# Patient Record
Sex: FEMALE | Race: WHITE | Employment: OTHER | URBAN - METROPOLITAN AREA
[De-identification: names, ages, dates, MRNs, and addresses within clinical notes are randomized per-mention and may not be internally consistent; named-entity substitution may affect disease eponyms.]

---

## 2017-05-03 ENCOUNTER — HOSPITAL ENCOUNTER (OUTPATIENT)
Dept: GENERAL RADIOLOGY | Age: 72
Discharge: HOME | End: 2017-05-03
Attending: NEUROLOGICAL SURGERY | Admitting: NEUROLOGICAL SURGERY

## 2017-05-03 DIAGNOSIS — M54.2 CERVICALGIA: ICD-10-CM

## 2017-05-03 DIAGNOSIS — M54.2 NECK PAIN: ICD-10-CM

## 2017-05-17 ENCOUNTER — HOSPITAL ENCOUNTER (OUTPATIENT)
Dept: MAMMOGRAPHY | Age: 72
Discharge: HOME | End: 2017-05-17
Attending: INTERNAL MEDICINE | Admitting: INTERNAL MEDICINE

## 2017-05-17 DIAGNOSIS — R92.8 ABNORMAL MAMMOGRAM, UNSPECIFIED: ICD-10-CM

## 2017-05-17 DIAGNOSIS — R92.8 OTHER ABNORMAL AND INCONCLUSIVE FINDINGS ON DIAGNOSTIC IMAGING OF BREAST: ICD-10-CM

## 2017-05-22 RX ORDER — NITROGLYCERIN 0.4 MG/1
0.4 TABLET SUBLINGUAL EVERY 5 MIN PRN
Qty: 25 TABLET | Refills: 3 | Status: SHIPPED | OUTPATIENT
Start: 2017-05-22 | End: 2018-03-07 | Stop reason: SDUPTHER

## 2017-06-20 LAB
ALBUMIN SERPL-MCNC: 3.8 G/DL
ALP BLD-CCNC: 64 U/L
ALT SERPL-CCNC: 29 U/L
AST SERPL-CCNC: 20 U/L
BILIRUB SERPL-MCNC: 0.5 MG/DL (ref 0.1–1.4)
BUN BLDV-MCNC: 18 MG/DL
CALCIUM SERPL-MCNC: 8.9 MG/DL
CHLORIDE BLD-SCNC: 103 MMOL/L
CO2: 23 MMOL/L
CREAT SERPL-MCNC: 1.14 MG/DL
GFR CALCULATED: 47
GLUCOSE BLD-MCNC: 101 MG/DL
POTASSIUM SERPL-SCNC: 4.5 MMOL/L
SODIUM BLD-SCNC: 138 MMOL/L
TOTAL PROTEIN: 6.2

## 2017-07-06 ENCOUNTER — OFFICE VISIT (OUTPATIENT)
Dept: CARDIOLOGY CLINIC | Age: 72
End: 2017-07-06

## 2017-07-06 VITALS
HEIGHT: 63 IN | SYSTOLIC BLOOD PRESSURE: 132 MMHG | WEIGHT: 220 LBS | DIASTOLIC BLOOD PRESSURE: 80 MMHG | BODY MASS INDEX: 38.98 KG/M2 | HEART RATE: 102 BPM | OXYGEN SATURATION: 95 %

## 2017-07-06 DIAGNOSIS — I10 ESSENTIAL HYPERTENSION: ICD-10-CM

## 2017-07-06 DIAGNOSIS — I25.10 CORONARY ARTERY DISEASE INVOLVING NATIVE CORONARY ARTERY OF NATIVE HEART WITHOUT ANGINA PECTORIS: ICD-10-CM

## 2017-07-06 DIAGNOSIS — E78.2 MIXED HYPERLIPIDEMIA: Primary | ICD-10-CM

## 2017-07-06 DIAGNOSIS — I20.9 ISCHEMIC CHEST PAIN (HCC): ICD-10-CM

## 2017-07-06 PROCEDURE — 1123F ACP DISCUSS/DSCN MKR DOCD: CPT | Performed by: INTERNAL MEDICINE

## 2017-07-06 PROCEDURE — 1090F PRES/ABSN URINE INCON ASSESS: CPT | Performed by: INTERNAL MEDICINE

## 2017-07-06 PROCEDURE — G8598 ASA/ANTIPLAT THER USED: HCPCS | Performed by: INTERNAL MEDICINE

## 2017-07-06 PROCEDURE — 3014F SCREEN MAMMO DOC REV: CPT | Performed by: INTERNAL MEDICINE

## 2017-07-06 PROCEDURE — G8399 PT W/DXA RESULTS DOCUMENT: HCPCS | Performed by: INTERNAL MEDICINE

## 2017-07-06 PROCEDURE — 1036F TOBACCO NON-USER: CPT | Performed by: INTERNAL MEDICINE

## 2017-07-06 PROCEDURE — 4040F PNEUMOC VAC/ADMIN/RCVD: CPT | Performed by: INTERNAL MEDICINE

## 2017-07-06 PROCEDURE — 3017F COLORECTAL CA SCREEN DOC REV: CPT | Performed by: INTERNAL MEDICINE

## 2017-07-06 PROCEDURE — 99214 OFFICE O/P EST MOD 30 MIN: CPT | Performed by: INTERNAL MEDICINE

## 2017-07-06 PROCEDURE — G8417 CALC BMI ABV UP PARAM F/U: HCPCS | Performed by: INTERNAL MEDICINE

## 2017-07-06 PROCEDURE — G8427 DOCREV CUR MEDS BY ELIG CLIN: HCPCS | Performed by: INTERNAL MEDICINE

## 2017-07-18 ENCOUNTER — HOSPITAL ENCOUNTER (OUTPATIENT)
Dept: NUCLEAR MEDICINE | Age: 72
Discharge: HOME | End: 2017-07-18
Attending: INTERNAL MEDICINE | Admitting: INTERNAL MEDICINE

## 2017-07-18 ENCOUNTER — TELEPHONE (OUTPATIENT)
Dept: CARDIOLOGY CLINIC | Age: 72
End: 2017-07-18

## 2017-07-18 VITALS
TEMPERATURE: 98 F | BODY MASS INDEX: 40.39 KG/M2 | RESPIRATION RATE: 16 BRPM | DIASTOLIC BLOOD PRESSURE: 85 MMHG | SYSTOLIC BLOOD PRESSURE: 175 MMHG | WEIGHT: 228 LBS | HEART RATE: 93 BPM

## 2017-07-18 DIAGNOSIS — I20.9 ANGINA PECTORIS (HCC): ICD-10-CM

## 2017-07-18 LAB
LV EF: 65 %
LVEF MODALITY: NORMAL

## 2017-07-18 RX ORDER — AMINOPHYLLINE DIHYDRATE 25 MG/ML
100 INJECTION, SOLUTION INTRAVENOUS ONCE
Status: COMPLETED | OUTPATIENT
Start: 2017-07-18 | End: 2017-07-18

## 2017-07-18 RX ADMIN — AMINOPHYLLINE DIHYDRATE 100 MG: 25 INJECTION, SOLUTION INTRAVENOUS at 09:15

## 2017-08-01 ENCOUNTER — HOSPITAL ENCOUNTER (OUTPATIENT)
Dept: OTHER | Age: 72
Discharge: HOME | End: 2017-08-01
Attending: PODIATRIST | Admitting: PODIATRIST

## 2017-08-01 DIAGNOSIS — M79.671 RIGHT FOOT PAIN: ICD-10-CM

## 2017-08-01 DIAGNOSIS — M14.679: ICD-10-CM

## 2017-08-01 DIAGNOSIS — M79.672 LEFT FOOT PAIN: ICD-10-CM

## 2017-08-16 ENCOUNTER — TELEPHONE (OUTPATIENT)
Dept: CARDIOLOGY CLINIC | Age: 72
End: 2017-08-16

## 2017-10-17 ENCOUNTER — OFFICE VISIT (OUTPATIENT)
Dept: CARDIOLOGY CLINIC | Age: 72
End: 2017-10-17

## 2017-10-17 VITALS
HEIGHT: 63 IN | WEIGHT: 233 LBS | HEART RATE: 89 BPM | OXYGEN SATURATION: 95 % | DIASTOLIC BLOOD PRESSURE: 72 MMHG | SYSTOLIC BLOOD PRESSURE: 126 MMHG | BODY MASS INDEX: 41.29 KG/M2

## 2017-10-17 DIAGNOSIS — I20.8 STABLE ANGINA PECTORIS (HCC): Primary | ICD-10-CM

## 2017-10-17 DIAGNOSIS — I25.10 CORONARY ARTERY DISEASE INVOLVING NATIVE CORONARY ARTERY OF NATIVE HEART WITHOUT ANGINA PECTORIS: ICD-10-CM

## 2017-10-17 DIAGNOSIS — R00.2 PALPITATIONS: ICD-10-CM

## 2017-10-17 DIAGNOSIS — E78.2 MIXED HYPERLIPIDEMIA: ICD-10-CM

## 2017-10-17 DIAGNOSIS — I10 ESSENTIAL HYPERTENSION: ICD-10-CM

## 2017-10-17 PROCEDURE — 1090F PRES/ABSN URINE INCON ASSESS: CPT | Performed by: INTERNAL MEDICINE

## 2017-10-17 PROCEDURE — 99215 OFFICE O/P EST HI 40 MIN: CPT | Performed by: INTERNAL MEDICINE

## 2017-10-17 PROCEDURE — G8427 DOCREV CUR MEDS BY ELIG CLIN: HCPCS | Performed by: INTERNAL MEDICINE

## 2017-10-17 PROCEDURE — G8417 CALC BMI ABV UP PARAM F/U: HCPCS | Performed by: INTERNAL MEDICINE

## 2017-10-17 PROCEDURE — G8598 ASA/ANTIPLAT THER USED: HCPCS | Performed by: INTERNAL MEDICINE

## 2017-10-17 PROCEDURE — 3014F SCREEN MAMMO DOC REV: CPT | Performed by: INTERNAL MEDICINE

## 2017-10-17 PROCEDURE — 4040F PNEUMOC VAC/ADMIN/RCVD: CPT | Performed by: INTERNAL MEDICINE

## 2017-10-17 PROCEDURE — 1036F TOBACCO NON-USER: CPT | Performed by: INTERNAL MEDICINE

## 2017-10-17 PROCEDURE — 1123F ACP DISCUSS/DSCN MKR DOCD: CPT | Performed by: INTERNAL MEDICINE

## 2017-10-17 PROCEDURE — 3017F COLORECTAL CA SCREEN DOC REV: CPT | Performed by: INTERNAL MEDICINE

## 2017-10-17 PROCEDURE — G8484 FLU IMMUNIZE NO ADMIN: HCPCS | Performed by: INTERNAL MEDICINE

## 2017-10-17 PROCEDURE — G8399 PT W/DXA RESULTS DOCUMENT: HCPCS | Performed by: INTERNAL MEDICINE

## 2017-10-17 RX ORDER — ISOSORBIDE MONONITRATE 30 MG/1
30 TABLET, EXTENDED RELEASE ORAL DAILY
Qty: 30 TABLET | Refills: 11 | Status: SHIPPED | OUTPATIENT
Start: 2017-10-17 | End: 2018-01-12 | Stop reason: SDUPTHER

## 2017-10-17 NOTE — COMMUNICATION BODY
[address]   Cardiac Followup    Referring Provider:  Martin Vasquez MD     Chief Complaint   Patient presents with    3 Month Follow-Up    Hyperlipidemia    Hypertension    Results     arlene 07/18/2017    Discuss Labs     cmp 06/201/2017 & lipids 2015    Palpitations     w/ activity    Shortness of Breath     w/ palps    Edema     little in feet    Fatigue     energy not good    Other     has gallbladder surgery 2 wks ago      Subjective: Mrs Mcrae is being seen today for follow up of CAD s/p 3V CABG in 2005, HTN, and HLD      History of Present Illness:    Mrs. Mcrae is a 71yo female with hx of CAD s/p 3V CABG in 2005, hyperlipidemia, HTN, sleep apnea and uses CPAP, Charcot-foot with neuropathy, s/p fab 10/2/17, and intolerance to statins and niacin due to myalgias. Note ECHO in May 2010 showing normal EF=55% with LAE and diastolic noncompliance. She had right foot surgery for her Charcot in April 2012 and can ambulate without pain but balance is issue with neuropathy. She states she has had 2 cataract surgeries in 2012. She underwent most recent lexiscan myoview stress testing on 04/13/2015 which was found to be abnormal with small amount of ischemia anteroseptal wall at apex and lateral wall at base. Thus, she underwent most recent cardiac cath on 4/16/15 which showed patent LIMA-LAD and vein grafts to RCA and OM1 with EF=55%. Diffuse disease in small vessels distal to grafts and medical management felt best without PCI at this time. EKG 7/2/15 showed sinus tachycardia with frequent PVC's, nonspecific ST changes. Note most recent EKG from 07/22/16 showed NSR; PVC's; nonspecific ST changes. Due to CP complaints at last OV I ordered most recent [place] 7/18/17 no evidence of  myocardial ischemia or scar. Hyperdynamic LV systolic function with PC>79%  with uniform wall motion. Note most recent EKG from 07/22/16 showed NSR; PVC's; nonspecific ST changes.      Today she reports episodes of exertional racing heart, SOB. She continues to have left anterior chest pain. No radiation of the chest pain. She will take SL nitro which will resolve her pain and symptoms. She reports occasional fatigue and BLE edema which is unchanged. She reports she is no longer taking aspirin due to nose bleeds. She is ambulating today with 4 prong cane. She reports undergoing gall bladder surgery by Dr. Snehal Lee 2 weeks ago at [facility] and has been doing well since surgery. Past Medical History:   has a past medical history of Allergic rhinitis; Arthritis; CAD (coronary artery disease); Cholelithiasis; Diabetes mellitus ([facility] [address] Hepatic steatosis; Hyperlipidemia; Hypertension; Peripheral neuropathy ([facility] [address] Sleep apnea; Thyroid disease; and Vitamin B12 deficiency. Surgical History:   has a past surgical history that includes Coronary artery bypass graft (2005); Foot surgery; colectomy (1998); Colonoscopy; Upper gastrointestinal endoscopy; Tonsillectomy and adenoidectomy; Tubal ligation; other surgical history (4/17/2012); other surgical history (Right, 3/20/13); Coronary angioplasty; and Cholecystectomy. Social History:  She is  and lives alone in [facility]. She reports that she quit smoking about 13 years ago. She has never used smokeless tobacco. She reports that she does not drink alcohol or use illicit drugs. Family History:  family history is not on file. Home Medications:  Prior to Admission medications    Medication Sig Start Date End Date Taking? Authorizing Provider   loratadine (ALAVERT) 10 MG tablet Take 10 mg by mouth 2 times daily as needed. Yes Historical Provider, MD   GLIPIZIDE Take 5 mg by mouth 2 times daily (with meals). Yes Historical Provider, MD   gabapentin (NEURONTIN) 600 MG tablet Take 1,200 mg by mouth 3 times daily.  May take up to 4x per day if needed for neuropathy pain   Yes Historical Provider, MD   tizanidine (ZANAFLEX) 4 MG tablet Take 8 mg Not at goal but overall not terrible considering she cannot take statin. She does NOT want any consideration for new PCSK9 inhibitors. 2. Hypertension : Stable and will continue present medical regimen. 3. CAD (coronary artery disease): Note she does not have history of MI and LV function is normal.  There is no need for beta blocker at this time. Most recent [facility] 7/18/17 no evidence of  myocardial ischemia or scar. Hyperdynamic LV systolic function with CN>49%  with uniform wall motion     4. Chest pain:   Taking SL NTG relieves the pain. She has documented CAD s/p CABG  Most recent Lexiscan 7/18/17 normal and may be due to small branch disease distally. Will start imdur 30mg daily for anti-anginal and see if helps alleviate symptoms. 5. Palpitations:  ? SVT or ectopy or sinus tachycardia. Will order 2 week event monitor. Plan:  1. I will order a 2 week event monitor Further recs after results. 2. Follow up with me in 9 month  3. Restart Aspirin 81 mg 1 tab twice per week or as tolerated  4. Will recheck fasting lipids  5. Chest pain may be due to small branch disease distally. Will start imdur 30mg daily for anti-anginal and see if helps alleviate symptoms. Cost of prescription medications and patient compliance have been reviewed with patient. All questions answered. Thank you for allowing me to participate in the care of this individual.     Duane Connor.  Julio Miner M.D., [facility] [place]

## 2017-10-17 NOTE — PROGRESS NOTES
production. No hematemesis. · Gastrointestinal: No abdominal pain, appetite loss, blood in stools. No change in bowel or bladder habits. · Genitourinary: No dysuria, trouble voiding, or hematuria. · Musculoskeletal:  No gait disturbance, weakness or joint complaints. · Integumentary: No rash or pruritis. · Neurological: No headache, diplopia, change in muscle strength, numbness or tingling. No change in gait, balance, coordination, mood, affect, memory, mentation, behavior. · Psychiatric: No anxiety, no depression. · Endocrine: No malaise, fatigue or temperature intolerance. No excessive thirst, fluid intake, or urination. No tremor. · Hematologic/Lymphatic: No abnormal bruising or bleeding, blood clots or swollen lymph nodes. · Allergic/Immunologic: No nasal congestion or hives. Physical Examination:    Vitals:    10/17/17 1240   BP: 126/72   Pulse: 89   SpO2: 95%        Constitutional and General Appearance: NAD   Respiratory:  · Normal excursion and expansion without use of accessory muscles  · Resp Auscultation: Normal breath sounds without dullness  Cardiovascular:  · The apical impulses not displaced  · Heart tones are crisp and normal  · Cervical veins are not engorged  · The carotid upstroke is normal in amplitude and contour without delay or bruit  · Normal S1S2, No S3, Soft systolic ejection murmur  · Peripheral pulses are symmetrical and full  · There is no clubbing, cyanosis of the extremities.               1+ BLE edema  · Femoral Arteries: 2+ and equal  · Pedal Pulses: 2+ and equal   Abdomen:  · Obese,No masses or tenderness  · Liver/Spleen: No Abnormalities Noted  Neurological/Psychiatric:  · Alert and oriented in all spheres  · Moves all extremities well  · Exhibits normal gait balance and coordination  · No abnormalities of mood, affect, memory, mentation, or behavior are noted    NM Stress Test: 04/13/2015  Small areas of stress-induced myocardial ischemia within the   anteroseptal wall at the apex and the lateral wall at the base. Cardiac Cath: 04/16/2015  LM: calcified, entire vessel 30% with 40% at distal bifurcation  LAD: heavily diseased, calcified, prox/mid 50%, tapers to small vessel and does not appear to wrap the apex. No competitive flow seen, Diag 1 with mild diffuse disease  LCX: prox with mod-severe disease. prox 70% then 70% lesion. Bifurcation with OM is 80% Competitive flow seen in OM1. RCA: 100% occluded in proximal portion. Dominant    LIMA-LAD: patent, unusual takeoff-possible posterior, touches down to mid LAD with only tiny/insignificant flow into LAD flowing in retrograde and anterograde direction  SVG-OM1: widely patent; Touches to OM1 with good anterograde flow, some but smaller retrograde flow up to Lcx and into OM2. This section is moderately diseased  SVG-RCA: patent, somewhat downward takeoff, Touches to distal RCA with retro/antegograde flow. LVEFP 20  LVEF: 55%    PLAN  1. Severe native CAD as above- medical management  2. Patent LIMA and SVG x 2. LIMA provides flow to a small insignificant LAD distal vessel    Lab Results   Component Value Date     06/20/2017    K 4.5 06/20/2017     06/20/2017    CO2 23 06/20/2017    BUN 18 06/20/2017    CREATININE 1.14 06/20/2017    GLUCOSE 101 06/20/2017    CALCIUM 8.9 06/20/2017    PROT 6.6 07/22/2016    LABALBU 3.8 06/20/2017    BILITOT 0.5 06/20/2017    ALKPHOS 64 06/20/2017    AST 20 06/20/2017    ALT 29 06/20/2017    LABGLOM 47 06/20/2017    GFRAA 60 (A) 11/18/2016    AGRATIO 1.8 07/22/2016    GLOB 2.4 07/22/2016       Assessment:     1. Hyperlipidemia : Last lipids 4/16/15 Cholesterol 227 Triglycerides 179   HDL 39   LDL Calculated 152   She is unable to take statins or niacin due to severe myagias. She states she is taking 2 tablets of fish oil and red yeast extract. With her intolerance to statins and niacin I do not order lipid panels. She has labs in April as part of routine set prior to cath. Not at goal but overall not terrible considering she cannot take statin. She does NOT want any consideration for new PCSK9 inhibitors. 2. Hypertension : Stable and will continue present medical regimen. 3. CAD (coronary artery disease): Note she does not have history of MI and LV function is normal.  There is no need for beta blocker at this time. Most recent [facility] 7/18/17 no evidence of  myocardial ischemia or scar. Hyperdynamic LV systolic function with TF>95%  with uniform wall motion     4. Chest pain:   Taking SL NTG relieves the pain. She has documented CAD s/p CABG  Most recent Lexiscan 7/18/17 normal and may be due to small branch disease distally. Will start imdur 30mg daily for anti-anginal and see if helps alleviate symptoms. 5. Palpitations:  ? SVT or ectopy or sinus tachycardia. Will order 2 week event monitor. Plan:  1. I will order a 2 week event monitor Further recs after results. 2. Follow up with me in 9 month  3. Restart Aspirin 81 mg 1 tab twice per week or as tolerated  4. Will recheck fasting lipids  5. Chest pain may be due to small branch disease distally. Will start imdur 30mg daily for anti-anginal and see if helps alleviate symptoms. Cost of prescription medications and patient compliance have been reviewed with patient. All questions answered. Thank you for allowing me to participate in the care of this individual.     Deloris Richard.  Xiomara Killian M.D., [facility] [place]

## 2017-10-18 ENCOUNTER — HOSPITAL ENCOUNTER (OUTPATIENT)
Dept: OTHER | Age: 72
Discharge: HOME | End: 2017-10-18
Attending: INTERNAL MEDICINE | Admitting: INTERNAL MEDICINE

## 2017-10-18 LAB
CHOLESTEROL, FASTING: 199 MG/DL (ref 0–199)
HDLC SERPL-MCNC: 34 MG/DL (ref 40–60)
LDL CHOLESTEROL CALCULATED: 108 MG/DL
TRIGLYCERIDE, FASTING: 284 MG/DL (ref 0–150)
VLDLC SERPL CALC-MCNC: 57 MG/DL

## 2017-10-19 ENCOUNTER — TELEPHONE (OUTPATIENT)
Dept: CARDIOLOGY CLINIC | Age: 72
End: 2017-10-19

## 2017-10-19 NOTE — TELEPHONE ENCOUNTER
----- Message from Carrie Roberts MD sent at 10/18/2017  4:19 PM EDT -----  Note TC and LDL definitely improved compared to 2015 labs. TG are too high. Continue to be as aggressive as possible with diet to watch cholesterol. Would not qualify for PCSK9 inhibitors and cannot tolerate statins.

## 2017-12-27 PROBLEM — R79.89 ELEVATED TROPONIN: Status: ACTIVE | Noted: 2017-12-27

## 2017-12-27 PROBLEM — I21.4 NSTEMI (NON-ST ELEVATED MYOCARDIAL INFARCTION) (HCC): Status: ACTIVE | Noted: 2017-12-27

## 2017-12-27 PROBLEM — E66.9 OBESITY: Status: ACTIVE | Noted: 2017-12-27

## 2017-12-27 PROBLEM — R11.10 VOMITING: Status: ACTIVE | Noted: 2017-12-27

## 2017-12-27 PROBLEM — R77.8 ELEVATED TROPONIN: Status: ACTIVE | Noted: 2017-12-27

## 2017-12-27 PROBLEM — I20.0 UNSTABLE ANGINA (HCC): Status: ACTIVE | Noted: 2017-12-27

## 2017-12-28 ENCOUNTER — TELEPHONE (OUTPATIENT)
Dept: CARDIOLOGY | Age: 72
End: 2017-12-28

## 2017-12-28 DIAGNOSIS — I50.22 CHRONIC SYSTOLIC HEART FAILURE (HCC): ICD-10-CM

## 2017-12-28 DIAGNOSIS — I25.708 CORONARY ARTERY DISEASE OF BYPASS GRAFT OF NATIVE HEART WITH STABLE ANGINA PECTORIS (HCC): Primary | ICD-10-CM

## 2017-12-28 LAB
LEFT VENTRICULAR EJECTION FRACTION HIGH VALUE: 55 %
LEFT VENTRICULAR EJECTION FRACTION MODE: NORMAL

## 2017-12-28 NOTE — TELEPHONE ENCOUNTER
Please schedule hospital follow up appt with me at 1/12/18 at [place] at 930. appt listed in her d/c instructions.

## 2018-01-05 ENCOUNTER — HOSPITAL ENCOUNTER (OUTPATIENT)
Dept: OTHER | Age: 73
Discharge: HOME | End: 2018-01-05
Attending: NURSE PRACTITIONER | Admitting: NURSE PRACTITIONER

## 2018-01-05 LAB
ANION GAP SERPL CALCULATED.3IONS-SCNC: 11 MMOL/L (ref 3–16)
BUN BLDV-MCNC: 18 MG/DL (ref 7–20)
CALCIUM SERPL-MCNC: 9.4 MG/DL (ref 8.3–10.6)
CHLORIDE BLD-SCNC: 104 MMOL/L (ref 99–110)
CO2: 28 MMOL/L (ref 21–32)
CREAT SERPL-MCNC: 1.2 MG/DL (ref 0.6–1.2)
GFR AFRICAN AMERICAN: 54
GFR NON-AFRICAN AMERICAN: 44
GLUCOSE BLD-MCNC: 151 MG/DL (ref 70–99)
POTASSIUM SERPL-SCNC: 4.8 MMOL/L (ref 3.5–5.1)
SODIUM BLD-SCNC: 143 MMOL/L (ref 136–145)

## 2018-01-08 ENCOUNTER — TELEPHONE (OUTPATIENT)
Dept: CARDIOLOGY CLINIC | Age: 73
End: 2018-01-08

## 2018-01-12 ENCOUNTER — OFFICE VISIT (OUTPATIENT)
Dept: CARDIOLOGY CLINIC | Age: 73
End: 2018-01-12

## 2018-01-12 VITALS
BODY MASS INDEX: 41.29 KG/M2 | DIASTOLIC BLOOD PRESSURE: 74 MMHG | HEART RATE: 74 BPM | OXYGEN SATURATION: 97 % | HEIGHT: 63 IN | SYSTOLIC BLOOD PRESSURE: 122 MMHG | WEIGHT: 233 LBS

## 2018-01-12 DIAGNOSIS — R93.1 LVEF <40%: ICD-10-CM

## 2018-01-12 DIAGNOSIS — E78.2 MIXED HYPERLIPIDEMIA: ICD-10-CM

## 2018-01-12 DIAGNOSIS — I25.708 CORONARY ARTERY DISEASE OF BYPASS GRAFT OF NATIVE HEART WITH STABLE ANGINA PECTORIS (HCC): Primary | ICD-10-CM

## 2018-01-12 DIAGNOSIS — I10 ESSENTIAL HYPERTENSION: ICD-10-CM

## 2018-01-12 PROCEDURE — 3017F COLORECTAL CA SCREEN DOC REV: CPT | Performed by: NURSE PRACTITIONER

## 2018-01-12 PROCEDURE — 1123F ACP DISCUSS/DSCN MKR DOCD: CPT | Performed by: NURSE PRACTITIONER

## 2018-01-12 PROCEDURE — 99214 OFFICE O/P EST MOD 30 MIN: CPT | Performed by: NURSE PRACTITIONER

## 2018-01-12 PROCEDURE — G8598 ASA/ANTIPLAT THER USED: HCPCS | Performed by: NURSE PRACTITIONER

## 2018-01-12 PROCEDURE — G8399 PT W/DXA RESULTS DOCUMENT: HCPCS | Performed by: NURSE PRACTITIONER

## 2018-01-12 PROCEDURE — 1111F DSCHRG MED/CURRENT MED MERGE: CPT | Performed by: NURSE PRACTITIONER

## 2018-01-12 PROCEDURE — 4040F PNEUMOC VAC/ADMIN/RCVD: CPT | Performed by: NURSE PRACTITIONER

## 2018-01-12 PROCEDURE — 1090F PRES/ABSN URINE INCON ASSESS: CPT | Performed by: NURSE PRACTITIONER

## 2018-01-12 PROCEDURE — G8427 DOCREV CUR MEDS BY ELIG CLIN: HCPCS | Performed by: NURSE PRACTITIONER

## 2018-01-12 PROCEDURE — G8417 CALC BMI ABV UP PARAM F/U: HCPCS | Performed by: NURSE PRACTITIONER

## 2018-01-12 PROCEDURE — G8484 FLU IMMUNIZE NO ADMIN: HCPCS | Performed by: NURSE PRACTITIONER

## 2018-01-12 PROCEDURE — 3014F SCREEN MAMMO DOC REV: CPT | Performed by: NURSE PRACTITIONER

## 2018-01-12 PROCEDURE — 1036F TOBACCO NON-USER: CPT | Performed by: NURSE PRACTITIONER

## 2018-01-12 RX ORDER — ISOSORBIDE MONONITRATE 60 MG/1
60 TABLET, EXTENDED RELEASE ORAL DAILY
Qty: 90 TABLET | Refills: 1 | Status: ON HOLD | OUTPATIENT
Start: 2018-01-12 | End: 2018-06-19 | Stop reason: HOSPADM

## 2018-01-12 NOTE — PROGRESS NOTES
(FISH OIL) 1000 MG CAPS Take 2 capsules by mouth daily. 4/3/15  Yes Shahriar Streeter MD   levothyroxine (SYNTHROID) 50 MCG tablet Take 50 mcg by mouth Daily. Yes Historical Provider, MD   GLIPIZIDE Take 5 mg by mouth 2 times daily (with meals) Taking 10 mg AM & 5 mg in PM   Yes Historical Provider, MD   gabapentin (NEURONTIN) 600 MG tablet Take 600 mg by mouth 3 times daily May take up to 4x per day if needed for neuropathy pain   Yes Historical Provider, MD   tizanidine (ZANAFLEX) 4 MG tablet Take 8 mg by mouth nightly. Yes Historical Provider, MD   diphenhydrAMINE (BENADRYL) 25 MG tablet Take 25 mg by mouth nightly as needed. Yes Historical Provider, MD   ranitidine (ZANTAC 75) 75 MG tablet Take 75 mg by mouth daily. Yes Historical Provider, MD   Cholecalciferol (VITAMIN D3) 1000 UNIT CAPS Take 1 capsule by mouth daily. Yes Historical Provider, MD   furosemide (LASIX) 40 MG tablet Take 40 mg by mouth as needed. Yes Historical Provider, MD        Allergies:  Niacin and related; Adhesive tape; Other; Statins depletion therapy; and Vancomycin     Review of Systems:   · Constitutional: there has been no unanticipated weight loss. · Eyes: No vision changes  · ENT: No Headaches, no nasal congestion. No mouth sores or sore throat. · Cardiovascular: Reviewed in HPI  · Respiratory: dry occasional cough , no wheezing, no sputum production. · Gastrointestinal: No abdominal pain, no constipation or diarrhea, + occ nausea  · Genitourinary: No dysuria, trouble voiding, or hematuria. · Musculoskeletal:  + weakness or joint complaints. · Integumentary: No rash or pruritis. · Neurological: No numbness or tingling. No weakness. No tremor. · Psychiatric: No anxiety, no depression. · Endocrine:  No excessive thirst or urination. · Hematologic/Lymphatic: No abnormal bruising or bleeding, blood clots or swollen lymph nodes.     Physical Examination:    Vitals:    01/12/18 0934   BP: 122/74   Pulse: 74

## 2018-01-12 NOTE — PATIENT INSTRUCTIONS
the camera. Small pads or patches (electrodes) will be attached to your chest.  The injection site on your arm will be cleaned. A needle will be put into a vein and a blood sample will be taken. Then the tracer will be added to the blood, which is put back into your vein. The camera will be positioned close to your body. It may be positioned in different places across your chest to get different views of your heart. The camera will take pictures as the tracer moves through your bloodstream and into your heart. It is important not to move during the scan. You may be asked to:  Change position for each different view. Do some exercise between scans to see how well your heart functions after the stress of exercise. What else should you know about the test?  A MUGA scan can be done both before and after your heart is stressed with exercise or medicine. You may feel a quick sting or pinch when the needle is put into your arm. The camera doesn't produce any radiation, so you aren't exposed to any more radiation while the scan is being done. How long does the test take? The test may take about 1 to 2 hours. What happens after the test?  After your scan, you will probably be able to leave the testing room right away. You may have to wait at the test center until all of your scan pictures have been reviewed. If you moved during the scan and the pictures turned out blurry, the scan may have to be done again. Drink lots of water and urinate often after your scan. This helps your body flush out the tracer. If you have kidney, heart, or liver disease and have to limit fluids, talk with your doctor before you increase the amount of fluids you drink. Follow-up care is a key part of your treatment and safety. Be sure to make and go to all appointments, and call your doctor if you are having problems. It's also a good idea to keep a list of the medicines you take.  Ask your doctor when you can expect to have your test results. Where can you learn more? Go to [email or web address] and sign in to your [employer] account. Enter Z031 in the GoSave box to learn more about \"MUGA Scan: About This Test.\"     If you do not have an account, please click on the \"Sign Up Now\" link. Current as of: September 21, 2016  Content Version: 11.5  © 5330-1490 Healthwise, Incorporated. Care instructions adapted under license by [facility] ([facility]). If you have questions about a medical condition or this instruction, always ask your healthcare professional. [address] any warranty or liability for your use of this information.

## 2018-01-12 NOTE — Clinical Note
Hi, saw your patient, hospital follow up. Continues with angina. I increased her imdur and ordered MUGA to actually verify her LVEF given the cath said 20% and the echo said 55%. I have her coming back in 4 weeks to see me to go over those results and decide a plan if I need to titrate any heart failure therapy. Otherwise, I she will be following back up with you. Thanks and let me know if you would like for me do to anything different in her management.  Jhonatan Thornton

## 2018-01-15 NOTE — COMMUNICATION BODY
History:   has a past surgical history that includes Coronary artery bypass graft (2005); Foot surgery; colectomy (1998); Colonoscopy; Upper gastrointestinal endoscopy; Tonsillectomy and adenoidectomy; Tubal ligation; other surgical history (4/17/2012); other surgical history (Right, 3/20/13); Coronary angioplasty; and Cholecystectomy. Social History:   reports that she quit smoking about 19 years ago. She has a 30.00 pack-year smoking history. She has never used smokeless tobacco. She reports that she does not drink alcohol or use drugs. Family History:   No family history on file. Home Medications:  Prior to Admission medications    Medication Sig Start Date End Date Taking? Authorizing Provider   aspirin 81 MG EC tablet Take 1 tablet by mouth daily 12/29/17  Yes Maikel Cantrell MD   metFORMIN (GLUCOPHAGE) 1000 MG tablet Take 1 tablet by mouth 2 times daily (with meals) 12/29/17  Yes Maikel Cantrell MD   lisinopril (PRINIVIL;ZESTRIL) 5 MG tablet Take 1 tablet by mouth 2 times daily 12/28/17  Yes Maikel Cantrell MD   carvedilol (COREG) 6.25 MG tablet Take 1 tablet by mouth 2 times daily (with meals) 12/28/17  Yes Maikel Cantrell MD   isosorbide mononitrate (IMDUR) 30 MG extended release tablet Take 1 tablet by mouth daily 10/17/17  Yes Elijah Holcomb MD   nitroGLYCERIN (NITROSTAT) 0.4 MG SL tablet Place 1 tablet under the tongue every 5 minutes as needed for Chest pain 5/22/17  Yes Elijah Holcomb MD   Cyanocobalamin (B-12 SL) Place under the tongue   Yes Historical Provider, MD   tolterodine (DETROL LA) 2 MG ER capsule Take 4 mg by mouth daily    Yes Historical Provider, MD   magnesium oxide (MAG-OX) 400 MG tablet Take 200 mg by mouth daily    Yes Historical Provider, MD   fluticasone (FLONASE) 50 MCG/ACT nasal spray 1 spray by Nasal route daily. Yes Historical Provider, MD   Red Yeast Rice 600 MG TABS Take 2 tablets by mouth daily.  4/3/15  Yes Elijah Holcomb MD   Omega-3 Fatty Acids (FISH OIL) 1000 MG CAPS Take 2 capsules by mouth daily. 4/3/15  Yes Kendall Bradley MD   levothyroxine (SYNTHROID) 50 MCG tablet Take 50 mcg by mouth Daily. Yes Historical Provider, MD   GLIPIZIDE Take 5 mg by mouth 2 times daily (with meals) Taking 10 mg AM & 5 mg in PM   Yes Historical Provider, MD   gabapentin (NEURONTIN) 600 MG tablet Take 600 mg by mouth 3 times daily May take up to 4x per day if needed for neuropathy pain   Yes Historical Provider, MD   tizanidine (ZANAFLEX) 4 MG tablet Take 8 mg by mouth nightly. Yes Historical Provider, MD   diphenhydrAMINE (BENADRYL) 25 MG tablet Take 25 mg by mouth nightly as needed. Yes Historical Provider, MD   ranitidine (ZANTAC 75) 75 MG tablet Take 75 mg by mouth daily. Yes Historical Provider, MD   Cholecalciferol (VITAMIN D3) 1000 UNIT CAPS Take 1 capsule by mouth daily. Yes Historical Provider, MD   furosemide (LASIX) 40 MG tablet Take 40 mg by mouth as needed. Yes Historical Provider, MD        Allergies:  Niacin and related; Adhesive tape; Other; Statins depletion therapy; and Vancomycin     Review of Systems:   · Constitutional: there has been no unanticipated weight loss. · Eyes: No vision changes  · ENT: No Headaches, no nasal congestion. No mouth sores or sore throat. · Cardiovascular: Reviewed in HPI  · Respiratory: dry occasional cough , no wheezing, no sputum production. · Gastrointestinal: No abdominal pain, no constipation or diarrhea, + occ nausea  · Genitourinary: No dysuria, trouble voiding, or hematuria. · Musculoskeletal:  + weakness or joint complaints. · Integumentary: No rash or pruritis. · Neurological: No numbness or tingling. No weakness. No tremor. · Psychiatric: No anxiety, no depression. · Endocrine:  No excessive thirst or urination. · Hematologic/Lymphatic: No abnormal bruising or bleeding, blood clots or swollen lymph nodes.     Physical Examination:    Vitals:    01/12/18 0934   BP: 122/74   Pulse: 74 SpO2: 97%   Weight: 233 lb (105.7 kg)   Height: 5' 3\" (1.6 m)        Constitutional and General Appearance: no apparent distress  HEENT: non-icteric sclera, oropharynx without exudate, oral mucosa moist  Neck: JVP less than 8 cm H20  Respiratory:  · No use of accessory muscles  · Clear breath sounds throughout, no wheezing, no crackles, no rhonchi  Cardiovascular:  · The apical impulses not displaced  · + soft murmur, no rub/gallop  · Regular rate and rhythm, S1,S2 normal  · Radial pulses 2+ and equal bilaterally  · + BLE edema  · Pedal Pulses: 1+ and equal   · Right groin without hematoma or bruising 2+ right femoral pulse  Abdomen:  · No masses or tenderness  · Liver: No Abnormalities Noted  Musculoskeletal/Skin:  · Walks with cane  · There is no clubbing, cyanosis of the extremities  · Skin is warm and dry  · Moves all extremities well  Neurological/Psychiatric:  · Alert and oriented in all spheres  · No abnormalities of mood, affect, memory, mentation, or behavior are noted    Lab Data:  CBC:   Lab Results   Component Value Date    WBC 6.4 12/28/2017    WBC 8.9 12/27/2017    WBC 8.2 07/23/2016    RBC 4.48 12/28/2017    RBC 5.31 12/27/2017    RBC 4.86 07/23/2016    HGB 13.3 12/28/2017    HGB 16.0 12/27/2017    HGB 14.6 07/23/2016    HCT 40.6 12/28/2017    HCT 47.7 12/27/2017    HCT 44.5 07/23/2016    MCV 90.6 12/28/2017    MCV 89.8 12/27/2017    MCV 91.7 07/23/2016    RDW 13.9 12/28/2017    RDW 13.3 12/27/2017    RDW 13.9 07/23/2016     12/28/2017     12/27/2017     07/23/2016     BMP:   Lab Results   Component Value Date     01/05/2018     12/28/2017     12/27/2017    K 4.8 01/05/2018    K 4.1 12/28/2017    K 4.6 12/27/2017     01/05/2018    CL 98 12/28/2017    CL 96 12/27/2017    CO2 28 01/05/2018    CO2 28 12/28/2017    CO2 25 12/27/2017    BUN 18 01/05/2018    BUN 22 12/28/2017    BUN 24 12/27/2017    CREATININE 1.2 01/05/2018    CREATININE 1.0 12/28/2017 patient on anti-platelet: Yes  5. CAD patient on STATIN therapy:  No, allergy  6. Patient with CHF and aFib on anticoagulation:  NA     I appreciate the opportunity for caring for this patient.      Jamie Davis CNP, 1/12/2018, 9:32 AM

## 2018-01-23 ENCOUNTER — HOSPITAL ENCOUNTER (OUTPATIENT)
Dept: NUCLEAR MEDICINE | Age: 73
Discharge: HOME | End: 2018-01-23
Attending: NURSE PRACTITIONER | Admitting: NURSE PRACTITIONER

## 2018-01-23 DIAGNOSIS — I25.708 ATHEROSCLEROSIS OF CORONARY ARTERY BYPASS GRAFT(S), UNSPECIFIED, WITH OTHER FORMS OF ANGINA PECTORIS (HCC): ICD-10-CM

## 2018-01-23 LAB
LV EF: 57 %
LVEF MODALITY: NORMAL

## 2018-01-23 RX ADMIN — Medication 20.7 MILLICURIE: at 10:42

## 2018-02-23 ENCOUNTER — OFFICE VISIT (OUTPATIENT)
Dept: CARDIOLOGY CLINIC | Age: 73
End: 2018-02-23

## 2018-02-23 VITALS
HEART RATE: 68 BPM | SYSTOLIC BLOOD PRESSURE: 122 MMHG | OXYGEN SATURATION: 93 % | HEIGHT: 63 IN | WEIGHT: 236 LBS | DIASTOLIC BLOOD PRESSURE: 76 MMHG | BODY MASS INDEX: 41.82 KG/M2

## 2018-02-23 DIAGNOSIS — I25.708 CORONARY ARTERY DISEASE OF BYPASS GRAFT OF NATIVE HEART WITH STABLE ANGINA PECTORIS (HCC): Primary | ICD-10-CM

## 2018-02-23 DIAGNOSIS — Z88.8 ALLERGY TO STATIN MEDICATION: ICD-10-CM

## 2018-02-23 DIAGNOSIS — I10 ESSENTIAL HYPERTENSION: ICD-10-CM

## 2018-02-23 DIAGNOSIS — E78.2 MIXED HYPERLIPIDEMIA: ICD-10-CM

## 2018-02-23 PROCEDURE — G8399 PT W/DXA RESULTS DOCUMENT: HCPCS | Performed by: NURSE PRACTITIONER

## 2018-02-23 PROCEDURE — 1090F PRES/ABSN URINE INCON ASSESS: CPT | Performed by: NURSE PRACTITIONER

## 2018-02-23 PROCEDURE — 1123F ACP DISCUSS/DSCN MKR DOCD: CPT | Performed by: NURSE PRACTITIONER

## 2018-02-23 PROCEDURE — 4040F PNEUMOC VAC/ADMIN/RCVD: CPT | Performed by: NURSE PRACTITIONER

## 2018-02-23 PROCEDURE — 3017F COLORECTAL CA SCREEN DOC REV: CPT | Performed by: NURSE PRACTITIONER

## 2018-02-23 PROCEDURE — 3014F SCREEN MAMMO DOC REV: CPT | Performed by: NURSE PRACTITIONER

## 2018-02-23 PROCEDURE — G8427 DOCREV CUR MEDS BY ELIG CLIN: HCPCS | Performed by: NURSE PRACTITIONER

## 2018-02-23 PROCEDURE — G8417 CALC BMI ABV UP PARAM F/U: HCPCS | Performed by: NURSE PRACTITIONER

## 2018-02-23 PROCEDURE — 99214 OFFICE O/P EST MOD 30 MIN: CPT | Performed by: NURSE PRACTITIONER

## 2018-02-23 PROCEDURE — G8484 FLU IMMUNIZE NO ADMIN: HCPCS | Performed by: NURSE PRACTITIONER

## 2018-02-23 PROCEDURE — G8598 ASA/ANTIPLAT THER USED: HCPCS | Performed by: NURSE PRACTITIONER

## 2018-02-23 PROCEDURE — 1036F TOBACCO NON-USER: CPT | Performed by: NURSE PRACTITIONER

## 2018-02-23 RX ORDER — CARVEDILOL 6.25 MG/1
3.12 TABLET ORAL 2 TIMES DAILY WITH MEALS
Qty: 60 TABLET | Refills: 3 | Status: ON HOLD
Start: 2018-02-23 | End: 2018-06-19 | Stop reason: HOSPADM

## 2018-02-23 RX ORDER — PANTOPRAZOLE SODIUM 20 MG/1
20 TABLET, DELAYED RELEASE ORAL DAILY
Status: ON HOLD | COMMUNITY
End: 2018-06-19 | Stop reason: HOSPADM

## 2018-02-23 NOTE — PROGRESS NOTES
complaints. · Integumentary: No rash or pruritis. · Neurological: No numbness or tingling. No weakness. No tremor. · Psychiatric: No anxiety, no depression. · Endocrine:  No excessive thirst or urination. · Hematologic/Lymphatic: No abnormal bruising or bleeding, blood clots or swollen lymph nodes.     Physical Examination:    Vitals:    02/23/18 1044   BP: 122/76   Pulse: 68   SpO2: 93%   Weight: 236 lb (107 kg)   Height: 5' 3\" (1.6 m)        Constitutional and General Appearance: no apparent distress, obese  HEENT: non-icteric sclera, oropharynx without exudate, oral mucosa moist  Neck: JVP less than 8 cm H20  Respiratory:  · No use of accessory muscles  · Clear breath sounds throughout, no wheezing, no crackles, no rhonchi  Cardiovascular:  · The apical impulses not displaced  · + soft murmur, no rub/gallop  · Regular rate and rhythm, S1,S2 normal  · Radial pulses 2+ and equal bilaterally  · Trace BLE edema  · Pedal Pulses: 1+ and equal   Abdomen:  · No masses or tenderness  · Liver: No Abnormalities Noted  Musculoskeletal/Skin:  · Walks with cane  · There is no clubbing, cyanosis of the extremities  · Skin is warm and dry  · Moves all extremities well  Neurological/Psychiatric:  · Alert and oriented in all spheres  · No abnormalities of mood, affect, memory, mentation, or behavior are noted    Lab Data:  CBC:   Lab Results   Component Value Date    WBC 6.4 12/28/2017    WBC 8.9 12/27/2017    WBC 8.2 07/23/2016    RBC 4.48 12/28/2017    RBC 5.31 12/27/2017    RBC 4.86 07/23/2016    HGB 13.3 12/28/2017    HGB 16.0 12/27/2017    HGB 14.6 07/23/2016    HCT 40.6 12/28/2017    HCT 47.7 12/27/2017    HCT 44.5 07/23/2016    MCV 90.6 12/28/2017    MCV 89.8 12/27/2017    MCV 91.7 07/23/2016    RDW 13.9 12/28/2017    RDW 13.3 12/27/2017    RDW 13.9 07/23/2016     12/28/2017     12/27/2017     07/23/2016     BMP:   Lab Results   Component Value Date     01/05/2018     12/28/2017    NA 137 12/27/2017    K 4.8 01/05/2018    K 4.1 12/28/2017    K 4.6 12/27/2017     01/05/2018    CL 98 12/28/2017    CL 96 12/27/2017    CO2 28 01/05/2018    CO2 28 12/28/2017    CO2 25 12/27/2017    BUN 18 01/05/2018    BUN 22 12/28/2017    BUN 24 12/27/2017    CREATININE 1.2 01/05/2018    CREATININE 1.0 12/28/2017    CREATININE 1.1 12/27/2017     BNP:   Lab Results   Component Value Date    PROBNP 208 12/27/2017    PROBNP 404 07/02/2015     Lab Results   Component Value Date    CHOL 227 (H) 04/16/2015     Lab Results   Component Value Date    TRIG 179 (H) 04/16/2015     Lab Results   Component Value Date    HDL 34 (L) 10/18/2017    HDL 39 (L) 04/16/2015     Lab Results   Component Value Date    LDLCALC 108 (H) 10/18/2017    LDLCALC 152 (H) 04/16/2015     Lab Results   Component Value Date    LABVLDL 57 10/18/2017    LABVLDL 36 04/16/2015     No results found for: CHOLHDLRATIO      Recent Testing:  NM Stress Test: 04/13/2015  Small areas of stress-induced myocardial ischemia within the   anteroseptal wall at the apex and the lateral wall at the base.      Cardiac Cath: 04/16/2015  LM: calcified, entire vessel 30% with 40% at distal bifurcation  LAD: heavily diseased, calcified, prox/mid 50%, tapers to small vessel and does not appear to wrap the apex. No competitive flow seen, Diag 1 with mild diffuse disease  LCX: prox with mod-severe disease. prox 70% then 70% lesion. Bifurcation with OM is 80% Competitive flow seen in OM1. RCA: 100% occluded in proximal portion. Dominant  LIMA-LAD: patent, unusual takeoff-possible posterior, touches down to mid LAD with only tiny/insignificant flow into LAD flowing in retrograde and anterograde direction  SVG-OM1: widely patent; Touches to OM1 with good anterograde flow, some but smaller retrograde flow up to Lcx and into OM2.  This section is moderately diseased  SVG-RCA: patent, somewhat downward takeoff, Touches to distal RCA with retro/antegograde flow.      LVEFP 20  LVEF: 55%     PLAN  1. Severe native CAD as above- medical management  2. Patent LIMA and SVG x 2. LIMA provides flow to a small insignificant LAD distal vessel    C 12/27/17  Procedures: LHC, CA, LVG, LIMA, SVG x 2, sedation  LM 50%  LAD 50% prox, 100% mid  Cx 80% mid              OM2 90%               Cx/Om system is small and diffusely diseased     % ostium (from prior cath)  LIMA to LAD  SVG to RCA 50% distal  SVG to OM2 100% ostium ( new since prior cath 2015  LVEF 20%  Severe small artery disease  Progressive decline LVEF  Med mgmt  Consider ICD if LVEF remains low post OMT    Echo 12/28/17  Summary   Technically difficult examination secondary to habitus.   Left ventricular systolic function is normal with the ejection fraction estimated at 55%.   Abnormal (paradoxical) septal motion is present likely due to post operative status.   Grade I diastolic dysfunction with normal filling pressure.   Left ventricular systolic function has improved from cardiac cath done 12/27/2017 when it was 20%. MuGA 1/23/18  MUGA Conclusions    Normal resting left ventricular function with ejection fraction of 56.9 %.             Pertinent Problems:  · CAD s/p 3 vessel CABG 2005  · CAD of SVG to OM2 graft 100%  · Angina- likely due to severe small artery disease  · HLD unable to take statins or niacin due to severe myalgias   · HTN  · LVEF 20% on cardiac cath, however echo shows normal LVEF at 55%; LVEF 56% on MUGA    Visit Diagnosis:    1. Coronary artery disease of bypass graft of native heart with stable angina pectoris ([address]    2. Mixed hyperlipidemia    3. Essential hypertension    4. Allergy to statin medication        Plan:     1. Continue Imdur to 60mg daily for angina  2. Decrease coreg to 3.125mg Twice a day, take with food to see if helps with fatigue, but would continue BB given her CAD  3. Continue aspirin 81mg daily  4. Consider EECP if angina continues; discussed with patient  5.  Continue Lisinopril 5mg twice a day for now, can consider increasing lisinopril to 5mg in the morning and 10 mg in the evening if angina continues to help with optimizing med management for microvascular angina  6. Did discuss considering PSCK9 meds again, as her LDL was 150 last check  7. Follow up with Primary care regarding reflux as possible cause of chest pain as well  8. Follow up with Dr. [place] Bouquet  1. Tobacco Cessation Counseling: NA  2. Retake of BP if >140/90:   NA  3. Documentation to PCP/referring for new patient:  Sent to PCP at close of office visit  4. CAD patient on anti-platelet: Yes  5. CAD patient on STATIN therapy:  No, allergy  6. Patient with CHF and aFib on anticoagulation:  NA     I appreciate the opportunity for caring for this patient.      Meenu Pérez CNP, 2/23/2018, 11:01 AM

## 2018-02-23 NOTE — COMMUNICATION BODY
medical history of Allergic rhinitis; Arthritis; CAD (coronary artery disease); Cholelithiasis; Diabetes mellitus ([facility] [address] Hepatic steatosis; Hyperlipidemia; Hypertension; Peripheral neuropathy ([facility] [address] Sleep apnea; Thyroid disease; and Vitamin B12 deficiency. Surgical History:   has a past surgical history that includes Coronary artery bypass graft (2005); Foot surgery; colectomy (1998); Colonoscopy; Upper gastrointestinal endoscopy; Tonsillectomy and adenoidectomy; Tubal ligation; other surgical history (4/17/2012); other surgical history (Right, 3/20/13); Coronary angioplasty; Cholecystectomy; and Cardiac catheterization (12/27/2017). Social History:   reports that she quit smoking about 19 years ago. She has a 30.00 pack-year smoking history. She has never used smokeless tobacco. She reports that she does not drink alcohol or use drugs. Family History:   History reviewed. No pertinent family history. Home Medications:  Prior to Admission medications    Medication Sig Start Date End Date Taking?  Authorizing Provider   pantoprazole (PROTONIX) 20 MG tablet Take 20 mg by mouth daily   Yes Historical Provider, MD   isosorbide mononitrate (IMDUR) 60 MG extended release tablet Take 1 tablet by mouth daily 1/12/18  Yes Jan Dickinson CNP   aspirin 81 MG EC tablet Take 1 tablet by mouth daily 12/29/17  Yes Марина Velasco MD   metFORMIN (GLUCOPHAGE) 1000 MG tablet Take 1 tablet by mouth 2 times daily (with meals) 12/29/17  Yes Марина Velasco MD   lisinopril (PRINIVIL;ZESTRIL) 5 MG tablet Take 1 tablet by mouth 2 times daily 12/28/17  Yes Марина Velasco MD   carvedilol (COREG) 6.25 MG tablet Take 1 tablet by mouth 2 times daily (with meals) 12/28/17  Yes Марина Velasco MD   nitroGLYCERIN (NITROSTAT) 0.4 MG SL tablet Place 1 tablet under the tongue every 5 minutes as needed for Chest pain 5/22/17  Yes Arianne Christie MD   Cyanocobalamin (B-12 SL) Place under the tongue   Yes Historical Provider, MD tolterodine (DETROL LA) 2 MG ER capsule Take 4 mg by mouth daily    Yes Historical Provider, MD   magnesium oxide (MAG-OX) 400 MG tablet Take 200 mg by mouth daily    Yes Historical Provider, MD   fluticasone (FLONASE) 50 MCG/ACT nasal spray 1 spray by Nasal route daily. Yes Historical Provider, MD   Red Yeast Rice 600 MG TABS Take 2 tablets by mouth daily. 4/3/15  Yes Arianne Christie MD   Omega-3 Fatty Acids (FISH OIL) 1000 MG CAPS Take 2 capsules by mouth daily. 4/3/15  Yes Arianne Christie MD   levothyroxine (SYNTHROID) 50 MCG tablet Take 50 mcg by mouth Daily. Yes Historical Provider, MD   GLIPIZIDE Take 5 mg by mouth 2 times daily (with meals) Taking 10 mg AM & 5 mg in PM   Yes Historical Provider, MD   gabapentin (NEURONTIN) 600 MG tablet Take 600 mg by mouth 3 times daily May take up to 4x per day if needed for neuropathy pain   Yes Historical Provider, MD   tizanidine (ZANAFLEX) 4 MG tablet Take 8 mg by mouth nightly. Yes Historical Provider, MD   diphenhydrAMINE (BENADRYL) 25 MG tablet Take 25 mg by mouth nightly as needed. Yes Historical Provider, MD   ranitidine (ZANTAC 75) 75 MG tablet Take 150 mg by mouth daily    Yes Historical Provider, MD   Cholecalciferol (VITAMIN D3) 1000 UNIT CAPS Take 1 capsule by mouth daily. Yes Historical Provider, MD   furosemide (LASIX) 40 MG tablet Take 40 mg by mouth as needed. Yes Historical Provider, MD        Allergies:  Niacin and related; Adhesive tape; Other; Statins depletion therapy; and Vancomycin     Review of Systems:   · Constitutional: there has been no unanticipated weight loss. · Eyes: No vision changes  · ENT: No Headaches, no nasal congestion. No mouth sores or sore throat. · Cardiovascular: Reviewed in HPI  · Respiratory:  no wheezing, no sputum production. · Gastrointestinal: No abdominal pain, no constipation or diarrhea,   · Genitourinary: No dysuria, trouble voiding, or hematuria.   · Musculoskeletal:  + weakness or joint 137 12/27/2017    K 4.8 01/05/2018    K 4.1 12/28/2017    K 4.6 12/27/2017     01/05/2018    CL 98 12/28/2017    CL 96 12/27/2017    CO2 28 01/05/2018    CO2 28 12/28/2017    CO2 25 12/27/2017    BUN 18 01/05/2018    BUN 22 12/28/2017    BUN 24 12/27/2017    CREATININE 1.2 01/05/2018    CREATININE 1.0 12/28/2017    CREATININE 1.1 12/27/2017     BNP:   Lab Results   Component Value Date    PROBNP 208 12/27/2017    PROBNP 404 07/02/2015     Lab Results   Component Value Date    CHOL 227 (H) 04/16/2015     Lab Results   Component Value Date    TRIG 179 (H) 04/16/2015     Lab Results   Component Value Date    HDL 34 (L) 10/18/2017    HDL 39 (L) 04/16/2015     Lab Results   Component Value Date    LDLCALC 108 (H) 10/18/2017    LDLCALC 152 (H) 04/16/2015     Lab Results   Component Value Date    LABVLDL 57 10/18/2017    LABVLDL 36 04/16/2015     No results found for: CHOLHDLRATIO      Recent Testing:  NM Stress Test: 04/13/2015  Small areas of stress-induced myocardial ischemia within the   anteroseptal wall at the apex and the lateral wall at the base.      Cardiac Cath: 04/16/2015  LM: calcified, entire vessel 30% with 40% at distal bifurcation  LAD: heavily diseased, calcified, prox/mid 50%, tapers to small vessel and does not appear to wrap the apex. No competitive flow seen, Diag 1 with mild diffuse disease  LCX: prox with mod-severe disease. prox 70% then 70% lesion. Bifurcation with OM is 80% Competitive flow seen in OM1. RCA: 100% occluded in proximal portion. Dominant  LIMA-LAD: patent, unusual takeoff-possible posterior, touches down to mid LAD with only tiny/insignificant flow into LAD flowing in retrograde and anterograde direction  SVG-OM1: widely patent; Touches to OM1 with good anterograde flow, some but smaller retrograde flow up to Lcx and into OM2.  This section is moderately diseased  SVG-RCA: patent, somewhat downward takeoff, Touches to distal RCA with retro/antegograde flow.      LVEFP

## 2018-03-07 RX ORDER — NITROGLYCERIN 0.4 MG/1
0.4 TABLET SUBLINGUAL EVERY 5 MIN PRN
Qty: 25 TABLET | Refills: 3 | Status: SHIPPED | OUTPATIENT
Start: 2018-03-07 | End: 2018-10-01 | Stop reason: SDUPTHER

## 2018-03-27 LAB
ALBUMIN SERPL-MCNC: 4 G/DL
ALP BLD-CCNC: 77 U/L
ALT SERPL-CCNC: 29 U/L
ANION GAP SERPL CALCULATED.3IONS-SCNC: 12 MMOL/L
AST SERPL-CCNC: 23 U/L
BILIRUB SERPL-MCNC: 0.7 MG/DL (ref 0.1–1.4)
BUN BLDV-MCNC: 16 MG/DL
CALCIUM SERPL-MCNC: 9.4 MG/DL
CHLORIDE BLD-SCNC: 102 MMOL/L
CO2: 26 MMOL/L
CREAT SERPL-MCNC: 1.02 MG/DL
GFR CALCULATED: 54
GLUCOSE BLD-MCNC: 124 MG/DL
POTASSIUM SERPL-SCNC: 5 MMOL/L
SODIUM BLD-SCNC: 140 MMOL/L
TOTAL PROTEIN: 6.2

## 2018-04-11 PROBLEM — R79.89 ELEVATED TROPONIN: Status: RESOLVED | Noted: 2017-12-27 | Resolved: 2018-04-11

## 2018-04-11 PROBLEM — R77.8 ELEVATED TROPONIN: Status: RESOLVED | Noted: 2017-12-27 | Resolved: 2018-04-11

## 2018-04-24 ENCOUNTER — OFFICE VISIT (OUTPATIENT)
Dept: CARDIOLOGY CLINIC | Age: 73
End: 2018-04-24

## 2018-04-24 VITALS
OXYGEN SATURATION: 96 % | WEIGHT: 238 LBS | DIASTOLIC BLOOD PRESSURE: 66 MMHG | HEIGHT: 63 IN | SYSTOLIC BLOOD PRESSURE: 118 MMHG | HEART RATE: 80 BPM | BODY MASS INDEX: 42.17 KG/M2

## 2018-04-24 DIAGNOSIS — E78.2 MIXED HYPERLIPIDEMIA: ICD-10-CM

## 2018-04-24 DIAGNOSIS — I25.810 CORONARY ARTERY DISEASE INVOLVING CORONARY BYPASS GRAFT OF NATIVE HEART WITHOUT ANGINA PECTORIS: ICD-10-CM

## 2018-04-24 DIAGNOSIS — I21.4 NSTEMI (NON-ST ELEVATED MYOCARDIAL INFARCTION) (HCC): Primary | ICD-10-CM

## 2018-04-24 DIAGNOSIS — I10 ESSENTIAL HYPERTENSION: ICD-10-CM

## 2018-04-24 PROCEDURE — 4040F PNEUMOC VAC/ADMIN/RCVD: CPT | Performed by: INTERNAL MEDICINE

## 2018-04-24 PROCEDURE — 1090F PRES/ABSN URINE INCON ASSESS: CPT | Performed by: INTERNAL MEDICINE

## 2018-04-24 PROCEDURE — 1036F TOBACCO NON-USER: CPT | Performed by: INTERNAL MEDICINE

## 2018-04-24 PROCEDURE — 1123F ACP DISCUSS/DSCN MKR DOCD: CPT | Performed by: INTERNAL MEDICINE

## 2018-04-24 PROCEDURE — G8427 DOCREV CUR MEDS BY ELIG CLIN: HCPCS | Performed by: INTERNAL MEDICINE

## 2018-04-24 PROCEDURE — G8399 PT W/DXA RESULTS DOCUMENT: HCPCS | Performed by: INTERNAL MEDICINE

## 2018-04-24 PROCEDURE — 99214 OFFICE O/P EST MOD 30 MIN: CPT | Performed by: INTERNAL MEDICINE

## 2018-04-24 PROCEDURE — G8598 ASA/ANTIPLAT THER USED: HCPCS | Performed by: INTERNAL MEDICINE

## 2018-04-24 PROCEDURE — G8417 CALC BMI ABV UP PARAM F/U: HCPCS | Performed by: INTERNAL MEDICINE

## 2018-04-24 PROCEDURE — 3017F COLORECTAL CA SCREEN DOC REV: CPT | Performed by: INTERNAL MEDICINE

## 2018-05-02 ENCOUNTER — TELEPHONE (OUTPATIENT)
Dept: CARDIOLOGY CLINIC | Age: 73
End: 2018-05-02

## 2018-05-02 PROCEDURE — 93228 REMOTE 30 DAY ECG REV/REPORT: CPT | Performed by: INTERNAL MEDICINE

## 2018-05-03 DIAGNOSIS — R00.2 PALPITATIONS: ICD-10-CM

## 2018-06-15 PROBLEM — R07.9 CHEST PAIN: Status: ACTIVE | Noted: 2018-06-15

## 2018-06-27 ENCOUNTER — OFFICE VISIT (OUTPATIENT)
Dept: CARDIOLOGY CLINIC | Age: 73
End: 2018-06-27

## 2018-06-27 VITALS — SYSTOLIC BLOOD PRESSURE: 126 MMHG | DIASTOLIC BLOOD PRESSURE: 62 MMHG | HEART RATE: 72 BPM | OXYGEN SATURATION: 98 %

## 2018-06-27 DIAGNOSIS — I50.22 CHRONIC SYSTOLIC HEART FAILURE (HCC): Primary | ICD-10-CM

## 2018-06-27 DIAGNOSIS — R06.02 SHORTNESS OF BREATH: ICD-10-CM

## 2018-06-27 DIAGNOSIS — I20.9 ANGINA PECTORIS (HCC): ICD-10-CM

## 2018-06-27 DIAGNOSIS — I10 ESSENTIAL HYPERTENSION: ICD-10-CM

## 2018-06-27 PROCEDURE — 1090F PRES/ABSN URINE INCON ASSESS: CPT | Performed by: NURSE PRACTITIONER

## 2018-06-27 PROCEDURE — G8399 PT W/DXA RESULTS DOCUMENT: HCPCS | Performed by: NURSE PRACTITIONER

## 2018-06-27 PROCEDURE — G8598 ASA/ANTIPLAT THER USED: HCPCS | Performed by: NURSE PRACTITIONER

## 2018-06-27 PROCEDURE — 1123F ACP DISCUSS/DSCN MKR DOCD: CPT | Performed by: NURSE PRACTITIONER

## 2018-06-27 PROCEDURE — G8427 DOCREV CUR MEDS BY ELIG CLIN: HCPCS | Performed by: NURSE PRACTITIONER

## 2018-06-27 PROCEDURE — 1036F TOBACCO NON-USER: CPT | Performed by: NURSE PRACTITIONER

## 2018-06-27 PROCEDURE — 3017F COLORECTAL CA SCREEN DOC REV: CPT | Performed by: NURSE PRACTITIONER

## 2018-06-27 PROCEDURE — G8417 CALC BMI ABV UP PARAM F/U: HCPCS | Performed by: NURSE PRACTITIONER

## 2018-06-27 PROCEDURE — 99214 OFFICE O/P EST MOD 30 MIN: CPT | Performed by: NURSE PRACTITIONER

## 2018-06-27 PROCEDURE — 1111F DSCHRG MED/CURRENT MED MERGE: CPT | Performed by: NURSE PRACTITIONER

## 2018-06-27 PROCEDURE — 4040F PNEUMOC VAC/ADMIN/RCVD: CPT | Performed by: NURSE PRACTITIONER

## 2018-07-10 ENCOUNTER — TELEPHONE (OUTPATIENT)
Dept: CARDIOLOGY CLINIC | Age: 73
End: 2018-07-10

## 2018-07-11 ENCOUNTER — HOSPITAL ENCOUNTER (OUTPATIENT)
Dept: OTHER | Age: 73
Discharge: HOME | End: 2018-07-11
Attending: PODIATRIST | Admitting: PODIATRIST

## 2018-07-11 DIAGNOSIS — M14.679 CHARCOT'S JOINT OF ANKLE, UNSPECIFIED LATERALITY: ICD-10-CM

## 2018-07-11 DIAGNOSIS — M21.969 DEFORMITY OF FOOT, UNSPECIFIED LATERALITY: ICD-10-CM

## 2018-07-17 RX ORDER — RAMIPRIL 2.5 MG/1
2.5 CAPSULE ORAL DAILY
Qty: 90 CAPSULE | Refills: 2 | Status: SHIPPED | OUTPATIENT
Start: 2018-07-17 | End: 2018-08-09 | Stop reason: SDUPTHER

## 2018-07-17 NOTE — TELEPHONE ENCOUNTER
Refill request ramipril (ALTACE) 2.5 MG capsule     :  [facility] [address], [address] [facility] - F [phone]

## 2018-08-02 ENCOUNTER — TELEPHONE (OUTPATIENT)
Dept: CARDIOLOGY CLINIC | Age: 73
End: 2018-08-02

## 2018-08-02 NOTE — TELEPHONE ENCOUNTER
Pt's Isosorbide Mononitrate was denied. Pt is asking to please refill medication. If there is a discrepancy with refilling the  Medication please have medical staff contact pt with reason. If pt is to get it filled please send to [facility]. Cr. She said she has been waiting for it to be filled since 7-23-18.

## 2018-08-03 RX ORDER — ISOSORBIDE MONONITRATE 60 MG/1
TABLET, EXTENDED RELEASE ORAL
Qty: 90 TABLET | Refills: 0 | Status: SHIPPED | OUTPATIENT
Start: 2018-08-03 | End: 2018-10-04 | Stop reason: SDUPTHER

## 2018-08-08 NOTE — PROGRESS NOTES
[address]   Cardiac Follow-up    Primary Care Doctor:  Janice Lal, APRN - CNP    Chief Complaint   Patient presents with    1 Month Follow-Up        History of Present Illness:   I had the pleasure of seeing Ginette Traore in follow up for chest pain, CAD, heart failure. She has a history of CAD s/p CABG 2005, HLD (intolerant to statins and niacin), HTN, BERTRAND on CPAP. Admitted on 12/27/17-12/28/17 with unstable angina, s/p LHC on 12/27/17 which showed severe small artery disease and SVG to OM2 100% ostium; recommend medical management. LVEF on cath was 20% and LVEF on echo was 55% prior to discharge. Admitted from 6/15/18-6/19/18 with chest pain, s/p angiogram 6/15/18 with recommend aggressive risk management, not amendible to PCI. Required lasix infusion. LVEF on cath 20% (6/2018). Lisinopril was changed to ramipril 2.5mg daily and lasix 40mg PO daily. Since last visit, she had some issues with getting her isosorbide renewed, not sure what the issues were. She has occasinal lightheadedness/dizziness at times, improves with drinking water and sitting. Seen by GI recently and to have gastric emptying study. Continues with chest pains with/after eating. + early satiety. Still getting short of breath with exertion, not walking around as much as she used to. She feels like she isn't recovering as well as she has in the past from the last hospitalizations. Haven't taken nitro for some time, but does occasionally. She has to sit up after eating due to the chest pain. Not able to eat much, having to eat very small meal.  Continues on lasix 40mg daily but will take an extra dose of 20mg if she is feeling the chest pain, shortness of breath in the afternoon. Ginette Traore describes symptoms including chest pain, dyspnea, palpitations, fatigue, early saiety, edema but denies PND, syncope.    Home Weights:  223-230lbs  HOme blood pressures: 100-130/60-80's      NYHA:   III  ACC/ AHA Stage:    C    Past Medical History:   has a past medical history of Allergic rhinitis; Arthritis; CAD (coronary artery disease); Cholelithiasis; Diabetes mellitus ([address] Hepatic steatosis; Hyperlipidemia; Hypertension; Peripheral neuropathy; Sleep apnea; Thyroid disease; and Vitamin B12 deficiency. Surgical History:   has a past surgical history that includes Coronary artery bypass graft (2005); Foot surgery; colectomy (1998); Colonoscopy; Upper gastrointestinal endoscopy; Tonsillectomy and adenoidectomy; Tubal ligation; other surgical history (4/17/2012); other surgical history (Right, 3/20/13); Coronary angioplasty; Cholecystectomy; and Cardiac catheterization (12/27/2017). Social History:   reports that she quit smoking about 20 years ago. She has a 30.00 pack-year smoking history. She has never used smokeless tobacco. She reports that she does not drink alcohol or use drugs. Family History:   History reviewed. No pertinent family history. Home Medications:  Prior to Admission medications    Medication Sig Start Date End Date Taking?  Authorizing Provider   isosorbide mononitrate (IMDUR) 60 MG extended release tablet TAKE ONE TABLET BY MOUTH DAILY 8/3/18  Yes DARIA Darden CNP   ramipril (ALTACE) 2.5 MG capsule Take 1 capsule by mouth daily 7/17/18  Yes DARIA Darden CNP   carvedilol (COREG) 3.125 MG tablet Take 1 tablet by mouth 2 times daily (with meals) 6/19/18  Yes Jenny Xiong MD   docusate sodium (COLACE, DULCOLAX) 100 MG CAPS Take 200 mg by mouth daily 6/20/18  Yes Jenny Xiong MD   furosemide (LASIX) 40 MG tablet Take 1 tablet by mouth daily 6/19/18  Yes Jenny Xiong MD   aspirin 81 MG EC tablet Take 1 tablet by mouth daily 12/29/17  Yes Jenny Xiong MD   metFORMIN (GLUCOPHAGE) 1000 MG tablet Take 1 tablet by mouth 2 times daily (with meals) 12/29/17  Yes Jenny Xiong MD   Cyanocobalamin (B-12 SL) Place under the tongue As needed   Yes Component Value Date     07/10/2018     06/19/2018     06/18/2018    K 4.7 07/10/2018    K 4.1 06/19/2018    K 4.1 06/18/2018    K 4.3 06/17/2018    K 4.6 06/16/2018    CL 97 07/10/2018    CL 94 06/19/2018    CL 95 06/18/2018    CO2 26 07/10/2018    CO2 27 06/19/2018    CO2 31 06/18/2018    PHOS 5.0 06/17/2018    BUN 20 07/10/2018    BUN 35 06/19/2018    BUN 34 06/18/2018    CREATININE 1.0 07/10/2018    CREATININE 1.2 06/19/2018    CREATININE 1.5 06/18/2018     BNP:   Lab Results   Component Value Date    PROBNP 327 07/10/2018    PROBNP 259 06/15/2018    PROBNP 208 12/27/2017     Lab Results   Component Value Date    CHOL 227 (H) 04/16/2015     Lab Results   Component Value Date    TRIG 179 (H) 04/16/2015     Lab Results   Component Value Date    HDL 34 (L) 10/18/2017    HDL 39 (L) 04/16/2015     Lab Results   Component Value Date    LDLCALC 108 (H) 10/18/2017    LDLCALC 152 (H) 04/16/2015     Lab Results   Component Value Date    LABVLDL 57 10/18/2017    LABVLDL 36 04/16/2015     No results found for: CHOLHDLRATIO      Recent Testing:  NM Stress Test: 04/13/2015  Small areas of stress-induced myocardial ischemia within the   anteroseptal wall at the apex and the lateral wall at the base.      Cardiac Cath: 04/16/2015  LM: calcified, entire vessel 30% with 40% at distal bifurcation  LAD: heavily diseased, calcified, prox/mid 50%, tapers to small vessel and does not appear to wrap the apex. No competitive flow seen, Diag 1 with mild diffuse disease  LCX: prox with mod-severe disease. prox 70% then 70% lesion. Bifurcation with OM is 80% Competitive flow seen in OM1. RCA: 100% occluded in proximal portion. Dominant  LIMA-LAD: patent, unusual takeoff-possible posterior, touches down to mid LAD with only tiny/insignificant flow into LAD flowing in retrograde and anterograde direction  SVG-OM1: widely patent;  Touches to OM1 with good anterograde flow, some but smaller retrograde flow up to Lcx and into OM2. This section is moderately diseased  SVG-RCA: patent, somewhat downward takeoff, Touches to distal RCA with retro/antegograde flow.      LVEFP 20  LVEF: 55%     PLAN  1. Severe native CAD as above- medical management  2. Patent LIMA and SVG x 2. LIMA provides flow to a small insignificant LAD distal vessel    [facility] 12/27/17  Procedures: LHC, CA, LVG, LIMA, SVG x 2, sedation  LM 50%  LAD 50% prox, 100% mid  Cx 80% mid              OM2 90%               Cx/Om system is small and diffusely diseased     % ostium (from prior cath)  LIMA to LAD  SVG to RCA 50% distal  SVG to OM2 100% ostium ( new since prior cath 2015  LVEF 20%  Severe small artery disease  Progressive decline LVEF  Med mgmt  Consider ICD if LVEF remains low post OMT    Echo 12/28/17  Summary   Technically difficult examination secondary to habitus.   Left ventricular systolic function is normal with the ejection fraction estimated at 55%.   Abnormal (paradoxical) septal motion is present likely due to post operative status.   Grade I diastolic dysfunction with normal filling pressure.   Left ventricular systolic function has improved from cardiac cath done 12/27/2017 when it was 20%. MuGA 1/23/18  MUGA Conclusions    Normal resting left ventricular function with ejection fraction of 56.9 %.         Cardiac cath 6/2018 [facility])  CONCLUSION:  Multivessel critical coronary artery disease with severely  reduced left ventricular function in the setting of decompensated  Hemodynamics. RECOMMENDATIONS:  Aggressive risk factor modification. The patient's clinical symptoms are severe and her ischemic disease is quite progressive, but unchanged from 12/2017. At this point, I  am concerned that her symptoms are mainly related to congestive heart failure; however, there is a component of aggressive ischemic heart disease. This has been attempted at revascularization.   We can consider high-risk PCI of the left main, but further diuresis and management will be  important initially. Pertinent Problems:  · CAD s/p 3 vessel CABG 2005  · CAD of SVG to OM2 graft 100%  · Angina- likely due to severe small artery disease  · HLD unable to take statins or niacin due to severe myalgias   · HTN  · LVEF 20% on cardiac cath, however echo shows normal LVEF at 55%; LVEF 56% on MUGA    Visit Diagnosis:    1. Coronary artery disease involving coronary bypass graft of native heart without angina pectoris    2. Essential hypertension    3. Chronic stable angina ([address]    4. Mixed hyperlipidemia    5. Other fatigue    6. Shortness of breath    7. Chronic systolic heart failure ([address]    8. Neuropathy    9. Intestinal malabsorption, unspecified type         Plan:     1. Continue Imdur to 60mg daily for angina  2. Continue coreg to 3.125mg Twice a day  3. Increase ramipril 5mg nightly  4. Check MUGA scan for LVEF  5. Check BMP/BNP; check vitamin D and vitamin B12  6. Continue lasix 40mg daily for now. Continue daily weights. Okay to take and extra 20 mg of the lasix if weight goes up and having shortness of breath  7. Keep your fluids consistently from day to day. 8. Continue aspirin 81mg daily  9. EECP- can consider after GI follow up   10. Follow up 8 weeks      QUALITY MEASURES  1. Tobacco Cessation Counseling: NA  2. Retake of BP if >140/90:   NA  3. Documentation to PCP/referring for new patient:  Sent to PCP at close of office visit  4. CAD patient on anti-platelet: Yes  5. CAD patient on STATIN therapy:  No, allergy  6. Patient with CHF and aFib on anticoagulation:  NA     I appreciate the opportunity for caring for this patient.      Baltazar Hernandez CNP, 8/9/2018, 1:55 PM

## 2018-08-09 ENCOUNTER — OFFICE VISIT (OUTPATIENT)
Dept: CARDIOLOGY CLINIC | Age: 73
End: 2018-08-09

## 2018-08-09 VITALS
WEIGHT: 235 LBS | BODY MASS INDEX: 41.64 KG/M2 | HEIGHT: 63 IN | DIASTOLIC BLOOD PRESSURE: 62 MMHG | OXYGEN SATURATION: 96 % | SYSTOLIC BLOOD PRESSURE: 104 MMHG | HEART RATE: 94 BPM

## 2018-08-09 DIAGNOSIS — R06.02 SHORTNESS OF BREATH: ICD-10-CM

## 2018-08-09 DIAGNOSIS — G62.9 NEUROPATHY: ICD-10-CM

## 2018-08-09 DIAGNOSIS — E78.2 MIXED HYPERLIPIDEMIA: ICD-10-CM

## 2018-08-09 DIAGNOSIS — I25.810 CORONARY ARTERY DISEASE INVOLVING CORONARY BYPASS GRAFT OF NATIVE HEART WITHOUT ANGINA PECTORIS: Primary | ICD-10-CM

## 2018-08-09 DIAGNOSIS — I20.8 CHRONIC STABLE ANGINA (HCC): ICD-10-CM

## 2018-08-09 DIAGNOSIS — R53.83 OTHER FATIGUE: ICD-10-CM

## 2018-08-09 DIAGNOSIS — I10 ESSENTIAL HYPERTENSION: ICD-10-CM

## 2018-08-09 DIAGNOSIS — K90.9 INTESTINAL MALABSORPTION, UNSPECIFIED TYPE: ICD-10-CM

## 2018-08-09 DIAGNOSIS — I50.22 CHRONIC SYSTOLIC HEART FAILURE (HCC): ICD-10-CM

## 2018-08-09 PROCEDURE — 99214 OFFICE O/P EST MOD 30 MIN: CPT | Performed by: NURSE PRACTITIONER

## 2018-08-09 PROCEDURE — 4040F PNEUMOC VAC/ADMIN/RCVD: CPT | Performed by: NURSE PRACTITIONER

## 2018-08-09 PROCEDURE — 1036F TOBACCO NON-USER: CPT | Performed by: NURSE PRACTITIONER

## 2018-08-09 PROCEDURE — G8417 CALC BMI ABV UP PARAM F/U: HCPCS | Performed by: NURSE PRACTITIONER

## 2018-08-09 PROCEDURE — 1101F PT FALLS ASSESS-DOCD LE1/YR: CPT | Performed by: NURSE PRACTITIONER

## 2018-08-09 PROCEDURE — 1090F PRES/ABSN URINE INCON ASSESS: CPT | Performed by: NURSE PRACTITIONER

## 2018-08-09 PROCEDURE — G8427 DOCREV CUR MEDS BY ELIG CLIN: HCPCS | Performed by: NURSE PRACTITIONER

## 2018-08-09 PROCEDURE — G8598 ASA/ANTIPLAT THER USED: HCPCS | Performed by: NURSE PRACTITIONER

## 2018-08-09 PROCEDURE — G8399 PT W/DXA RESULTS DOCUMENT: HCPCS | Performed by: NURSE PRACTITIONER

## 2018-08-09 PROCEDURE — 1123F ACP DISCUSS/DSCN MKR DOCD: CPT | Performed by: NURSE PRACTITIONER

## 2018-08-09 PROCEDURE — 3017F COLORECTAL CA SCREEN DOC REV: CPT | Performed by: NURSE PRACTITIONER

## 2018-08-09 RX ORDER — RAMIPRIL 5 MG/1
5 CAPSULE ORAL NIGHTLY
Qty: 90 CAPSULE | Refills: 1 | Status: SHIPPED | OUTPATIENT
Start: 2018-08-09 | End: 2019-04-30 | Stop reason: SDUPTHER

## 2018-08-09 RX ORDER — FUROSEMIDE 40 MG/1
40 TABLET ORAL SEE ADMIN INSTRUCTIONS
Qty: 120 TABLET | Refills: 0 | Status: SHIPPED | OUTPATIENT
Start: 2018-08-09 | End: 2019-01-29 | Stop reason: SDUPTHER

## 2018-08-09 NOTE — PATIENT INSTRUCTIONS
Plan:     1. Continue Imdur to 60mg daily for angina  2. Continue coreg to 3.125mg Twice a day  3. Increase ramipril 5mg nightly  4. Check MUGA scan for LVEF  5. Check BMP/BNP; check vitamin D and vitamin B12  6. Continue lasix 40mg daily for now. Continue daily weights. Okay to take and extra 20 mg of the lasix if weight goes up and having shortness of breath  7. Keep your fluids consistently from day to day. 8. Continue aspirin 81mg daily  9. EECP- can consider after GI follow up   10. Follow up 8 weeks    Your provider has ordered testing for further evaluation. An order/prescription has been included in your paper work.  Central Schedule should contact you within three business days to schedule the test or you can contact Central Scheduling sooner by calling [employer].  If Central scheduling does not contact within three business days, please call to schedule the test by calling [employer]. ([phone])

## 2018-08-14 ENCOUNTER — TELEPHONE (OUTPATIENT)
Dept: CARDIOLOGY CLINIC | Age: 73
End: 2018-08-14

## 2018-08-14 ENCOUNTER — HOSPITAL ENCOUNTER (OUTPATIENT)
Dept: NUCLEAR MEDICINE | Age: 73
Discharge: HOME | End: 2018-08-14
Payer: MEDICARE

## 2018-08-14 DIAGNOSIS — I10 ESSENTIAL HYPERTENSION: ICD-10-CM

## 2018-08-14 DIAGNOSIS — I50.22 CHRONIC SYSTOLIC HEART FAILURE (HCC): ICD-10-CM

## 2018-08-14 DIAGNOSIS — R06.02 SHORTNESS OF BREATH: ICD-10-CM

## 2018-08-14 DIAGNOSIS — I25.810 CORONARY ARTERY DISEASE INVOLVING CORONARY BYPASS GRAFT OF NATIVE HEART WITHOUT ANGINA PECTORIS: ICD-10-CM

## 2018-08-14 DIAGNOSIS — E78.2 MIXED HYPERLIPIDEMIA: ICD-10-CM

## 2018-08-14 DIAGNOSIS — I20.8 CHRONIC STABLE ANGINA (HCC): ICD-10-CM

## 2018-08-14 DIAGNOSIS — R53.83 OTHER FATIGUE: ICD-10-CM

## 2018-08-14 LAB
LV EF: 61 %
LVEF MODALITY: NORMAL

## 2018-08-14 PROCEDURE — 3430000000 HC RX DIAGNOSTIC RADIOPHARMACEUTICAL: Performed by: NURSE PRACTITIONER

## 2018-08-14 PROCEDURE — 78472 GATED HEART PLANAR SINGLE: CPT

## 2018-08-14 PROCEDURE — A9560 TC99M LABELED RBC: HCPCS | Performed by: NURSE PRACTITIONER

## 2018-08-14 RX ADMIN — Medication 29.4 MILLICURIE: at 10:06

## 2018-08-14 NOTE — TELEPHONE ENCOUNTER
NM CARDIAC MUGA SCAN   Order: [id number]   Status:  Final result   Visible to patient:  Yes (MyChart)   Notes recorded by DARIA Taveras CNP on 8/14/2018 at 4:38 PM EDT  EF is normal at 60%! Attempted to reach.

## 2018-08-16 ENCOUNTER — HOSPITAL ENCOUNTER (OUTPATIENT)
Age: 73
Setting detail: SPECIMEN
Discharge: HOME | End: 2018-08-16
Payer: MEDICARE

## 2018-08-16 LAB
ANION GAP SERPL CALCULATED.3IONS-SCNC: 16 MMOL/L (ref 3–16)
BUN BLDV-MCNC: 27 MG/DL (ref 7–20)
CALCIUM SERPL-MCNC: 9.7 MG/DL (ref 8.3–10.6)
CHLORIDE BLD-SCNC: 96 MMOL/L (ref 99–110)
CO2: 26 MMOL/L (ref 21–32)
CREAT SERPL-MCNC: 1.2 MG/DL (ref 0.6–1.2)
GFR AFRICAN AMERICAN: 54
GFR NON-AFRICAN AMERICAN: 44
GLUCOSE BLD-MCNC: 146 MG/DL (ref 70–99)
POTASSIUM SERPL-SCNC: 4.8 MMOL/L (ref 3.5–5.1)
PRO-BNP: 197 PG/ML (ref 0–124)
SODIUM BLD-SCNC: 138 MMOL/L (ref 136–145)

## 2018-08-16 PROCEDURE — 82607 VITAMIN B-12: CPT

## 2018-08-16 PROCEDURE — 36415 COLL VENOUS BLD VENIPUNCTURE: CPT

## 2018-08-16 PROCEDURE — 83880 ASSAY OF NATRIURETIC PEPTIDE: CPT

## 2018-08-16 PROCEDURE — 82746 ASSAY OF FOLIC ACID SERUM: CPT

## 2018-08-16 PROCEDURE — 80048 BASIC METABOLIC PNL TOTAL CA: CPT

## 2018-08-16 PROCEDURE — 82306 VITAMIN D 25 HYDROXY: CPT

## 2018-08-17 LAB
FOLATE: 8.61 NG/ML (ref 4.78–24.2)
VITAMIN B-12: 288 PG/ML (ref 211–911)
VITAMIN D 25-HYDROXY: 42.2 NG/ML

## 2018-10-01 RX ORDER — NITROGLYCERIN 0.4 MG/1
0.4 TABLET SUBLINGUAL EVERY 5 MIN PRN
Qty: 25 TABLET | Refills: 3 | Status: SHIPPED | OUTPATIENT
Start: 2018-10-01 | End: 2018-10-22 | Stop reason: SDUPTHER

## 2018-10-04 ENCOUNTER — TELEPHONE (OUTPATIENT)
Dept: CARDIOLOGY CLINIC | Age: 73
End: 2018-10-04

## 2018-10-04 ENCOUNTER — OFFICE VISIT (OUTPATIENT)
Dept: CARDIOLOGY CLINIC | Age: 73
End: 2018-10-04
Payer: MEDICARE

## 2018-10-04 VITALS
DIASTOLIC BLOOD PRESSURE: 60 MMHG | WEIGHT: 233 LBS | HEART RATE: 78 BPM | BODY MASS INDEX: 41.29 KG/M2 | OXYGEN SATURATION: 97 % | HEIGHT: 63 IN | SYSTOLIC BLOOD PRESSURE: 110 MMHG

## 2018-10-04 DIAGNOSIS — I25.708 CORONARY ARTERY DISEASE OF BYPASS GRAFT OF NATIVE HEART WITH STABLE ANGINA PECTORIS (HCC): Primary | ICD-10-CM

## 2018-10-04 DIAGNOSIS — E78.2 MIXED HYPERLIPIDEMIA: ICD-10-CM

## 2018-10-04 DIAGNOSIS — I10 ESSENTIAL HYPERTENSION: ICD-10-CM

## 2018-10-04 PROCEDURE — 4040F PNEUMOC VAC/ADMIN/RCVD: CPT | Performed by: NURSE PRACTITIONER

## 2018-10-04 PROCEDURE — G8484 FLU IMMUNIZE NO ADMIN: HCPCS | Performed by: NURSE PRACTITIONER

## 2018-10-04 PROCEDURE — G8399 PT W/DXA RESULTS DOCUMENT: HCPCS | Performed by: NURSE PRACTITIONER

## 2018-10-04 PROCEDURE — 3017F COLORECTAL CA SCREEN DOC REV: CPT | Performed by: NURSE PRACTITIONER

## 2018-10-04 PROCEDURE — 99214 OFFICE O/P EST MOD 30 MIN: CPT | Performed by: NURSE PRACTITIONER

## 2018-10-04 PROCEDURE — 1101F PT FALLS ASSESS-DOCD LE1/YR: CPT | Performed by: NURSE PRACTITIONER

## 2018-10-04 PROCEDURE — G8427 DOCREV CUR MEDS BY ELIG CLIN: HCPCS | Performed by: NURSE PRACTITIONER

## 2018-10-04 PROCEDURE — 1123F ACP DISCUSS/DSCN MKR DOCD: CPT | Performed by: NURSE PRACTITIONER

## 2018-10-04 PROCEDURE — 1036F TOBACCO NON-USER: CPT | Performed by: NURSE PRACTITIONER

## 2018-10-04 PROCEDURE — 1090F PRES/ABSN URINE INCON ASSESS: CPT | Performed by: NURSE PRACTITIONER

## 2018-10-04 PROCEDURE — G8598 ASA/ANTIPLAT THER USED: HCPCS | Performed by: NURSE PRACTITIONER

## 2018-10-04 PROCEDURE — G8417 CALC BMI ABV UP PARAM F/U: HCPCS | Performed by: NURSE PRACTITIONER

## 2018-10-04 RX ORDER — METOCLOPRAMIDE 10 MG/1
10 TABLET ORAL 2 TIMES DAILY
COMMUNITY
End: 2018-12-04

## 2018-10-04 RX ORDER — ISOSORBIDE MONONITRATE 120 MG/1
120 TABLET, EXTENDED RELEASE ORAL DAILY
Qty: 90 TABLET | Refills: 1 | Status: SHIPPED | OUTPATIENT
Start: 2018-10-04 | End: 2018-11-19 | Stop reason: SDUPTHER

## 2018-10-04 NOTE — PROGRESS NOTES
tolterodine (DETROL LA) 2 MG ER capsule Take 4 mg by mouth daily    Yes Historical Provider, MD   magnesium oxide (MAG-OX) 400 MG tablet Take 200 mg by mouth daily    Yes Historical Provider, MD   fluticasone (FLONASE) 50 MCG/ACT nasal spray 1 spray by Nasal route daily. Yes Historical Provider, MD   Red Yeast Rice 600 MG TABS Take 2 tablets by mouth daily. 4/3/15  Yes Wil Courser, MD   Omega-3 Fatty Acids (FISH OIL) 1000 MG CAPS Take 2 capsules by mouth daily. 4/3/15  Yes Wil Powellr, MD   levothyroxine (SYNTHROID) 50 MCG tablet Take 50 mcg by mouth Daily. Yes Historical Provider, MD   GLIPIZIDE Take 5 mg by mouth 2 times daily (with meals) Taking 10 mg AM & 5 mg in PM   Yes Historical Provider, MD   gabapentin (NEURONTIN) 600 MG tablet Take 600 mg by mouth. May take up to 4x per day if needed for neuropathy pain. Yes Historical Provider, MD   tizanidine (ZANAFLEX) 4 MG tablet Take 8 mg by mouth nightly. Yes Historical Provider, MD   diphenhydrAMINE (BENADRYL) 25 MG tablet Take 25 mg by mouth nightly as needed. Yes Historical Provider, MD   ranitidine (ZANTAC 75) 75 MG tablet Take 150 mg by mouth daily    Yes Historical Provider, MD   Cholecalciferol (VITAMIN D3) 1000 UNIT CAPS Take 1 capsule by mouth daily. Yes Historical Provider, MD   docusate sodium (COLACE, DULCOLAX) 100 MG CAPS Take 200 mg by mouth daily 6/20/18   Jelani Kennedy MD        Allergies:  Niacin and related; Adhesive tape; Other; Statins depletion therapy; and Vancomycin     Review of Systems:   · Constitutional: there has been no unanticipated weight loss. · Eyes: No vision changes  · ENT: No Headaches, no nasal congestion. No mouth sores or sore throat. · Cardiovascular: Reviewed in HPI  · Respiratory:  no wheezing, no sputum production. · Gastrointestinal: No abdominal pain, no constipation or diarrhea,   · Genitourinary: No dysuria, trouble voiding, or hematuria.   · Musculoskeletal:  + weakness or joint complaints. · Integumentary: No rash or pruritis. · Neurological: No numbness or tingling. + weakness. No tremor. · Psychiatric: No anxiety, no depression. · Endocrine:  No excessive thirst or urination. · Hematologic/Lymphatic: No abnormal bruising or bleeding, blood clots or swollen lymph nodes.     Physical Examination:    Vitals:    10/04/18 1323   BP: 110/60   Pulse: 78   SpO2: 97%   Weight: 233 lb (105.7 kg)   Height: 5' 3\" (1.6 m)        Constitutional and General Appearance: no apparent distress, obese  HEENT: non-icteric sclera, oropharynx without exudate, oral mucosa moist  Neck: JVP less than 8 cm H20  Respiratory:  · No use of accessory muscles  · Clear breath sounds throughout, no wheezing, no crackles, no rhonchi  Cardiovascular:  · The apical impulses not displaced  · + soft murmur, no rub/gallop  · Regular rate and rhythm, S1,S2 normal  · Radial pulses 2+ and equal bilaterally  · Trace BLE edema  · Pedal Pulses: 1+ and equal   Abdomen:  · No masses or tenderness  · Liver: No Abnormalities Noted  Musculoskeletal/Skin:  · Walks with cane  · There is no clubbing, cyanosis of the extremities  · Skin is warm and dry  · Moves all extremities well  Neurological/Psychiatric:  · Alert and oriented in all spheres  · No abnormalities of mood, affect, memory, mentation, or behavior are noted    Lab Data:  CBC:   Lab Results   Component Value Date    WBC 8.8 06/16/2018    WBC 9.7 06/15/2018    WBC 6.4 12/28/2017    RBC 4.26 06/16/2018    RBC 5.66 06/15/2018    RBC 4.48 12/28/2017    HGB 13.0 06/16/2018    HGB 17.4 06/15/2018    HGB 13.3 12/28/2017    HCT 38.6 06/16/2018    HCT 51.3 06/15/2018    HCT 40.6 12/28/2017    MCV 90.5 06/16/2018    MCV 90.6 06/15/2018    MCV 90.6 12/28/2017    RDW 14.7 06/16/2018    RDW 14.5 06/15/2018    RDW 13.9 12/28/2017     06/19/2018     06/17/2018     06/16/2018     BMP:   Lab Results   Component Value Date     08/16/2018     07/10/2018    NA

## 2018-10-05 NOTE — COMMUNICATION BODY
[address]   Cardiac Follow-up    Primary Care Doctor:  Desmond Delcid, DARIA - CNP    Chief Complaint   Patient presents with    Coronary Artery Disease        History of Present Illness:   I had the pleasure of seeing Azael Gilmore in follow up for chest pain, CAD, heart failure. She has a history of CAD s/p CABG 2005, HLD (intolerant to statins and niacin), HTN, BERTRAND on CPAP. Admitted on 12/27/17-12/28/17 with unstable angina, s/p LHC on 12/27/17 which showed severe small artery disease and SVG to OM2 100% ostium; recommend medical management. LVEF on cath was 20% and LVEF on echo was 55% prior to discharge. Admitted from 6/15/18-6/19/18 with chest pain, s/p angiogram 6/15/18 with recommend aggressive risk management, not amendible to PCI. Required lasix infusion. LVEF on cath 20% (6/2018). Lisinopril was changed to ramipril 2.5mg daily and lasix 40mg PO daily. Since last visit, she had a MUGA scan showed LVEF 60%. Ramipril was increased to 5mg daily. She has been having chest pain, that would be releived with nitro but would come back 15-20 minutes later. Took aspirin, extra water pill, Required 3-4 nitro and pain was not completely releived. Recently diagnosed with gastroparesis and started on reglan. Continues with angina and shortness of breath with minimal exertion. Only taking 40mg of the lasix and has had to take 2-3 times extra 20mg of the lasix. Using cpap at night. Home weights running 226-229lbs  -142 at home    Azael Gilmore describes symptoms including chest pain, dyspnea, fatigue but denies palpitations, orthopnea, edema, syncope. NYHA:   III  ACC/ AHA Stage:    C    Past Medical History:   has a past medical history of Allergic rhinitis; Arthritis; CAD (coronary artery disease); Cholelithiasis; Diabetes mellitus ([address] Hepatic steatosis; Hyperlipidemia; Hypertension; Peripheral neuropathy; Sleep apnea;  Thyroid disease; and Vitamin B12 complaints. · Integumentary: No rash or pruritis. · Neurological: No numbness or tingling. + weakness. No tremor. · Psychiatric: No anxiety, no depression. · Endocrine:  No excessive thirst or urination. · Hematologic/Lymphatic: No abnormal bruising or bleeding, blood clots or swollen lymph nodes.     Physical Examination:    Vitals:    10/04/18 1323   BP: 110/60   Pulse: 78   SpO2: 97%   Weight: 233 lb (105.7 kg)   Height: 5' 3\" (1.6 m)        Constitutional and General Appearance: no apparent distress, obese  HEENT: non-icteric sclera, oropharynx without exudate, oral mucosa moist  Neck: JVP less than 8 cm H20  Respiratory:  · No use of accessory muscles  · Clear breath sounds throughout, no wheezing, no crackles, no rhonchi  Cardiovascular:  · The apical impulses not displaced  · + soft murmur, no rub/gallop  · Regular rate and rhythm, S1,S2 normal  · Radial pulses 2+ and equal bilaterally  · Trace BLE edema  · Pedal Pulses: 1+ and equal   Abdomen:  · No masses or tenderness  · Liver: No Abnormalities Noted  Musculoskeletal/Skin:  · Walks with cane  · There is no clubbing, cyanosis of the extremities  · Skin is warm and dry  · Moves all extremities well  Neurological/Psychiatric:  · Alert and oriented in all spheres  · No abnormalities of mood, affect, memory, mentation, or behavior are noted    Lab Data:  CBC:   Lab Results   Component Value Date    WBC 8.8 06/16/2018    WBC 9.7 06/15/2018    WBC 6.4 12/28/2017    RBC 4.26 06/16/2018    RBC 5.66 06/15/2018    RBC 4.48 12/28/2017    HGB 13.0 06/16/2018    HGB 17.4 06/15/2018    HGB 13.3 12/28/2017    HCT 38.6 06/16/2018    HCT 51.3 06/15/2018    HCT 40.6 12/28/2017    MCV 90.5 06/16/2018    MCV 90.6 06/15/2018    MCV 90.6 12/28/2017    RDW 14.7 06/16/2018    RDW 14.5 06/15/2018    RDW 13.9 12/28/2017     06/19/2018     06/17/2018     06/16/2018     BMP:   Lab Results   Component Value Date     08/16/2018     07/10/2018    NA diseased  SVG-RCA: patent, somewhat downward takeoff, Touches to distal RCA with retro/antegograde flow.      LVEFP 20  LVEF: 55%     PLAN  1. Severe native CAD as above- medical management  2. Patent LIMA and SVG x 2. LIMA provides flow to a small insignificant LAD distal vessel    [facility] 12/27/17  Procedures: LHC, CA, LVG, LIMA, SVG x 2, sedation  LM 50%  LAD 50% prox, 100% mid  Cx 80% mid              OM2 90%               Cx/Om system is small and diffusely diseased     % ostium (from prior cath)  LIMA to LAD  SVG to RCA 50% distal  SVG to OM2 100% ostium ( new since prior cath 2015  LVEF 20%  Severe small artery disease  Progressive decline LVEF  Med mgmt  Consider ICD if LVEF remains low post OMT    Echo 12/28/17  Summary   Technically difficult examination secondary to habitus.   Left ventricular systolic function is normal with the ejection fraction estimated at 55%.   Abnormal (paradoxical) septal motion is present likely due to post operative status.   Grade I diastolic dysfunction with normal filling pressure.   Left ventricular systolic function has improved from cardiac cath done 12/27/2017 when it was 20%. MuGA 1/23/18  MUGA Conclusions    Normal resting left ventricular function with ejection fraction of 56.9 %.         Cardiac cath 6/2018 [facility])  ANGIOGRAPHY FINDINGS:  1. Native three-vessel critical coronary artery disease. 2.  Severely reduced left ventricular function with EF of 20% with anterior  and apical wall hypokinesis. 3.  Decompensated hemodynamics with elevated left ventricular end-diastolic  pressure of 26.  4.  Patent saphenous vein graft to the PDA. 5.  Occluded saphenous vein graft to the OM-1.  6.  Patent left internal mammary artery to the LAD. CONCLUSION:  Multivessel critical coronary artery disease with severely  reduced left ventricular function in the setting of decompensated  Hemodynamics. RECOMMENDATIONS:  Aggressive risk factor modification.  The patient's clinical symptoms are severe and her ischemic disease is quite progressive, but unchanged from 12/2017. At this point, I am concerned that her symptoms are mainly related to congestive heart failure; however, there is a component of aggressive ischemic heart disease. This has been attempted at revascularization. We can consider high-risk PCI of the left main, but further diuresis and management will be important initially. MUGA 8/14/18  MUGA Conclusions    Normal resting left ventricular function with ejection fraction of 60.4 %.    Normal wall motion. Pertinent Problems:  · CAD s/p 3 vessel CABG 2005  · CAD of SVG to OM2 graft 100%  · Angina- likely due to severe small artery disease  · HLD unable to take statins or niacin due to severe myalgias   · HTN  · LVEF 20% on cardiac cath, however echo shows normal LVEF at 55%; LVEF 60% on MUGA    Visit Diagnosis:    1. Coronary artery disease of bypass graft of native heart with stable angina pectoris ([address]    2. Essential hypertension    3. Mixed hyperlipidemia        Plan:     1. Increase Imdur to 120mg daily for angina; can consider adding Ranexa for chest pain and also EECP  2. Continue coreg to 3.125mg Twice a day  3. ramipril 5mg nightly  4. Continue lasix 40mg daily for now. Continue daily weights. Okay to take and extra 20 mg of the lasix if weight goes up and having shortness of breath  5. Continue aspirin 81mg daily  6. Follow up Dr. Pride Bussing next month      QUALITY MEASURES  1. Tobacco Cessation Counseling: NA  2. Retake of BP if >140/90:   NA  3. Documentation to PCP/referring for new patient:  Sent to PCP at close of office visit  4. CAD patient on anti-platelet: Yes  5. CAD patient on STATIN therapy:  No, allergy  6. Patient with CHF and aFib on anticoagulation:  NA     I appreciate the opportunity for caring for this patient.      Shant Grissom CNP, 10/4/2018, 1:39 PM

## 2018-10-22 RX ORDER — CARVEDILOL 3.12 MG/1
3.12 TABLET ORAL 2 TIMES DAILY WITH MEALS
Qty: 60 TABLET | Refills: 5 | Status: SHIPPED | OUTPATIENT
Start: 2018-10-22 | End: 2018-11-19 | Stop reason: SDUPTHER

## 2018-10-22 RX ORDER — NITROGLYCERIN 0.4 MG/1
0.4 TABLET SUBLINGUAL EVERY 5 MIN PRN
Qty: 25 TABLET | Refills: 3 | Status: SHIPPED | OUTPATIENT
Start: 2018-10-22 | End: 2018-11-19 | Stop reason: SDUPTHER

## 2018-12-03 NOTE — PROGRESS NOTES
Authorizing Provider   loratadine (ALAVERT) 10 MG tablet Take 10 mg by mouth 2 times daily as needed. Yes Historical Provider, MD   GLIPIZIDE Take 5 mg by mouth 2 times daily (with meals). Yes Historical Provider, MD   gabapentin (NEURONTIN) 600 MG tablet Take 1,200 mg by mouth 3 times daily. May take up to 4x per day if needed for neuropathy pain   Yes Historical Provider, MD   tizanidine (ZANAFLEX) 4 MG tablet Take 8 mg by mouth nightly. Yes Historical Provider, MD   tramadol (ULTRAM) 50 MG tablet Take 50 mg by mouth every 4 hours as needed. States uses very rare and only when neurontin ineffective. Yes Historical Provider, MD   metformin (GLUCOPHAGE) 1000 MG tablet Take 1,000 mg by mouth daily (with breakfast). Yes Historical Provider, MD   levothyroxine (SYNTHROID) 50 MCG tablet Take 50 mcg by mouth every morning. Yes Historical Provider, MD   topiramate (TOPAMAX) 25 MG tablet Take 25 mg by mouth daily. Yes Historical Provider, MD   tolterodine (DETROL LA) 4 MG ER capsule Take 4 mg by mouth 2 times daily. Yes Historical Provider, MD   diphenhydrAMINE (BENADRYL) 25 MG tablet Take 25 mg by mouth nightly as needed. Yes Historical Provider, MD   ranitidine (ZANTAC 75) 75 MG tablet Take 75 mg by mouth daily. Yes Historical Provider, MD   Cholecalciferol (VITAMIN D3) 1000 UNIT CAPS Take 1 capsule by mouth daily. Yes Historical Provider, MD   Omega-3 Fatty Acids (FISH OIL) 1000 MG CAPS Take 1,000 mg by mouth daily. Yes Historical Provider, MD   Red Yeast Rice 600 MG TABS Take 1 tablet by mouth daily. Yes Historical Provider, MD   lisinopril (PRINIVIL;ZESTRIL) 10 MG tablet Take 5 mg by mouth every morning. Yes Historical Provider, MD   furosemide (LASIX) 40 MG tablet Take 40 mg by mouth daily as needed. Yes Historical Provider, MD   aspirin 81 MG EC tablet Take 81 mg by mouth daily. Historical Provider, MD        Allergies:  Niacin and related; Adhesive tape;  Other; Abdomen:  · Obese,No masses or tenderness  · Liver/Spleen: No Abnormalities Noted  Neurological/Psychiatric:  · Alert and oriented in all spheres  · Moves all extremities well  · Exhibits normal gait balance and coordination  · No abnormalities of mood, affect, memory, mentation, or behavior are noted      Lab Results   Component Value Date     08/16/2018    K 4.8 08/16/2018    CL 96 (L) 08/16/2018    CO2 26 08/16/2018    BUN 27 (H) 08/16/2018    CREATININE 1.2 08/16/2018    GLUCOSE 146 (H) 08/16/2018    CALCIUM 9.7 08/16/2018    PROT 6.9 12/27/2017    LABALBU 3.3 (L) 06/17/2018    BILITOT 0.7 03/27/2018    ALKPHOS 77 03/27/2018    AST 23 03/27/2018    ALT 29 03/27/2018    LABGLOM 44 (A) 08/16/2018    GFRAA 54 (A) 08/16/2018    AGRATIO 1.4 12/27/2017    GLOB 2.9 12/27/2017     Lab Results   Component Value Date    CHOL 227 (H) 04/16/2015     Lab Results   Component Value Date    TRIG 179 (H) 04/16/2015     Lab Results   Component Value Date    HDL 34 (L) 10/18/2017    HDL 39 (L) 04/16/2015     Lab Results   Component Value Date    LDLCALC 108 (H) 10/18/2017    LDLCALC 152 (H) 04/16/2015     Lab Results   Component Value Date    LABVLDL 57 10/18/2017    LABVLDL 36 04/16/2015     No results found for: CHOLHDLRATIO    Assessment:     1. Hyperlipidemia : Last lipids 10/18/17 personally reviewed by me (see above)  She is unable to take statins or niacin due to severe myagias. She states she is taking 2 tablets of fish oil and red yeast extract along with dieting. She still does NOT want any consideration for new PCSK9 inhibitors. No need to recheck labs. 2. Hypertension : Stable and will continue present medical regimen. 3. CAD (coronary artery disease): Diagnosed with NSTEMI June 2018 peak troponin 0.24 and new LBBB. Most recent [address] [place] [address] 6/15/18  Native 3VCAD with occluded SVG to the OM Patent SVG to the RCA Patent LIMA to the LAD; risky disease NOT amenable to PCI. LVEF on cath 20% (6/2018).   Most recent MUGA scan 8/14/18 EF of 60.4 %. Normal wall motion  1/23/18 56.9% EF (no significant change)     4. Chest pain:   Taking SL NTG relieves the pain but also with pain after eating possibly gastroparesis. Definitely could have angina given extensive CAD. Will add ranexa to regimen and if not helping consider EECP. She is hesitant given transportation difficulties. Plan:  1. Did discuss considering PSCK9 meds again and still wants to think about it. 2. No need for cardiac testing at this time. 3. Follow up with me in 3 month  4. Medications reviewed and refilled as warranted  5. Will add Ranexa 500 mg twice a day will give samples to see if this helps chest pain she will call for more samples in 2 weeks may increase to 2 tabs twice a day if helpful but still having chest pain. Call if not helping and will consider EECP. Cost of prescription medications and patient compliance have been reviewed with patient. All questions answered. Thank you for allowing me to participate in the care of this individual.     Narayan Altamirano.  Gaye Ruiz M.D., [facility] - [place]

## 2018-12-04 ENCOUNTER — OFFICE VISIT (OUTPATIENT)
Dept: CARDIOLOGY CLINIC | Age: 73
End: 2018-12-04
Payer: MEDICARE

## 2018-12-04 VITALS
OXYGEN SATURATION: 98 % | HEART RATE: 78 BPM | DIASTOLIC BLOOD PRESSURE: 70 MMHG | WEIGHT: 236 LBS | SYSTOLIC BLOOD PRESSURE: 110 MMHG | HEIGHT: 63 IN | BODY MASS INDEX: 41.82 KG/M2

## 2018-12-04 DIAGNOSIS — E78.2 MIXED HYPERLIPIDEMIA: ICD-10-CM

## 2018-12-04 DIAGNOSIS — I10 ESSENTIAL HYPERTENSION: ICD-10-CM

## 2018-12-04 DIAGNOSIS — I25.708 CORONARY ARTERY DISEASE OF BYPASS GRAFT OF NATIVE HEART WITH STABLE ANGINA PECTORIS (HCC): Primary | ICD-10-CM

## 2018-12-04 PROCEDURE — 1101F PT FALLS ASSESS-DOCD LE1/YR: CPT | Performed by: INTERNAL MEDICINE

## 2018-12-04 PROCEDURE — G8417 CALC BMI ABV UP PARAM F/U: HCPCS | Performed by: INTERNAL MEDICINE

## 2018-12-04 PROCEDURE — G8427 DOCREV CUR MEDS BY ELIG CLIN: HCPCS | Performed by: INTERNAL MEDICINE

## 2018-12-04 PROCEDURE — 99214 OFFICE O/P EST MOD 30 MIN: CPT | Performed by: INTERNAL MEDICINE

## 2018-12-04 PROCEDURE — 1036F TOBACCO NON-USER: CPT | Performed by: INTERNAL MEDICINE

## 2018-12-04 PROCEDURE — 1123F ACP DISCUSS/DSCN MKR DOCD: CPT | Performed by: INTERNAL MEDICINE

## 2018-12-04 PROCEDURE — G8484 FLU IMMUNIZE NO ADMIN: HCPCS | Performed by: INTERNAL MEDICINE

## 2018-12-04 PROCEDURE — 4040F PNEUMOC VAC/ADMIN/RCVD: CPT | Performed by: INTERNAL MEDICINE

## 2018-12-04 PROCEDURE — G8399 PT W/DXA RESULTS DOCUMENT: HCPCS | Performed by: INTERNAL MEDICINE

## 2018-12-04 PROCEDURE — G8598 ASA/ANTIPLAT THER USED: HCPCS | Performed by: INTERNAL MEDICINE

## 2018-12-04 PROCEDURE — 1090F PRES/ABSN URINE INCON ASSESS: CPT | Performed by: INTERNAL MEDICINE

## 2018-12-04 PROCEDURE — 3017F COLORECTAL CA SCREEN DOC REV: CPT | Performed by: INTERNAL MEDICINE

## 2018-12-04 NOTE — COMMUNICATION BODY
MUGA scan 8/14/18 EF of 60.4 %. Normal wall motion  1/23/18 56.9% EF (no significant change)     4. Chest pain:   Taking SL NTG relieves the pain but also with pain after eating possibly gastroparesis. Definitely could have angina given extensive CAD. Will add ranexa to regimen and if not helping consider EECP. She is hesitant given transportation difficulties. Plan:  1. Did discuss considering PSCK9 meds again and still wants to think about it. 2. No need for cardiac testing at this time. 3. Follow up with me in 3 month  4. Medications reviewed and refilled as warranted  5. Will add Ranexa 500 mg twice a day will give samples to see if this helps chest pain she will call for more samples in 2 weeks may increase to 2 tabs twice a day if helpful but still having chest pain. Call if not helping and will consider EECP. Cost of prescription medications and patient compliance have been reviewed with patient. All questions answered. Thank you for allowing me to participate in the care of this individual.     Maurice Mora.  Anmol Shah M.D., [facility] - [place]

## 2018-12-27 RX ORDER — CARVEDILOL 3.12 MG/1
3.12 TABLET ORAL 2 TIMES DAILY WITH MEALS
Qty: 60 TABLET | Refills: 5 | Status: SHIPPED | OUTPATIENT
Start: 2018-12-27 | End: 2019-04-30 | Stop reason: SDUPTHER

## 2019-01-08 RX ORDER — ISOSORBIDE MONONITRATE 120 MG/1
120 TABLET, EXTENDED RELEASE ORAL DAILY
Qty: 90 TABLET | Refills: 1 | Status: SHIPPED | OUTPATIENT
Start: 2019-01-08 | End: 2019-01-23 | Stop reason: SDUPTHER

## 2019-01-29 RX ORDER — NITROGLYCERIN 0.4 MG/1
0.4 TABLET SUBLINGUAL EVERY 5 MIN PRN
Qty: 25 TABLET | Refills: 3 | Status: SHIPPED | OUTPATIENT
Start: 2019-01-29 | End: 2020-03-24

## 2019-01-29 RX ORDER — ISOSORBIDE MONONITRATE 120 MG/1
120 TABLET, EXTENDED RELEASE ORAL DAILY
Qty: 90 TABLET | Refills: 1 | Status: SHIPPED | OUTPATIENT
Start: 2019-01-29 | End: 2019-04-30 | Stop reason: SDUPTHER

## 2019-01-29 RX ORDER — FUROSEMIDE 40 MG/1
40 TABLET ORAL SEE ADMIN INSTRUCTIONS
Qty: 120 TABLET | Refills: 0 | Status: SHIPPED | OUTPATIENT
Start: 2019-01-29 | End: 2019-04-30 | Stop reason: SDUPTHER

## 2019-04-29 NOTE — PROGRESS NOTES
the LAD; disease risky and NOT amenable to PCI; LVEF on cath 20%. Note EKG 6/15/18 NSR; Cannot rule out Anterior infarct; nonspecific ST change. Most recent MUGA scan 8/14/18 EF of 60% (no change from 1/23/18 showing EF=57%). History of Present Illness: Today she reports she has been aggravated from psoriasis, she has one area  in her ear. She is ambulating today with 4 prong cane. She reports issues with GERD, but no true chest pains. She has used SL nitro a few times in past month feels she is stable. Denies chest pain, shortness of breath, edema, dizziness, palpitations and syncope. Past Medical History:   has a past medical history of Allergic rhinitis, Arthritis, CAD (coronary artery disease), Cholelithiasis, Diabetes mellitus ([address] Hepatic steatosis, Hyperlipidemia, Hypertension, Peripheral neuropathy, Sleep apnea, Thyroid disease, and Vitamin B12 deficiency. Surgical History:   has a past surgical history that includes Coronary artery bypass graft (2005); Foot surgery; colectomy (1998); Colonoscopy; Upper gastrointestinal endoscopy; Tonsillectomy and adenoidectomy; Tubal ligation; other surgical history (4/17/2012); other surgical history (Right, 3/20/13); Coronary angioplasty; Cholecystectomy; Cardiac catheterization (12/27/2017); and Diagnostic Cardiac Cath Lab Procedure. Social History:  She is  and lives alone in [facility]. She reports that she quit smoking about 13 years ago. She has never used smokeless tobacco. She reports that she does not drink alcohol or use illicit drugs. Family History:  family history is not on file. Home Medications:  Prior to Admission medications    Medication Sig Start Date End Date Taking? Authorizing Provider   loratadine (ALAVERT) 10 MG tablet Take 10 mg by mouth 2 times daily as needed. Yes Historical Provider, MD   GLIPIZIDE Take 5 mg by mouth 2 times daily (with meals).    Yes Historical Provider, MD   gabapentin (NEURONTIN) 600 MG tablet Take 1,200 mg by mouth 3 times daily. May take up to 4x per day if needed for neuropathy pain   Yes Historical Provider, MD   tizanidine (ZANAFLEX) 4 MG tablet Take 8 mg by mouth nightly. Yes Historical Provider, MD   tramadol (ULTRAM) 50 MG tablet Take 50 mg by mouth every 4 hours as needed. States uses very rare and only when neurontin ineffective. Yes Historical Provider, MD   metformin (GLUCOPHAGE) 1000 MG tablet Take 1,000 mg by mouth daily (with breakfast). Yes Historical Provider, MD   levothyroxine (SYNTHROID) 50 MCG tablet Take 50 mcg by mouth every morning. Yes Historical Provider, MD   topiramate (TOPAMAX) 25 MG tablet Take 25 mg by mouth daily. Yes Historical Provider, MD   tolterodine (DETROL LA) 4 MG ER capsule Take 4 mg by mouth 2 times daily. Yes Historical Provider, MD   diphenhydrAMINE (BENADRYL) 25 MG tablet Take 25 mg by mouth nightly as needed. Yes Historical Provider, MD   ranitidine (ZANTAC 75) 75 MG tablet Take 75 mg by mouth daily. Yes Historical Provider, MD   Cholecalciferol (VITAMIN D3) 1000 UNIT CAPS Take 1 capsule by mouth daily. Yes Historical Provider, MD   Omega-3 Fatty Acids (FISH OIL) 1000 MG CAPS Take 1,000 mg by mouth daily. Yes Historical Provider, MD   Red Yeast Rice 600 MG TABS Take 1 tablet by mouth daily. Yes Historical Provider, MD   lisinopril (PRINIVIL;ZESTRIL) 10 MG tablet Take 5 mg by mouth every morning. Yes Historical Provider, MD   furosemide (LASIX) 40 MG tablet Take 40 mg by mouth daily as needed. Yes Historical Provider, MD   aspirin 81 MG EC tablet Take 81 mg by mouth daily. Historical Provider, MD        Allergies:  Niacin and related; Adhesive tape; Other; Statins depletion therapy; and Vancomycin     Review of Systems:   · Constitutional: there has been no unanticipated weight loss. There's been no change in energy level, sleep pattern, or activity level.      · Eyes: No visual changes or diplopia. No scleral icterus. · ENT: No Headaches, hearing loss or vertigo. No mouth sores or sore throat. · Cardiovascular: Reviewed in HPI  · Respiratory: No cough or wheezing, no sputum production. No hematemesis. · Gastrointestinal: No abdominal pain, appetite loss, blood in stools. No change in bowel or bladder habits. · Genitourinary: No dysuria, trouble voiding, or hematuria. · Musculoskeletal:  No gait disturbance, weakness or joint complaints. · Integumentary: No rash or pruritis. · Neurological: No headache, diplopia, change in muscle strength, numbness or tingling. No change in gait, balance, coordination, mood, affect, memory, mentation, behavior. · Psychiatric: No anxiety, no depression. · Endocrine: No malaise, fatigue or temperature intolerance. No excessive thirst, fluid intake, or urination. No tremor. · Hematologic/Lymphatic: No abnormal bruising or bleeding, blood clots or swollen lymph nodes. · Allergic/Immunologic: No nasal congestion or hives. Physical Examination:    Vitals:    04/30/19 1302   BP: 118/72   Pulse: 75   SpO2: 97%        Constitutional and General Appearance: NAD   Respiratory:  · Normal excursion and expansion without use of accessory muscles  · Resp Auscultation: Normal breath sounds without dullness  Cardiovascular:  · The apical impulses not displaced  · Heart tones are crisp and normal RRR  · Cervical veins are not engorged  · The carotid upstroke is normal in amplitude and contour without delay or bruit  · Normal S1S2, No S3, Soft JACQUI  · Peripheral pulses are symmetrical and full  · There is no clubbing, cyanosis of the extremities.               Trace BLE edema  · Femoral Arteries: 2+ and equal  · Pedal Pulses: 2+ and equal   Abdomen:  · Obese,No masses or tenderness  · Liver/Spleen: No Abnormalities Noted  Neurological/Psychiatric:  · Alert and oriented in all spheres  · Moves all extremities well  · Exhibits normal gait balance and coordination  · No abnormalities of mood, affect, memory, mentation, or behavior are noted      Lab Results   Component Value Date     08/16/2018    K 4.8 08/16/2018    CL 96 (L) 08/16/2018    CO2 26 08/16/2018    BUN 27 (H) 08/16/2018    CREATININE 1.2 08/16/2018    GLUCOSE 146 (H) 08/16/2018    CALCIUM 9.7 08/16/2018    PROT 6.9 12/27/2017    LABALBU 3.3 (L) 06/17/2018    BILITOT 0.7 03/27/2018    ALKPHOS 77 03/27/2018    AST 23 03/27/2018    ALT 29 03/27/2018    LABGLOM 44 (A) 08/16/2018    GFRAA 54 (A) 08/16/2018    AGRATIO 1.4 12/27/2017    GLOB 2.9 12/27/2017     Lab Results   Component Value Date    CHOL 227 (H) 04/16/2015     Lab Results   Component Value Date    TRIG 179 (H) 04/16/2015     Lab Results   Component Value Date    HDL 34 (L) 10/18/2017    HDL 39 (L) 04/16/2015     Lab Results   Component Value Date    LDLCALC 108 (H) 10/18/2017    LDLCALC 152 (H) 04/16/2015     Lab Results   Component Value Date    LABVLDL 57 10/18/2017    LABVLDL 36 04/16/2015     No results found for: CHOLHDLRATIO    Assessment:     1. Hyperlipidemia : Last lipids 10/18/17 personally reviewed by me (see above)  She is unable to take statins or niacin due to severe myagias. She states she is taking 2 tablets of fish oil and red yeast extract along with dieting. She still does NOT want any consideration for new PCSK9 inhibitors. No need to recheck labs. 2. Hypertension : Stable and will continue present medical regimen. 3. CAD (coronary artery disease): Diagnosed with NSTEMI June 2018 peak troponin 0.24 and new LBBB. Most recent [facility] 6/15/18  Native 3VCAD with occluded SVG to the OM Patent SVG to the RCA Patent LIMA to the LAD; risky disease NOT amenable to PCI. LVEF on cath 20% (6/2018). Most recent MUGA scan 8/14/18 EF of 60.4 %. Normal wall motion  1/23/18 56.9% EF (no significant change)     4. Chest pain:   Taking SL NTG 2-3 x per week which relieves the pain but also with pain after eating possibly gastroparesis. Definitely could have angina given extensive CAD. No increased frequency or severity of CP and actually feels like it's improved. Continue current medical mgt. Plan:  1. No changes warranted    2. No need for cardiac testing at this time. 3. Follow up with me in 6  month  4. Medications reviewed and refilled as warranted   . Cost of prescription medications and patient compliance have been reviewed with patient. All questions answered. Thank you for allowing me to participate in the care of this individual.     This note was scribed in the presence of Aneudy Wooten MD by Marlon Alvarado RN    I, Dr. Evan Izaguirre, personally performed the services described in this documentation, as scribed by the above signed scribe in my presence. It is both accurate and complete to my knowledge. I agree with the details independently gathered by the clinical support staff, while the remaining scribed note accurately describes my personal service to the patient. Hernan Young.  Gill Morris M.D., [address]

## 2019-04-30 ENCOUNTER — OFFICE VISIT (OUTPATIENT)
Dept: CARDIOLOGY CLINIC | Age: 74
End: 2019-04-30
Payer: MEDICARE

## 2019-04-30 VITALS
WEIGHT: 226 LBS | SYSTOLIC BLOOD PRESSURE: 118 MMHG | HEART RATE: 75 BPM | HEIGHT: 63 IN | BODY MASS INDEX: 40.04 KG/M2 | DIASTOLIC BLOOD PRESSURE: 72 MMHG | OXYGEN SATURATION: 97 %

## 2019-04-30 DIAGNOSIS — I25.810 CORONARY ARTERY DISEASE INVOLVING CORONARY BYPASS GRAFT OF NATIVE HEART WITHOUT ANGINA PECTORIS: Primary | ICD-10-CM

## 2019-04-30 DIAGNOSIS — E78.2 MIXED HYPERLIPIDEMIA: ICD-10-CM

## 2019-04-30 DIAGNOSIS — I10 ESSENTIAL HYPERTENSION: ICD-10-CM

## 2019-04-30 PROCEDURE — G8399 PT W/DXA RESULTS DOCUMENT: HCPCS | Performed by: INTERNAL MEDICINE

## 2019-04-30 PROCEDURE — G8417 CALC BMI ABV UP PARAM F/U: HCPCS | Performed by: INTERNAL MEDICINE

## 2019-04-30 PROCEDURE — 99214 OFFICE O/P EST MOD 30 MIN: CPT | Performed by: INTERNAL MEDICINE

## 2019-04-30 PROCEDURE — 4040F PNEUMOC VAC/ADMIN/RCVD: CPT | Performed by: INTERNAL MEDICINE

## 2019-04-30 PROCEDURE — 1090F PRES/ABSN URINE INCON ASSESS: CPT | Performed by: INTERNAL MEDICINE

## 2019-04-30 PROCEDURE — 1123F ACP DISCUSS/DSCN MKR DOCD: CPT | Performed by: INTERNAL MEDICINE

## 2019-04-30 PROCEDURE — G8427 DOCREV CUR MEDS BY ELIG CLIN: HCPCS | Performed by: INTERNAL MEDICINE

## 2019-04-30 PROCEDURE — 3017F COLORECTAL CA SCREEN DOC REV: CPT | Performed by: INTERNAL MEDICINE

## 2019-04-30 PROCEDURE — 1036F TOBACCO NON-USER: CPT | Performed by: INTERNAL MEDICINE

## 2019-04-30 PROCEDURE — G8599 NO ASA/ANTIPLAT THER USE RNG: HCPCS | Performed by: INTERNAL MEDICINE

## 2019-04-30 RX ORDER — RAMIPRIL 5 MG/1
5 CAPSULE ORAL NIGHTLY
Qty: 90 CAPSULE | Refills: 3 | Status: SHIPPED | OUTPATIENT
Start: 2019-04-30 | End: 2020-10-23

## 2019-04-30 RX ORDER — FUROSEMIDE 40 MG/1
40 TABLET ORAL SEE ADMIN INSTRUCTIONS
Qty: 120 TABLET | Refills: 3 | Status: SHIPPED | OUTPATIENT
Start: 2019-04-30 | End: 2020-06-01

## 2019-04-30 RX ORDER — CARVEDILOL 3.12 MG/1
3.12 TABLET ORAL 2 TIMES DAILY WITH MEALS
Qty: 60 TABLET | Refills: 11 | Status: SHIPPED | OUTPATIENT
Start: 2019-04-30 | End: 2019-11-12 | Stop reason: SDUPTHER

## 2019-04-30 RX ORDER — ISOSORBIDE MONONITRATE 120 MG/1
120 TABLET, EXTENDED RELEASE ORAL DAILY
Qty: 90 TABLET | Refills: 3 | Status: SHIPPED | OUTPATIENT
Start: 2019-04-30 | End: 2020-10-05

## 2019-04-30 NOTE — PATIENT INSTRUCTIONS
[address]   Cardiac Followup    Referring Provider:  DARIA Gama - CNP     Chief Complaint   Patient presents with    3 Month Follow-Up    Coronary Artery Disease    Hyperlipidemia    Hypertension    Discuss Labs     CE 11/01/2018    Chest Pain     every once in awhile - same as before      Subjective: Mrs Adriano Hauser is being seen today for follow up of CAD s/p 3V CABG in 2005, HTN, and HLD; c/o baseline mild CP    Past Medical History:    Mrs. Adriano Hauser is a 81yo female with hx of CAD s/p 3V CABG in 2005, HLD, HTN, sleep apnea and uses CPAP, Charcot-foot with neuropathy, s/p fab 10/2/17, and intolerance to statins and niacin due to myalgias. She had right foot surgery for her Charcot in April 2012. She states she has had 2 cataract surgeries in 2012. Note lexiscan myoview stress test on 04/13/2015 was abnormal with small amount of ischemia anteroseptal wall at apex and lateral wall at base. Note cardiac cath on 4/16/15 which showed patent LIMA-LAD and vein grafts to RCA and OM1 with EF=55%. Diffuse disease in small vessels distal to grafts and medical management felt best without PCI at this time. Due to CP complaints at last OV I ordered most recent [facility] 7/18/17 no evidence of myocardial ischemia or scar;  EF>65%  with uniform wall motion. Admitted to hospital 12/27/17 with chest pain and diagnosed with NSTEMI (peak troponin 0.29). Note LHC 12/27/17 showed LM 50% LAD 50% prox, 100% mid Cx 80% mid OM2 90%  Cx/Om system is small and diffusely diseased % ostium (from prior cath); LIMA to LAD patent; SVG to RCA 50% distal, and SVG to OM2 100% ostially occluded (new since prior cath 2015, LVEF 20%) with severe small artery disease. Most recent ECHO 12/28/17 EF 55% Grade I DD. Readmitted and diagnosed with NSTEMI mid-June 2018 (peak troponin 0.24) and new LBBB. Most recent [facility] 6/15/18 showed severe 3V CAD with occluded SVG to the OM; Patent SVG to the RCA;  Patent LIMA to the LAD; disease risky and NOT amenable to PCI; LVEF on cath 20%. Note EKG 6/15/18 NSR; Cannot rule out Anterior infarct; nonspecific ST change. Most recent MUGA scan 8/14/18 EF of 60% (no change from 1/23/18 showing EF=57%). History of Present Illness: Today she reports she has been aggravated from psoriasis, she has one area  in her ear. She is ambulating today with 4 prong cane. She reports issues with GERD, but no true chest pains. She has used SL nitro a few times in past month feels she is stable. Denies chest pain, shortness of breath, edema, dizziness, palpitations and syncope. Past Medical History:   has a past medical history of Allergic rhinitis, Arthritis, CAD (coronary artery disease), Cholelithiasis, Diabetes mellitus ([address] Hepatic steatosis, Hyperlipidemia, Hypertension, Peripheral neuropathy, Sleep apnea, Thyroid disease, and Vitamin B12 deficiency. Surgical History:   has a past surgical history that includes Coronary artery bypass graft (2005); Foot surgery; colectomy (1998); Colonoscopy; Upper gastrointestinal endoscopy; Tonsillectomy and adenoidectomy; Tubal ligation; other surgical history (4/17/2012); other surgical history (Right, 3/20/13); Coronary angioplasty; Cholecystectomy; Cardiac catheterization (12/27/2017); and Diagnostic Cardiac Cath Lab Procedure. Social History:  She is  and lives alone in [facility]. She reports that she quit smoking about 13 years ago. She has never used smokeless tobacco. She reports that she does not drink alcohol or use illicit drugs. Family History:  family history is not on file. Home Medications:  Prior to Admission medications    Medication Sig Start Date End Date Taking? Authorizing Provider   loratadine (ALAVERT) 10 MG tablet Take 10 mg by mouth 2 times daily as needed. Yes Historical Provider, MD   GLIPIZIDE Take 5 mg by mouth 2 times daily (with meals).    Yes Historical Provider, MD   gabapentin (NEURONTIN) 600 MG tablet Take 1,200 mg by mouth 3 times daily. May take up to 4x per day if needed for neuropathy pain   Yes Historical Provider, MD   tizanidine (ZANAFLEX) 4 MG tablet Take 8 mg by mouth nightly. Yes Historical Provider, MD   tramadol (ULTRAM) 50 MG tablet Take 50 mg by mouth every 4 hours as needed. States uses very rare and only when neurontin ineffective. Yes Historical Provider, MD   metformin (GLUCOPHAGE) 1000 MG tablet Take 1,000 mg by mouth daily (with breakfast). Yes Historical Provider, MD   levothyroxine (SYNTHROID) 50 MCG tablet Take 50 mcg by mouth every morning. Yes Historical Provider, MD   topiramate (TOPAMAX) 25 MG tablet Take 25 mg by mouth daily. Yes Historical Provider, MD   tolterodine (DETROL LA) 4 MG ER capsule Take 4 mg by mouth 2 times daily. Yes Historical Provider, MD   diphenhydrAMINE (BENADRYL) 25 MG tablet Take 25 mg by mouth nightly as needed. Yes Historical Provider, MD   ranitidine (ZANTAC 75) 75 MG tablet Take 75 mg by mouth daily. Yes Historical Provider, MD   Cholecalciferol (VITAMIN D3) 1000 UNIT CAPS Take 1 capsule by mouth daily. Yes Historical Provider, MD   Omega-3 Fatty Acids (FISH OIL) 1000 MG CAPS Take 1,000 mg by mouth daily. Yes Historical Provider, MD   Red Yeast Rice 600 MG TABS Take 1 tablet by mouth daily. Yes Historical Provider, MD   lisinopril (PRINIVIL;ZESTRIL) 10 MG tablet Take 5 mg by mouth every morning. Yes Historical Provider, MD   furosemide (LASIX) 40 MG tablet Take 40 mg by mouth daily as needed. Yes Historical Provider, MD   aspirin 81 MG EC tablet Take 81 mg by mouth daily. Historical Provider, MD        Allergies:  Niacin and related; Adhesive tape; Other; Statins depletion therapy; and Vancomycin     Review of Systems:   · Constitutional: there has been no unanticipated weight loss. There's been no change in energy level, sleep pattern, or activity level.      · Eyes: No visual changes or diplopia. No scleral icterus. · ENT: No Headaches, hearing loss or vertigo. No mouth sores or sore throat. · Cardiovascular: Reviewed in HPI  · Respiratory: No cough or wheezing, no sputum production. No hematemesis. · Gastrointestinal: No abdominal pain, appetite loss, blood in stools. No change in bowel or bladder habits. · Genitourinary: No dysuria, trouble voiding, or hematuria. · Musculoskeletal:  No gait disturbance, weakness or joint complaints. · Integumentary: No rash or pruritis. · Neurological: No headache, diplopia, change in muscle strength, numbness or tingling. No change in gait, balance, coordination, mood, affect, memory, mentation, behavior. · Psychiatric: No anxiety, no depression. · Endocrine: No malaise, fatigue or temperature intolerance. No excessive thirst, fluid intake, or urination. No tremor. · Hematologic/Lymphatic: No abnormal bruising or bleeding, blood clots or swollen lymph nodes. · Allergic/Immunologic: No nasal congestion or hives. Physical Examination:    Vitals:    04/30/19 1302   BP: 118/72   Pulse: 75   SpO2: 97%        Constitutional and General Appearance: NAD   Respiratory:  · Normal excursion and expansion without use of accessory muscles  · Resp Auscultation: Normal breath sounds without dullness  Cardiovascular:  · The apical impulses not displaced  · Heart tones are crisp and normal RRR  · Cervical veins are not engorged  · The carotid upstroke is normal in amplitude and contour without delay or bruit  · Normal S1S2, No S3, Soft JACQUI  · Peripheral pulses are symmetrical and full  · There is no clubbing, cyanosis of the extremities.               Trace BLE edema  · Femoral Arteries: 2+ and equal  · Pedal Pulses: 2+ and equal   Abdomen:  · Obese,No masses or tenderness  · Liver/Spleen: No Abnormalities Noted  Neurological/Psychiatric:  · Alert and oriented in all spheres  · Moves all extremities well  · Exhibits normal gait balance and coordination  · No Definitely could have angina given extensive CAD. No increased frequency or severity of CP and actually feels like it's improved. Continue current medical mgt. Plan:  1. No changes warranted    2. No need for cardiac testing at this time. 3. Follow up with me in 6  month  4. Medications reviewed and refilled as warranted   . Cost of prescription medications and patient compliance have been reviewed with patient. All questions answered. Thank you for allowing me to participate in the care of this individual.     This note was scribed in the presence of Zelda Javier MD by Ayala Miranda RN    I, Dr. Quan Jimenez, personally performed the services described in this documentation, as scribed by the above signed scribe in my presence. It is both accurate and complete to my knowledge. I agree with the details independently gathered by the clinical support staff, while the remaining scribed note accurately describes my personal service to the patient. Jamir Gandhi.  Monica Mccray M.D., [facility]

## 2019-09-16 ENCOUNTER — HOSPITAL ENCOUNTER (OUTPATIENT)
Age: 74
Discharge: HOME | End: 2019-09-16
Payer: MEDICARE

## 2019-09-16 ENCOUNTER — HOSPITAL ENCOUNTER (OUTPATIENT)
Dept: ULTRASOUND IMAGING | Age: 74
Discharge: HOME | End: 2019-09-16
Payer: MEDICARE

## 2019-09-16 DIAGNOSIS — N18.30 CHRONIC KIDNEY DISEASE, STAGE III (MODERATE) (HCC): ICD-10-CM

## 2019-09-16 LAB
ALBUMIN SERPL-MCNC: 4 G/DL (ref 3.4–5)
ANION GAP SERPL CALCULATED.3IONS-SCNC: 15 MMOL/L (ref 3–16)
BILIRUBIN URINE: NEGATIVE
BLOOD, URINE: NEGATIVE
BUN BLDV-MCNC: 22 MG/DL (ref 7–20)
CALCIUM SERPL-MCNC: 9.5 MG/DL (ref 8.3–10.6)
CHLORIDE BLD-SCNC: 100 MMOL/L (ref 99–110)
CLARITY: CLEAR
CO2: 23 MMOL/L (ref 21–32)
COLOR: NORMAL
CREAT SERPL-MCNC: 1.1 MG/DL (ref 0.6–1.2)
CREATININE URINE: 15.6 MG/DL (ref 28–259)
GFR AFRICAN AMERICAN: 59
GFR NON-AFRICAN AMERICAN: 49
GLUCOSE BLD-MCNC: 205 MG/DL (ref 70–99)
GLUCOSE URINE: NEGATIVE MG/DL
HCT VFR BLD CALC: 45.3 % (ref 36–48)
HEMOGLOBIN: 15 G/DL (ref 12–16)
KETONES, URINE: NEGATIVE MG/DL
LEUKOCYTE ESTERASE, URINE: NEGATIVE
MCH RBC QN AUTO: 30.6 PG (ref 26–34)
MCHC RBC AUTO-ENTMCNC: 33.2 G/DL (ref 31–36)
MCV RBC AUTO: 92.3 FL (ref 80–100)
MICROSCOPIC EXAMINATION: NORMAL
NITRITE, URINE: NEGATIVE
PARATHYROID HORMONE INTACT: 42.5 PG/ML (ref 14–72)
PDW BLD-RTO: 13.9 % (ref 12.4–15.4)
PH UA: 6.5 (ref 5–8)
PHOSPHORUS: 3 MG/DL (ref 2.5–4.9)
PLATELET # BLD: 166 K/UL (ref 135–450)
PMV BLD AUTO: 9.6 FL (ref 5–10.5)
POTASSIUM SERPL-SCNC: 4.9 MMOL/L (ref 3.5–5.1)
PROTEIN PROTEIN: <0.004 G/DL
PROTEIN PROTEIN: <4 MG/DL
PROTEIN UA: NEGATIVE MG/DL
PROTEIN/CREAT RATIO: ABNORMAL MG/DL
RBC # BLD: 4.91 M/UL (ref 4–5.2)
SODIUM BLD-SCNC: 138 MMOL/L (ref 136–145)
SPECIFIC GRAVITY UA: <=1.005 (ref 1–1.03)
URINE TYPE: NORMAL
UROBILINOGEN, URINE: 0.2 E.U./DL
VITAMIN D 25-HYDROXY: 41.3 NG/ML
WBC # BLD: 10.2 K/UL (ref 4–11)

## 2019-09-16 PROCEDURE — 83883 ASSAY NEPHELOMETRY NOT SPEC: CPT

## 2019-09-16 PROCEDURE — 82306 VITAMIN D 25 HYDROXY: CPT

## 2019-09-16 PROCEDURE — 84155 ASSAY OF PROTEIN SERUM: CPT

## 2019-09-16 PROCEDURE — 85027 COMPLETE CBC AUTOMATED: CPT

## 2019-09-16 PROCEDURE — 83970 ASSAY OF PARATHORMONE: CPT

## 2019-09-16 PROCEDURE — 84156 ASSAY OF PROTEIN URINE: CPT

## 2019-09-16 PROCEDURE — 84165 PROTEIN E-PHORESIS SERUM: CPT

## 2019-09-16 PROCEDURE — 82570 ASSAY OF URINE CREATININE: CPT

## 2019-09-16 PROCEDURE — 80069 RENAL FUNCTION PANEL: CPT

## 2019-09-16 PROCEDURE — 36415 COLL VENOUS BLD VENIPUNCTURE: CPT

## 2019-09-16 PROCEDURE — 84166 PROTEIN E-PHORESIS/URINE/CSF: CPT

## 2019-09-16 PROCEDURE — 81003 URINALYSIS AUTO W/O SCOPE: CPT

## 2019-09-16 PROCEDURE — 76770 US EXAM ABDO BACK WALL COMP: CPT

## 2019-09-17 LAB
KAPPA, FREE LIGHT CHAINS, SERUM: 10.94 MG/L (ref 3.3–19.4)
KAPPA/LAMBDA RATIO: 1.2 (ref 0.26–1.65)
KAPPA/LAMBDA TEST COMMENT: NORMAL
LAMBDA, FREE LIGHT CHAINS, SERUM: 9.08 MG/L (ref 5.71–26.3)

## 2019-09-18 LAB
ALBUMIN SERPL-MCNC: 3.4 G/DL (ref 3.1–4.9)
ALPHA-1-GLOBULIN: 0.2 G/DL (ref 0.2–0.4)
ALPHA-2-GLOBULIN: 1.2 G/DL (ref 0.4–1.1)
BETA GLOBULIN: 1.1 G/DL (ref 0.9–1.6)
GAMMA GLOBULIN: 0.8 G/DL (ref 0.6–1.8)
TOTAL PROTEIN: 6.7 G/DL (ref 6.4–8.2)

## 2019-09-19 LAB
SPE/IFE INTERPRETATION: NORMAL
URINE ELECTROPHORESIS INTERP: NORMAL

## 2019-10-29 ENCOUNTER — OFFICE VISIT (OUTPATIENT)
Dept: CARDIOLOGY CLINIC | Age: 74
End: 2019-10-29
Payer: MEDICARE

## 2019-10-29 VITALS
BODY MASS INDEX: 40.36 KG/M2 | DIASTOLIC BLOOD PRESSURE: 64 MMHG | SYSTOLIC BLOOD PRESSURE: 98 MMHG | WEIGHT: 227.8 LBS | OXYGEN SATURATION: 97 % | HEIGHT: 63 IN | HEART RATE: 72 BPM

## 2019-10-29 DIAGNOSIS — E78.2 MIXED HYPERLIPIDEMIA: ICD-10-CM

## 2019-10-29 DIAGNOSIS — I25.810 CORONARY ARTERY DISEASE INVOLVING CORONARY BYPASS GRAFT OF NATIVE HEART WITHOUT ANGINA PECTORIS: Primary | ICD-10-CM

## 2019-10-29 DIAGNOSIS — E78.2 MIXED HYPERLIPIDEMIA: Primary | ICD-10-CM

## 2019-10-29 DIAGNOSIS — I44.7 LBBB (LEFT BUNDLE BRANCH BLOCK): ICD-10-CM

## 2019-10-29 DIAGNOSIS — I10 ESSENTIAL HYPERTENSION: ICD-10-CM

## 2019-10-29 PROCEDURE — 1036F TOBACCO NON-USER: CPT | Performed by: INTERNAL MEDICINE

## 2019-10-29 PROCEDURE — G8427 DOCREV CUR MEDS BY ELIG CLIN: HCPCS | Performed by: INTERNAL MEDICINE

## 2019-10-29 PROCEDURE — 1123F ACP DISCUSS/DSCN MKR DOCD: CPT | Performed by: INTERNAL MEDICINE

## 2019-10-29 PROCEDURE — G8599 NO ASA/ANTIPLAT THER USE RNG: HCPCS | Performed by: INTERNAL MEDICINE

## 2019-10-29 PROCEDURE — G8399 PT W/DXA RESULTS DOCUMENT: HCPCS | Performed by: INTERNAL MEDICINE

## 2019-10-29 PROCEDURE — 1090F PRES/ABSN URINE INCON ASSESS: CPT | Performed by: INTERNAL MEDICINE

## 2019-10-29 PROCEDURE — 3017F COLORECTAL CA SCREEN DOC REV: CPT | Performed by: INTERNAL MEDICINE

## 2019-10-29 PROCEDURE — G8484 FLU IMMUNIZE NO ADMIN: HCPCS | Performed by: INTERNAL MEDICINE

## 2019-10-29 PROCEDURE — 99214 OFFICE O/P EST MOD 30 MIN: CPT | Performed by: INTERNAL MEDICINE

## 2019-10-29 PROCEDURE — G8417 CALC BMI ABV UP PARAM F/U: HCPCS | Performed by: INTERNAL MEDICINE

## 2019-10-29 PROCEDURE — 4040F PNEUMOC VAC/ADMIN/RCVD: CPT | Performed by: INTERNAL MEDICINE

## 2019-10-29 RX ORDER — RANOLAZINE 500 MG/1
500 TABLET, EXTENDED RELEASE ORAL 2 TIMES DAILY
Qty: 60 TABLET | Refills: 11 | Status: SHIPPED | OUTPATIENT
Start: 2019-10-29 | End: 2020-11-05

## 2019-11-12 RX ORDER — CARVEDILOL 3.12 MG/1
TABLET ORAL
Qty: 60 TABLET | Refills: 5 | Status: SHIPPED | OUTPATIENT
Start: 2019-11-12 | End: 2020-05-18

## 2019-11-18 RX ORDER — NITROGLYCERIN 0.4 MG/1
TABLET SUBLINGUAL
Qty: 25 TABLET | Refills: 2 | Status: ON HOLD
Start: 2019-11-18 | End: 2020-02-25 | Stop reason: HOSPADM

## 2020-02-22 ENCOUNTER — HOSPITAL ENCOUNTER (EMERGENCY)
Age: 75
Discharge: ANOTHER HOSPITAL | End: 2020-02-23
Attending: EMERGENCY MEDICINE
Payer: MEDICARE

## 2020-02-22 ENCOUNTER — APPOINTMENT (OUTPATIENT)
Dept: GENERAL RADIOLOGY | Age: 75
End: 2020-02-22
Payer: MEDICARE

## 2020-02-22 PROCEDURE — 83880 ASSAY OF NATRIURETIC PEPTIDE: CPT

## 2020-02-22 PROCEDURE — 6370000000 HC RX 637 (ALT 250 FOR IP)

## 2020-02-22 PROCEDURE — 83605 ASSAY OF LACTIC ACID: CPT

## 2020-02-22 PROCEDURE — 80053 COMPREHEN METABOLIC PANEL: CPT

## 2020-02-22 PROCEDURE — 93005 ELECTROCARDIOGRAM TRACING: CPT | Performed by: EMERGENCY MEDICINE

## 2020-02-22 PROCEDURE — 71045 X-RAY EXAM CHEST 1 VIEW: CPT

## 2020-02-22 PROCEDURE — 84484 ASSAY OF TROPONIN QUANT: CPT

## 2020-02-22 PROCEDURE — 85025 COMPLETE CBC W/AUTO DIFF WBC: CPT

## 2020-02-22 PROCEDURE — 99285 EMERGENCY DEPT VISIT HI MDM: CPT

## 2020-02-22 PROCEDURE — 36415 COLL VENOUS BLD VENIPUNCTURE: CPT

## 2020-02-22 PROCEDURE — 85730 THROMBOPLASTIN TIME PARTIAL: CPT

## 2020-02-22 RX ORDER — PANTOPRAZOLE SODIUM 40 MG/1
TABLET, DELAYED RELEASE ORAL
COMMUNITY
Start: 2019-09-03

## 2020-02-22 RX ORDER — IPRATROPIUM BROMIDE AND ALBUTEROL SULFATE 2.5; .5 MG/3ML; MG/3ML
SOLUTION RESPIRATORY (INHALATION)
Status: COMPLETED
Start: 2020-02-22 | End: 2020-02-22

## 2020-02-22 RX ADMIN — IPRATROPIUM BROMIDE AND ALBUTEROL SULFATE 3 ML: .5; 3 SOLUTION RESPIRATORY (INHALATION) at 23:51

## 2020-02-23 ENCOUNTER — HOSPITAL ENCOUNTER (INPATIENT)
Age: 75
LOS: 2 days | Discharge: HOME | DRG: 308 | End: 2020-02-25
Attending: INTERNAL MEDICINE | Admitting: INTERNAL MEDICINE
Payer: MEDICARE

## 2020-02-23 VITALS
OXYGEN SATURATION: 100 % | WEIGHT: 227 LBS | RESPIRATION RATE: 12 BRPM | DIASTOLIC BLOOD PRESSURE: 81 MMHG | HEART RATE: 98 BPM | SYSTOLIC BLOOD PRESSURE: 127 MMHG | BODY MASS INDEX: 40.22 KG/M2 | HEIGHT: 63 IN

## 2020-02-23 PROBLEM — I48.91 ATRIAL FIBRILLATION WITH RAPID VENTRICULAR RESPONSE (HCC): Status: ACTIVE | Noted: 2020-02-23

## 2020-02-23 PROBLEM — J96.01 ACUTE RESPIRATORY FAILURE WITH HYPOXIA (HCC): Status: ACTIVE | Noted: 2020-02-23

## 2020-02-23 PROBLEM — Z95.1 AORTOCORONARY BYPASS STATUS: Chronic | Status: ACTIVE | Noted: 2020-02-23

## 2020-02-23 LAB
A/G RATIO: 1.4 (ref 1.1–2.2)
ALBUMIN SERPL-MCNC: 4.5 G/DL (ref 3.4–5)
ALP BLD-CCNC: 111 U/L (ref 40–129)
ALT SERPL-CCNC: 30 U/L (ref 10–40)
ANION GAP SERPL CALCULATED.3IONS-SCNC: 13 MMOL/L (ref 3–16)
ANION GAP SERPL CALCULATED.3IONS-SCNC: 14 MMOL/L (ref 3–16)
APTT: 25.9 SEC (ref 24.2–36.2)
APTT: 38 SEC (ref 24.2–36.2)
APTT: 55.5 SEC (ref 24.2–36.2)
APTT: 63.4 SEC (ref 24.2–36.2)
AST SERPL-CCNC: 25 U/L (ref 15–37)
BASOPHILS ABSOLUTE: 0.1 K/UL (ref 0–0.2)
BASOPHILS RELATIVE PERCENT: 0.8 %
BILIRUB SERPL-MCNC: 0.3 MG/DL (ref 0–1)
BUN BLDV-MCNC: 21 MG/DL (ref 7–20)
BUN BLDV-MCNC: 22 MG/DL (ref 7–20)
CALCIUM SERPL-MCNC: 9.1 MG/DL (ref 8.3–10.6)
CALCIUM SERPL-MCNC: 9.6 MG/DL (ref 8.3–10.6)
CHLORIDE BLD-SCNC: 102 MMOL/L (ref 99–110)
CHLORIDE BLD-SCNC: 103 MMOL/L (ref 99–110)
CO2: 22 MMOL/L (ref 21–32)
CO2: 24 MMOL/L (ref 21–32)
CREAT SERPL-MCNC: 1.4 MG/DL (ref 0.6–1.2)
CREAT SERPL-MCNC: 1.5 MG/DL (ref 0.6–1.2)
EKG ATRIAL RATE: 101 BPM
EKG ATRIAL RATE: 133 BPM
EKG ATRIAL RATE: 92 BPM
EKG DIAGNOSIS: NORMAL
EKG P AXIS: 38 DEGREES
EKG P-R INTERVAL: 156 MS
EKG Q-T INTERVAL: 344 MS
EKG Q-T INTERVAL: 346 MS
EKG Q-T INTERVAL: 426 MS
EKG QRS DURATION: 106 MS
EKG QRS DURATION: 136 MS
EKG QRS DURATION: 136 MS
EKG QTC CALCULATION (BAZETT): 446 MS
EKG QTC CALCULATION (BAZETT): 501 MS
EKG QTC CALCULATION (BAZETT): 526 MS
EKG R AXIS: -14 DEGREES
EKG R AXIS: 26 DEGREES
EKG R AXIS: 8 DEGREES
EKG T AXIS: 206 DEGREES
EKG T AXIS: 214 DEGREES
EKG T AXIS: 219 DEGREES
EKG VENTRICULAR RATE: 101 BPM
EKG VENTRICULAR RATE: 126 BPM
EKG VENTRICULAR RATE: 92 BPM
EOSINOPHILS ABSOLUTE: 0.2 K/UL (ref 0–0.6)
EOSINOPHILS RELATIVE PERCENT: 1.4 %
ESTIMATED AVERAGE GLUCOSE: 157.1 MG/DL
GFR AFRICAN AMERICAN: 41
GFR AFRICAN AMERICAN: 45
GFR NON-AFRICAN AMERICAN: 34
GFR NON-AFRICAN AMERICAN: 37
GLOBULIN: 3.3 G/DL
GLUCOSE BLD-MCNC: 168 MG/DL (ref 70–99)
GLUCOSE BLD-MCNC: 170 MG/DL (ref 70–99)
GLUCOSE BLD-MCNC: 181 MG/DL (ref 70–99)
GLUCOSE BLD-MCNC: 189 MG/DL (ref 70–99)
GLUCOSE BLD-MCNC: 342 MG/DL (ref 70–99)
HBA1C MFR BLD: 7.1 %
HCT VFR BLD CALC: 42.7 % (ref 36–48)
HCT VFR BLD CALC: 51.5 % (ref 36–48)
HEMOGLOBIN: 14.4 G/DL (ref 12–16)
HEMOGLOBIN: 16.6 G/DL (ref 12–16)
LACTIC ACID: 3.5 MMOL/L (ref 0.4–2)
LYMPHOCYTES ABSOLUTE: 3.7 K/UL (ref 1–5.1)
LYMPHOCYTES RELATIVE PERCENT: 27.2 %
MCH RBC QN AUTO: 31.2 PG (ref 26–34)
MCH RBC QN AUTO: 32 PG (ref 26–34)
MCHC RBC AUTO-ENTMCNC: 32.3 G/DL (ref 31–36)
MCHC RBC AUTO-ENTMCNC: 33.8 G/DL (ref 31–36)
MCV RBC AUTO: 94.6 FL (ref 80–100)
MCV RBC AUTO: 96.8 FL (ref 80–100)
MONOCYTES ABSOLUTE: 0.7 K/UL (ref 0–1.3)
MONOCYTES RELATIVE PERCENT: 4.9 %
NEUTROPHILS ABSOLUTE: 8.9 K/UL (ref 1.7–7.7)
NEUTROPHILS RELATIVE PERCENT: 65.7 %
PDW BLD-RTO: 13.4 % (ref 12.4–15.4)
PDW BLD-RTO: 13.5 % (ref 12.4–15.4)
PERFORMED ON: ABNORMAL
PLATELET # BLD: 163 K/UL (ref 135–450)
PLATELET # BLD: 248 K/UL (ref 135–450)
PMV BLD AUTO: 8.4 FL (ref 5–10.5)
PMV BLD AUTO: 9.8 FL (ref 5–10.5)
POTASSIUM REFLEX MAGNESIUM: 5.4 MMOL/L (ref 3.5–5.1)
POTASSIUM SERPL-SCNC: 5.1 MMOL/L (ref 3.5–5.1)
PRO-BNP: 3954 PG/ML (ref 0–124)
RBC # BLD: 4.52 M/UL (ref 4–5.2)
RBC # BLD: 5.32 M/UL (ref 4–5.2)
SODIUM BLD-SCNC: 139 MMOL/L (ref 136–145)
SODIUM BLD-SCNC: 139 MMOL/L (ref 136–145)
TOTAL PROTEIN: 7.8 G/DL (ref 6.4–8.2)
TROPONIN: 0.03 NG/ML
TROPONIN: 0.1 NG/ML
TROPONIN: 0.3 NG/ML
WBC # BLD: 13.6 K/UL (ref 4–11)
WBC # BLD: 8 K/UL (ref 4–11)

## 2020-02-23 PROCEDURE — 93005 ELECTROCARDIOGRAM TRACING: CPT | Performed by: INTERNAL MEDICINE

## 2020-02-23 PROCEDURE — 2060000000 HC ICU INTERMEDIATE R&B

## 2020-02-23 PROCEDURE — 6360000002 HC RX W HCPCS: Performed by: EMERGENCY MEDICINE

## 2020-02-23 PROCEDURE — 36415 COLL VENOUS BLD VENIPUNCTURE: CPT

## 2020-02-23 PROCEDURE — 84484 ASSAY OF TROPONIN QUANT: CPT

## 2020-02-23 PROCEDURE — 2500000003 HC RX 250 WO HCPCS: Performed by: EMERGENCY MEDICINE

## 2020-02-23 PROCEDURE — 85027 COMPLETE CBC AUTOMATED: CPT

## 2020-02-23 PROCEDURE — 6360000002 HC RX W HCPCS: Performed by: INTERNAL MEDICINE

## 2020-02-23 PROCEDURE — 85730 THROMBOPLASTIN TIME PARTIAL: CPT

## 2020-02-23 PROCEDURE — 80048 BASIC METABOLIC PNL TOTAL CA: CPT

## 2020-02-23 PROCEDURE — 96375 TX/PRO/DX INJ NEW DRUG ADDON: CPT

## 2020-02-23 PROCEDURE — 93010 ELECTROCARDIOGRAM REPORT: CPT | Performed by: INTERNAL MEDICINE

## 2020-02-23 PROCEDURE — 96374 THER/PROPH/DIAG INJ IV PUSH: CPT

## 2020-02-23 PROCEDURE — 6370000000 HC RX 637 (ALT 250 FOR IP): Performed by: INTERNAL MEDICINE

## 2020-02-23 PROCEDURE — 94761 N-INVAS EAR/PLS OXIMETRY MLT: CPT

## 2020-02-23 PROCEDURE — 2700000000 HC OXYGEN THERAPY PER DAY

## 2020-02-23 PROCEDURE — 99223 1ST HOSP IP/OBS HIGH 75: CPT | Performed by: INTERNAL MEDICINE

## 2020-02-23 PROCEDURE — 6370000000 HC RX 637 (ALT 250 FOR IP): Performed by: EMERGENCY MEDICINE

## 2020-02-23 PROCEDURE — 83036 HEMOGLOBIN GLYCOSYLATED A1C: CPT

## 2020-02-23 RX ORDER — HEPARIN SODIUM 1000 [USP'U]/ML
4000 INJECTION, SOLUTION INTRAVENOUS; SUBCUTANEOUS ONCE
Status: DISCONTINUED | OUTPATIENT
Start: 2020-02-23 | End: 2020-02-23 | Stop reason: DRUGHIGH

## 2020-02-23 RX ORDER — RANOLAZINE 500 MG/1
500 TABLET, EXTENDED RELEASE ORAL 2 TIMES DAILY
Status: DISCONTINUED | OUTPATIENT
Start: 2020-02-23 | End: 2020-02-25 | Stop reason: HOSPADM

## 2020-02-23 RX ORDER — IPRATROPIUM BROMIDE AND ALBUTEROL SULFATE 2.5; .5 MG/3ML; MG/3ML
1 SOLUTION RESPIRATORY (INHALATION) 4 TIMES DAILY
Status: DISCONTINUED | OUTPATIENT
Start: 2020-02-23 | End: 2020-02-23

## 2020-02-23 RX ORDER — ALBUTEROL SULFATE 2.5 MG/3ML
2.5 SOLUTION RESPIRATORY (INHALATION)
Status: DISCONTINUED | OUTPATIENT
Start: 2020-02-23 | End: 2020-02-25 | Stop reason: HOSPADM

## 2020-02-23 RX ORDER — MORPHINE SULFATE 4 MG/ML
INJECTION, SOLUTION INTRAMUSCULAR; INTRAVENOUS
Status: DISCONTINUED
Start: 2020-02-23 | End: 2020-02-23 | Stop reason: WASHOUT

## 2020-02-23 RX ORDER — DEXTROSE MONOHYDRATE 25 G/50ML
12.5 INJECTION, SOLUTION INTRAVENOUS PRN
Status: DISCONTINUED | OUTPATIENT
Start: 2020-02-23 | End: 2020-02-25 | Stop reason: HOSPADM

## 2020-02-23 RX ORDER — MAGNESIUM SULFATE 1 G/100ML
1 INJECTION INTRAVENOUS PRN
Status: DISCONTINUED | OUTPATIENT
Start: 2020-02-23 | End: 2020-02-25 | Stop reason: HOSPADM

## 2020-02-23 RX ORDER — POTASSIUM CHLORIDE 7.45 MG/ML
10 INJECTION INTRAVENOUS PRN
Status: DISCONTINUED | OUTPATIENT
Start: 2020-02-23 | End: 2020-02-25 | Stop reason: HOSPADM

## 2020-02-23 RX ORDER — ASPIRIN 81 MG/1
81 TABLET ORAL DAILY
Status: DISCONTINUED | OUTPATIENT
Start: 2020-02-23 | End: 2020-02-25 | Stop reason: HOSPADM

## 2020-02-23 RX ORDER — IPRATROPIUM BROMIDE AND ALBUTEROL SULFATE 2.5; .5 MG/3ML; MG/3ML
1 SOLUTION RESPIRATORY (INHALATION) EVERY 4 HOURS PRN
Status: DISCONTINUED | OUTPATIENT
Start: 2020-02-23 | End: 2020-02-25 | Stop reason: HOSPADM

## 2020-02-23 RX ORDER — HEPARIN SODIUM 1000 [USP'U]/ML
2000 INJECTION, SOLUTION INTRAVENOUS; SUBCUTANEOUS PRN
Status: DISCONTINUED | OUTPATIENT
Start: 2020-02-23 | End: 2020-02-24

## 2020-02-23 RX ORDER — LEVOTHYROXINE SODIUM 0.05 MG/1
50 TABLET ORAL DAILY
Status: DISCONTINUED | OUTPATIENT
Start: 2020-02-23 | End: 2020-02-25 | Stop reason: HOSPADM

## 2020-02-23 RX ORDER — ONDANSETRON 2 MG/ML
4 INJECTION INTRAMUSCULAR; INTRAVENOUS ONCE
Status: COMPLETED | OUTPATIENT
Start: 2020-02-23 | End: 2020-02-23

## 2020-02-23 RX ORDER — HEPARIN SODIUM 10000 [USP'U]/100ML
8.7 INJECTION, SOLUTION INTRAVENOUS CONTINUOUS
Status: DISCONTINUED | OUTPATIENT
Start: 2020-02-23 | End: 2020-02-23 | Stop reason: HOSPADM

## 2020-02-23 RX ORDER — ASPIRIN 325 MG
325 TABLET ORAL ONCE
Status: DISCONTINUED | OUTPATIENT
Start: 2020-02-23 | End: 2020-02-23

## 2020-02-23 RX ORDER — ONDANSETRON 4 MG/1
4 TABLET, ORALLY DISINTEGRATING ORAL EVERY 8 HOURS PRN
Status: DISCONTINUED | OUTPATIENT
Start: 2020-02-23 | End: 2020-02-25 | Stop reason: HOSPADM

## 2020-02-23 RX ORDER — SODIUM CHLORIDE 0.9 % (FLUSH) 0.9 %
10 SYRINGE (ML) INJECTION PRN
Status: DISCONTINUED | OUTPATIENT
Start: 2020-02-23 | End: 2020-02-25 | Stop reason: HOSPADM

## 2020-02-23 RX ORDER — METOPROLOL TARTRATE 5 MG/5ML
2.5 INJECTION INTRAVENOUS ONCE
Status: COMPLETED | OUTPATIENT
Start: 2020-02-23 | End: 2020-02-23

## 2020-02-23 RX ORDER — POTASSIUM CHLORIDE 20 MEQ/1
40 TABLET, EXTENDED RELEASE ORAL PRN
Status: DISCONTINUED | OUTPATIENT
Start: 2020-02-23 | End: 2020-02-25 | Stop reason: HOSPADM

## 2020-02-23 RX ORDER — SODIUM CHLORIDE 9 MG/ML
INJECTION, SOLUTION INTRAVENOUS CONTINUOUS
Status: DISCONTINUED | OUTPATIENT
Start: 2020-02-24 | End: 2020-02-24

## 2020-02-23 RX ORDER — ACETAMINOPHEN 650 MG/1
650 SUPPOSITORY RECTAL EVERY 6 HOURS PRN
Status: DISCONTINUED | OUTPATIENT
Start: 2020-02-23 | End: 2020-02-25 | Stop reason: HOSPADM

## 2020-02-23 RX ORDER — CARVEDILOL 3.12 MG/1
3.12 TABLET ORAL 2 TIMES DAILY WITH MEALS
Status: DISCONTINUED | OUTPATIENT
Start: 2020-02-23 | End: 2020-02-25 | Stop reason: HOSPADM

## 2020-02-23 RX ORDER — PANTOPRAZOLE SODIUM 40 MG/1
40 TABLET, DELAYED RELEASE ORAL DAILY
Status: DISCONTINUED | OUTPATIENT
Start: 2020-02-23 | End: 2020-02-25 | Stop reason: HOSPADM

## 2020-02-23 RX ORDER — ONDANSETRON 2 MG/ML
4 INJECTION INTRAMUSCULAR; INTRAVENOUS EVERY 6 HOURS PRN
Status: DISCONTINUED | OUTPATIENT
Start: 2020-02-23 | End: 2020-02-25 | Stop reason: HOSPADM

## 2020-02-23 RX ORDER — DEXTROSE MONOHYDRATE 50 MG/ML
100 INJECTION, SOLUTION INTRAVENOUS PRN
Status: DISCONTINUED | OUTPATIENT
Start: 2020-02-23 | End: 2020-02-25 | Stop reason: HOSPADM

## 2020-02-23 RX ORDER — FUROSEMIDE 10 MG/ML
40 INJECTION INTRAMUSCULAR; INTRAVENOUS ONCE
Status: COMPLETED | OUTPATIENT
Start: 2020-02-23 | End: 2020-02-23

## 2020-02-23 RX ORDER — NICOTINE POLACRILEX 4 MG
15 LOZENGE BUCCAL PRN
Status: DISCONTINUED | OUTPATIENT
Start: 2020-02-23 | End: 2020-02-25 | Stop reason: HOSPADM

## 2020-02-23 RX ORDER — MORPHINE SULFATE 2 MG/ML
2 INJECTION, SOLUTION INTRAMUSCULAR; INTRAVENOUS ONCE
Status: DISCONTINUED | OUTPATIENT
Start: 2020-02-23 | End: 2020-02-23 | Stop reason: HOSPADM

## 2020-02-23 RX ORDER — HEPARIN SODIUM 1000 [USP'U]/ML
4000 INJECTION, SOLUTION INTRAVENOUS; SUBCUTANEOUS ONCE
Status: COMPLETED | OUTPATIENT
Start: 2020-02-23 | End: 2020-02-23

## 2020-02-23 RX ORDER — ACETAMINOPHEN 325 MG/1
650 TABLET ORAL EVERY 6 HOURS PRN
Status: DISCONTINUED | OUTPATIENT
Start: 2020-02-23 | End: 2020-02-25 | Stop reason: HOSPADM

## 2020-02-23 RX ORDER — HEPARIN SODIUM 10000 [USP'U]/100ML
1000 INJECTION, SOLUTION INTRAVENOUS CONTINUOUS
Status: DISCONTINUED | OUTPATIENT
Start: 2020-02-23 | End: 2020-02-24

## 2020-02-23 RX ORDER — HEPARIN SODIUM 1000 [USP'U]/ML
4000 INJECTION, SOLUTION INTRAVENOUS; SUBCUTANEOUS PRN
Status: DISCONTINUED | OUTPATIENT
Start: 2020-02-23 | End: 2020-02-24

## 2020-02-23 RX ORDER — GABAPENTIN 300 MG/1
600 CAPSULE ORAL 3 TIMES DAILY
Status: DISCONTINUED | OUTPATIENT
Start: 2020-02-23 | End: 2020-02-25 | Stop reason: HOSPADM

## 2020-02-23 RX ORDER — ISOSORBIDE MONONITRATE 60 MG/1
120 TABLET, EXTENDED RELEASE ORAL DAILY
Status: DISCONTINUED | OUTPATIENT
Start: 2020-02-23 | End: 2020-02-25 | Stop reason: HOSPADM

## 2020-02-23 RX ADMIN — RANOLAZINE 500 MG: 500 TABLET, FILM COATED, EXTENDED RELEASE ORAL at 09:25

## 2020-02-23 RX ADMIN — INSULIN LISPRO 1 UNITS: 100 INJECTION, SOLUTION INTRAVENOUS; SUBCUTANEOUS at 18:25

## 2020-02-23 RX ADMIN — CARVEDILOL 3.12 MG: 3.12 TABLET, FILM COATED ORAL at 09:25

## 2020-02-23 RX ADMIN — LEVOTHYROXINE SODIUM 50 MCG: 0.05 TABLET ORAL at 09:25

## 2020-02-23 RX ADMIN — RANOLAZINE 500 MG: 500 TABLET, FILM COATED, EXTENDED RELEASE ORAL at 20:06

## 2020-02-23 RX ADMIN — ISOSORBIDE MONONITRATE 120 MG: 60 TABLET, EXTENDED RELEASE ORAL at 09:25

## 2020-02-23 RX ADMIN — METOPROLOL TARTRATE 2.5 MG: 5 INJECTION, SOLUTION INTRAVENOUS at 00:40

## 2020-02-23 RX ADMIN — CARVEDILOL 3.12 MG: 3.12 TABLET, FILM COATED ORAL at 18:25

## 2020-02-23 RX ADMIN — METOPROLOL TARTRATE 2.5 MG: 5 INJECTION, SOLUTION INTRAVENOUS at 00:30

## 2020-02-23 RX ADMIN — ONDANSETRON HYDROCHLORIDE 4 MG: 2 INJECTION, SOLUTION INTRAMUSCULAR; INTRAVENOUS at 00:29

## 2020-02-23 RX ADMIN — HEPARIN SODIUM 8.7 ML/HR: 10000 INJECTION, SOLUTION INTRAVENOUS at 01:27

## 2020-02-23 RX ADMIN — GABAPENTIN 600 MG: 300 CAPSULE ORAL at 14:34

## 2020-02-23 RX ADMIN — ASPIRIN 81 MG: 81 TABLET, COATED ORAL at 09:25

## 2020-02-23 RX ADMIN — FUROSEMIDE 40 MG: 10 INJECTION, SOLUTION INTRAMUSCULAR; INTRAVENOUS at 00:38

## 2020-02-23 RX ADMIN — HEPARIN SODIUM 2000 UNITS: 1000 INJECTION, SOLUTION INTRAVENOUS; SUBCUTANEOUS at 09:25

## 2020-02-23 RX ADMIN — GABAPENTIN 600 MG: 300 CAPSULE ORAL at 20:06

## 2020-02-23 RX ADMIN — PANTOPRAZOLE SODIUM 40 MG: 40 TABLET, DELAYED RELEASE ORAL at 09:25

## 2020-02-23 RX ADMIN — NITROGLYCERIN 1 INCH: 20 OINTMENT TOPICAL at 00:07

## 2020-02-23 RX ADMIN — INSULIN LISPRO 1 UNITS: 100 INJECTION, SOLUTION INTRAVENOUS; SUBCUTANEOUS at 12:34

## 2020-02-23 RX ADMIN — GABAPENTIN 600 MG: 300 CAPSULE ORAL at 09:25

## 2020-02-23 RX ADMIN — INSULIN LISPRO 1 UNITS: 100 INJECTION, SOLUTION INTRAVENOUS; SUBCUTANEOUS at 22:09

## 2020-02-23 RX ADMIN — HEPARIN SODIUM 4000 UNITS: 1000 INJECTION, SOLUTION INTRAVENOUS; SUBCUTANEOUS at 01:26

## 2020-02-23 RX ADMIN — HEPARIN SODIUM 1000 UNITS/HR: 10000 INJECTION, SOLUTION INTRAVENOUS at 09:26

## 2020-02-23 ASSESSMENT — PAIN SCALES - GENERAL
PAINLEVEL_OUTOF10: 0
PAINLEVEL_OUTOF10: 3
PAINLEVEL_OUTOF10: 0
PAINLEVEL_OUTOF10: 8
PAINLEVEL_OUTOF10: 0

## 2020-02-23 ASSESSMENT — PAIN DESCRIPTION - PAIN TYPE
TYPE: ACUTE PAIN
TYPE: ACUTE PAIN

## 2020-02-23 ASSESSMENT — PAIN DESCRIPTION - ORIENTATION: ORIENTATION: LEFT

## 2020-02-23 ASSESSMENT — PAIN DESCRIPTION - DESCRIPTORS: DESCRIPTORS: PRESSURE

## 2020-02-23 ASSESSMENT — PAIN DESCRIPTION - FREQUENCY: FREQUENCY: CONTINUOUS

## 2020-02-23 ASSESSMENT — PAIN DESCRIPTION - LOCATION
LOCATION: CHEST
LOCATION: CHEST

## 2020-02-23 NOTE — ED NOTES
Attempted to call report to [facility] [phone] without success.       Lon Vela RN  02/23/20 2043

## 2020-02-23 NOTE — ED PROVIDER NOTES
Emergency Department Attending Note    Ibis Asif MD    Date of ED VIsit: 2/22/2020    CHIEF COMPLAINT  Chest Pain (pt arrived in resp. distress. having cp today and rapid onset sob tonight. Has had a cough. )      HISTORY OF PRESENT ILLNESS  Almaz Camacho is a 69 y.o. female  With Vital signs of BP (!) 158/104   Pulse 107   Resp 24   Ht 5' 3\" (1.6 m)   Wt 227 lb (103 kg)   SpO2 99%   BMI 40.21 kg/m²  who presents to the ED with a complaint of shortness of breath and chest pain. Patient seen and evaluated in room 1. She states she had chest pain earlier today prompting her to take 4 nitroglycerin. During this time she also developed shortness of breath no shortness of breath or developed rather rapidly. Apparently according to EMS her heart rate was in the 160s. And when she first came here her blood heart rate was in the 150s. Rhythm strip revealed her to be in atrial fibrillation with for which she has no history of. Twelve-lead EKG reveals a left bundle branch block which which she does have a history of patient denies any fevers or chills she does have a cough. No abdominal pain she has basic longstanding lower extremity edema. .  No other complaints, modifying factors or associated symptoms. Patients Past medical history reviewed and listed below  Past Medical History:   Diagnosis Date    Allergic rhinitis     Arthritis     CAD (coronary artery disease)     Cholelithiasis 7/3/2015    Diabetes mellitus ([address]     Accuchecks daily once a day at home in [place]    [facility] Hepatic steatosis 7/3/2015    Hyperlipidemia     Hypertension     Peripheral neuropathy     Sleep apnea     Has used the CPAP x 25 yrs now per pt.     Thyroid disease     Vitamin B12 deficiency      Past Surgical History:   Procedure Laterality Date    CARDIAC CATHETERIZATION  12/27/2017    SVG to OM2 100% ostium    CHOLECYSTECTOMY      COLECTOMY  1998    diverticulitis     COLONOSCOPY      CORONARY ANGIOPLASTY      CORONARY ARTERY BYPASS GRAFT  2005    x 3    DIAGNOSTIC CARDIAC CATH LAB PROCEDURE      FOOT SURGERY      Multiple foot surgeries bilateral    OTHER SURGICAL HISTORY  2012    achilles tendon lengthening,arthroplasty,matatarsalhead resection    OTHER SURGICAL HISTORY Right 3/20/13    FUSION OF RIGHT GREAT TOE JOINT WITH WIRE FIXATION, DEBRIEDMENT OF WOUND RIGHT GREAT TOE    TONSILLECTOMY AND ADENOIDECTOMY      TUBAL LIGATION      Laparoscopic    UPPER GASTROINTESTINAL ENDOSCOPY         I have reviewed the following from the nursing documentation. History reviewed. No pertinent family history. Social History     Socioeconomic History    Marital status:       Spouse name: Not on file    Number of children: Not on file    Years of education: Not on file    Highest education level: Not on file   Occupational History    Not on file   Social Needs    Financial resource strain: Not on file    Food insecurity:     Worry: Not on file     Inability: Not on file    Transportation needs:     Medical: Not on file     Non-medical: Not on file   Tobacco Use    Smoking status: Former Smoker     Packs/day: 1.00     Years: 30.00     Pack years: 30.00     Last attempt to quit: 1998     Years since quittin.7    Smokeless tobacco: Never Used   Substance and Sexual Activity    Alcohol use: No    Drug use: No    Sexual activity: Not Currently   Lifestyle    Physical activity:     Days per week: Not on file     Minutes per session: Not on file    Stress: Not on file   Relationships    Social connections:     Talks on phone: Not on file     Gets together: Not on file     Attends Sabianist service: Not on file     Active member of club or organization: Not on file     Attends meetings of clubs or organizations: Not on file     Relationship status: Not on file    Intimate partner violence:     Fear of current or ex partner: Not on file     Emotionally abused: Not on file     Physically abused: Not on file     Forced sexual activity: Not on file   Other Topics Concern    Not on file   Social History Narrative    Not on file     Current Facility-Administered Medications   Medication Dose Route Frequency Provider Last Rate Last Dose    [START ON 2/23/2020] nitroglycerin (NITRO-BID) 2 % ointment 1 inch  1 inch Topical Once Ibis Asif MD         Current Outpatient Medications   Medication Sig Dispense Refill    pantoprazole (PROTONIX) 40 MG tablet TAKE ONE TABLET BY MOUTH DAILY      carvedilol (COREG) 3.125 MG tablet TAKE ONE TABLET BY MOUTH TWICE A DAY WITH MEALS 60 tablet 5    ranolazine (RANEXA) 500 MG extended release tablet Take 1 tablet by mouth 2 times daily 60 tablet 11    furosemide (LASIX) 40 MG tablet Take 1 tablet by mouth See Admin Instructions Take 1 tab daily, okay to take an extra dose 20mg as needed for weight gain of 3lbs in a day 120 tablet 3    isosorbide mononitrate (IMDUR) 120 MG extended release tablet Take 1 tablet by mouth daily 90 tablet 3    ramipril (ALTACE) 5 MG capsule Take 1 capsule by mouth nightly 90 capsule 3    nitroGLYCERIN (NITROSTAT) 0.4 MG SL tablet Place 1 tablet under the tongue every 5 minutes as needed for Chest pain 25 tablet 3    aspirin 81 MG EC tablet Take 1 tablet by mouth daily 30 tablet 3    tolterodine (DETROL LA) 2 MG ER capsule Take 4 mg by mouth daily       fluticasone (FLONASE) 50 MCG/ACT nasal spray 1 spray by Nasal route daily.  Omega-3 Fatty Acids (FISH OIL) 1000 MG CAPS Take 2 capsules by mouth daily. 180 capsule 0    levothyroxine (SYNTHROID) 50 MCG tablet Take 50 mcg by mouth Daily.  GLIPIZIDE Take 5 mg by mouth 2 times daily (with meals) Taking 10 mg AM & 5 mg in PM      gabapentin (NEURONTIN) 600 MG tablet Take 600 mg by mouth. May take up to 4x per day if needed for neuropathy pain.  tizanidine (ZANAFLEX) 4 MG tablet Take 8 mg by mouth nightly.       diphenhydrAMINE (BENADRYL) 25 MG tablet Take 25 mg by mouth Her initial heart rate of 160 which was in atrial fib with a left bundle branch block was acquired. She does have an old bundle branch block which was similar in wave formation. But she never had A. fib before and she specifically states that she never had an irregular heartbeat. She is significant vasculopath with CABG in 2005 and multiple caths in the with the most recent in 2018 showing critical stenosis in multiple places. She was also complaining of chest pain which seem to be rate related. It was worse when she had a heart rate of 160 and it was significantly better down to about a 2 or 3 of 10 when she her heart rate was in the 70s and 80s. She did pop positive with a troponin of 0.03. She also has a chest radiograph consistent with congestive heart failure as well as a BNP that was elevated. Patient responded well to all therapies including morphine Lasix nitroglycerin and heparin. She was stabilized on BiPAP and then she arrangements were made for her to be transferred to [facility]. I spoke with Dr. Selvin Ambrose who accepted her in transfer. ED Course as of Mar 06 1919   Sat Feb 22, 2020   2342 Patient seen on arrival Case discussed with EMS    [DL]   [phone] EKG: Indication: Shortness of breath and chest pain, it shows a rhythm of atrial fibrillation at a rate of 101, with no acute ST-Twave changes except for a left bundle branch which I cannot find an EKG for but is noted in her history as previously having a left bundle to indicate ischemia. Intervals are irregular and the axis is leftward. This  interpreted by me without a cardiologist.   We did finally get an EKG from June 2018 which revealed her to be in normal sinus rhythm with this left bundle branch block not as wide as it is now. The ST depression seen on today's EKG is much deeper in the inferior and lateral leads. [DL]   Sun Feb 23, 2020   0010 IMPRESSION:  Pulmonary edema.    XR CHEST PORTABLE [DL]   0010 Note patient feels much Neutrophils Absolute 8.9(!)   Lymphocytes Absolute 3.7   Monocytes Absolute 0.7   Eosinophils Absolute 0.2   Basophils Absolute 0.1 [DL]   0048 Patient was found to have an EF of 55% on 12/17    [DL]   0126 Patient's APTT was 25.9. Heparin dosing was adjusted by pharmacy   aPTT: 25.9 [DL]   0235 Patient's repeat troponin was 0.10   Troponin(!):    Troponin 0.10(!) [DL]      ED Course User Index  [DL] Juan Ngo MD       The ED course and plan were reviewed and results discussed with the patient    The patient understood and agreed with the Discharge/transfer planning. CLINICAL IMPRESSION and DISPOSITION    Kinsey Lopez was stable and diagnosed with A. fib with RVR and unstable angina    Patient was treated with nitro, Lasix, aspirin, heparin    Patient was admitted/transferred to [facility]. I spoke to Dr. Ernst Britt and reviewed the patients labs and radiographic images, who accepted the continuity of this patients care. CRITICAL CARE TIME:   CRITICAL CARE NOTE:    I spent 90 minutes on the critical care of the patient since this illness of A. fib with RVR and possible unstable angina acutely impaired one or more vital organ systems such that there was a high probability of imminent or life threatening deterioration of the patient's condition. This time includes discussion regarding the patients condition with paramedics, nurses, consultants and the family. It also includes  a review of computer record data, interpretation of all test results and time spent charting. It does not include time spent on separately reported billable procedures.                        Juan Ngo MD  02/23/20 0028       Juan Ngo MD  03/06/20 [facility]

## 2020-02-23 NOTE — ED NOTES
Pt removed from bipap and placed on NRB @ 12L. Pt O2 sat 100%.       Anurag Hawkins RN  02/23/20 9078

## 2020-02-23 NOTE — CONSULTS
Pharmacy to manage low dose heparin protocol    Baseline aPTT = 38 sec  Bolus: 2000 units (2 mL)  Initial rate: 10 mL/hr  Next aPTT due 2/23 @ 1600    Tatum Wilson [facility]       Aptt = 55.5 at 1611. Continue heparin drip at 10ml/hr. Next aptt at 2230 2/23  [place] Locus 4:41 PM 2/23/2020 2/23 2253  aptt = 63.4sec  Continue current rate. Next aptt daily  Julianne Cavanaugh Pharm D.2/24/2020 1:15 AM    2/24  aPTT - 70. 3sec @ 0390  Continue heparin at 10ml/hr  Next aPTT 2/25 with AM labs  RAFFAELE ROJAS PharmD 2/24/2020 7:19 AM

## 2020-02-23 NOTE — ED NOTES
Patient pressed call light. Patient advises she is having a bad cramp in right lower leg. Patient reports this normal for her and only relief is with an ice pack. Provided patient with an ice pack to affected area.      Joleen Priest  02/23/20 0205

## 2020-02-23 NOTE — H&P
Hospital Medicine History & Physical      PCP: DARIA Gamble - CNP    Date of Admission: 2/23/2020    Date of Service: Pt seen/examined on 02/23/20 and Admitted to Inpatient with expected LOS greater than two midnights due to medical therapy. Chief Complaint:  Chest pain      History Of Present Illness:    69 y.o. female who presented to [facility] with chest pain. Patient developed sudden chest pain last night. Normally when she develops chest pain, she takes nitro and it resolves. However, last night the pain did not improve after 4 doses of nitro. The chest pain was associated with worsening SOB. When she presented to the ED, she was found to have Afib with rates in the 150s. She is not aware of a prior history of afib. She denies any fevers, chills, nausea or diarrhea. Past Medical History:          Diagnosis Date    Allergic rhinitis     Arthritis     CAD (coronary artery disease)     Cholelithiasis 7/3/2015    Diabetes mellitus ([address]     Accuchecks daily once a day at home in [facility] Hepatic steatosis 7/3/2015    Hyperlipidemia     Hypertension     Peripheral neuropathy     Sleep apnea     Has used the CPAP x 25 yrs now per pt.     Thyroid disease     Vitamin B12 deficiency        Past Surgical History:          Procedure Laterality Date    CARDIAC CATHETERIZATION  12/27/2017    SVG to OM2 100% ostium    CHOLECYSTECTOMY      COLECTOMY  1998    diverticulitis     COLONOSCOPY      CORONARY ANGIOPLASTY      CORONARY ARTERY BYPASS GRAFT  2005    x 3    DIAGNOSTIC CARDIAC CATH LAB PROCEDURE      FOOT SURGERY      Multiple foot surgeries bilateral    OTHER SURGICAL HISTORY  4/17/2012    achilles tendon lengthening,arthroplasty,matatarsalhead resection    OTHER SURGICAL HISTORY Right 3/20/13    FUSION OF RIGHT GREAT TOE JOINT WITH WIRE FIXATION, DEBRIEDMENT OF WOUND RIGHT GREAT TOE    TONSILLECTOMY AND ADENOIDECTOMY      TUBAL LIGATION      Laparoscopic    UPPER GASTROINTESTINAL ENDOSCOPY         Medications Prior to Admission:      Prior to Admission medications    Medication Sig Start Date End Date Taking? Authorizing Provider   pantoprazole (PROTONIX) 40 MG tablet TAKE ONE TABLET BY MOUTH DAILY 9/3/19   Historical Provider, MD   nitroGLYCERIN (NITROSTAT) 0.4 MG SL tablet DISSOLVE 1 TAB UNDER TONGUE FOR CHEST PAIN - IF PAIN REMAINS AFTER 5 MIN, CALL 911 AND REPEAT DOSE. MAX 3 TABS IN 15 MINUTES 11/18/19   Jeannie Olea MD   carvedilol (COREG) 3.125 MG tablet TAKE ONE TABLET BY MOUTH TWICE A DAY WITH MEALS 11/12/19   Dom Potter MD   ranolazine (RANEXA) 500 MG extended release tablet Take 1 tablet by mouth 2 times daily 10/29/19   Jeannie Olea MD   furosemide (LASIX) 40 MG tablet Take 1 tablet by mouth See Admin Instructions Take 1 tab daily, okay to take an extra dose 20mg as needed for weight gain of 3lbs in a day 4/30/19   Jeannie Olea MD   isosorbide mononitrate (IMDUR) 120 MG extended release tablet Take 1 tablet by mouth daily 4/30/19   Jeannie Olea MD   ramipril (ALTACE) 5 MG capsule Take 1 capsule by mouth nightly 4/30/19   Jeannie Olea MD   nitroGLYCERIN (NITROSTAT) 0.4 MG SL tablet Place 1 tablet under the tongue every 5 minutes as needed for Chest pain 1/29/19   [facility], APRN - CNP   aspirin 81 MG EC tablet Take 1 tablet by mouth daily 12/29/17   Suha Chin MD   tolterodine (DETROL LA) 2 MG ER capsule Take 4 mg by mouth daily     Historical Provider, MD   fluticasone (FLONASE) 50 MCG/ACT nasal spray 1 spray by Nasal route daily. Historical Provider, MD   Red Yeast Rice 600 MG TABS Take 2 tablets by mouth daily. 4/3/15   Jeannie Olea MD   Omega-3 Fatty Acids (FISH OIL) 1000 MG CAPS Take 2 capsules by mouth daily. 4/3/15   Jeannie Olea MD   levothyroxine (SYNTHROID) 50 MCG tablet Take 50 mcg by mouth Daily.     Historical Provider, MD   GLIPIZIDE Take 5 mg by mouth 2 times daily (with meals) Taking 10 mg AM & 5 mg in PM    Historical Provider, MD   gabapentin (NEURONTIN) 600 MG tablet Take 600 mg by mouth. May take up to 4x per day if needed for neuropathy pain. Historical Provider, MD   tizanidine (ZANAFLEX) 4 MG tablet Take 8 mg by mouth nightly. Historical Provider, MD   diphenhydrAMINE (BENADRYL) 25 MG tablet Take 25 mg by mouth nightly as needed. Historical Provider, MD   Cholecalciferol (VITAMIN D3) 1000 UNIT CAPS Take 1 capsule by mouth daily. Historical Provider, MD       Allergies:  Niacin and related; Adhesive tape; Other; Statins depletion therapy; and Vancomycin    Social History:      The patient currently lives at home    TOBACCO:   reports that she quit smoking about 21 years ago. She has a 30.00 pack-year smoking history. She has never used smokeless tobacco.  ETOH:   reports no history of alcohol use. E-Cigarettes Vaping or Juuling     Questions Responses    E-Cigarette Use Never User    Start Date     Does device contain nicotine? Never    Quit Date     E-Cigarette Type             Family History:      Reviewed in detail and negative for DM, Cancer. Positive as follows:        Problem Relation Age of Onset    Stroke Mother     Coronary Art Dis Father     Coronary Art Dis Sister     Coronary Art Dis Brother        REVIEW OF SYSTEMS:   Pertinent positives as noted in the HPI. All other systems reviewed and negative. PHYSICAL EXAM PERFORMED:    /68   Pulse 91   Temp 98.3 °F (36.8 °C) (Oral)   Resp 16   Wt 236 lb (107 kg)   SpO2 98%   BMI 41.81 kg/m²     General appearance:  No apparent distress, appears stated age and cooperative. HEENT:  Normal cephalic, atraumatic without obvious deformity. Pupils equal, round, and reactive to light. Extra ocular muscles intact. Conjunctivae/corneas clear. Neck: Supple, with full range of motion. No jugular venous distention. Trachea midline. Respiratory:  Normal respiratory effort.  Clear to auscultation, bilaterally without Rales/Wheezes/Rhonchi. Cardiovascular:  Regular rate and rhythm with normal S1/S2 without murmurs, rubs or gallops. Abdomen: Soft, non-tender, non-distended with normal bowel sounds. Musculoskeletal:  No clubbing, cyanosis. LE edema bilaterally. Full range of motion without deformity. Skin: Skin color, texture, turgor normal.  No rashes or lesions. Neurologic:  Neurovascularly intact without any focal sensory/motor deficits. Cranial nerves: II-XII intact, grossly non-focal.  Psychiatric:  Alert and oriented, thought content appropriate, normal insight  Capillary Refill: Brisk,< 3 seconds   Peripheral Pulses: +2 palpable, equal bilaterally       Labs:     Recent Labs     02/22/20  2343 02/23/20  0830   WBC 13.6* 8.0   HGB 16.6* 14.4   HCT 51.5* 42.7    163     Recent Labs     02/22/20  2343      K 5.4*      CO2 22   BUN 21*   CREATININE 1.5*   CALCIUM 9.6     Recent Labs     02/22/20  2343   AST 25   ALT 30   BILITOT 0.3   ALKPHOS 111     No results for input(s): INR in the last 72 hours.   Recent Labs     02/22/20  2343 02/23/20  0130 02/23/20  0830   TROPONINI 0.03* 0.10* 0.30*       Urinalysis:      Lab Results   Component Value Date    NITRU Negative 09/16/2019    WBCUA 10-20 07/03/2015    BACTERIA 4+ 07/03/2015    RBCUA 3-5 07/03/2015    BLOODU Negative 09/16/2019    SPECGRAV <=1.005 09/16/2019    GLUCOSEU Negative 09/16/2019       Radiology:     CXR: I have reviewed the CXR with the following interpretation: pulmonary edema  EKG:  I have reviewed the EKG with the following interpretation: NSR, LBBB    No orders to display       ASSESSMENT:    Active Hospital Problems    Diagnosis Date Noted    Atrial fibrillation with rapid ventricular response ([address] [I48.91] 02/23/2020    Aortocoronary bypass status [Z95.1] 02/23/2020    Acute respiratory failure with hypoxia ([address] [J96.01] 02/23/2020    LBBB (left bundle branch block) [I44.7]     Diabetes mellitus ([address] [E11.9]     CAD (coronary

## 2020-02-23 NOTE — ED NOTES
After attempting to reduce FIO2 to 80% pt heart rate increased and pain had came back. Reported to Dr. Ita Myles who ordered FIO2 to be increased back to 100% which is now done.       Melody Ha RN  02/23/20 9055

## 2020-02-23 NOTE — CONSULTS
[address]  Cardiac Consult     Referring Provider:  Kaila Wang, APRN - CNP         History of Present Illness:  70 y/o female followed by Dr. Christina Bashir with severe CAD admitted with atrial fib, chest pain and increased troponin. She is followed by Dr. Christina Bashir with severe CAD. Prior CABG. Last cath 6/2018. \"Severe 3 vessel CAD with occluded SVG to the OM, Patent to the RCA patent LIMA to the LAD risky disease not amenable to PCI\". She has been having chest pain a couple times a week since then. Seems to occur after eating. She is not very active but limited by back pain and dyspnea not chest pain. Chest pain relieved after a few minutes with slNTG that she takes about 2 times per week. She believes this has been worse over the last 2-3 weeks. Coming more often, more severe and lasts longer    Last pm she was sitting watching TV. Sudden onset of severe chest pain. Radiated into neck and arms. Associated \"heart pounding\" and shortness of breath. Took 4 slNTG without help. Called EMS. Brought to ER in rapid afib and respiratory distress. Placed on bipap and carizem drip. Symptoms seemed to improve as heart rate improved. Pain free overnight and converted to sinus. Troponin increased to 0.3. Past Medical History:   has a past medical history of Allergic rhinitis, Arthritis, CAD (coronary artery disease), Cholelithiasis, Diabetes mellitus ([address] Hepatic steatosis, Hyperlipidemia, Hypertension, Peripheral neuropathy, Sleep apnea, Thyroid disease, and Vitamin B12 deficiency. Surgical History:   has a past surgical history that includes Coronary artery bypass graft (2005); Foot surgery; colectomy (1998); Colonoscopy; Upper gastrointestinal endoscopy; Tonsillectomy and adenoidectomy; Tubal ligation; other surgical history (4/17/2012); other surgical history (Right, 3/20/13); Coronary angioplasty; Cholecystectomy; Cardiac catheterization (12/27/2017); and Diagnostic Cardiac Cath Lab Procedure. 20% with anterior  and apical wall hypokinesis. 3.  Decompensated hemodynamics with elevated left ventricular end-diastolic  pressure of 26.  4.  Patent saphenous vein graft to the PDA. 5.  Occluded saphenous vein graft to the OM-1.  6.  Patent left internal mammary artery to the LAD. ECHO 12/2017   Technically difficult examination secondary to habitus. Left ventricular systolic function is normal with the ejection fraction   estimated at 55%. Abnormal (paradoxical) septal motion is present likely due to post operative   status. Grade I diastolic dysfunction with normal filling pressure. Left ventricular systolic function has improved from cardiac cath done   12/27/2017 when it was 20%. LABS  WBC 8.0 K/uL RBC 4.52 M/uL Hemoglobin 14.4 g/dL Hematocrit 42.7 % MCV 94.6 fL MCH 32.0 pg MCHC 33.8 g/dL RDW 13.4 % Platelets 453 K/uL   Results for Malik Askew (MRN [id number]) as of 2/23/2020 10:44   Ref. Range 2/22/2020 23:43 2/23/2020 01:30   Troponin Latest Ref Range: <0.01 ng/mL 0.03 (H) 0.10 (H)     EKG: Not available to be afib with LBBB    TELE: NSR    Assessment:     Chest Pain/NQWMI:  It is unclear if this was a primary event or brought on by rapids afib. Follow troponin. Agree with heparin. She does say she feels she was having more issues over the last 2-3 weeks  Last cath with little options by report. I will have interventional review films in am and decide if they feel repeat cath worthwhile,  Will make NPO after midnight    Afib;  CHADS2-Vasc=4  Back in sinus  On heparin, will need anticoagulation  Follow    Renal:  Creat 1.5 on admit  Mild edema but no dyspnea now,  Recheck and follow  Gentle hydration in am in event cath needed    Plan:  As above.  Discussed with pt, family and medicine service      Repeat EKG performed=>sinus, ILBBB, anterior ischemic changes-Will need cath unless there are no good revascularization options    Thank you for allowing me to participate in the care of

## 2020-02-23 NOTE — ED NOTES
Patient BiPAP adjusted. FIO2 decreased to 90%. IPAP 14, EPAP 5.       [facility] Distance  02/23/20 6455

## 2020-02-23 NOTE — PROGRESS NOTES
Per Low-dose heparin drip protocol:    Wt = 103 kg  IBW = 51.9 kg    Heparin to be dosed on adjusted body wt = 72.3 kg    Baseline aPTT = 25.9 sec  Bolus dose = 4000 units  (60 units/kg) - max initial dose    Initiate drip at 12 units/kg/h = 868 units/h (8.7 ml/h)     First aPTT ordered for 2/23 @ 1000    Adjust drip as needed per the following protocol:    aPTT < 36.3    Heparin 60 units/kg bolus  Increase infusion by 4 units/kg/hr  aPTT 36.3 - 48.9 Heparin 30 units/kg bolus Increase infusion by 2 units/kg/hr  aPTT 49 - 76    No bolus No change   aPTT 76.1 - 85 No bolus Decrease infusion by 2 units/kg/hr  aPTT 85.1 - 94 Hold heparin for 1 hour Decrease infusion by 3 units/kg/hr  aPTT 94.1 - 103    Hold heparin for 1 hour Decrease infusion by 4 units/kg/hr  aPTT > 103 Hold heparin for 1 hour Decrease infusion by 6 units/kg/hr      Obtain aPTT 6-8 hours after bolus and 6 hours after any dose change until two consecutive therapeutic aPTT are achieved- then daily.

## 2020-02-23 NOTE — ED NOTES
Pt requested nurse to call family, nurse left voicemail for Justin Maddox. Then called Janelle Schroeder her niece as requested by pt and gave her an update of pt current status and that she would be transferred to [facility]. She will continue to get a hold of Justin Maddox.       Milad Lozano RN  02/23/20 1420

## 2020-02-24 PROBLEM — J96.01 ACUTE HYPOXEMIC RESPIRATORY FAILURE (HCC): Status: ACTIVE | Noted: 2020-02-24

## 2020-02-24 LAB
ANION GAP SERPL CALCULATED.3IONS-SCNC: 11 MMOL/L (ref 3–16)
APTT: 70.3 SEC (ref 24.2–36.2)
BASOPHILS ABSOLUTE: 0 K/UL (ref 0–0.2)
BASOPHILS RELATIVE PERCENT: 0.5 %
BUN BLDV-MCNC: 20 MG/DL (ref 7–20)
CALCIUM SERPL-MCNC: 8.7 MG/DL (ref 8.3–10.6)
CHLORIDE BLD-SCNC: 101 MMOL/L (ref 99–110)
CO2: 25 MMOL/L (ref 21–32)
CREAT SERPL-MCNC: 1.2 MG/DL (ref 0.6–1.2)
EOSINOPHILS ABSOLUTE: 0.2 K/UL (ref 0–0.6)
EOSINOPHILS RELATIVE PERCENT: 3.2 %
GFR AFRICAN AMERICAN: 53
GFR NON-AFRICAN AMERICAN: 44
GLUCOSE BLD-MCNC: 171 MG/DL (ref 70–99)
GLUCOSE BLD-MCNC: 173 MG/DL (ref 70–99)
GLUCOSE BLD-MCNC: 192 MG/DL (ref 70–99)
GLUCOSE BLD-MCNC: 194 MG/DL (ref 70–99)
GLUCOSE BLD-MCNC: 216 MG/DL (ref 70–99)
HCT VFR BLD CALC: 38.9 % (ref 36–48)
HEMOGLOBIN: 13.2 G/DL (ref 12–16)
INR BLD: 1.09 (ref 0.86–1.14)
LYMPHOCYTES ABSOLUTE: 2.1 K/UL (ref 1–5.1)
LYMPHOCYTES RELATIVE PERCENT: 34.4 %
MAGNESIUM: 1.6 MG/DL (ref 1.8–2.4)
MCH RBC QN AUTO: 31.9 PG (ref 26–34)
MCHC RBC AUTO-ENTMCNC: 33.8 G/DL (ref 31–36)
MCV RBC AUTO: 94.3 FL (ref 80–100)
MONOCYTES ABSOLUTE: 0.5 K/UL (ref 0–1.3)
MONOCYTES RELATIVE PERCENT: 7.9 %
NEUTROPHILS ABSOLUTE: 3.3 K/UL (ref 1.7–7.7)
NEUTROPHILS RELATIVE PERCENT: 54 %
PDW BLD-RTO: 13.3 % (ref 12.4–15.4)
PERFORMED ON: ABNORMAL
PLATELET # BLD: 156 K/UL (ref 135–450)
PMV BLD AUTO: 8.9 FL (ref 5–10.5)
POTASSIUM SERPL-SCNC: 4.3 MMOL/L (ref 3.5–5.1)
PROTHROMBIN TIME: 12.6 SEC (ref 10–13.2)
RBC # BLD: 4.13 M/UL (ref 4–5.2)
SODIUM BLD-SCNC: 137 MMOL/L (ref 136–145)
TROPONIN: 0.19 NG/ML
WBC # BLD: 6.1 K/UL (ref 4–11)

## 2020-02-24 PROCEDURE — 85730 THROMBOPLASTIN TIME PARTIAL: CPT

## 2020-02-24 PROCEDURE — 94660 CPAP INITIATION&MGMT: CPT

## 2020-02-24 PROCEDURE — 85610 PROTHROMBIN TIME: CPT

## 2020-02-24 PROCEDURE — 80048 BASIC METABOLIC PNL TOTAL CA: CPT

## 2020-02-24 PROCEDURE — 6370000000 HC RX 637 (ALT 250 FOR IP): Performed by: INTERNAL MEDICINE

## 2020-02-24 PROCEDURE — 84484 ASSAY OF TROPONIN QUANT: CPT

## 2020-02-24 PROCEDURE — 99233 SBSQ HOSP IP/OBS HIGH 50: CPT | Performed by: INTERNAL MEDICINE

## 2020-02-24 PROCEDURE — 85025 COMPLETE CBC W/AUTO DIFF WBC: CPT

## 2020-02-24 PROCEDURE — 83735 ASSAY OF MAGNESIUM: CPT

## 2020-02-24 PROCEDURE — 2060000000 HC ICU INTERMEDIATE R&B

## 2020-02-24 PROCEDURE — 36415 COLL VENOUS BLD VENIPUNCTURE: CPT

## 2020-02-24 PROCEDURE — 2580000003 HC RX 258: Performed by: INTERNAL MEDICINE

## 2020-02-24 PROCEDURE — 6360000002 HC RX W HCPCS: Performed by: INTERNAL MEDICINE

## 2020-02-24 RX ORDER — FUROSEMIDE 10 MG/ML
40 INJECTION INTRAMUSCULAR; INTRAVENOUS ONCE
Status: COMPLETED | OUTPATIENT
Start: 2020-02-24 | End: 2020-02-24

## 2020-02-24 RX ORDER — AMIODARONE HYDROCHLORIDE 200 MG/1
200 TABLET ORAL 2 TIMES DAILY
Status: DISCONTINUED | OUTPATIENT
Start: 2020-02-24 | End: 2020-02-25 | Stop reason: HOSPADM

## 2020-02-24 RX ADMIN — Medication 10 ML: at 20:07

## 2020-02-24 RX ADMIN — INSULIN LISPRO 1 UNITS: 100 INJECTION, SOLUTION INTRAVENOUS; SUBCUTANEOUS at 12:17

## 2020-02-24 RX ADMIN — INSULIN LISPRO 2 UNITS: 100 INJECTION, SOLUTION INTRAVENOUS; SUBCUTANEOUS at 18:30

## 2020-02-24 RX ADMIN — RANOLAZINE 500 MG: 500 TABLET, FILM COATED, EXTENDED RELEASE ORAL at 08:54

## 2020-02-24 RX ADMIN — MAGNESIUM SULFATE HEPTAHYDRATE 1 G: 1 INJECTION, SOLUTION INTRAVENOUS at 14:47

## 2020-02-24 RX ADMIN — ASPIRIN 81 MG: 81 TABLET, COATED ORAL at 08:54

## 2020-02-24 RX ADMIN — INSULIN LISPRO 1 UNITS: 100 INJECTION, SOLUTION INTRAVENOUS; SUBCUTANEOUS at 23:13

## 2020-02-24 RX ADMIN — MAGNESIUM SULFATE HEPTAHYDRATE 1 G: 1 INJECTION, SOLUTION INTRAVENOUS at 12:27

## 2020-02-24 RX ADMIN — APIXABAN 5 MG: 5 TABLET, FILM COATED ORAL at 12:26

## 2020-02-24 RX ADMIN — GABAPENTIN 600 MG: 300 CAPSULE ORAL at 14:46

## 2020-02-24 RX ADMIN — Medication 10 ML: at 20:06

## 2020-02-24 RX ADMIN — FUROSEMIDE 40 MG: 10 INJECTION, SOLUTION INTRAMUSCULAR; INTRAVENOUS at 12:27

## 2020-02-24 RX ADMIN — HEPARIN SODIUM 1000 UNITS/HR: 10000 INJECTION, SOLUTION INTRAVENOUS at 05:57

## 2020-02-24 RX ADMIN — Medication 10 ML: at 12:27

## 2020-02-24 RX ADMIN — PANTOPRAZOLE SODIUM 40 MG: 40 TABLET, DELAYED RELEASE ORAL at 08:54

## 2020-02-24 RX ADMIN — AMIODARONE HYDROCHLORIDE 200 MG: 200 TABLET ORAL at 12:26

## 2020-02-24 RX ADMIN — CARVEDILOL 3.12 MG: 3.12 TABLET, FILM COATED ORAL at 08:53

## 2020-02-24 RX ADMIN — SODIUM CHLORIDE: 9 INJECTION, SOLUTION INTRAVENOUS at 00:00

## 2020-02-24 RX ADMIN — CARVEDILOL 3.12 MG: 3.12 TABLET, FILM COATED ORAL at 18:30

## 2020-02-24 RX ADMIN — GABAPENTIN 600 MG: 300 CAPSULE ORAL at 08:55

## 2020-02-24 RX ADMIN — LEVOTHYROXINE SODIUM 50 MCG: 0.05 TABLET ORAL at 08:55

## 2020-02-24 RX ADMIN — APIXABAN 5 MG: 5 TABLET, FILM COATED ORAL at 20:07

## 2020-02-24 RX ADMIN — GABAPENTIN 600 MG: 300 CAPSULE ORAL at 20:07

## 2020-02-24 RX ADMIN — RANOLAZINE 500 MG: 500 TABLET, FILM COATED, EXTENDED RELEASE ORAL at 20:07

## 2020-02-24 RX ADMIN — ISOSORBIDE MONONITRATE 120 MG: 60 TABLET, EXTENDED RELEASE ORAL at 08:54

## 2020-02-24 RX ADMIN — AMIODARONE HYDROCHLORIDE 200 MG: 200 TABLET ORAL at 20:07

## 2020-02-24 ASSESSMENT — PAIN SCALES - GENERAL
PAINLEVEL_OUTOF10: 0

## 2020-02-24 NOTE — PLAN OF CARE
Problem: Falls - Risk of:  Goal: Will remain free from falls  Description  Will remain free from falls  2/24/2020 1006 by Prasanna Cho RN  Outcome: Ongoing  2/24/2020 0854 by Prasanna Cho RN  Outcome: Ongoing  Goal: Absence of physical injury  Description  Absence of physical injury  2/24/2020 1006 by Prasanna Cho RN  Outcome: Ongoing  2/24/2020 0854 by Prasanna Cho RN  Outcome: Ongoing    Remains free from falls. Bed alarm in place. Patient verbalizes understanding to call for assistance.

## 2020-02-25 VITALS
RESPIRATION RATE: 16 BRPM | DIASTOLIC BLOOD PRESSURE: 89 MMHG | WEIGHT: 226 LBS | HEART RATE: 84 BPM | BODY MASS INDEX: 40.04 KG/M2 | SYSTOLIC BLOOD PRESSURE: 138 MMHG | HEIGHT: 63 IN | OXYGEN SATURATION: 96 % | TEMPERATURE: 97.9 F

## 2020-02-25 LAB
ANION GAP SERPL CALCULATED.3IONS-SCNC: 12 MMOL/L (ref 3–16)
BASOPHILS ABSOLUTE: 0.1 K/UL (ref 0–0.2)
BASOPHILS RELATIVE PERCENT: 1.3 %
BUN BLDV-MCNC: 19 MG/DL (ref 7–20)
CALCIUM SERPL-MCNC: 8.7 MG/DL (ref 8.3–10.6)
CHLORIDE BLD-SCNC: 99 MMOL/L (ref 99–110)
CO2: 24 MMOL/L (ref 21–32)
CREAT SERPL-MCNC: 1.2 MG/DL (ref 0.6–1.2)
EOSINOPHILS ABSOLUTE: 0.2 K/UL (ref 0–0.6)
EOSINOPHILS RELATIVE PERCENT: 3.4 %
GFR AFRICAN AMERICAN: 53
GFR NON-AFRICAN AMERICAN: 44
GLUCOSE BLD-MCNC: 161 MG/DL (ref 70–99)
GLUCOSE BLD-MCNC: 181 MG/DL (ref 70–99)
GLUCOSE BLD-MCNC: 206 MG/DL (ref 70–99)
HCT VFR BLD CALC: 40.5 % (ref 36–48)
HEMOGLOBIN: 13.6 G/DL (ref 12–16)
LV EF: 58 %
LVEF MODALITY: NORMAL
LYMPHOCYTES ABSOLUTE: 2 K/UL (ref 1–5.1)
LYMPHOCYTES RELATIVE PERCENT: 30.7 %
MAGNESIUM: 2 MG/DL (ref 1.8–2.4)
MCH RBC QN AUTO: 31.8 PG (ref 26–34)
MCHC RBC AUTO-ENTMCNC: 33.7 G/DL (ref 31–36)
MCV RBC AUTO: 94.5 FL (ref 80–100)
MONOCYTES ABSOLUTE: 0.6 K/UL (ref 0–1.3)
MONOCYTES RELATIVE PERCENT: 9.9 %
NEUTROPHILS ABSOLUTE: 3.5 K/UL (ref 1.7–7.7)
NEUTROPHILS RELATIVE PERCENT: 54.7 %
PDW BLD-RTO: 13.4 % (ref 12.4–15.4)
PERFORMED ON: ABNORMAL
PERFORMED ON: ABNORMAL
PLATELET # BLD: 159 K/UL (ref 135–450)
PMV BLD AUTO: 9 FL (ref 5–10.5)
POTASSIUM SERPL-SCNC: 4.3 MMOL/L (ref 3.5–5.1)
RBC # BLD: 4.28 M/UL (ref 4–5.2)
SODIUM BLD-SCNC: 135 MMOL/L (ref 136–145)
WBC # BLD: 6.4 K/UL (ref 4–11)

## 2020-02-25 PROCEDURE — 94660 CPAP INITIATION&MGMT: CPT

## 2020-02-25 PROCEDURE — 6370000000 HC RX 637 (ALT 250 FOR IP): Performed by: INTERNAL MEDICINE

## 2020-02-25 PROCEDURE — C8929 TTE W OR WO FOL WCON,DOPPLER: HCPCS

## 2020-02-25 PROCEDURE — 83735 ASSAY OF MAGNESIUM: CPT

## 2020-02-25 PROCEDURE — 80048 BASIC METABOLIC PNL TOTAL CA: CPT

## 2020-02-25 PROCEDURE — 2580000003 HC RX 258: Performed by: INTERNAL MEDICINE

## 2020-02-25 PROCEDURE — 99232 SBSQ HOSP IP/OBS MODERATE 35: CPT | Performed by: INTERNAL MEDICINE

## 2020-02-25 PROCEDURE — 6360000004 HC RX CONTRAST MEDICATION: Performed by: INTERNAL MEDICINE

## 2020-02-25 PROCEDURE — 85025 COMPLETE CBC W/AUTO DIFF WBC: CPT

## 2020-02-25 PROCEDURE — 36415 COLL VENOUS BLD VENIPUNCTURE: CPT

## 2020-02-25 RX ORDER — AMIODARONE HYDROCHLORIDE 200 MG/1
200 TABLET ORAL DAILY
Qty: 30 TABLET | Refills: 3 | Status: SHIPPED | OUTPATIENT
Start: 2020-02-25 | End: 2020-06-29

## 2020-02-25 RX ADMIN — CARVEDILOL 3.12 MG: 3.12 TABLET, FILM COATED ORAL at 10:18

## 2020-02-25 RX ADMIN — GABAPENTIN 600 MG: 300 CAPSULE ORAL at 10:19

## 2020-02-25 RX ADMIN — ASPIRIN 81 MG: 81 TABLET, COATED ORAL at 10:20

## 2020-02-25 RX ADMIN — LEVOTHYROXINE SODIUM 50 MCG: 0.05 TABLET ORAL at 07:31

## 2020-02-25 RX ADMIN — PANTOPRAZOLE SODIUM 40 MG: 40 TABLET, DELAYED RELEASE ORAL at 10:20

## 2020-02-25 RX ADMIN — ISOSORBIDE MONONITRATE 120 MG: 60 TABLET, EXTENDED RELEASE ORAL at 10:20

## 2020-02-25 RX ADMIN — INSULIN LISPRO 2 UNITS: 100 INJECTION, SOLUTION INTRAVENOUS; SUBCUTANEOUS at 12:30

## 2020-02-25 RX ADMIN — INSULIN LISPRO 1 UNITS: 100 INJECTION, SOLUTION INTRAVENOUS; SUBCUTANEOUS at 08:18

## 2020-02-25 RX ADMIN — Medication 10 ML: at 08:10

## 2020-02-25 RX ADMIN — GABAPENTIN 600 MG: 300 CAPSULE ORAL at 14:55

## 2020-02-25 RX ADMIN — AMIODARONE HYDROCHLORIDE 200 MG: 200 TABLET ORAL at 10:19

## 2020-02-25 RX ADMIN — PERFLUTREN 1.65 MG: 6.52 INJECTION, SUSPENSION INTRAVENOUS at 08:10

## 2020-02-25 RX ADMIN — APIXABAN 5 MG: 5 TABLET, FILM COATED ORAL at 10:19

## 2020-02-25 RX ADMIN — RANOLAZINE 500 MG: 500 TABLET, FILM COATED, EXTENDED RELEASE ORAL at 10:19

## 2020-02-25 ASSESSMENT — PAIN SCALES - GENERAL
PAINLEVEL_OUTOF10: 0

## 2020-02-25 NOTE — DISCHARGE SUMMARY
RVR  - appears to be new onset  - currently converted back to NSR. Continue coreg  - cardiology consulted  - Start Eliquis    Acute hypoxic respiratory failure  - likely pulmonary edema from above problems  - briefly on BIPAP  -Diuresis given  -Improved    OMKAR with CKD stage III  - baseline Cr around 1.2  - admitted with Cr 1.5  - likely related to above event  -Monitor with additional diuresis    DMII  - well controlled    HTN  - well controlled  - continue coreg and ramipril    Hypothyroidism  - continue home synthroid    BERTRAND  - CPAP        Exam:   /89   Pulse 84   Temp 97.9 °F (36.6 °C) (Oral)   Resp 16   Ht 5' 3\" (1.6 m)   Wt 226 lb (102.5 kg)   SpO2 96%   BMI 40.03 kg/m²     General appearance:  No apparent distress, appears stated age and cooperative. HEENT:  Normal cephalic, atraumatic without obvious deformity. Pupils equal, round, and reactive to light. Extra ocular muscles intact. Conjunctivae/corneas clear. Neck: Supple, with full range of motion. No jugular venous distention. Trachea midline. Respiratory:  Normal respiratory effort. Clear to auscultation, bilaterally without Rales/Wheezes/Rhonchi. Cardiovascular:  Regular rate and rhythm with normal S1/S2 without murmurs, rubs or gallops. Abdomen: Soft, non-tender, non-distended with normal bowel sounds. Musculoskeletal:  No clubbing, cyanosis. LE edema bilaterally. Full range of motion without deformity. Skin: Skin color, texture, turgor normal.  No rashes or lesions. Neurologic:  Neurovascularly intact without any focal sensory/motor deficits. Cranial nerves: II-XII intact, grossly non-focal.  Psychiatric:  Alert and oriented, thought content appropriate, normal insight  Capillary Refill: Brisk,< 3 seconds   Peripheral Pulses: +2 palpable, equal bilaterally       Patient Discharge Instructions: Follow-up appointment on March 3rd at 10AM with Rico Wallace CNP.     Discharge Medications:   Discharge Medication List as of 2/25/2020 daily progress note from day of discharge. Time spent on discharge is more than 30 minutes in the examination, evaluation, counseling and review of medications and discharge plan. Signed:    Danie Coulter MD   3/19/2020    Thank you DARIA Peters CNP for the opportunity to be involved in this patient's care. If you have any questions or concerns please feel free to contact my office [phone].

## 2020-02-25 NOTE — FLOWSHEET NOTE
02/24/20 2004   Assessment   Charting Type Shift assessment   Neurological   Neuro (WDL) WDL   Level of Consciousness 0   Orientation Level Oriented X4;Oriented to place;Oriented to time;Oriented to situation;Oriented to person   Cognition Follows commands   Language Clear   Size R Pupil (mm) 3   R Pupil Shape Round   R Pupil Reaction Brisk   Size L Pupil (mm) 3   L Pupil Shape Round   L Pupil Reaction Brisk   R Hand  Strong   L Hand  Strong   R Foot Dorsiflexion Strong   L Foot Dorsiflexion Strong   R Foot Plantar Flexion Strong   L Foot Plantar Flexion Strong   RUE Motor Response Responds to command;Normal extension;Normal flexion; No tremor   LUE Motor Response Responds to command;Normal extension;Normal flexion; No tremor   RLE Motor Response Responds to command;Normal extension;Normal flexion; No tremor   Sensation RLE Decreased;Numbness   LLE Motor Response Responds to command;Normal extension;Normal flexion; No tremor   Sensation LLE Decreased;Numbness   NIH/MNIS Stroke Scale Assessed No   Jesse Coma Scale   Eye Opening 4   Best Verbal Response 5   Best Motor Response 6   [place] Coma Scale Score 15   HEENT   HEENT (WDL) X   Teeth Dentures upper   Respiratory   Respiratory (WDL) X   Respiratory Pattern Regular   Respiratory Depth Normal   Respiratory Quality/Effort Unlabored   Chest Assessment Trachea midline; Chest expansion symmetrical   L Breath Sounds Clear;Diminished   R Breath Sounds Clear;Diminished   Breath Sounds   Right Upper Lobe Clear   Right Middle Lobe Diminished   Right Lower Lobe Diminished   Left Upper Lobe Clear   Left Lower Lobe Diminished   Cardiac   Cardiac (WDL) WDL   Cardiac Regularity Regular   Heart Sounds S1, S2   Cardiac Rhythm NSR   Rhythm Interpretation   Pulse 81   Cardiac Monitor   Telemetry Monitor On Yes   Telemetry Audible Yes   Telemetry Alarms Set Yes   Telemetry Box Number 20   Gastrointestinal   Abdominal (WDL) WDL   Abdomen Inspection Soft   Last BM (including

## 2020-02-25 NOTE — PLAN OF CARE
Problem:  Activity:  Goal: Ability to tolerate increased activity will improve  Description  Ability to tolerate increased activity will improve  Outcome: Ongoing     Problem: Cardiac:  Goal: Ability to maintain an adequate cardiac output will improve  Description  Ability to maintain an adequate cardiac output will improve  Outcome: Ongoing     Problem: Cardiac:  Goal: Complications related to the disease process, condition or treatment will be avoided or minimized  Description  Complications related to the disease process, condition or treatment will be avoided or minimized  Outcome: Ongoing

## 2020-02-25 NOTE — PROGRESS NOTES
02/25/20 0007   NIV Type   $NIV $Daily Charge   Equipment Type v60   Mode CPAP   Mask Type Nasal mask   Settings/Measurements   IPAP 7 cmH20   Resp 11   FiO2  21 %   Vt Exhaled 312 mL   Minute Volume 3.8 Liters   Mask Leak (lpm) 0 lpm   Comfort Level Good   Using Accessory Muscles No   SpO2 94   Breath Sounds   Right Upper Lobe Diminished   Right Middle Lobe Diminished   Right Lower Lobe Diminished   Left Upper Lobe Diminished   Left Lower Lobe Diminished   Alarm Settings   Alarms On Y   Press Low Alarm 4 cmH2O   High Pressure Alarm 30 cmH2O   Delay Alarm 20 sec(s)   Resp Rate Low Alarm 6   High Respiratory Rate 40 br/min
02/25/20 0419   NIV Type   Equipment Type v60   Mode CPAP   Mask Type Nasal mask   Settings/Measurements   IPAP 9 cmH20   Resp 18   FiO2  21 %   Vt Exhaled 350 mL   Minute Volume 5.5 Liters   Mask Leak (lpm) 5 lpm   Comfort Level Good   Using Accessory Muscles No   SpO2 96   Breath Sounds   Right Upper Lobe Diminished   Right Middle Lobe Diminished   Right Lower Lobe Diminished   Left Upper Lobe Diminished   Left Lower Lobe Diminished
4 Eyes Skin Assessment     The patient is being assess for  Admission    I agree that 2 RN's have performed a thorough Head to Toe Skin Assessment on the patient. ALL assessment sites listed below have been assessed. Areas assessed by both nurses: lolly/Lia  [x]   Head, Face, and Ears   [x]   Shoulders, Back, and Chest  [x]   Arms, Elbows, and Hands   [x]   Coccyx, Sacrum, and Ischum  [x]   Legs, Feet, and Heels        Does the Patient have Skin Breakdown?   No         Jesus Prevention initiated:  No   Wound Care Orders initiated:  No      [facility] nurse consulted for Pressure Injury (Stage 3,4, Unstageable, DTI, NWPT, and Complex wounds):  No      Nurse 1 eSignature: Electronically signed by Priya Funes RN on 2/23/2020 at 6:35 AM    **SHARE this note so that the co-signing nurse is able to place an eSignature**    Nurse 2 eSignature: Electronically signed by Oz Kovacs RN on 2/23/20 at 6:37 AM
Patient admitted to room 441 from [facility] ED. Patient oriented to room, call light, bed rails, phone, lights and bathroom. Patient instructed about the schedule of the day including: vital sign frequency, lab draws, possible tests, frequency of MD and staff rounds, including RN/MD rounding together at bedside, daily weights, and I &O's. Patient instructed about prescribed diet, how to use 8MENU, and television. bed alarm in place, patient aware of placement and reason. Telemetry box in place, patient aware of placement and reason. Bed locked, in lowest position, side rails up 2/4, call light within reach. Will continue to monitor.       Vitals:    02/23/20 0625   BP: 115/77   Pulse: 82   Resp: 14   Temp: 98 °F (36.7 °C)   SpO2: 100%
Patient arrived from [place]. Following msg sent to Dr Bill Diaz cross cover to inform and get orders. Via [place] [id number] or Facility: [facility] From: Diane Larkin RE: Barak Corrales 1945 RM: 0 Hi Dr Bill Diaz, the above patient has just arrived from [place]. Can you please place orders Thanks    A/W Orders.
Pt discharged home per order. IV removed, scripts given, meds reviewed. Pt fully understand d/c instructions. Pt has left via wheelchair with friend and all personal belongings.
Spoke with pharmacy. Instructed to give patient 2,000 unit heparin bolus and drip @ 10 ml/hr.
CATHETERIZATION  12/27/2017    SVG to OM2 100% ostium    CHOLECYSTECTOMY      COLECTOMY  1998    diverticulitis     COLONOSCOPY      CORONARY ANGIOPLASTY      CORONARY ARTERY BYPASS GRAFT  2005    x 3    DIAGNOSTIC CARDIAC CATH LAB PROCEDURE      FOOT SURGERY      Multiple foot surgeries bilateral    OTHER SURGICAL HISTORY  4/17/2012    achilles tendon lengthening,arthroplasty,matatarsalhead resection    OTHER SURGICAL HISTORY Right 3/20/13    FUSION OF RIGHT GREAT TOE JOINT WITH WIRE FIXATION, DEBRIEDMENT OF WOUND RIGHT GREAT TOE    TONSILLECTOMY AND ADENOIDECTOMY      TUBAL LIGATION      Laparoscopic    UPPER GASTROINTESTINAL ENDOSCOPY         Level of Consciousness: Alert, Oriented, and Cooperative = 0    Level of Activity: Walking unassisted = 0    Respiratory Pattern: Regular Pattern; RR 8-20 = 0    Breath Sounds: Clear = 0    Sputum   ,  ,    Cough: Strong, spontaneous, non-productive = 0    Vital Signs   /77   Pulse 82   Temp 98 °F (36.7 °C) (Oral)   Resp 14   Wt 236 lb (107 kg)   SpO2 100%   BMI 41.81 kg/m²   SPO2 (COPD values may differ): 88-89% on room air or greater than 92% on FiO2 28- 35% = 2    Peak Flow (asthma only): not applicable = 0    RSI: 0-4 = See once and convert to home regimen or discontinue        Plan       Goals: medication delivery, mobilize retained secretions, volume expansion and improve oxygenation    Patient/caregiver was educated on the proper method of use for Respiratory Care Devices:  Yes      Level of patient/caregiver understanding able to:   ? Verbalize understanding   ? Demonstrate understanding       ? Teach back        ? Needs reinforcement       ? No available caregiver               ? Other:     Response to education:  Excellent     Is patient being placed on Home Treatment Regimen? NA     Does the patient have everything they need prior to discharge?   NA     Comments: pt only episode of SOB was with her heart attack/  Changing meds to
ACE    Hypothyroidism  - continue home synthroid    BERTRAND  - CPAP    DVT Prophylaxis: Eliquis  Diet: Diet NPO Time Specified Exceptions are: Sips with Meds, Ice Chips  Code Status: Full Code  PT/OT Eval Status: NA    Dispo - 1-2 days     Alisson Cat MD

## 2020-03-02 NOTE — PROGRESS NOTES
MG extended release tablet Take 1 tablet by mouth 2 times daily 10/29/19  Yes Nico Khan MD   furosemide (LASIX) 40 MG tablet Take 1 tablet by mouth See Admin Instructions Take 1 tab daily, okay to take an extra dose 20mg as needed for weight gain of 3lbs in a day 4/30/19  Yes Nico Khan MD   isosorbide mononitrate (IMDUR) 120 MG extended release tablet Take 1 tablet by mouth daily 4/30/19  Yes Nico Khan MD   ramipril (ALTACE) 5 MG capsule Take 1 capsule by mouth nightly 4/30/19  Yes Nico Khan MD   nitroGLYCERIN (NITROSTAT) 0.4 MG SL tablet Place 1 tablet under the tongue every 5 minutes as needed for Chest pain 1/29/19  Yes DARIA Barragan CNP   aspirin 81 MG EC tablet Take 1 tablet by mouth daily 12/29/17  Yes Selma Morris MD   tolterodine (DETROL LA) 2 MG ER capsule Take 4 mg by mouth daily    Yes Historical Provider, MD   fluticasone (FLONASE) 50 MCG/ACT nasal spray 1 spray by Nasal route daily. Yes Historical Provider, MD   Red Yeast Rice 600 MG TABS Take 2 tablets by mouth daily. 4/3/15  Yes Nico Khan MD   Omega-3 Fatty Acids (FISH OIL) 1000 MG CAPS Take 2 capsules by mouth daily. 4/3/15  Yes Nico Khan MD   levothyroxine (SYNTHROID) 50 MCG tablet Take 50 mcg by mouth Daily. Yes Historical Provider, MD   GLIPIZIDE Take 5 mg by mouth 2 times daily (with meals) Taking 10 mg AM & 5 mg in PM   Yes Historical Provider, MD   gabapentin (NEURONTIN) 600 MG tablet Take 600 mg by mouth. May take up to 4x per day if needed for neuropathy pain. Yes Historical Provider, MD   tizanidine (ZANAFLEX) 4 MG tablet Take 8 mg by mouth nightly. Yes Historical Provider, MD   diphenhydrAMINE (BENADRYL) 25 MG tablet Take 25 mg by mouth nightly as needed. Yes Historical Provider, MD   Cholecalciferol (VITAMIN D3) 1000 UNIT CAPS Take 1 capsule by mouth daily. Yes Historical Provider, MD       Allergies:  Niacin and related; Adhesive tape;  Other; Statins depletion therapy; and Vancomycin    Social History:   reports that she quit smoking about 21 years ago. She has a 30.00 pack-year smoking history. She has never used smokeless tobacco. She reports that she does not drink alcohol or use drugs. Family History: family history includes Coronary Art Dis in her brother, father, and sister; Stroke in her mother. Review of Systems   Review of Systems   Constitutional: Positive for fatigue. Respiratory: Positive for shortness of breath (with exertion ). Negative for cough and chest tightness. Cardiovascular: Negative for chest pain, palpitations and leg swelling. Gastrointestinal: Negative for blood in stool. Genitourinary: Negative for hematuria. Musculoskeletal: Positive for arthralgias, back pain and gait problem. Neurological: Positive for weakness. Negative for dizziness. Hematological: Bruises/bleeds easily. Psychiatric/Behavioral: Positive for sleep disturbance.        OBJECTIVE:    Vital signs:    /68   Pulse 80   Ht 5' 3\" (1.6 m)   Wt 222 lb (100.7 kg)   BMI 39.33 kg/m²      Physical Exam:  Constitutional:  Comfortable and alert, NAD, obese, appears stated age  Eyes: PERRL, sclera nonicteric  Neck:  Supple, no masses, no thyroidmegaly, no JVD  Skin:  Warm and dry; no rash or lesions, scattered ecchymotic areas from multiple blood sticks and IV at hospital.  Heart:  Regular, normal apex, S1 and S2 normal, no M/G/R  Lungs:  Normal respiratory effort; clear; no wheezing/rhonchi/rales  Abdomen: obese, soft, non tender, + bowel sounds  Extremities:  No edema or cyanosis; no clubbing  Neuro: alert and oriented, moves legs and arms equally, normal mood and affect      Data Reviewed:    EKG 2/23/2020:  Normal sinus rhythmIncomplete left bundle branch blockST & T wave abnormality, consider inferior ischemiaST & T wave abnormality, consider anterolateral ischemiaProlonged QTAbnormal ECGWhen compared with ECG of 23-FEB-2020 00:15,Sinus rhythm has replaced Atrial fibrillationIncomplete left bundle branch block has replaced Left bundle branch blockConfirmed by Maggie Will MD ([id number]) on 2/23/2020 1:24:55 PM    EKG 2/23/2020:  Atrial fibrillation with rapid ventricular responseLeft bundle branch blockAbnormal ECGWhen compared with ECG of 22-FEB-2020 23:42, (unconfirmed)No significant change was foundConfirmed by Maggie Will MD ([id number]) on 2/23/2020 1:04:00 PM    Echo 2/25/2020:  Normal left ventricular systolic function with ejection fraction of 55-60%. No regional wall motion abnormalites are seen. Moderate concentric left ventricular hypertrophy. Grade I diastolic dysfunction with normal filling pressure. Mild mitral regurgitation. ECHO 12/2017  Technically difficult examination secondary to habitus. Left ventricular systolic function is normal with the ejection fraction  estimated at 55%. Abnormal (paradoxical) septal motion is present likely due to post operative  status. Grade I diastolic dysfunction with normal filling pressure. Left ventricular systolic function has improved from cardiac cath done  12/27/2017 when it was 20%. CARDIAC CATH 6/2018  1. Selective coronary angiography. 2.  Left heart catheterization. 3.  Left ventriculography. 4.  Saphenous vein graft angiography x2.  5.  Left internal mammary artery angiography. ANGIOGRAPHY FINDINGS:  1. Native three-vessel critical coronary artery disease. 2.  Severely reduced left ventricular function with EF of 20% with anterior  and apical wall hypokinesis. 3.  Decompensated hemodynamics with elevated left ventricular end-diastolic  pressure of 26.  4.  Patent saphenous vein graft to the PDA. 5.  Occluded saphenous vein graft to the OM-1.  6.  Patent left internal mammary artery to the LAD.        Coronary angiogram 12/27/2017:  Procedures: LHC, CA, LVG, LIMA, SVG x 2, sedation     LM 50%  LAD 50% prox, 100% mid  Cx 80% mid              OM2 90%               Cx/Om system is 6/2018   -CABG x3 2005  2. Elevated troponin while in hospital- due to ischemia by new AFIB with RVR as well as CHF    3. Atrial fibrillation with RVR -new onset while in hospital :EKG shows NSR today- no significant changes from previous EKG 2/23 while in hospital    -[id number] Score for Atrial Fibrillation Stroke Risk 6 (CHF, HTN, DM, vascular disease, age, female)  3. Chronic diastolic CHF: appears compensated  5. LBBB  6. BERTRAND: uses CPAP      Plan:   1. No change in medications at this time  2. Continue to monitor blood pressure, if top number consistently in the 90's or you become symptomatic please call the office we can make some medication adjustments. 3. Daily weights, low sodium diet, 2000 ml fluid restriction, if you gain 3 lbs in a day 5 lbs in a week, increased swelling, or increased shortness of breath please. Ok to take extra dose of 20 mg as needed for weight gain, swelling, or increased shortness of breath. 4. Follow up with Dr. Perez Mckenzie as scheduled in April, follow up with Dr. Crow Madera as scheduled he ordered blood work prior to appointment. 5. Diet and exercise as discussed    DARIA Figueroa-CNP  [facility]  [phone]      QUALITY MEASURES  1. Tobacco Cessation Counseling: NA  2. Retake of BP if >140/90:   NA  3. Documentation to PCP/referring for new patient:  Sent to PCP at close of office visit  4. CAD patient on anti-platelet: Yes  5. CAD patient on STATIN therapy:  Intolerant   6.  Patient with CHF and aFib on anticoagulation:  Yes

## 2020-03-03 ENCOUNTER — OFFICE VISIT (OUTPATIENT)
Dept: CARDIOLOGY CLINIC | Age: 75
End: 2020-03-03
Payer: MEDICARE

## 2020-03-03 VITALS
SYSTOLIC BLOOD PRESSURE: 100 MMHG | WEIGHT: 222 LBS | HEART RATE: 80 BPM | DIASTOLIC BLOOD PRESSURE: 68 MMHG | HEIGHT: 63 IN | BODY MASS INDEX: 39.34 KG/M2

## 2020-03-03 PROCEDURE — 93000 ELECTROCARDIOGRAM COMPLETE: CPT | Performed by: NURSE PRACTITIONER

## 2020-03-03 PROCEDURE — 3017F COLORECTAL CA SCREEN DOC REV: CPT | Performed by: NURSE PRACTITIONER

## 2020-03-03 PROCEDURE — G8399 PT W/DXA RESULTS DOCUMENT: HCPCS | Performed by: NURSE PRACTITIONER

## 2020-03-03 PROCEDURE — 1036F TOBACCO NON-USER: CPT | Performed by: NURSE PRACTITIONER

## 2020-03-03 PROCEDURE — G8427 DOCREV CUR MEDS BY ELIG CLIN: HCPCS | Performed by: NURSE PRACTITIONER

## 2020-03-03 PROCEDURE — G8484 FLU IMMUNIZE NO ADMIN: HCPCS | Performed by: NURSE PRACTITIONER

## 2020-03-03 PROCEDURE — 1123F ACP DISCUSS/DSCN MKR DOCD: CPT | Performed by: NURSE PRACTITIONER

## 2020-03-03 PROCEDURE — G8417 CALC BMI ABV UP PARAM F/U: HCPCS | Performed by: NURSE PRACTITIONER

## 2020-03-03 PROCEDURE — 1111F DSCHRG MED/CURRENT MED MERGE: CPT | Performed by: NURSE PRACTITIONER

## 2020-03-03 PROCEDURE — 1090F PRES/ABSN URINE INCON ASSESS: CPT | Performed by: NURSE PRACTITIONER

## 2020-03-03 PROCEDURE — 99214 OFFICE O/P EST MOD 30 MIN: CPT | Performed by: NURSE PRACTITIONER

## 2020-03-03 PROCEDURE — 4040F PNEUMOC VAC/ADMIN/RCVD: CPT | Performed by: NURSE PRACTITIONER

## 2020-03-03 ASSESSMENT — ENCOUNTER SYMPTOMS
CHEST TIGHTNESS: 0
SHORTNESS OF BREATH: 1
BACK PAIN: 1
COUGH: 0
BLOOD IN STOOL: 0

## 2020-03-03 NOTE — PATIENT INSTRUCTIONS
Plan:   1. No change in medications at this time  2. Continue to monitor blood pressure, if top number consistently in the 90's or u become symptomatic please call the office we can make some medication adjustments. 3. Daily weights, low sodium diet, 2000 ml fluid restriction, if you gain 3 lbs in a day 5 lbs in a week, increased swelling, or increased shortness of breath please. Ok to take extra dose of 20 mg as needed for weight gain, swelling, or increased shortness of breath. 4. Follow up with Dr. Mandi Olmedo as scheduled in April- Dr. Angelina Westfall and lab work prior to his scheduled appoitnment  5.  Diet and exercise as discussed

## 2020-03-03 NOTE — LETTER
[id number]  [address] [address]  Phone: [phone]  Fax: [phone]     Calton Scheuermann, APRN - CNP     3/4/2020     DARIA Anne CNP   No address on file     Patient: Daniel Whitlock   MR Number: [id number]   YOB: 1945   Date of Visit: 3/3/2020     Dear Dr. Roxann Vargas     Today I saw our mutual patient named above. Below are the relevant portions of my assessment and plan of care. If you have questions, please do not hesitate to call me. I look forward to following Yumiko Mckeon along with you. [facility]   Cardiology Note              Date:  March 3, 2020  Patientname: Daniel Whitlock  YOB: 1945    Chief Complaint   Patient presents with    Follow-Up from [facility] Atrial Fibrillation      Primary Care physician: DARIA Anne CNP    HISTORY OF PRESENT ILLNESS: Daniel Whitlock is a 69 y.o. female who presented to the hospital with complaints of chest pain for a couple times a week. Usually occurring after eating. She is not very active but limited by back pain and dyspnea not chest pain. Chest pain was relieved after a few minutes with SL nitro which she takes a couple times a week. She had sudden onset of severe chest pain that radiated to neck and arms associated with heart pounding and shortness of breath. She was brought to the ER by EMS and noted to be in atrial fibrillation with RVR. Placed on bipap and cardizem drip. She converted to NSR while in the hospital. Troponin noted to peak at  0.30 which was noted to be related to new onset AFIB as well as CHF. She was also treated for acute diastolic heart failure which mostly likely was caused by AFIB with RVR. She was started on amiodarone and Eliquis while in the hospital.  Options regarding cath vs medical management while in hospital, decided on medical management. Today she presents for follow up as mentioned above.  This is the first time seeing this patient. Overall she is doing well other than fatigue. She stated that she is tired today, she usually doesn't get up this early. Her fatigue has been going on over the 3-4 months. She has been trying to stay active. She,s limited on movement due to her back issues and her neuropathy. Weights at home have been running around 225-230. She has not had to take as needed Lasix for weight gain, or shortness of breath. She has been taking 40 mg daily and extra 20 only when needed. Denies chest pain, shortness of breath, palpitations and dizziness. No s/sx of bleeding since starting Eliquis. She has not been monitoring blood pressures at home, she is planning on getting a new machine and start monitoring again. She does get some dizziness if she gets up too fast, which has been going on for awhile, No change. Taking all medications as prescribed, no refills needed at this time. EKG today NSR with incomplete LBBB QTC has improved and no significant changes from previous EKG om 2/23/2020. Sharmaine Yi describes symptoms including dyspnea, fatigue but denies chest pain, palpitations, orthopnea, PND, exertional chest pressure/discomfort, early saiety, edema, syncope. Past Medical History:   has a past medical history of Allergic rhinitis, Arthritis, CAD (coronary artery disease), Cholelithiasis, Diabetes mellitus ([address] Hepatic steatosis, Hyperlipidemia, Hypertension, Peripheral neuropathy, Sleep apnea, Thyroid disease, and Vitamin B12 deficiency. Past Surgical History:   has a past surgical history that includes Coronary artery bypass graft (2005); Foot surgery; colectomy (1998); Colonoscopy; Upper gastrointestinal endoscopy; Tonsillectomy and adenoidectomy; Tubal ligation; other surgical history (4/17/2012); other surgical history (Right, 3/20/13); Coronary angioplasty; Cholecystectomy; Cardiac catheterization (12/27/2017); and Diagnostic Cardiac Cath Lab Procedure. GLIPIZIDE Take 5 mg by mouth 2 times daily (with meals) Taking 10 mg AM & 5 mg in PM   Yes Historical Provider, MD   gabapentin (NEURONTIN) 600 MG tablet Take 600 mg by mouth. May take up to 4x per day if needed for neuropathy pain. Yes Historical Provider, MD   tizanidine (ZANAFLEX) 4 MG tablet Take 8 mg by mouth nightly. Yes Historical Provider, MD   diphenhydrAMINE (BENADRYL) 25 MG tablet Take 25 mg by mouth nightly as needed. Yes Historical Provider, MD   Cholecalciferol (VITAMIN D3) 1000 UNIT CAPS Take 1 capsule by mouth daily. Yes Historical Provider, MD       Allergies:  Niacin and related; Adhesive tape; Other; Statins depletion therapy; and Vancomycin    Social History:   reports that she quit smoking about 21 years ago. She has a 30.00 pack-year smoking history. She has never used smokeless tobacco. She reports that she does not drink alcohol or use drugs. Family History: family history includes Coronary Art Dis in her brother, father, and sister; Stroke in her mother. Review of Systems   Review of Systems   Constitutional: Positive for fatigue. Respiratory: Positive for shortness of breath (with exertion ). Negative for cough and chest tightness. Cardiovascular: Negative for chest pain, palpitations and leg swelling. Gastrointestinal: Negative for blood in stool. Genitourinary: Negative for hematuria. Musculoskeletal: Positive for arthralgias, back pain and gait problem. Neurological: Positive for weakness. Negative for dizziness. Hematological: Bruises/bleeds easily. Psychiatric/Behavioral: Positive for sleep disturbance.        OBJECTIVE:    Vital signs:    /68   Pulse 80   Ht 5' 3\" (1.6 m)   Wt 222 lb (100.7 kg)   BMI 39.33 kg/m²       Physical Exam:  Constitutional:  Comfortable and alert, NAD, obese, appears stated age  Eyes: PERRL, sclera nonicteric  Neck:  Supple, no masses, no thyroidmegaly, no JVD Skin:  Warm and dry; no rash or lesions, scattered ecchymotic areas from multiple blood sticks and IV at hospital.  Heart:  Regular, normal apex, S1 and S2 normal, no M/G/R  Lungs:  Normal respiratory effort; clear; no wheezing/rhonchi/rales  Abdomen: obese, soft, non tender, + bowel sounds  Extremities:  No edema or cyanosis; no clubbing  Neuro: alert and oriented, moves legs and arms equally, normal mood and affect      Data Reviewed:    EKG 2/23/2020:  Normal sinus rhythmIncomplete left bundle branch blockST & T wave abnormality, consider inferior ischemiaST & T wave abnormality, consider anterolateral ischemiaProlonged QTAbnormal ECGWhen compared with ECG of 23-FEB-2020 00:15,Sinus rhythm has replaced Atrial fibrillationIncomplete left bundle branch block has replaced Left bundle branch blockConfirmed by Reyes James MD, Aranza Serum ([id number]) on 2/23/2020 1:24:55 PM    EKG 2/23/2020:  Atrial fibrillation with rapid ventricular responseLeft bundle branch blockAbnormal ECGWhen compared with ECG of 22-FEB-2020 23:42, (unconfirmed)No significant change was foundConfirmed by Reyes James MD, Annitta Serum ([id number]) on 2/23/2020 1:04:00 PM    Echo 2/25/2020:  Normal left ventricular systolic function with ejection fraction of 55-60%. No regional wall motion abnormalites are seen. Moderate concentric left ventricular hypertrophy. Grade I diastolic dysfunction with normal filling pressure. Mild mitral regurgitation. ECHO 12/2017  Technically difficult examination secondary to habitus. Left ventricular systolic function is normal with the ejection fraction  estimated at 55%. Abnormal (paradoxical) septal motion is present likely due to post operative  status. Grade I diastolic dysfunction with normal filling pressure. Left ventricular systolic function has improved from cardiac cath done  12/27/2017 when it was 20%. CARDIAC CATH 6/2018  1. Selective coronary angiography. 2.  Left heart catheterization. 3.  Left ventriculography. CO2 24 02/23/2020    PHOS 3.0 09/16/2019    PHOS 5.0 06/17/2018    BUN 19 02/25/2020    BUN 20 02/24/2020    BUN 22 02/23/2020    CREATININE 1.2 02/25/2020    CREATININE 1.2 02/24/2020    CREATININE 1.4 02/23/2020     BNP:   Lab Results   Component Value Date    PROBNP 3,954 02/22/2020    PROBNP 197 08/16/2018    PROBNP 327 07/10/2018     FASTING LIPID PANEL:  Lab Results   Component Value Date    HDL 34 10/18/2017    LDLCALC 108 10/18/2017    TRIG 179 04/16/2015     LIVER PROFILE:No results for input(s): AST, ALT, ALB in the last 72 hours. Reviewed all labs and imaging today    Assessment:   1.  CAD: denies any chest pain   -severe MVCAD not amenable to PCI note patent SVG to RPDA and patent LIMA 6/2018   -CABG x3 2005  2. Elevated troponin while in hospital- due to ischemia by new AFIB with RVR as well as CHF    3. Atrial fibrillation with RVR -new onset while in hospital :EKG shows NSR today- no significant changes from previous EKG 2/23 while in hospital    -[id number] Score for Atrial Fibrillation Stroke Risk 6 (CHF, HTN, DM, vascular disease, age, female)  3. Chronic diastolic CHF: appears compensated  5. LBBB  6. BERTRAND: uses CPAP      Plan:   1. No change in medications at this time  2. Continue to monitor blood pressure, if top number consistently in the 90's or you become symptomatic please call the office we can make some medication adjustments. 3. Daily weights, low sodium diet, 2000 ml fluid restriction, if you gain 3 lbs in a day 5 lbs in a week, increased swelling, or increased shortness of breath please. Ok to take extra dose of 20 mg as needed for weight gain, swelling, or increased shortness of breath. 4. Follow up with Dr. Delano Real as scheduled in April, follow up with Dr. Ayala Lopez as scheduled he ordered blood work prior to appointment. 5. Diet and exercise as discussed    Michael Michle, APRN-CNP  [address]  [phone]      QUALITY MEASURES  1.  Tobacco Cessation Counseling: MARILYN

## 2020-03-24 RX ORDER — NITROGLYCERIN 0.4 MG/1
TABLET SUBLINGUAL
Qty: 25 TABLET | Refills: 1 | Status: SHIPPED | OUTPATIENT
Start: 2020-03-24 | End: 2021-06-03 | Stop reason: SDUPTHER

## 2020-03-25 PROBLEM — R77.8 ELEVATED TROPONIN: Status: RESOLVED | Noted: 2017-12-27 | Resolved: 2020-03-25

## 2020-03-25 PROBLEM — R79.89 ELEVATED TROPONIN: Status: RESOLVED | Noted: 2017-12-27 | Resolved: 2020-03-25

## 2020-04-03 ENCOUNTER — HOSPITAL ENCOUNTER (OUTPATIENT)
Age: 75
Discharge: HOME | End: 2020-04-03
Payer: MEDICARE

## 2020-04-03 LAB
ALBUMIN SERPL-MCNC: 4.1 G/DL (ref 3.4–5)
ANION GAP SERPL CALCULATED.3IONS-SCNC: 13 MMOL/L (ref 3–16)
BILIRUBIN URINE: NEGATIVE
BLOOD, URINE: NEGATIVE
BUN BLDV-MCNC: 22 MG/DL (ref 7–20)
CALCIUM SERPL-MCNC: 9.3 MG/DL (ref 8.3–10.6)
CHLORIDE BLD-SCNC: 97 MMOL/L (ref 99–110)
CLARITY: CLEAR
CO2: 29 MMOL/L (ref 21–32)
COLOR: YELLOW
CREAT SERPL-MCNC: 1.7 MG/DL (ref 0.6–1.2)
CREATININE URINE: 47.9 MG/DL (ref 28–259)
GFR AFRICAN AMERICAN: 36
GFR NON-AFRICAN AMERICAN: 29
GLUCOSE BLD-MCNC: 117 MG/DL (ref 70–99)
GLUCOSE URINE: NEGATIVE MG/DL
HCT VFR BLD CALC: 42.8 % (ref 36–48)
HEMOGLOBIN: 14.2 G/DL (ref 12–16)
KETONES, URINE: NEGATIVE MG/DL
LEUKOCYTE ESTERASE, URINE: NEGATIVE
MCH RBC QN AUTO: 31.3 PG (ref 26–34)
MCHC RBC AUTO-ENTMCNC: 33.3 G/DL (ref 31–36)
MCV RBC AUTO: 94.2 FL (ref 80–100)
MICROSCOPIC EXAMINATION: NORMAL
NITRITE, URINE: NEGATIVE
PDW BLD-RTO: 13.4 % (ref 12.4–15.4)
PH UA: 6.5 (ref 5–8)
PHOSPHORUS: 4.5 MG/DL (ref 2.5–4.9)
PLATELET # BLD: 192 K/UL (ref 135–450)
PMV BLD AUTO: 8.8 FL (ref 5–10.5)
POTASSIUM SERPL-SCNC: 4.9 MMOL/L (ref 3.5–5.1)
PROTEIN PROTEIN: 7 MG/DL
PROTEIN UA: NEGATIVE MG/DL
PROTEIN/CREAT RATIO: 0.1 MG/DL
RBC # BLD: 4.54 M/UL (ref 4–5.2)
SODIUM BLD-SCNC: 139 MMOL/L (ref 136–145)
SPECIFIC GRAVITY UA: 1.01 (ref 1–1.03)
URINE TYPE: NORMAL
UROBILINOGEN, URINE: 0.2 E.U./DL
WBC # BLD: 7.5 K/UL (ref 4–11)

## 2020-04-03 PROCEDURE — 82570 ASSAY OF URINE CREATININE: CPT

## 2020-04-03 PROCEDURE — 80069 RENAL FUNCTION PANEL: CPT

## 2020-04-03 PROCEDURE — 85027 COMPLETE CBC AUTOMATED: CPT

## 2020-04-03 PROCEDURE — 84156 ASSAY OF PROTEIN URINE: CPT

## 2020-04-03 PROCEDURE — 83970 ASSAY OF PARATHORMONE: CPT

## 2020-04-03 PROCEDURE — 36415 COLL VENOUS BLD VENIPUNCTURE: CPT

## 2020-04-03 PROCEDURE — 82306 VITAMIN D 25 HYDROXY: CPT

## 2020-04-03 PROCEDURE — 81003 URINALYSIS AUTO W/O SCOPE: CPT

## 2020-04-05 LAB
PARATHYROID HORMONE INTACT: 102.5 PG/ML (ref 14–72)
VITAMIN D 25-HYDROXY: 24 NG/ML

## 2020-04-23 ENCOUNTER — HOSPITAL ENCOUNTER (OUTPATIENT)
Age: 75
Discharge: HOME | End: 2020-04-23
Payer: MEDICARE

## 2020-04-23 LAB
ANION GAP SERPL CALCULATED.3IONS-SCNC: 13 MMOL/L (ref 3–16)
BUN BLDV-MCNC: 25 MG/DL (ref 7–20)
CALCIUM SERPL-MCNC: 8.9 MG/DL (ref 8.3–10.6)
CHLORIDE BLD-SCNC: 99 MMOL/L (ref 99–110)
CO2: 28 MMOL/L (ref 21–32)
CREAT SERPL-MCNC: 1.3 MG/DL (ref 0.6–1.2)
GFR AFRICAN AMERICAN: 49
GFR NON-AFRICAN AMERICAN: 40
GLUCOSE BLD-MCNC: 113 MG/DL (ref 70–99)
POTASSIUM SERPL-SCNC: 4.3 MMOL/L (ref 3.5–5.1)
SODIUM BLD-SCNC: 140 MMOL/L (ref 136–145)

## 2020-04-23 PROCEDURE — 80048 BASIC METABOLIC PNL TOTAL CA: CPT

## 2020-04-23 PROCEDURE — 36415 COLL VENOUS BLD VENIPUNCTURE: CPT

## 2020-05-18 RX ORDER — CARVEDILOL 3.12 MG/1
TABLET ORAL
Qty: 60 TABLET | Refills: 5 | Status: SHIPPED | OUTPATIENT
Start: 2020-05-18 | End: 2020-11-21

## 2020-06-01 RX ORDER — FUROSEMIDE 40 MG/1
TABLET ORAL
Qty: 120 TABLET | Refills: 2 | Status: SHIPPED | OUTPATIENT
Start: 2020-06-01 | End: 2021-07-16

## 2020-06-29 RX ORDER — AMIODARONE HYDROCHLORIDE 200 MG/1
TABLET ORAL
Qty: 30 TABLET | Refills: 2 | Status: SHIPPED | OUTPATIENT
Start: 2020-06-29 | End: 2020-11-05 | Stop reason: SDUPTHER

## 2020-07-15 ENCOUNTER — HOSPITAL ENCOUNTER (OUTPATIENT)
Dept: MAMMOGRAPHY | Age: 75
Discharge: HOME | End: 2020-07-15
Payer: MEDICARE

## 2020-07-15 PROCEDURE — 77067 SCR MAMMO BI INCL CAD: CPT

## 2020-09-09 RX ORDER — APIXABAN 5 MG/1
TABLET, FILM COATED ORAL
Qty: 60 TABLET | Refills: 3 | Status: SHIPPED | OUTPATIENT
Start: 2020-09-09 | End: 2021-10-08 | Stop reason: SDUPTHER

## 2020-09-09 NOTE — TELEPHONE ENCOUNTER
Please call her and have her schedule follow up with Dr. Emory Yates.   Thank you, DARIA Hooks - CNP

## 2020-09-21 ENCOUNTER — HOSPITAL ENCOUNTER (OUTPATIENT)
Age: 75
Discharge: HOME | End: 2020-09-21
Payer: MEDICARE

## 2020-09-21 LAB
ALBUMIN SERPL-MCNC: 4 G/DL (ref 3.4–5)
ANION GAP SERPL CALCULATED.3IONS-SCNC: 10 MMOL/L (ref 3–16)
BACTERIA: ABNORMAL /HPF
BILIRUBIN URINE: ABNORMAL
BLOOD, URINE: ABNORMAL
BUN BLDV-MCNC: 21 MG/DL (ref 7–20)
CALCIUM SERPL-MCNC: 9.1 MG/DL (ref 8.3–10.6)
CHLORIDE BLD-SCNC: 100 MMOL/L (ref 99–110)
CLARITY: CLEAR
CO2: 28 MMOL/L (ref 21–32)
COLOR: ABNORMAL
CREAT SERPL-MCNC: 1.5 MG/DL (ref 0.6–1.2)
EPITHELIAL CELLS, UA: ABNORMAL /HPF (ref 0–5)
GFR AFRICAN AMERICAN: 41
GFR NON-AFRICAN AMERICAN: 34
GLUCOSE BLD-MCNC: 167 MG/DL (ref 70–99)
GLUCOSE URINE: NEGATIVE MG/DL
KETONES, URINE: ABNORMAL MG/DL
LEUKOCYTE ESTERASE, URINE: ABNORMAL
MICROSCOPIC EXAMINATION: YES
MUCUS: ABNORMAL /LPF
NITRITE, URINE: NEGATIVE
PH UA: 5.5 (ref 5–8)
PHOSPHORUS: 4.2 MG/DL (ref 2.5–4.9)
POTASSIUM SERPL-SCNC: 5 MMOL/L (ref 3.5–5.1)
PROTEIN UA: 30 MG/DL
RBC UA: ABNORMAL /HPF (ref 0–4)
SODIUM BLD-SCNC: 138 MMOL/L (ref 136–145)
SPECIFIC GRAVITY UA: >=1.03 (ref 1–1.03)
URINE TYPE: ABNORMAL
UROBILINOGEN, URINE: 0.2 E.U./DL
WBC UA: ABNORMAL /HPF (ref 0–5)

## 2020-09-21 PROCEDURE — 36415 COLL VENOUS BLD VENIPUNCTURE: CPT

## 2020-09-21 PROCEDURE — 81001 URINALYSIS AUTO W/SCOPE: CPT

## 2020-09-21 PROCEDURE — 82570 ASSAY OF URINE CREATININE: CPT

## 2020-09-21 PROCEDURE — 80069 RENAL FUNCTION PANEL: CPT

## 2020-09-21 PROCEDURE — 84156 ASSAY OF PROTEIN URINE: CPT

## 2020-09-22 LAB
CREATININE URINE: 224.9 MG/DL (ref 28–259)
PROTEIN PROTEIN: 40 MG/DL
PROTEIN/CREAT RATIO: 0.2 MG/DL

## 2020-10-05 RX ORDER — ISOSORBIDE MONONITRATE 120 MG/1
TABLET, EXTENDED RELEASE ORAL
Qty: 90 TABLET | Refills: 0 | Status: SHIPPED | OUTPATIENT
Start: 2020-10-05 | End: 2021-01-11

## 2020-10-21 NOTE — PROGRESS NOTES
[facility]   Cardiac Followup    Referring Provider:  DARIA Littlejohn CNP     Chief Complaint   Patient presents with    6 Month Follow-Up     Cardiac follow up for CAD    Other     Hypotension    Dizziness     Balance unsteady       Subjective: Mrs Raymon Pimentel is being seen today for follow up of CAD s/p 3V CABG in 2005, HTN, and HLD; c/o dizziness and \"feeling drunk\" walking around     Past Medical History:    Mrs. Raymon Pimentel is a 81yo female with hx of CAD s/p 3V CABG in 2005, HLD, HTN, PAF dx 2/20 on eliquis, sleep apnea and uses CPAP, Charcot-foot with neuropathy, s/p fab 10/2/17, and intolerance to statins and niacin due to myalgias. She had right foot surgery for her Charcot in April 2012. Note lexiscan myoview stress test on 04/13/2015 was abnormal with small amount of ischemia anteroseptal wall at apex and lateral wall at base. Note cardiac cath on 4/16/15 which showed patent LIMA-LAD and vein grafts to RCA and OM1 with EF=55%. Diffuse disease in small vessels distal to grafts and medical management felt best without PCI at this time. Most recent [facility] 7/18/17 no evidence of myocardial ischemia or scar;  EF>65%  with uniform wall motion. Fcinvmcd24/27/17 with CP and NSTEMI (peak troponin 0.29). Note [facility] 12/27/17 showed LM 50% LAD 50% prox, 100% mid Cx 80% mid OM2 90%  Cx/Om system is small and diffusely diseased % ostium (from prior cath); LIMA to LAD patent; SVG to RCA 50% distal, and SVG to OM2 100% ostially occluded (new since prior cath 2015, LVEF 20%) with severe small artery disease. Readmitted and diagnosed with NSTEMI mid-June 2018 and new LBBB. Most recent [facility] 6/15/18 showed severe 3V CAD with occluded SVG to the OM; Patent SVG to the RCA; Patent LIMA to the LAD; disease risky and NOT amenable to PCI; LVEF on cath 20%  Most recent MUGA scan 8/14/18 EF of 60% (no change from 1/23/18 showing EF=57%). Admitted 2/22/20 c/o CP.  Initial EKG Afib  bpm. Most recent  ECHO 2/25/20 EF 55-60%. no wall abnls. Moderate cLVH. Grade I DD with normal filling pressure. Mild MR (no change from 12/17 study). Most recent EKG 3/3/20 SR, nonspecific ST depression. Today she reports mechanical fall 8/2020 had extensive bruising to face but did not seek medical attention. She is not taking 81mg aspirin and is unsure why she stopped. She reports waking in there morning feeling \"goofy\". Sounds like she is dizzy. Renal Dr. Jai Bruce decreased ramipril to 2.5mg daily to see if would help but she does not think so. Denies chest pain, shortness of breath, edema, dizziness, palpitations and syncope. Past Medical History:   has a past medical history of Allergic rhinitis, Arthritis, CAD (coronary artery disease), Cholelithiasis, Diabetes mellitus ([facility] [address] Hepatic steatosis, Hyperlipidemia, Hypertension, Peripheral neuropathy, Sleep apnea, Thyroid disease, and Vitamin B12 deficiency. Surgical History:   has a past surgical history that includes Coronary artery bypass graft (2005); Foot surgery; colectomy (1998); Colonoscopy; Upper gastrointestinal endoscopy; Tonsillectomy and adenoidectomy; Tubal ligation; other surgical history (4/17/2012); other surgical history (Right, 3/20/13); Coronary angioplasty; Cholecystectomy; Cardiac catheterization (12/27/2017); and Diagnostic Cardiac Cath Lab Procedure. Social History:  She is  and lives alone in [facility]. She reports that she quit smoking about 13 years ago. She has never used smokeless tobacco. She reports that she does not drink alcohol or use illicit drugs. Family History:  family history includes Coronary Art Dis in her brother, father, and sister; Stroke in her mother. Home Medications:  Prior to Admission medications    Medication Sig Start Date End Date Taking? Authorizing Provider   loratadine (ALAVERT) 10 MG tablet Take 10 mg by mouth 2 times daily as needed.      Yes Historical Provider, MD   GLIPIZIDE Take 5 mg by mouth 2 times daily (with meals). Yes Historical Provider, MD   gabapentin (NEURONTIN) 600 MG tablet Take 1,200 mg by mouth 3 times daily. May take up to 4x per day if needed for neuropathy pain   Yes Historical Provider, MD   tizanidine (ZANAFLEX) 4 MG tablet Take 8 mg by mouth nightly. Yes Historical Provider, MD   tramadol (ULTRAM) 50 MG tablet Take 50 mg by mouth every 4 hours as needed. States uses very rare and only when neurontin ineffective. Yes Historical Provider, MD   metformin (GLUCOPHAGE) 1000 MG tablet Take 1,000 mg by mouth daily (with breakfast). Yes Historical Provider, MD   levothyroxine (SYNTHROID) 50 MCG tablet Take 50 mcg by mouth every morning. Yes Historical Provider, MD   topiramate (TOPAMAX) 25 MG tablet Take 25 mg by mouth daily. Yes Historical Provider, MD   tolterodine (DETROL LA) 4 MG ER capsule Take 4 mg by mouth 2 times daily. Yes Historical Provider, MD   diphenhydrAMINE (BENADRYL) 25 MG tablet Take 25 mg by mouth nightly as needed. Yes Historical Provider, MD   ranitidine (ZANTAC 75) 75 MG tablet Take 75 mg by mouth daily. Yes Historical Provider, MD   Cholecalciferol (VITAMIN D3) 1000 UNIT CAPS Take 1 capsule by mouth daily. Yes Historical Provider, MD   Omega-3 Fatty Acids (FISH OIL) 1000 MG CAPS Take 1,000 mg by mouth daily. Yes Historical Provider, MD   Red Yeast Rice 600 MG TABS Take 1 tablet by mouth daily. Yes Historical Provider, MD   lisinopril (PRINIVIL;ZESTRIL) 10 MG tablet Take 5 mg by mouth every morning. Yes Historical Provider, MD   furosemide (LASIX) 40 MG tablet Take 40 mg by mouth daily as needed. Yes Historical Provider, MD   aspirin 81 MG EC tablet Take 81 mg by mouth daily. Historical Provider, MD        Allergies:  Niacin and related; Adhesive tape; Other; Statins depletion therapy; and Vancomycin     Review of Systems:   · Constitutional: there has been no unanticipated weight loss.  There's been no change in energy level, sleep pattern, or activity level. · Eyes: No visual changes or diplopia. No scleral icterus. · ENT: No Headaches, hearing loss or vertigo. No mouth sores or sore throat. · Cardiovascular: Reviewed in HPI  · Respiratory: No cough or wheezing, no sputum production. No hematemesis. · Gastrointestinal: No abdominal pain, appetite loss, blood in stools. No change in bowel or bladder habits. · Genitourinary: No dysuria, trouble voiding, or hematuria. · Musculoskeletal:  No gait disturbance, weakness or joint complaints. · Integumentary: No rash or pruritis. · Neurological: No headache, diplopia, change in muscle strength, numbness or tingling. No change in gait, balance, coordination, mood, affect, memory, mentation, behavior. · Psychiatric: No anxiety, no depression. · Endocrine: No malaise, fatigue or temperature intolerance. No excessive thirst, fluid intake, or urination. No tremor. · Hematologic/Lymphatic: No abnormal bruising or bleeding, blood clots or swollen lymph nodes. · Allergic/Immunologic: No nasal congestion or hives. Physical Examination:    Vitals:    10/23/20 1249   BP: 112/64   Pulse: 70   Temp: 96.9 °F (36.1 °C)   SpO2: 97%        Wt Readings from Last 3 Encounters:   10/23/20 229 lb (103.9 kg)   03/03/20 222 lb (100.7 kg)   02/25/20 226 lb (102.5 kg)       Constitutional and General Appearance: NAD   Respiratory:  · Normal excursion and expansion without use of accessory muscles  · Resp Auscultation: Normal breath sounds without dullness  Cardiovascular:  · The apical impulses not displaced  · Heart tones are crisp and normal RRR  · Cervical veins are not engorged  · The carotid upstroke is normal in amplitude and contour without delay or bruit  · Normal S1S2, No S3, ? Soft JACQUI  · Peripheral pulses are symmetrical and full  · There is no clubbing, cyanosis of the extremities.               Trace-1+ BLE edema  · Femoral Arteries: 2+ and equal  · Pedal Pulses: 2+ and troponin 0.24 and new LBBB. Most recent 5 [facility] 6/15/18  Native 3VCAD with occluded SVG to the OM Patent SVG to the RCA Patent LIMA to the LAD; risky disease NOT amenable to PCI. LVEF on cath 20% (6/2018). There are no concerning symptoms for angina currently. 4. Chest pain: No complaints today. She has diffuse CAD with small vessel disease beyond grafts. Will continue aggressive med mgt if able. She is on maximum dose imdur and ranexa. I have discussed EECP  In past but she is not excited about pursuing this option. Plan:  1. Meds reviewed. No refills per pt request    2. Resume 81mg aspirin daily for CAD s/p CABG histor. 3. She is having trouble affording meds. Will check the office today for samples and co-pay card. Call my office if unable to afford   4. Stop taking ramipril to see if helps dizziness. BP on lower side  5. Will check TSH , CMP, CBC  6. Follow up with me in 6 months      This note was scribed in the presence of Deysi Mcknight MD by Vinny Palm RN    I, Dr. Armando Alanis, personally performed the services described in this documentation, as scribed by the above signed scribe in my presence. It is both accurate and complete to my knowledge. I agree with the details independently gathered by the clinical support staff, while the remaining scribed note accurately describes my personal service to the patient. Cost of prescription medications and patient compliance have been reviewed with patient. All questions answered. Thank you for allowing me to participate in the care of this individual.      Sherita Fields.  Mina Schumacher M.D., [facility]

## 2020-10-23 ENCOUNTER — OFFICE VISIT (OUTPATIENT)
Dept: CARDIOLOGY CLINIC | Age: 75
End: 2020-10-23
Payer: MEDICARE

## 2020-10-23 ENCOUNTER — HOSPITAL ENCOUNTER (OUTPATIENT)
Age: 75
Discharge: HOME | End: 2020-10-23
Payer: MEDICARE

## 2020-10-23 VITALS
BODY MASS INDEX: 40.57 KG/M2 | DIASTOLIC BLOOD PRESSURE: 64 MMHG | OXYGEN SATURATION: 97 % | TEMPERATURE: 96.9 F | HEIGHT: 63 IN | SYSTOLIC BLOOD PRESSURE: 112 MMHG | WEIGHT: 229 LBS | HEART RATE: 70 BPM

## 2020-10-23 PROBLEM — Z87.891 HISTORY OF TOBACCO ABUSE: Status: ACTIVE | Noted: 2020-10-23

## 2020-10-23 LAB
A/G RATIO: 1.5 (ref 1.1–2.2)
ALBUMIN SERPL-MCNC: 4 G/DL (ref 3.4–5)
ALP BLD-CCNC: 72 U/L (ref 40–129)
ALT SERPL-CCNC: 24 U/L (ref 10–40)
ANION GAP SERPL CALCULATED.3IONS-SCNC: 9 MMOL/L (ref 3–16)
AST SERPL-CCNC: 19 U/L (ref 15–37)
BILIRUB SERPL-MCNC: 0.4 MG/DL (ref 0–1)
BUN BLDV-MCNC: 19 MG/DL (ref 7–20)
CALCIUM SERPL-MCNC: 9.2 MG/DL (ref 8.3–10.6)
CHLORIDE BLD-SCNC: 100 MMOL/L (ref 99–110)
CO2: 30 MMOL/L (ref 21–32)
CREAT SERPL-MCNC: 1.3 MG/DL (ref 0.6–1.2)
GFR AFRICAN AMERICAN: 49
GFR NON-AFRICAN AMERICAN: 40
GLOBULIN: 2.6 G/DL
GLUCOSE BLD-MCNC: 181 MG/DL (ref 70–99)
HCT VFR BLD CALC: 41.3 % (ref 36–48)
HEMOGLOBIN: 13.8 G/DL (ref 12–16)
MCH RBC QN AUTO: 32 PG (ref 26–34)
MCHC RBC AUTO-ENTMCNC: 33.5 G/DL (ref 31–36)
MCV RBC AUTO: 95.5 FL (ref 80–100)
PDW BLD-RTO: 14.6 % (ref 12.4–15.4)
PLATELET # BLD: 169 K/UL (ref 135–450)
PMV BLD AUTO: 8.7 FL (ref 5–10.5)
POTASSIUM SERPL-SCNC: 4.9 MMOL/L (ref 3.5–5.1)
RBC # BLD: 4.33 M/UL (ref 4–5.2)
SODIUM BLD-SCNC: 139 MMOL/L (ref 136–145)
TOTAL PROTEIN: 6.6 G/DL (ref 6.4–8.2)
TSH REFLEX: 4.3 UIU/ML (ref 0.27–4.2)
WBC # BLD: 5.6 K/UL (ref 4–11)

## 2020-10-23 PROCEDURE — G8427 DOCREV CUR MEDS BY ELIG CLIN: HCPCS | Performed by: INTERNAL MEDICINE

## 2020-10-23 PROCEDURE — G8484 FLU IMMUNIZE NO ADMIN: HCPCS | Performed by: INTERNAL MEDICINE

## 2020-10-23 PROCEDURE — 4040F PNEUMOC VAC/ADMIN/RCVD: CPT | Performed by: INTERNAL MEDICINE

## 2020-10-23 PROCEDURE — G8417 CALC BMI ABV UP PARAM F/U: HCPCS | Performed by: INTERNAL MEDICINE

## 2020-10-23 PROCEDURE — 84443 ASSAY THYROID STIM HORMONE: CPT

## 2020-10-23 PROCEDURE — 84439 ASSAY OF FREE THYROXINE: CPT

## 2020-10-23 PROCEDURE — 80053 COMPREHEN METABOLIC PANEL: CPT

## 2020-10-23 PROCEDURE — 1123F ACP DISCUSS/DSCN MKR DOCD: CPT | Performed by: INTERNAL MEDICINE

## 2020-10-23 PROCEDURE — 3017F COLORECTAL CA SCREEN DOC REV: CPT | Performed by: INTERNAL MEDICINE

## 2020-10-23 PROCEDURE — 85027 COMPLETE CBC AUTOMATED: CPT

## 2020-10-23 PROCEDURE — G8399 PT W/DXA RESULTS DOCUMENT: HCPCS | Performed by: INTERNAL MEDICINE

## 2020-10-23 PROCEDURE — 99214 OFFICE O/P EST MOD 30 MIN: CPT | Performed by: INTERNAL MEDICINE

## 2020-10-23 PROCEDURE — 1090F PRES/ABSN URINE INCON ASSESS: CPT | Performed by: INTERNAL MEDICINE

## 2020-10-23 PROCEDURE — 36415 COLL VENOUS BLD VENIPUNCTURE: CPT

## 2020-10-23 PROCEDURE — 1036F TOBACCO NON-USER: CPT | Performed by: INTERNAL MEDICINE

## 2020-10-23 NOTE — PATIENT INSTRUCTIONS
Plan:  1. Meds reviewed. 2. Resume 81mg aspirin daily   3. She is having trouble affording medications. Will Check the office today for samples and co-pay card. Call my office if unable to afford   4. Stop taking ramipril   5. Will check TSH , CMP, CBC  6.  Follow up with me in 6 months

## 2020-10-24 LAB — T4 FREE: 1.8 NG/DL (ref 0.9–1.8)

## 2020-11-05 RX ORDER — AMIODARONE HYDROCHLORIDE 200 MG/1
200 TABLET ORAL DAILY
Qty: 30 TABLET | Refills: 5 | Status: SHIPPED | OUTPATIENT
Start: 2020-11-05 | End: 2022-03-28 | Stop reason: ALTCHOICE

## 2020-11-05 RX ORDER — RANOLAZINE 500 MG/1
TABLET, EXTENDED RELEASE ORAL
Qty: 60 TABLET | Refills: 5 | Status: SHIPPED | OUTPATIENT
Start: 2020-11-05 | End: 2021-09-16

## 2020-11-05 NOTE — TELEPHONE ENCOUNTER
Pt last OV 10/23/20    Assessment:      1. Hyperlipidemia : Last lipids 5/12/20 , HDL 44, , , . She is unable to take statins or niacin due to severe myagias. She states she is taking 2 tablets of fish oil and red yeast extract along with dieting. She has resisted PCSK9 inhibitors prior but is now willing to consider. She has hard time affording meds and will see if she can afford.      2. Hypertension : Stable and will continue present medical regimen.       3. CAD (coronary artery disease): Diagnosed with NSTEMI June 2018 peak troponin 0.24 and new LBBB. Most recent [facility] 6/15/18  Native 3VCAD with occluded SVG to the OM Patent SVG to the RCA Patent LIMA to the LAD; risky disease NOT amenable to PCI. LVEF on cath 20% (6/2018). There are no concerning symptoms for angina currently.        4. Chest pain: No complaints today. She has diffuse CAD with small vessel disease beyond grafts. Will continue aggressive med mgt if able. She is on maximum dose imdur and ranexa. I have discussed EECP  In past but she is not excited about pursuing this option.     Plan:  1. Meds reviewed. No refills per pt request    2. Resume 81mg aspirin daily for CAD s/p CABG histor. 3. She is having trouble affording meds. Will check the office today for samples and co-pay card. Call my office if unable to afford   4. Stop taking ramipril to see if helps dizziness. BP on lower side  5. Will check TSH , CMP, CBC  6.  Follow up with me in 6 months

## 2020-11-21 RX ORDER — CARVEDILOL 3.12 MG/1
TABLET ORAL
Qty: 60 TABLET | Refills: 4 | Status: SHIPPED | OUTPATIENT
Start: 2020-11-21 | End: 2021-10-08 | Stop reason: SDUPTHER

## 2021-01-11 RX ORDER — ISOSORBIDE MONONITRATE 120 MG/1
TABLET, EXTENDED RELEASE ORAL
Qty: 90 TABLET | Refills: 0 | Status: SHIPPED | OUTPATIENT
Start: 2021-01-11 | End: 2021-04-15

## 2021-03-24 ENCOUNTER — HOSPITAL ENCOUNTER (OUTPATIENT)
Age: 76
Discharge: HOME | DRG: 239 | End: 2021-03-24
Payer: MEDICARE

## 2021-03-24 LAB
ALBUMIN SERPL-MCNC: 3.5 G/DL (ref 3.4–5)
ANION GAP SERPL CALCULATED.3IONS-SCNC: 11 MMOL/L (ref 3–16)
BILIRUBIN URINE: NEGATIVE
BLOOD, URINE: NEGATIVE
BUN BLDV-MCNC: 17 MG/DL (ref 7–20)
CALCIUM SERPL-MCNC: 8.9 MG/DL (ref 8.3–10.6)
CHLORIDE BLD-SCNC: 94 MMOL/L (ref 99–110)
CLARITY: CLEAR
CO2: 28 MMOL/L (ref 21–32)
COLOR: YELLOW
CREAT SERPL-MCNC: 1.5 MG/DL (ref 0.6–1.2)
GFR AFRICAN AMERICAN: 41
GFR NON-AFRICAN AMERICAN: 34
GLUCOSE BLD-MCNC: 125 MG/DL (ref 70–99)
GLUCOSE URINE: NEGATIVE MG/DL
HCT VFR BLD CALC: 37.7 % (ref 36–48)
HEMOGLOBIN: 12.6 G/DL (ref 12–16)
KETONES, URINE: NEGATIVE MG/DL
LEUKOCYTE ESTERASE, URINE: NEGATIVE
MCH RBC QN AUTO: 30.6 PG (ref 26–34)
MCHC RBC AUTO-ENTMCNC: 33.5 G/DL (ref 31–36)
MCV RBC AUTO: 91.5 FL (ref 80–100)
MICROSCOPIC EXAMINATION: NORMAL
NITRITE, URINE: NEGATIVE
PDW BLD-RTO: 13.6 % (ref 12.4–15.4)
PH UA: 6.5 (ref 5–8)
PHOSPHORUS: 2.6 MG/DL (ref 2.5–4.9)
PLATELET # BLD: 215 K/UL (ref 135–450)
PMV BLD AUTO: 8.1 FL (ref 5–10.5)
POTASSIUM SERPL-SCNC: 3.7 MMOL/L (ref 3.5–5.1)
PROTEIN UA: NEGATIVE MG/DL
RBC # BLD: 4.13 M/UL (ref 4–5.2)
SODIUM BLD-SCNC: 133 MMOL/L (ref 136–145)
SPECIFIC GRAVITY UA: <=1.005 (ref 1–1.03)
URINE TYPE: NORMAL
UROBILINOGEN, URINE: 0.2 E.U./DL
WBC # BLD: 9.3 K/UL (ref 4–11)

## 2021-03-24 PROCEDURE — 82306 VITAMIN D 25 HYDROXY: CPT

## 2021-03-24 PROCEDURE — 81003 URINALYSIS AUTO W/O SCOPE: CPT

## 2021-03-24 PROCEDURE — 36415 COLL VENOUS BLD VENIPUNCTURE: CPT

## 2021-03-24 PROCEDURE — 82570 ASSAY OF URINE CREATININE: CPT

## 2021-03-24 PROCEDURE — 84156 ASSAY OF PROTEIN URINE: CPT

## 2021-03-24 PROCEDURE — 80069 RENAL FUNCTION PANEL: CPT

## 2021-03-24 PROCEDURE — 83970 ASSAY OF PARATHORMONE: CPT

## 2021-03-24 PROCEDURE — 85027 COMPLETE CBC AUTOMATED: CPT

## 2021-03-25 LAB
CREATININE URINE: 34.9 MG/DL (ref 28–259)
PARATHYROID HORMONE INTACT: 70.4 PG/ML (ref 14–72)
PROTEIN PROTEIN: 5 MG/DL
PROTEIN/CREAT RATIO: 0.1 MG/DL
VITAMIN D 25-HYDROXY: 73.6 NG/ML

## 2021-03-27 ENCOUNTER — APPOINTMENT (OUTPATIENT)
Dept: GENERAL RADIOLOGY | Age: 76
DRG: 239 | End: 2021-03-27
Payer: MEDICARE

## 2021-03-27 ENCOUNTER — HOSPITAL ENCOUNTER (INPATIENT)
Age: 76
LOS: 5 days | Discharge: HOME | DRG: 239 | End: 2021-04-01
Attending: EMERGENCY MEDICINE | Admitting: INTERNAL MEDICINE
Payer: MEDICARE

## 2021-03-27 DIAGNOSIS — E11.628 DIABETIC FOOT INFECTION (HCC): Primary | ICD-10-CM

## 2021-03-27 DIAGNOSIS — L08.9 DIABETIC FOOT INFECTION (HCC): Primary | ICD-10-CM

## 2021-03-27 DIAGNOSIS — M86.9 OSTEOMYELITIS OF RIGHT FOOT, UNSPECIFIED TYPE (HCC): ICD-10-CM

## 2021-03-27 LAB
A/G RATIO: 1 (ref 1.1–2.2)
ALBUMIN SERPL-MCNC: 3.7 G/DL (ref 3.4–5)
ALP BLD-CCNC: 103 U/L (ref 40–129)
ALT SERPL-CCNC: 11 U/L (ref 10–40)
ANION GAP SERPL CALCULATED.3IONS-SCNC: 11 MMOL/L (ref 3–16)
AST SERPL-CCNC: 15 U/L (ref 15–37)
BASOPHILS ABSOLUTE: 0.1 K/UL (ref 0–0.2)
BASOPHILS RELATIVE PERCENT: 1.5 %
BILIRUB SERPL-MCNC: 0.4 MG/DL (ref 0–1)
BUN BLDV-MCNC: 15 MG/DL (ref 7–20)
CALCIUM SERPL-MCNC: 9.3 MG/DL (ref 8.3–10.6)
CHLORIDE BLD-SCNC: 101 MMOL/L (ref 99–110)
CO2: 30 MMOL/L (ref 21–32)
CREAT SERPL-MCNC: 1.5 MG/DL (ref 0.6–1.2)
EOSINOPHILS ABSOLUTE: 0.2 K/UL (ref 0–0.6)
EOSINOPHILS RELATIVE PERCENT: 2.6 %
GFR AFRICAN AMERICAN: 41
GFR NON-AFRICAN AMERICAN: 34
GLOBULIN: 3.7 G/DL
GLUCOSE BLD-MCNC: 138 MG/DL (ref 70–99)
HCT VFR BLD CALC: 39.3 % (ref 36–48)
HEMOGLOBIN: 12.9 G/DL (ref 12–16)
LYMPHOCYTES ABSOLUTE: 1.3 K/UL (ref 1–5.1)
LYMPHOCYTES RELATIVE PERCENT: 16.6 %
MCH RBC QN AUTO: 30.2 PG (ref 26–34)
MCHC RBC AUTO-ENTMCNC: 32.8 G/DL (ref 31–36)
MCV RBC AUTO: 92.1 FL (ref 80–100)
MONOCYTES ABSOLUTE: 0.5 K/UL (ref 0–1.3)
MONOCYTES RELATIVE PERCENT: 6.1 %
NEUTROPHILS ABSOLUTE: 5.7 K/UL (ref 1.7–7.7)
NEUTROPHILS RELATIVE PERCENT: 73.2 %
PDW BLD-RTO: 13.4 % (ref 12.4–15.4)
PLATELET # BLD: 287 K/UL (ref 135–450)
PMV BLD AUTO: 8.4 FL (ref 5–10.5)
POTASSIUM REFLEX MAGNESIUM: 3.8 MMOL/L (ref 3.5–5.1)
RBC # BLD: 4.27 M/UL (ref 4–5.2)
SEDIMENTATION RATE, ERYTHROCYTE: 77 MM/HR (ref 0–30)
SODIUM BLD-SCNC: 142 MMOL/L (ref 136–145)
TOTAL PROTEIN: 7.4 G/DL (ref 6.4–8.2)
WBC # BLD: 7.8 K/UL (ref 4–11)

## 2021-03-27 PROCEDURE — 73660 X-RAY EXAM OF TOE(S): CPT

## 2021-03-27 PROCEDURE — 2580000003 HC RX 258: Performed by: EMERGENCY MEDICINE

## 2021-03-27 PROCEDURE — 36415 COLL VENOUS BLD VENIPUNCTURE: CPT

## 2021-03-27 PROCEDURE — 6360000002 HC RX W HCPCS: Performed by: EMERGENCY MEDICINE

## 2021-03-27 PROCEDURE — 96365 THER/PROPH/DIAG IV INF INIT: CPT

## 2021-03-27 PROCEDURE — 80053 COMPREHEN METABOLIC PANEL: CPT

## 2021-03-27 PROCEDURE — 85652 RBC SED RATE AUTOMATED: CPT

## 2021-03-27 PROCEDURE — 85025 COMPLETE CBC W/AUTO DIFF WBC: CPT

## 2021-03-27 PROCEDURE — 99283 EMERGENCY DEPT VISIT LOW MDM: CPT

## 2021-03-27 PROCEDURE — 96361 HYDRATE IV INFUSION ADD-ON: CPT

## 2021-03-27 PROCEDURE — 1200000000 HC SEMI PRIVATE

## 2021-03-27 RX ORDER — AMIODARONE HYDROCHLORIDE 200 MG/1
200 TABLET ORAL DAILY
Status: DISCONTINUED | OUTPATIENT
Start: 2021-03-28 | End: 2021-04-01 | Stop reason: HOSPADM

## 2021-03-27 RX ORDER — ASPIRIN 81 MG/1
81 TABLET ORAL DAILY
Status: DISCONTINUED | OUTPATIENT
Start: 2021-03-28 | End: 2021-04-01 | Stop reason: HOSPADM

## 2021-03-27 RX ORDER — POTASSIUM CHLORIDE 7.45 MG/ML
10 INJECTION INTRAVENOUS PRN
Status: DISCONTINUED | OUTPATIENT
Start: 2021-03-27 | End: 2021-04-01 | Stop reason: HOSPADM

## 2021-03-27 RX ORDER — SODIUM CHLORIDE 0.9 % (FLUSH) 0.9 %
10 SYRINGE (ML) INJECTION EVERY 12 HOURS SCHEDULED
Status: DISCONTINUED | OUTPATIENT
Start: 2021-03-28 | End: 2021-04-01 | Stop reason: HOSPADM

## 2021-03-27 RX ORDER — ISOSORBIDE MONONITRATE 60 MG/1
120 TABLET, EXTENDED RELEASE ORAL DAILY
Status: DISCONTINUED | OUTPATIENT
Start: 2021-03-28 | End: 2021-04-01 | Stop reason: HOSPADM

## 2021-03-27 RX ORDER — PROMETHAZINE HYDROCHLORIDE 25 MG/1
12.5 TABLET ORAL EVERY 6 HOURS PRN
Status: DISCONTINUED | OUTPATIENT
Start: 2021-03-27 | End: 2021-04-01 | Stop reason: HOSPADM

## 2021-03-27 RX ORDER — ACETAMINOPHEN 325 MG/1
650 TABLET ORAL EVERY 6 HOURS PRN
Status: DISCONTINUED | OUTPATIENT
Start: 2021-03-27 | End: 2021-04-01 | Stop reason: HOSPADM

## 2021-03-27 RX ORDER — SODIUM CHLORIDE 9 MG/ML
1000 INJECTION, SOLUTION INTRAVENOUS CONTINUOUS
Status: DISCONTINUED | OUTPATIENT
Start: 2021-03-27 | End: 2021-03-28

## 2021-03-27 RX ORDER — MAGNESIUM SULFATE IN WATER 40 MG/ML
2000 INJECTION, SOLUTION INTRAVENOUS PRN
Status: DISCONTINUED | OUTPATIENT
Start: 2021-03-27 | End: 2021-04-01 | Stop reason: HOSPADM

## 2021-03-27 RX ORDER — ONDANSETRON 2 MG/ML
4 INJECTION INTRAMUSCULAR; INTRAVENOUS EVERY 6 HOURS PRN
Status: DISCONTINUED | OUTPATIENT
Start: 2021-03-27 | End: 2021-04-01 | Stop reason: HOSPADM

## 2021-03-27 RX ORDER — SODIUM CHLORIDE, SODIUM LACTATE, POTASSIUM CHLORIDE, CALCIUM CHLORIDE 600; 310; 30; 20 MG/100ML; MG/100ML; MG/100ML; MG/100ML
INJECTION, SOLUTION INTRAVENOUS CONTINUOUS
Status: DISCONTINUED | OUTPATIENT
Start: 2021-03-28 | End: 2021-03-28

## 2021-03-27 RX ORDER — LINEZOLID 2 MG/ML
600 INJECTION, SOLUTION INTRAVENOUS EVERY 12 HOURS
Status: DISCONTINUED | OUTPATIENT
Start: 2021-03-27 | End: 2021-03-28 | Stop reason: SDUPTHER

## 2021-03-27 RX ORDER — POLYETHYLENE GLYCOL 3350 17 G/17G
17 POWDER, FOR SOLUTION ORAL DAILY PRN
Status: DISCONTINUED | OUTPATIENT
Start: 2021-03-27 | End: 2021-04-01 | Stop reason: HOSPADM

## 2021-03-27 RX ORDER — SODIUM CHLORIDE 9 MG/ML
25 INJECTION, SOLUTION INTRAVENOUS PRN
Status: DISCONTINUED | OUTPATIENT
Start: 2021-03-27 | End: 2021-04-01 | Stop reason: HOSPADM

## 2021-03-27 RX ORDER — SODIUM CHLORIDE 0.9 % (FLUSH) 0.9 %
10 SYRINGE (ML) INJECTION PRN
Status: DISCONTINUED | OUTPATIENT
Start: 2021-03-27 | End: 2021-04-01 | Stop reason: HOSPADM

## 2021-03-27 RX ORDER — LINEZOLID 2 MG/ML
600 INJECTION, SOLUTION INTRAVENOUS EVERY 12 HOURS
Status: DISCONTINUED | OUTPATIENT
Start: 2021-03-28 | End: 2021-03-30

## 2021-03-27 RX ORDER — PANTOPRAZOLE SODIUM 40 MG/1
40 TABLET, DELAYED RELEASE ORAL DAILY
Status: DISCONTINUED | OUTPATIENT
Start: 2021-03-28 | End: 2021-04-01 | Stop reason: HOSPADM

## 2021-03-27 RX ORDER — CARVEDILOL 3.12 MG/1
3.12 TABLET ORAL 2 TIMES DAILY WITH MEALS
Status: DISCONTINUED | OUTPATIENT
Start: 2021-03-28 | End: 2021-04-01 | Stop reason: HOSPADM

## 2021-03-27 RX ORDER — RANOLAZINE 500 MG/1
500 TABLET, EXTENDED RELEASE ORAL 2 TIMES DAILY
Status: DISCONTINUED | OUTPATIENT
Start: 2021-03-28 | End: 2021-04-01 | Stop reason: HOSPADM

## 2021-03-27 RX ORDER — ACETAMINOPHEN 650 MG/1
650 SUPPOSITORY RECTAL EVERY 6 HOURS PRN
Status: DISCONTINUED | OUTPATIENT
Start: 2021-03-27 | End: 2021-04-01 | Stop reason: HOSPADM

## 2021-03-27 RX ORDER — CIPROFLOXACIN 2 MG/ML
400 INJECTION, SOLUTION INTRAVENOUS ONCE
Status: DISCONTINUED | OUTPATIENT
Start: 2021-03-27 | End: 2021-03-27

## 2021-03-27 RX ADMIN — PIPERACILLIN SODIUM AND TAZOBACTAM SODIUM 3375 MG: 3; .375 INJECTION, POWDER, LYOPHILIZED, FOR SOLUTION INTRAVENOUS at 20:11

## 2021-03-27 RX ADMIN — SODIUM CHLORIDE 1000 ML: 9 INJECTION, SOLUTION INTRAVENOUS at 20:12

## 2021-03-27 NOTE — ED PROVIDER NOTES
Emergency Department Attending Note    Reagan Cabot, MD    Date of ED VIsit: 3/27/2021    CHIEF COMPLAINT  Toe Pain (Pt reports having a hammer toe on L foot, 2nd toe. Pt state it rubbed against her shoe and now it is bleeding and weeping and has turned black at the tip. )      HISTORY OF PRESENT ILLNESS  Gustavo Singh is a 70 y.o. female  With Vital signs of /64   Pulse 65   Temp 98.4 °F (36.9 °C) (Oral)   Resp 18   Ht 5' 2\" (1.575 m)   Wt 230 lb (104.3 kg)   SpO2 98%   BMI 42.07 kg/m²  who presents to the ED with a complaint of right second toe infection. Patient seen and evaluated in room 12. Patient comes in stating that over the past few days her right second toe is gotten more more red and today it turned black at the tip and that prompted her to come into the emerge department for evaluation. She is got diabetes with obvious neuropathy and is not able to feel pain to that area. She does not need she denies any fevers or chills no nausea or vomiting no diarrhea no constipation no chest pain or shortness of breath no neurologic complaints other than her neuropathy. She has no urologic complaints as well. The focus is obviously on her foot and toe which looks necrotic at the tip and shows osteomyelitis by radiograph. Under the toe is a large collection of pus as well that probably needs to be debrided in the operating room. .  No other complaints, modifying factors or associated symptoms. Patients Past medical history reviewed and listed below  Past Medical History:   Diagnosis Date    Allergic rhinitis     Arthritis     CAD (coronary artery disease)     Cholelithiasis 7/3/2015    Diabetes mellitus ([address]     Accuchecks daily once a day at home in [place]    [facility] Hepatic steatosis 7/3/2015    Hyperlipidemia     Hypertension     Peripheral neuropathy     Sleep apnea     Has used the CPAP x 25 yrs now per pt.     Thyroid disease     Vitamin B12 deficiency      Past Surgical History: file     Active member of club or organization: Not on file     Attends meetings of clubs or organizations: Not on file     Relationship status: Not on file    Intimate partner violence     Fear of current or ex partner: Not on file     Emotionally abused: Not on file     Physically abused: Not on file     Forced sexual activity: Not on file   Other Topics Concern    Not on file   Social History Narrative    Not on file     No current facility-administered medications for this encounter. Current Outpatient Medications   Medication Sig Dispense Refill    isosorbide mononitrate (IMDUR) 120 MG extended release tablet TAKE ONE TABLET BY MOUTH DAILY 90 tablet 0    carvedilol (COREG) 3.125 MG tablet TAKE ONE TABLET BY MOUTH TWICE A DAY WITH MEALS 60 tablet 4    ranolazine (RANEXA) 500 MG extended release tablet TAKE ONE TABLET BY MOUTH TWICE A DAY 60 tablet 5    amiodarone (CORDARONE) 200 MG tablet Take 1 tablet by mouth daily 30 tablet 5    ELIQUIS 5 MG TABS tablet TAKE ONE TABLET BY MOUTH TWICE A DAY 60 tablet 3    furosemide (LASIX) 40 MG tablet TAKE ONE TABLET BY MOUTH DAILY MAY TAKE AN ADDITIONAL 1/2 TABLET AS NEEDED FOR WEIGHT GAIN OF 3 LBS IN A  tablet 2    nitroGLYCERIN (NITROSTAT) 0.4 MG SL tablet DISSOLVE 1 TAB UNDER TONGUE FOR CHEST PAIN - IF PAIN REMAINS AFTER 5 MIN, CALL 911 AND REPEAT DOSE. MAX 3 TABS IN 15 MINUTES 25 tablet 1    pantoprazole (PROTONIX) 40 MG tablet TAKE ONE TABLET BY MOUTH DAILY      aspirin 81 MG EC tablet Take 1 tablet by mouth daily (Patient not taking: Reported on 10/23/2020) 30 tablet 3    tolterodine (DETROL LA) 2 MG ER capsule Take 4 mg by mouth daily       fluticasone (FLONASE) 50 MCG/ACT nasal spray 1 spray by Nasal route daily.  Red Yeast Rice 600 MG TABS Take 2 tablets by mouth daily. 60 tablet 0    Omega-3 Fatty Acids (FISH OIL) 1000 MG CAPS Take 2 capsules by mouth daily.  180 capsule 0    levothyroxine (SYNTHROID) 50 MCG tablet Take 50 mcg by below       ED COURSE/MDM    Based on the physical exam and the appearance of the foot is as well as the osteomyelitis based and radiograph the patient has an obvious diabetic foot that needs to be managed on the inpatient basis. So based on that should be started on antibiotics here and then transferred to [facility] for further management and debridement of the diabetic foot and that foot abscess. ED Course as of Mar 28 0613   Sat Mar 27, 2021   1907 IMPRESSION:  Osteomyelitis with bone loss of the 2nd digit phalanges      XR TOE LEFT (MIN 2 VIEWS) [DL]   2046 Patient's comprehensive metabolic panel was normal and she had a potassium of 3.8. Glucose of 138 her BUN was 15 and a creatinine of 1.5 she had a GFR of 34.    Comprehensive Metabolic Panel w/ Reflex to MG(!):    Sodium 142   Potassium 3.8   Chloride 101   CO2 30   Anion Gap 11   Glucose 138(!)   BUN 15   Creatinine 1.5(!)   GFR Non- 34(!)   GFR  41(!)   Calcium 9.3   Total Protein 7.4   Albumin 3.7   Albumin/Globulin Ratio 1.0(!)   Bilirubin 0.4   Alk Phos 103   ALT 11   AST 15   Globulin 3.7 [DL]   2046 CBC reveals a white count of 7.8 hemoglobin of 12.9 hematocrit of 39.3 with a platelet count of 572   CBC Auto Differential:    WBC 7.8   RBC 4.27   Hemoglobin Quant 12.9   Hematocrit 39.3   MCV 92.1   MCH 30.2   MCHC 32.8   RDW 13.4   Platelet Count 951   MPV 8.4   Neutrophils % 73.2   Lymphocyte % 16.6   Monocytes % 6.1   Eosinophils % 2.6   Basophils % 1.5   Neutrophils Absolute 5.7   Lymphocytes Absolute 1.3   Monocytes Absolute 0.5   Eosinophils Absolute 0.2   Basophils Absolute 0.1 [DL]   2056 Patient had a sed rate of 77   Sedimentation Rate(!):    Sed Rate 77(!) [DL]   2056 Please note we do not have a linezolid here in our stock supply so that will be have to have to be administered at [facility] when she gets there    [DL]      ED Course User Index  [DL] Gerald Trevino MD       The ED course and plan were reviewed

## 2021-03-28 LAB
GLUCOSE BLD-MCNC: 108 MG/DL (ref 70–99)
GLUCOSE BLD-MCNC: 124 MG/DL (ref 70–99)
GLUCOSE BLD-MCNC: 179 MG/DL (ref 70–99)
GLUCOSE BLD-MCNC: 196 MG/DL (ref 70–99)
PERFORMED ON: ABNORMAL

## 2021-03-28 PROCEDURE — 6370000000 HC RX 637 (ALT 250 FOR IP): Performed by: INTERNAL MEDICINE

## 2021-03-28 PROCEDURE — 1200000000 HC SEMI PRIVATE

## 2021-03-28 PROCEDURE — 6360000002 HC RX W HCPCS: Performed by: INTERNAL MEDICINE

## 2021-03-28 PROCEDURE — 99232 SBSQ HOSP IP/OBS MODERATE 35: CPT | Performed by: INTERNAL MEDICINE

## 2021-03-28 PROCEDURE — 2580000003 HC RX 258: Performed by: INTERNAL MEDICINE

## 2021-03-28 RX ORDER — DIAPER,BRIEF,INFANT-TODD,DISP
EACH MISCELLANEOUS 2 TIMES DAILY
Status: DISCONTINUED | OUTPATIENT
Start: 2021-03-28 | End: 2021-04-01 | Stop reason: HOSPADM

## 2021-03-28 RX ORDER — DESONIDE 0.5 MG/G
0.05 CREAM TOPICAL 2 TIMES DAILY
COMMUNITY

## 2021-03-28 RX ORDER — FUROSEMIDE 10 MG/ML
40 INJECTION INTRAMUSCULAR; INTRAVENOUS ONCE
Status: COMPLETED | OUTPATIENT
Start: 2021-03-28 | End: 2021-03-28

## 2021-03-28 RX ORDER — BISACODYL 10 MG
10 SUPPOSITORY, RECTAL RECTAL ONCE
Status: COMPLETED | OUTPATIENT
Start: 2021-03-28 | End: 2021-03-28

## 2021-03-28 RX ADMIN — LINEZOLID 600 MG: 600 INJECTION, SOLUTION INTRAVENOUS at 00:26

## 2021-03-28 RX ADMIN — CARVEDILOL 3.12 MG: 3.12 TABLET, FILM COATED ORAL at 17:52

## 2021-03-28 RX ADMIN — FUROSEMIDE 40 MG: 10 INJECTION, SOLUTION INTRAMUSCULAR; INTRAVENOUS at 00:22

## 2021-03-28 RX ADMIN — PIPERACILLIN SODIUM AND TAZOBACTAM SODIUM 3375 MG: 3; .375 INJECTION, POWDER, LYOPHILIZED, FOR SOLUTION INTRAVENOUS at 14:17

## 2021-03-28 RX ADMIN — RANOLAZINE 500 MG: 500 TABLET, FILM COATED, EXTENDED RELEASE ORAL at 20:49

## 2021-03-28 RX ADMIN — BISACODYL 10 MG: 10 SUPPOSITORY RECTAL at 14:12

## 2021-03-28 RX ADMIN — AMIODARONE HYDROCHLORIDE 200 MG: 200 TABLET ORAL at 11:33

## 2021-03-28 RX ADMIN — APIXABAN 5 MG: 5 TABLET, FILM COATED ORAL at 14:12

## 2021-03-28 RX ADMIN — PIPERACILLIN SODIUM AND TAZOBACTAM SODIUM 3375 MG: 3; .375 INJECTION, POWDER, LYOPHILIZED, FOR SOLUTION INTRAVENOUS at 04:07

## 2021-03-28 RX ADMIN — RANOLAZINE 500 MG: 500 TABLET, FILM COATED, EXTENDED RELEASE ORAL at 11:32

## 2021-03-28 RX ADMIN — ISOSORBIDE MONONITRATE 120 MG: 60 TABLET ORAL at 11:39

## 2021-03-28 RX ADMIN — CARVEDILOL 3.12 MG: 3.12 TABLET, FILM COATED ORAL at 11:33

## 2021-03-28 RX ADMIN — PIPERACILLIN SODIUM AND TAZOBACTAM SODIUM 3375 MG: 3; .375 INJECTION, POWDER, LYOPHILIZED, FOR SOLUTION INTRAVENOUS at 20:49

## 2021-03-28 RX ADMIN — PANTOPRAZOLE SODIUM 40 MG: 40 TABLET, DELAYED RELEASE ORAL at 11:34

## 2021-03-28 RX ADMIN — LINEZOLID 600 MG: 600 INJECTION, SOLUTION INTRAVENOUS at 11:34

## 2021-03-28 RX ADMIN — Medication 10 ML: at 20:55

## 2021-03-28 NOTE — PLAN OF CARE
Problem: Falls - Risk of:  Goal: Will remain free from falls  Description: Will remain free from falls  Outcome: Ongoing     Problem: Skin Integrity:  Goal: Skin integrity will be maintained  Description: Skin integrity will be maintained  Outcome: Ongoing     Problem: OXYGENATION/RESPIRATORY FUNCTION  Goal: Patient will maintain patent airway  Outcome: Ongoing

## 2021-03-28 NOTE — H&P
[address]                 [address] [address]                              HISTORY AND PHYSICAL    PATIENT NAME: Sherlyn Shin                    :        1945  MED REC NO:   [id number]                          ROOM:       0221  ACCOUNT NO:   [de-identified]                           ADMIT DATE: 2021  PROVIDER:     Betti Sandhoff, MD    I obtained a history and performed physical exam on the patient on the  medical floor on 2021. CHIEF COMPLAINT:  Toe swelling and discoloration. HISTORY OF PRESENT ILLNESS:  The patient is a 81-year-old   female with a known history of uncontrolled type 2 diabetes mellitus,  who presents to the hospital with chief complaints of what she describes  as around a week and week-and-a-half of the slowly progressive swelling  over the left second toe that she states started when she was wearing  poorly fitting shoe, after which the shoe started rubbing on it and  started causing it to get swollen. The patient notes that in the last  few days she has been soaking it in vinegar and now last night she  noticed that it was more red, was slightly painful, had a foul smell to  it and tip of the right second toe was black in color. No nausea or  vomiting. No fevers or chills. No other constitutional symptoms. Please note, the patient has described to me that ever since she was  started on the IV fluids, she feels like she is filling up with fluid  and she feels like she cannot get the fluid out of her and feels like it  is filling up in her chest.  The patient does note that she takes Lasix  at home. PAST MEDICAL/PAST SURGICAL HISTORY:  1.  Morbid obesity with a BMI of 42.07 kg/m2. 2.  Uncontrolled type 2 diabetes mellitus. 3.  Coronary artery disease status post coronary artery bypass grafting. 4.  Hypertension. 5.  Dyslipidemia. 6.  Paroxysmal atrial fibrillation.     PAST SURGICAL phalanx osteomyelitis with bone loss. Sed rate is  77. CONSULTATION:  Requested a consultation to Podiatry. Review of previous medical records shows:  1. An echo from 02/25/2020 that shows an LV ejection fraction of 55-60%  with grade 1 diastolic dysfunction. 2.  The patient had a coronary angiography done in 2018 by Dr. Ceasar Cano that  showed native three-vessel coronary artery disease with severely reduced  LV ejection fraction of 20% with anterior and apical hypokinesis; at  which time, aggressive risk factor modification was initiated. At that  time if the patient's symptom was continued, a high-risk PCI of the left  main was planned, which ended up not being necessary at that point of  time. ASSESSMENT:  1. Acute left foot diabetic infection with osteomyelitis and cellulitis  in a patient with uncontrolled type 2 diabetes mellitus. 2.  Chronic systolic congestive heart failure with early acute component  in a patient with ischemic cardiomyopathy and multivessel coronary  artery disease. 3.  Stage 3 chronic kidney disease. 4.  Hypertension. 5.  Dyslipidemia. 6.  Morbid obesity with a BMI of 42.07 kg/m2. PLAN OF CARE:  The patient admitted to Internal Medicine Service. IV  Zosyn and Zyvox have been initiated. Excessive hydration will be  avoided. Diuresis will be instituted as necessary. Podiatry consult  requested, n.p.o. after midnight. The patient's blood sugar will be  appropriately controlled with injectable medications namely insulin as  necessary. DVT prophylaxis with Lovenox. The patient's home dose of  amiodarone, aspirin, Coreg, and Imdur will be continued. Ranolazine  will also be continued. The patient's Eliquis is currently being  withheld due to the possibility of the patient needing surgery; once it  is being determined that the patient does not need immediate surgery,  the Eliquis will be resumed. CODE STATUS:  Full.     EXPECTED LENGTH OF STAY:  More than two midnights based on plan of care  above. RISK:  High due to the patient's presentation with the active diabetic  foot infection, requiring IV antibiotics. DISPOSITION:  Admitted to Internal Medicine Service.         Eloy Yang MD    D: 03/28/2021 0:06:42       T: 03/28/2021 2:12:26     [id number]  Job#: [id number]     Doc#: [id number]    CC:

## 2021-03-28 NOTE — PROGRESS NOTES
Pt arrived from [place]. [place] [facility] per squad to 2w. Pt alert/oriented. Sitting at bedside. 2 nd L toe with eschar tip. Swelling & infection noted. Elevated the LLE in bed. MD in room and held her IVF r/t hx of CHF. Noted excoriation under her L breast & under pannus.

## 2021-03-28 NOTE — PROGRESS NOTES
Progress Note    Admit Date:  3/27/2021    Admitted with left foot second toe osteomyelitis . Podiatry consulted . she will need surgical amputation    Subjective:  Ms. Oh Arriola complains of left foot swelling and redness. left second toe is dark and black. She is also constipated,  she has history of diabetic gastroparesis. No fevers or chills    Objective:   /68   Pulse 65   Temp 97 °F (36.1 °C) (Oral)   Resp 18   Ht 5' 3\" (1.6 m)   Wt 228 lb 8 oz (103.6 kg)   SpO2 97%   BMI 40.48 kg/m²       Intake/Output Summary (Last 24 hours) at 3/28/2021 1343  Last data filed at 3/28/2021 0415  Gross per 24 hour   Intake    Output 875 ml   Net -875 ml         Physical Exam:  CNS:  The patient is alert and oriented to time, place, and person. PSYCH:  The patient is cooperative, answering questions appropriately. HEENT:  Eyes:  Pupils are reactive to light. ENT:  Extraocular muscle  movements are intact. RESPIRATORY SYSTEM:  No obvious rales, rubs, or rhonchi. CVS:  S1 and S2 are heard. Currently, the patient has got regular rate  and rhythm. ABDOMEN:  Nondistended. Morbidly obese. NT, BS +  MUSCULOSKELETAL EXAM:  The patient's left foot shows evidence of Charcot  joint with significant swelling and cellulitic appearing left forefoot  including the left great toe, left second toe, and even the neighboring  toes. The left second toe also has area of blackish scar on the tip  with foul smelling discharge and increased temperature in the area. SKIN:  Exam appears positive for cellulitic change of the toes as  described above.       Medications:   Scheduled Meds:   hydrocortisone   Topical BID    apixaban  5 mg Oral BID    amiodarone  200 mg Oral Daily    aspirin  81 mg Oral Daily    carvedilol  3.125 mg Oral BID WC    isosorbide mononitrate  120 mg Oral Daily    pantoprazole  40 mg Oral Daily    ranolazine  500 mg Oral BID    sodium chloride flush  10 mL Intravenous 2 times per day    enoxaparin  40 mg Subcutaneous Daily    piperacillin-tazobactam  3,375 mg Intravenous Q8H    linezolid  600 mg Intravenous Q12H       Continuous Infusions:   sodium chloride         Data:  CBC:   Recent Labs     03/27/21 1959   WBC 7.8   RBC 4.27   HGB 12.9   HCT 39.3   MCV 92.1   RDW 13.4        BMP:   Recent Labs     03/27/21 1959      K 3.8      CO2 30   BUN 15   CREATININE 1.5*     BNP: No results for input(s): BNP in the last 72 hours. PT/INR: No results for input(s): PROTIME, INR in the last 72 hours. APTT: No results for input(s): APTT in the last 72 hours. CARDIAC ENZYMES: No results for input(s): CKMB, CKMBINDEX, TROPONINI in the last 72 hours. Invalid input(s): CKTOTAL;3  FASTING LIPID PANEL:  Lab Results   Component Value Date    CHOL 227 (H) 04/16/2015    HDL 34 (L) 10/18/2017    TRIG 179 (H) 04/16/2015     LIVER PROFILE:   Recent Labs     03/27/21 1959   AST 15   ALT 11   BILITOT 0.4   ALKPHOS 103         Radiology  XR TOE LEFT (MIN 2 VIEWS)   Final Result   Osteomyelitis with bone loss of the 2nd digit phalanges               Assessment:  Active Problems:    Diabetic foot infection (HCC)  Resolved Problems:    * No resolved hospital problems. *      Plan:    1. Acute left foot diabetic infection with osteomyelitis and cellulitis  in a patient with uncontrolled type 2 diabetes mellitus. Left 2nd toe is gangrenous . needs amputation   - consult podiatry   -Continue IV antibiotics Zyvox and Zosyn day #2.  -Continue Eliquis for today , hold a.m. dose. 2.  Chronic systolic congestive heart failure with early acute component  in a patient with ischemic cardiomyopathy and multivessel coronary  artery disease.  -Monitor with IV fluids    3. Stage 3 chronic kidney disease.  -Monitor renal function closely    4. Hypertension.  - BP stable    5. Dyslipidemia. 6.  Morbid obesity with a BMI of 42.07 kg/m2.     7.  Diabetes mellitus type 2  -Blood sugar stable  -Check A1c -We will start scale insulin coverage    8. Diabetic gastroparesis  Constipation  -Add suppository    9 . Chronic atrial fibrillation  -Continue amiodarone and Coreg as at home.  -On chronic anticoagulation. Hold a.m. dose for possible surgery.     10.  CAD   -Continue aspirin and nitrates and Ranexa as at home        CODE STATUS:  Full.       Mary Ellen Musa MD 3/28/2021 1:43 PM

## 2021-03-28 NOTE — PROGRESS NOTES
pt is asking about her Eliquis and if she needs to take tonight dose for possible surgery tomorrow perfect serve sent to dr Edward Claros.

## 2021-03-28 NOTE — CONSULTS
CONSULT    Admit Date:  3/27/2021    Subjective:  70 y.o. female who is seen for evaluation of cellulitis and ulcer on the left foot. Patient states the toe rubbed on her shoes and created a wound. Was treating herself and thought it was doing well until recently the redness and black tip of the toe developed. States redness has improved since being in hospital.   History of CMT and has been dealing with foot issues for years. States does not have much feeling in her feet. Past Medical History:        Diagnosis Date    Allergic rhinitis     Arthritis     CAD (coronary artery disease)     Cholelithiasis 7/3/2015    Diabetes mellitus ([address]     Accuchecks daily once a day at home in [facility] Hepatic steatosis 7/3/2015    Hyperlipidemia     Hypertension     Peripheral neuropathy     Sleep apnea     Has used the CPAP x 25 yrs now per pt.     Thyroid disease     Vitamin B12 deficiency        Past Surgical History:        Procedure Laterality Date    CARDIAC CATHETERIZATION  12/27/2017    SVG to OM2 100% ostium    CHOLECYSTECTOMY      COLECTOMY  1998    diverticulitis     COLONOSCOPY      CORONARY ANGIOPLASTY      CORONARY ARTERY BYPASS GRAFT  2005    x 3    DIAGNOSTIC CARDIAC CATH LAB PROCEDURE      FOOT SURGERY      Multiple foot surgeries bilateral    OTHER SURGICAL HISTORY  4/17/2012    achilles tendon lengthening,arthroplasty,matatarsalhead resection    OTHER SURGICAL HISTORY Right 3/20/13    FUSION OF RIGHT GREAT TOE JOINT WITH WIRE FIXATION, DEBRIEDMENT OF WOUND RIGHT GREAT TOE    TONSILLECTOMY AND ADENOIDECTOMY      TUBAL LIGATION      Laparoscopic    UPPER GASTROINTESTINAL ENDOSCOPY         Current Medications:     hydrocortisone   Topical BID    apixaban  5 mg Oral BID    amiodarone  200 mg Oral Daily    aspirin  81 mg Oral Daily    carvedilol  3.125 mg Oral BID WC    isosorbide mononitrate  120 mg Oral Daily    pantoprazole  40 mg Oral Daily    ranolazine  500 mg Oral BID    sodium chloride flush  10 mL Intravenous 2 times per day    enoxaparin  40 mg Subcutaneous Daily    piperacillin-tazobactam  3,375 mg Intravenous Q8H    linezolid  600 mg Intravenous Q12H       Allergies:  Niacin and related, Adhesive tape, Other, Statins depletion therapy, and Vancomycin    Social History:    Social History     Tobacco Use    Smoking status: Former Smoker     Packs/day: 1.00     Years: 30.00     Pack years: 30.00     Quit date: 1998     Years since quittin.8    Smokeless tobacco: Never Used   Substance Use Topics    Alcohol use: No    Drug use: No       Family History:       Problem Relation Age of Onset    Stroke Mother     Coronary Art Dis Father     Coronary Art Dis Sister     Coronary Art Dis Brother        Review of Systems    CONSTITUTIONAL:  negative  EYES:  negative  HEENT:  negative  RESPIRATORY:  negative  CARDIOVASCULAR:  negative  GASTROINTESTINAL:  negative  GENITOURINARY:  negative  INTEGUMENT/BREAST:  positive for ulcer  MUSCULOSKELETAL:  negative  NEUROLOGICAL:  positive for numbness      Objective:   /70   Pulse 67   Temp 97 °F (36.1 °C) (Oral)   Resp 18   Ht 5' 3\" (1.6 m)   Wt 228 lb 8 oz (103.6 kg)   SpO2 97%   BMI 40.48 kg/m²     Data:  CBC:   Recent Labs     21   WBC 7.8   HGB 12.9   HCT 39.3   MCV 92.1        BMP:   Recent Labs     21      K 3.8      CO2 30   BUN 15   CREATININE 1.5*     LIVER PROFILE:   Recent Labs     21   AST 15   ALT 11   BILITOT 0.4   ALKPHOS 103     PT/INR:   Recent Labs     21   PROT 7.4     HgBA1c:  Lab Results   Component Value Date    LABA1C 7.1 2020     Sed Rate77     Cultures:     Imaging: xray left foot -   There is loss of bone involving the distal aspect of the 2nd proximal phalanx   as well as the middle phalanx. There has been fusion of the PIP joint with a   device crossing the joint into both phalanges.   There also is some loss of   bone involving the medial base of the 2nd distal phalanx. There is   dislocation of the 2nd DIP joint. Numerous dislocations elsewhere. No   definitive evidence of osteomyelitis outside of the 2nd toe. Impression:  Osteomyelitis with bone loss of the 2nd digit phalanges   Sed Rate77       Physical Exam:    General Appearance: alert and oriented to person, place and time, well developed and well- nourished, in no acute distress  Skin: warm and dry, no rash or erythema  Head: normocephalic and atraumatic  Eyes: pupils equal, round, and reactive to light, extraocular eye movements intact, conjunctivae normal  ENT: tympanic membrane, external ear and ear canal normal bilaterally, nose without deformity, nasal mucosa and turbinates normal without polyps  Neck: supple and non-tender without mass, no thyromegaly or thyroid nodules, no cervical lymphadenopathy  Pulmonary/Chest: clear to auscultation bilaterally- no wheezes, rales or rhonchi, normal air movement, no respiratory distress  Cardiovascular: normal rate, regular rhythm, normal S1 and S2, no murmurs, rubs, clicks, or gallops, distal pulses intact, no carotid bruits  Abdomen: soft, non-tender, non-distended, normal bowel sounds, no masses or organomegaly    DP/PT palpable bilateral.   Distal tip left 2nd digit with dry gangrenous changes noted with mild serous drainage noted at the most proximal edge. + mild malodor noted. + Erythema and edema of the forefoot noted but decreased as compared to pictures on admission. No increase in skin temperature noted. Plantar aspect of the left 2nd digit and sub 2nd MH region with yellow colored skin with + serous drainage noted. No purulence noted. Bony prominence dorsal midfoot bilateral. Left 2nd and 3rd digits resting on dorsal aspect of their respective metatarsals.          Assessment:  Patient Active Problem List   Diagnosis Code    Hyperlipidemia E78.5    Hypertension I10    CAD (coronary artery disease) I25.10    Diabetes mellitus ([facility] E11.9    Thyroid disease E07.9    Allergic rhinitis J30.9    Sleep apnea B65.34    Metabolic encephalopathy H38.42    Hepatic steatosis K76.0    Cholelithiasis K80.20    Acute renal failure (ARF) ([facility]) N17.9    Small bowel obstruction ([facility]) K56.609    Ischemic chest pain ([facility]) I20.9    Unstable angina ([facility]) I20.0    Vomiting R11.10    Obesity E66.9    NSTEMI (non-ST elevated myocardial infarction) ([facility]) I21.4    Chest pain R07.9    LBBB (left bundle branch block) I44.7    Atrial fibrillation with rapid ventricular response ([facility]) I48.91    Aortocoronary bypass status Z95.1    Acute respiratory failure with hypoxia ([facility]) J96.01    Acute hypoxemic respiratory failure ([facility]) J96.01    Angina, class IV ([facility]) I20.9    History of tobacco abuse Z87.891    Diabetic foot infection ([facility]) E11.628, L08.9     diabetic foot ulcer left with gangrene secondary to peripheral neuropathy   diabetes mellitus uncontrolled  Cellulitis left foot  Osteomyelitis left 2nd digit      Plan  Patient examined. Reviewed wound pictures in EPIC. Reviewed labs and imaging results. Elevation of legs to decrease edema. OK to leave open to air at this time. Due to severity of the infection with gangrene and osteomyelitis I recommended surgical intervention at this time. Will need amputation of the 2nd digit at a minimum. Also discussed potential for further amputation of portion of the foot. Explained that clinically the most risky area for this would appear to be the 2nd/3rd metatarsals and the 3rd digit. However explained that she is at risk for needing higher level amputation given severity of infection at this time. Discussed risks, complications, alternatives and benefits with patient. Understands chance of nonhealing wound, infection, need for further surgery, loss of limb or life. Questions answered. She agrees to proceed with surgery. NPO at midnight.    Control glucose levels to

## 2021-03-28 NOTE — PROGRESS NOTES
H/p dictation id [id number]. Date of service 03/28/21. Left diabetic foot infection with osteomyelitis. Acute on chronic schf. Cad. Morbid obesity. Uncontrolled dm II. Paf. CKD III.  HTN.

## 2021-03-28 NOTE — PROGRESS NOTES
Pt alert med's given without issue, pt voiced concerns r/ t to possible need for surgery to toe. Explained to pt when podiatry  comes to talk with her to make sure they are aware of all of her concerns. Pt bed alalrm on call light  in reach able to make needs known.

## 2021-03-28 NOTE — PLAN OF CARE
Problem: Falls - Risk of:  Goal: Will remain free from falls  Description: Will remain free from falls  Outcome: Ongoing  Goal: Absence of physical injury  Description: Absence of physical injury  Outcome: Ongoing     Problem: Physical Regulation:  Goal: Complications related to the disease process, condition or treatment will be avoided or minimized  Description: Complications related to the disease process, condition or treatment will be avoided or minimized  Outcome: Ongoing     Problem: Sensory:  Goal: Pain level will decrease  Description: Pain level will decrease  Outcome: Ongoing     Problem: Skin Integrity:  Goal: Skin integrity will be maintained  Description: Skin integrity will be maintained  Outcome: Ongoing  Goal: Skin integrity will improve  Description: Skin integrity will improve  Outcome: Ongoing     Problem: Tissue Perfusion:  Goal: Ability to maintain adequate tissue perfusion will improve  Description: Ability to maintain adequate tissue perfusion will improve  Outcome: Ongoing     Problem: Infection:  Goal: Will remain free from infection  Description: Will remain free from infection  Outcome: Ongoing     Problem: Infection:  Goal: Will remain free from infection  Description: Will remain free from infection  Outcome: Ongoing     Problem: Safety:  Goal: Free from accidental physical injury  Description: Free from accidental physical injury  Outcome: Ongoing

## 2021-03-28 NOTE — PROGRESS NOTES
Called Bereket Stewart daughter with update. She is requesting staff to call if there is any status change R/T to possible surgery.

## 2021-03-29 ENCOUNTER — APPOINTMENT (OUTPATIENT)
Dept: GENERAL RADIOLOGY | Age: 76
DRG: 239 | End: 2021-03-29
Payer: MEDICARE

## 2021-03-29 ENCOUNTER — ANESTHESIA EVENT (OUTPATIENT)
Dept: OPERATING ROOM | Age: 76
DRG: 239 | End: 2021-03-29
Payer: MEDICARE

## 2021-03-29 ENCOUNTER — ANESTHESIA (OUTPATIENT)
Dept: OPERATING ROOM | Age: 76
DRG: 239 | End: 2021-03-29
Payer: MEDICARE

## 2021-03-29 VITALS
SYSTOLIC BLOOD PRESSURE: 120 MMHG | RESPIRATION RATE: 9 BRPM | DIASTOLIC BLOOD PRESSURE: 70 MMHG | OXYGEN SATURATION: 99 %

## 2021-03-29 LAB
GLUCOSE BLD-MCNC: 116 MG/DL (ref 70–99)
GLUCOSE BLD-MCNC: 141 MG/DL (ref 70–99)
GLUCOSE BLD-MCNC: 142 MG/DL (ref 70–99)
GLUCOSE BLD-MCNC: 165 MG/DL (ref 70–99)
PERFORMED ON: ABNORMAL
SARS-COV-2, NAAT: NOT DETECTED

## 2021-03-29 PROCEDURE — 2500000003 HC RX 250 WO HCPCS: Performed by: NURSE ANESTHETIST, CERTIFIED REGISTERED

## 2021-03-29 PROCEDURE — 6360000002 HC RX W HCPCS: Performed by: INTERNAL MEDICINE

## 2021-03-29 PROCEDURE — 99232 SBSQ HOSP IP/OBS MODERATE 35: CPT | Performed by: INTERNAL MEDICINE

## 2021-03-29 PROCEDURE — 2580000003 HC RX 258: Performed by: PODIATRIST

## 2021-03-29 PROCEDURE — 83036 HEMOGLOBIN GLYCOSYLATED A1C: CPT

## 2021-03-29 PROCEDURE — 1200000000 HC SEMI PRIVATE

## 2021-03-29 PROCEDURE — 2580000003 HC RX 258

## 2021-03-29 PROCEDURE — 2500000003 HC RX 250 WO HCPCS: Performed by: PODIATRIST

## 2021-03-29 PROCEDURE — 2580000003 HC RX 258: Performed by: INTERNAL MEDICINE

## 2021-03-29 PROCEDURE — 6360000002 HC RX W HCPCS: Performed by: PODIATRIST

## 2021-03-29 PROCEDURE — 88305 TISSUE EXAM BY PATHOLOGIST: CPT

## 2021-03-29 PROCEDURE — 7100000010 HC PHASE II RECOVERY - FIRST 15 MIN: Performed by: PODIATRIST

## 2021-03-29 PROCEDURE — 3700000000 HC ANESTHESIA ATTENDED CARE: Performed by: PODIATRIST

## 2021-03-29 PROCEDURE — 73620 X-RAY EXAM OF FOOT: CPT

## 2021-03-29 PROCEDURE — 2709999900 HC NON-CHARGEABLE SUPPLY: Performed by: PODIATRIST

## 2021-03-29 PROCEDURE — 36415 COLL VENOUS BLD VENIPUNCTURE: CPT

## 2021-03-29 PROCEDURE — 6370000000 HC RX 637 (ALT 250 FOR IP): Performed by: PODIATRIST

## 2021-03-29 PROCEDURE — 3600000013 HC SURGERY LEVEL 3 ADDTL 15MIN: Performed by: PODIATRIST

## 2021-03-29 PROCEDURE — 6370000000 HC RX 637 (ALT 250 FOR IP): Performed by: INTERNAL MEDICINE

## 2021-03-29 PROCEDURE — 3600000003 HC SURGERY LEVEL 3 BASE: Performed by: PODIATRIST

## 2021-03-29 PROCEDURE — 94761 N-INVAS EAR/PLS OXIMETRY MLT: CPT

## 2021-03-29 PROCEDURE — 94660 CPAP INITIATION&MGMT: CPT

## 2021-03-29 PROCEDURE — 3700000001 HC ADD 15 MINUTES (ANESTHESIA): Performed by: PODIATRIST

## 2021-03-29 PROCEDURE — 6360000002 HC RX W HCPCS: Performed by: NURSE ANESTHETIST, CERTIFIED REGISTERED

## 2021-03-29 PROCEDURE — 2780000010 HC IMPLANT OTHER: Performed by: PODIATRIST

## 2021-03-29 PROCEDURE — 2580000003 HC RX 258: Performed by: NURSE ANESTHETIST, CERTIFIED REGISTERED

## 2021-03-29 PROCEDURE — 7100000011 HC PHASE II RECOVERY - ADDTL 15 MIN: Performed by: PODIATRIST

## 2021-03-29 PROCEDURE — 0Y6N0ZB DETACHMENT AT LEFT FOOT, PARTIAL 2ND RAY, OPEN APPROACH: ICD-10-PCS | Performed by: PODIATRIST

## 2021-03-29 PROCEDURE — 87635 SARS-COV-2 COVID-19 AMP PRB: CPT

## 2021-03-29 DEVICE — ALLOGRAFT HUM TISS 1 CC AMNIO TISS MEMBRN AMNIFLO CRYOPRES: Type: IMPLANTABLE DEVICE | Site: FOOT | Status: FUNCTIONAL

## 2021-03-29 RX ORDER — SODIUM CHLORIDE, SODIUM LACTATE, POTASSIUM CHLORIDE, CALCIUM CHLORIDE 600; 310; 30; 20 MG/100ML; MG/100ML; MG/100ML; MG/100ML
INJECTION, SOLUTION INTRAVENOUS CONTINUOUS PRN
Status: DISCONTINUED | OUTPATIENT
Start: 2021-03-29 | End: 2021-03-29 | Stop reason: SDUPTHER

## 2021-03-29 RX ORDER — DEXTROSE MONOHYDRATE 50 MG/ML
100 INJECTION, SOLUTION INTRAVENOUS PRN
Status: DISCONTINUED | OUTPATIENT
Start: 2021-03-29 | End: 2021-04-01 | Stop reason: HOSPADM

## 2021-03-29 RX ORDER — OXYCODONE HYDROCHLORIDE AND ACETAMINOPHEN 5; 325 MG/1; MG/1
2 TABLET ORAL PRN
Status: DISCONTINUED | OUTPATIENT
Start: 2021-03-29 | End: 2021-03-29 | Stop reason: HOSPADM

## 2021-03-29 RX ORDER — FENTANYL CITRATE 50 UG/ML
25 INJECTION, SOLUTION INTRAMUSCULAR; INTRAVENOUS EVERY 5 MIN PRN
Status: DISCONTINUED | OUTPATIENT
Start: 2021-03-29 | End: 2021-03-29 | Stop reason: HOSPADM

## 2021-03-29 RX ORDER — NICOTINE POLACRILEX 4 MG
15 LOZENGE BUCCAL PRN
Status: DISCONTINUED | OUTPATIENT
Start: 2021-03-29 | End: 2021-04-01 | Stop reason: HOSPADM

## 2021-03-29 RX ORDER — MIDAZOLAM HYDROCHLORIDE 1 MG/ML
INJECTION INTRAMUSCULAR; INTRAVENOUS PRN
Status: DISCONTINUED | OUTPATIENT
Start: 2021-03-29 | End: 2021-03-29 | Stop reason: SDUPTHER

## 2021-03-29 RX ORDER — ONDANSETRON 2 MG/ML
4 INJECTION INTRAMUSCULAR; INTRAVENOUS
Status: DISCONTINUED | OUTPATIENT
Start: 2021-03-29 | End: 2021-03-29 | Stop reason: HOSPADM

## 2021-03-29 RX ORDER — PROPOFOL 10 MG/ML
INJECTION, EMULSION INTRAVENOUS PRN
Status: DISCONTINUED | OUTPATIENT
Start: 2021-03-29 | End: 2021-03-29 | Stop reason: SDUPTHER

## 2021-03-29 RX ORDER — MAGNESIUM HYDROXIDE 1200 MG/15ML
LIQUID ORAL CONTINUOUS PRN
Status: COMPLETED | OUTPATIENT
Start: 2021-03-29 | End: 2021-03-29

## 2021-03-29 RX ORDER — HYDROCODONE BITARTRATE AND ACETAMINOPHEN 5; 325 MG/1; MG/1
1 TABLET ORAL EVERY 4 HOURS PRN
Status: DISCONTINUED | OUTPATIENT
Start: 2021-03-29 | End: 2021-04-01 | Stop reason: HOSPADM

## 2021-03-29 RX ORDER — HYDROCODONE BITARTRATE AND ACETAMINOPHEN 5; 325 MG/1; MG/1
2 TABLET ORAL EVERY 4 HOURS PRN
Status: DISCONTINUED | OUTPATIENT
Start: 2021-03-29 | End: 2021-04-01 | Stop reason: HOSPADM

## 2021-03-29 RX ORDER — SODIUM CHLORIDE 9 MG/ML
INJECTION, SOLUTION INTRAVENOUS
Status: COMPLETED
Start: 2021-03-29 | End: 2021-03-29

## 2021-03-29 RX ORDER — MEPERIDINE HYDROCHLORIDE 25 MG/ML
12.5 INJECTION INTRAMUSCULAR; INTRAVENOUS; SUBCUTANEOUS EVERY 5 MIN PRN
Status: DISCONTINUED | OUTPATIENT
Start: 2021-03-29 | End: 2021-03-29 | Stop reason: HOSPADM

## 2021-03-29 RX ORDER — LIDOCAINE HYDROCHLORIDE 20 MG/ML
INJECTION, SOLUTION INFILTRATION; PERINEURAL PRN
Status: DISCONTINUED | OUTPATIENT
Start: 2021-03-29 | End: 2021-03-29 | Stop reason: SDUPTHER

## 2021-03-29 RX ORDER — FENTANYL CITRATE 50 UG/ML
INJECTION, SOLUTION INTRAMUSCULAR; INTRAVENOUS PRN
Status: DISCONTINUED | OUTPATIENT
Start: 2021-03-29 | End: 2021-03-29 | Stop reason: SDUPTHER

## 2021-03-29 RX ORDER — PROMETHAZINE HYDROCHLORIDE 25 MG/ML
6.25 INJECTION, SOLUTION INTRAMUSCULAR; INTRAVENOUS
Status: DISCONTINUED | OUTPATIENT
Start: 2021-03-29 | End: 2021-03-29 | Stop reason: HOSPADM

## 2021-03-29 RX ORDER — SODIUM CHLORIDE, SODIUM LACTATE, POTASSIUM CHLORIDE, CALCIUM CHLORIDE 600; 310; 30; 20 MG/100ML; MG/100ML; MG/100ML; MG/100ML
INJECTION, SOLUTION INTRAVENOUS
Status: COMPLETED
Start: 2021-03-29 | End: 2021-03-29

## 2021-03-29 RX ORDER — DEXTROSE MONOHYDRATE 25 G/50ML
12.5 INJECTION, SOLUTION INTRAVENOUS PRN
Status: DISCONTINUED | OUTPATIENT
Start: 2021-03-29 | End: 2021-04-01 | Stop reason: HOSPADM

## 2021-03-29 RX ORDER — HYDRALAZINE HYDROCHLORIDE 20 MG/ML
5 INJECTION INTRAMUSCULAR; INTRAVENOUS EVERY 10 MIN PRN
Status: DISCONTINUED | OUTPATIENT
Start: 2021-03-29 | End: 2021-03-29 | Stop reason: HOSPADM

## 2021-03-29 RX ORDER — OXYCODONE HYDROCHLORIDE AND ACETAMINOPHEN 5; 325 MG/1; MG/1
1 TABLET ORAL PRN
Status: DISCONTINUED | OUTPATIENT
Start: 2021-03-29 | End: 2021-03-29 | Stop reason: HOSPADM

## 2021-03-29 RX ADMIN — Medication 10 ML: at 08:30

## 2021-03-29 RX ADMIN — PANTOPRAZOLE SODIUM 40 MG: 40 TABLET, DELAYED RELEASE ORAL at 08:24

## 2021-03-29 RX ADMIN — PIPERACILLIN SODIUM AND TAZOBACTAM SODIUM 3375 MG: 3; .375 INJECTION, POWDER, LYOPHILIZED, FOR SOLUTION INTRAVENOUS at 11:48

## 2021-03-29 RX ADMIN — MIDAZOLAM 1 MG: 1 INJECTION INTRAMUSCULAR; INTRAVENOUS at 16:34

## 2021-03-29 RX ADMIN — PIPERACILLIN SODIUM AND TAZOBACTAM SODIUM 3375 MG: 3; .375 INJECTION, POWDER, LYOPHILIZED, FOR SOLUTION INTRAVENOUS at 22:30

## 2021-03-29 RX ADMIN — FENTANYL CITRATE 50 MCG: 50 INJECTION INTRAMUSCULAR; INTRAVENOUS at 16:35

## 2021-03-29 RX ADMIN — SODIUM CHLORIDE 25 ML: 9 INJECTION, SOLUTION INTRAVENOUS at 11:49

## 2021-03-29 RX ADMIN — LIDOCAINE HYDROCHLORIDE 50 MG: 20 INJECTION, SOLUTION INFILTRATION; PERINEURAL at 16:36

## 2021-03-29 RX ADMIN — CARVEDILOL 3.12 MG: 3.12 TABLET, FILM COATED ORAL at 08:24

## 2021-03-29 RX ADMIN — PROPOFOL 150 MG: 10 INJECTION, EMULSION INTRAVENOUS at 16:36

## 2021-03-29 RX ADMIN — LINEZOLID 600 MG: 600 INJECTION, SOLUTION INTRAVENOUS at 11:48

## 2021-03-29 RX ADMIN — PIPERACILLIN SODIUM AND TAZOBACTAM SODIUM 3375 MG: 3; .375 INJECTION, POWDER, LYOPHILIZED, FOR SOLUTION INTRAVENOUS at 04:45

## 2021-03-29 RX ADMIN — CARVEDILOL 3.12 MG: 3.12 TABLET, FILM COATED ORAL at 22:30

## 2021-03-29 RX ADMIN — SODIUM CHLORIDE, POTASSIUM CHLORIDE, SODIUM LACTATE AND CALCIUM CHLORIDE: 600; 310; 30; 20 INJECTION, SOLUTION INTRAVENOUS at 16:23

## 2021-03-29 RX ADMIN — RANOLAZINE 500 MG: 500 TABLET, FILM COATED, EXTENDED RELEASE ORAL at 22:30

## 2021-03-29 RX ADMIN — Medication 10 ML: at 22:31

## 2021-03-29 RX ADMIN — ONDANSETRON HYDROCHLORIDE 4 MG: 2 INJECTION, SOLUTION INTRAMUSCULAR; INTRAVENOUS at 08:30

## 2021-03-29 RX ADMIN — LINEZOLID 600 MG: 600 INJECTION, SOLUTION INTRAVENOUS at 01:20

## 2021-03-29 ASSESSMENT — PULMONARY FUNCTION TESTS
PIF_VALUE: 1
PIF_VALUE: 0
PIF_VALUE: 1

## 2021-03-29 ASSESSMENT — PAIN SCALES - GENERAL: PAINLEVEL_OUTOF10: 0

## 2021-03-29 NOTE — BRIEF OP NOTE
Brief Postoperative Note      Patient: Erin Cardenas  YOB: 1945  MRN: [id number]    Date of Procedure: 3/29/2021    Pre-Op Diagnosis: GANGRENE, OSTEOMYELITIS, DIABETIC FOOT ULCER    Post-Op Diagnosis: Same       Procedure(s):  PARTIAL LEFT FOOT AMPUTATION    Surgeon(s):  Cruzito Martínez DPM    Assistant:  Surgical Assistant: Rachel Lopez    Anesthesia: Monitor Anesthesia Care    Estimated Blood Loss (mL): less than 149     Complications: None    Specimens:   ID Type Source Tests Collected by Time Destination   A : Partial Left Foot Tissue Tissue SURGICAL PATHOLOGY Cruzito Martínez DPM 3/29/2021 1650        Implants:  Implant Name Type Inv.  Item Serial No.  Lot No. LRB No. Used Action   ALLOGRAFT HUM TISS 1 CC AMNIO TISS MEMBRN AMNIFLO CRYOPRES  ALLOGRAFT HUM TISS 1 CC AMNIO TISS MEMBRN AMNIFLO CRYOPRES  BONE BANK ALLOGRAFTS-WD [de-identified] Left 1 Implanted         Drains:   Urethral Catheter Double-lumen (Active)       Findings: ulcer with gangrene left 2nd digit    Electronically signed by Cruzito Martínez DPM on 3/29/2021 at 5:06 PM

## 2021-03-29 NOTE — PROGRESS NOTES
Shift assessment complete. See flow sheet. Due medications administered per MD orders. See MAR. Vital signs stable. Patient is alert and oriented x 4. Pt denies any present needs/concerns. Call light explained and in reach.

## 2021-03-29 NOTE — FLOWSHEET NOTE
03/29/21 1804   Vital Signs   Temp 97.2 °F (36.2 °C)   Temp Source Oral   Pulse 62   Heart Rate Source Monitor   Resp 18   /70   BP Location Right upper arm   Patient Position Semi fowlers   Level of Consciousness Alert (0)   MEWS Score 1   Oxygen Therapy   SpO2 97 %   O2 Device None (Room air)     Pt a/o. Returned to room from PACU. Pt denies pain at this time. Pt assisted up to [facility], dietary called to provide supper tray. Bed alarm on for safety, call light within reach. Juanis Starr RN\

## 2021-03-29 NOTE — PLAN OF CARE
Problem: Falls - Risk of:  Goal: Will remain free from falls  Description: Will remain free from falls  3/28/2021 2057 by Erika Stringer RN  Outcome: Ongoing  3/28/2021 1658 by Kan Michel RN  Outcome: Ongoing  Goal: Absence of physical injury  Description: Absence of physical injury  Outcome: Ongoing     Problem: Physical Regulation:  Goal: Complications related to the disease process, condition or treatment will be avoided or minimized  Description: Complications related to the disease process, condition or treatment will be avoided or minimized  Outcome: Ongoing     Problem: Sensory:  Goal: Pain level will decrease  Description: Pain level will decrease  Outcome: Ongoing     Problem: Skin Integrity:  Goal: Skin integrity will be maintained  Description: Skin integrity will be maintained  3/28/2021 2057 by Erika Stringer RN  Outcome: Ongoing  3/28/2021 1658 by Kan Michel RN  Outcome: Ongoing  Goal: Skin integrity will improve  Description: Skin integrity will improve  Outcome: Ongoing  Goal: Will show no infection signs and symptoms  Description: Will show no infection signs and symptoms  Outcome: Ongoing  Goal: Absence of new skin breakdown  Description: Absence of new skin breakdown  Outcome: Ongoing     Problem: Tissue Perfusion:  Goal: Ability to maintain adequate tissue perfusion will improve  Description: Ability to maintain adequate tissue perfusion will improve  Outcome: Ongoing     Problem: Infection:  Goal: Will remain free from infection  Description: Will remain free from infection  Outcome: Ongoing     Problem: Safety:  Goal: Free from accidental physical injury  Description: Free from accidental physical injury  Outcome: Ongoing     Problem: OXYGENATION/RESPIRATORY FUNCTION  Goal: Patient will maintain patent airway  3/28/2021 2057 by Erika Stringer RN  Outcome: Ongoing  3/28/2021 1658 by Kan Michel RN  Outcome: Ongoing  Goal: Patient will achieve/maintain normal respiratory rate/effort  Description: Respiratory rate and effort will be within normal limits for the patient  Outcome: Ongoing     Problem: HEMODYNAMIC STATUS  Goal: Patient has stable vital signs and fluid balance  Outcome: Ongoing     Problem: FLUID AND ELECTROLYTE IMBALANCE  Goal: Fluid and electrolyte balance are achieved/maintained  Outcome: Ongoing     Problem: ACTIVITY INTOLERANCE/IMPAIRED MOBILITY  Goal: Mobility/activity is maintained at optimum level for patient  Outcome: Ongoing

## 2021-03-29 NOTE — CARE COORDINATION
Case Management Assessment  Initial Evaluation      Patient Name: Erin Cardenas  YOB: 1945  Diagnosis: Diabetic foot infection ([facility] [E11.628, L08.9]  Date / Time: 3/27/2021  6:02 PM    Admission status/Date: 03/27/2021 Inpatient   Chart Reviewed: Yes      Patient Interviewed: Yes   Family Interviewed:  No      Hospitalization in the last 30 days:  No      Health Care Decision Maker :   Primary Decision MakeDajuanasa Morataya - Child - [phone]    (CM - must 1st enter selection under Navigator - emergency contact- [place] Relationship and pick relationship)   Who do you trust or have selected to make healthcare decisions for you      Met with: pt   Interview conducted  (bedside/phone): phone    Current PCP: Lynn RILEY with [address]  Medicare and [address] required for SNF : N          3 night stay required -  N-WAIVED    ADLS  Support Systems/Care Needs: Children  Transportation: self    Meal Preparation: self    Housing  Living Arrangements: Pt lives at home alone. Steps: None  Intent for return to present living arrangements: Yes  Identified Issues: [address] [address] [facility] with Home Health Care : No Agency:(Services)  Type of Home Care Services: None  Passport/Waiver : No  :                      Phone Number:    Passport/Waiver Services: n/a          Durable Medical Equiptment   DME Provider:   Equipment:   Walker_x__Cane_x  RTS___ BSC___Shower Chair___Hospital Bed___W/C___x_Other__Bed that raises up and down, electric scooter, sit down walkers. ______  02 at ____Liter(s)---wears(frequency)_______ [facility] ___ CPAP___ BiPap___   N/A____      Home O2 Use :  No    If No for home O2---if presently on O2 during hospitalization:  No  if yes CM to follow for potential DC O2 need  Informed of need for care provider to bring portable home O2 tank on day of discharge for nursing to connect prior to leaving: Not Indicated  Verbalized agreement/Understanding:   Not Indicated    Community Service Affiliation  Dialysis:  No    · Agency:  · Location:  · Dialysis Schedule:  · Phone:   · Fax: Other Community Services: n/a    DISCHARGE PLAN: Explained Case Management role/services. Chart review completed. Pt has a pending covid test. Called and spoke with pt via call to bedside phone. Pt stated she is independent at home and plans on returning when discharged. She has no current HHC or services in the community. She denied needs or questions for CM at this time. CM will follow. Please notify CM if needs or concerns arise.

## 2021-03-29 NOTE — PROGRESS NOTES
Progress Note    Admit Date:  3/27/2021    Admitted with left foot second toe osteomyelitis . Podiatry consulted . she will need surgical amputation    Subjective:  Ms. Gutierrez Daily complains of left foot swelling and redness. left second toe is dark and black. Seen by podiatry plan is for surgery today   -> left second digit amputation. Patient is n.p.o. Patient is  Stable . no fevers or chills      Objective:   /84   Pulse 64   Temp 97 °F (36.1 °C) (Oral)   Resp 18   Ht 5' 3\" (1.6 m)   Wt 228 lb 8 oz (103.6 kg)   SpO2 94%   BMI 40.48 kg/m²     No intake or output data in the 24 hours ending 03/29/21 1018      Physical Exam:  CNS:  The patient is alert and oriented to time, place, and person. PSYCH:  The patient is cooperative, answering questions appropriately. HEENT:  Eyes:  Pupils are reactive to light. ENT:  Extraocular muscle  movements are intact. RESPIRATORY SYSTEM:  No obvious rales, rubs, or rhonchi. CVS:  S1 and S2 are heard. Currently, the patient has got regular rate  and rhythm. ABDOMEN:  Nondistended. Morbidly obese. NT, BS +  MUSCULOSKELETAL EXAM:  The patient's left foot shows evidence of Charcot  joint with significant swelling and cellulitic appearing left forefoot  including the left great toe, left second toe, and even the neighboring  toes. The left second toe also has area of blackish scar on the tip  with foul smelling discharge and increased temperature in the area. SKIN:  Exam appears positive for cellulitic change of the toes as  described above.       Medications:   Scheduled Meds:   insulin lispro  0-6 Units Subcutaneous TID WC    insulin lispro  0-3 Units Subcutaneous Nightly    hydrocortisone   Topical BID    [Held by provider] apixaban  5 mg Oral BID    amiodarone  200 mg Oral Daily    aspirin  81 mg Oral Daily    carvedilol  3.125 mg Oral BID WC    isosorbide mononitrate  120 mg Oral Daily    pantoprazole  40 mg Oral Daily    ranolazine  500 mg Oral BID    sodium chloride flush  10 mL Intravenous 2 times per day    [Held by provider] enoxaparin  40 mg Subcutaneous Daily    piperacillin-tazobactam  3,375 mg Intravenous Q8H    linezolid  600 mg Intravenous Q12H       Continuous Infusions:   dextrose      sodium chloride         Data:  CBC:   Recent Labs     03/27/21 1959   WBC 7.8   RBC 4.27   HGB 12.9   HCT 39.3   MCV 92.1   RDW 13.4        BMP:   Recent Labs     03/27/21 1959      K 3.8      CO2 30   BUN 15   CREATININE 1.5*     BNP: No results for input(s): BNP in the last 72 hours. PT/INR: No results for input(s): PROTIME, INR in the last 72 hours. APTT: No results for input(s): APTT in the last 72 hours. CARDIAC ENZYMES: No results for input(s): CKMB, CKMBINDEX, TROPONINI in the last 72 hours. Invalid input(s): CKTOTAL;3  FASTING LIPID PANEL:  Lab Results   Component Value Date    CHOL 227 (H) 04/16/2015    HDL 34 (L) 10/18/2017    TRIG 179 (H) 04/16/2015     LIVER PROFILE:   Recent Labs     03/27/21 1959   AST 15   ALT 11   BILITOT 0.4   ALKPHOS 103         Radiology  XR TOE LEFT (MIN 2 VIEWS)   Final Result   Osteomyelitis with bone loss of the 2nd digit phalanges               Assessment:  Active Problems:    Diabetic foot infection (HCC)    Acute hematogenous osteomyelitis of left foot (HCC)    Atrial fibrillation (HCC)  Resolved Problems:    * No resolved hospital problems. *      Plan:    1. Acute left foot diabetic infection with osteomyelitis and cellulitis  in a patient with uncontrolled type 2 diabetes mellitus. Left 2nd toe is gangrenous . needs amputation   - consulted podiatry -> plan to OR today for amputation  -Continue IV antibiotics Zyvox and Zosyn day # 3    2. Chronic systolic congestive heart failure with early acute component  in a patient with ischemic cardiomyopathy and multivessel coronary  artery disease.  -Monitor with IV fluids    3.   Stage 3 chronic kidney disease.  -Monitor renal

## 2021-03-29 NOTE — ANESTHESIA PRE PROCEDURE
Department of Anesthesiology  Preprocedure Note       Name:  Sal Thompson   Age:  70 y.o.  :  1945                                          MRN:  [id number]         Date:  3/29/2021      Surgeon:  Lavon Mcnally DPM    Procedure:  PARTIAL LEFT FOOT AMPUTATION    HPI:  70 y.o. female who is seen for evaluation of cellulitis and ulcer on the left foot. Patient states the toe rubbed on her shoes and created a wound. Was treating herself and thought it was doing well until recently the redness and black tip of the toe developed. States redness has improved since being in hospital.     EKG:  Sinus  Rhythm; Nl axis; nonspecific ST depression + T-abnormality  -Nondiagnostic  -Possible  Anterior/lateral  ischemia. ECHO:   Normal left ventricular systolic function with ejection fraction of 55-60%. No regional wall motion abnormalites are seen. Moderate concentric left ventricular hypertrophy. Grade I diastolic dysfunction with normal filling pressure. Mild mitral regurgitation. Medications prior to admission:    desonide (DESOWEN) 0.05 % cream Apply 0.05 % topically 2 times daily Apply topically 2 times daily. isosorbide mononitrate (IMDUR) 120 MG ER TAKE ONE TABLET BY MOUTH DAILY   carvedilol (COREG) 3.125 MG tablet TAKE ONE TABLET BY MOUTH TWICE A DAY WITH MEALS   ranolazine (RANEXA) 500 MG extended release tablet TAKE ONE TABLET BY MOUTH TWICE A DAY   amiodarone (CORDARONE) 200 MG tablet Take 1 tablet by mouth daily   ELIQUIS 5 MG TABS tablet TAKE ONE TABLET BY MOUTH TWICE A DAY   furosemide (LASIX) 40 MG tablet TAKE ONE TABLET BY MOUTH DAILY MAY TAKE AN ADDITIONAL 1/2 TABLET AS NEEDED FOR WEIGHT GAIN OF 3 LBS IN A DAY   nitroGLYCERIN (NITROSTAT) 0.4 MG SL tablet DISSOLVE 1 TAB UNDER TONGUE FOR CHEST PAIN - IF PAIN REMAINS AFTER 5 MIN, CALL 911 AND REPEAT DOSE.  MAX 3 TABS IN 15 MINUTES   pantoprazole (PROTONIX) 40 MG tablet TAKE ONE TABLET BY MOUTH DAILY   aspirin 81 MG EC tablet Take 1 tablet by mouth (TYLENOL) tablet 650 mg  650 mg Oral Q6H PRN       acetaminophen (TYLENOL) suppository 650 mg  650 mg Rectal Q6H PRN    potassium chloride 10 mEq/100 mL IVPB (Peripheral Line)  10 mEq Intravenous PRN    magnesium sulfate 2000 mg in 50 mL IVPB premix  2,000 mg Intravenous PRN    piperacillin-tazobactam (ZOSYN)  3,375 mg Intravenous Q8H    linezolid (ZYVOX) IVPB 600 mg  600 mg Intravenous Q12H     Allergies:     Niacin And Related Anaphylaxis    Adhesive Tape      Causes rash and blisters. Can use paper tape or opsite without any problems.  Other      QVacin IV antibiotic had after foot surgery placed on this for infection. Developed high fever, chills and uncontrollable shaking.     Statins Depletion Therapy      Deep muscle pain    Vancomycin      Does not recall reaction      Problem List:     Hyperlipidemia    Hypertension    CAD (coronary artery disease)    Diabetes mellitus (HCC)    Thyroid disease    Allergic rhinitis    Sleep apnea    Metabolic encephalopathy    Hepatic steatosis    Cholelithiasis    Acute renal failure (ARF) (HCC)    Small bowel obstruction (HCC)    Ischemic chest pain (HCC)    Unstable angina (HCC)    Vomiting    Obesity    NSTEMI (non-ST elevated myocardial infarction) (HCC)    Chest pain    LBBB (left bundle branch block)    Atrial fibrillation with rapid ventricular response (HCC)    Aortocoronary bypass status    Acute respiratory failure with hypoxia (HCC)    Acute hypoxemic respiratory failure (HCC)    Angina, class IV (HCC)    History of tobacco abuse    Diabetic foot infection (HCC)    Acute hematogenous osteomyelitis of left foot (HCC)    Atrial fibrillation (HCC)     Past Medical History:     Allergic rhinitis     Arthritis     CAD (coronary artery disease)     Cholelithiasis 7/3/2015    Diabetes mellitus ([facility]     Hepatic steatosis 7/3/2015    Hyperlipidemia     Hypertension     Peripheral neuropathy     Sleep apnea     Has used the CPAP x 25 yrs now per pt.  Thyroid disease     Vitamin B12 deficiency      Past Surgical History:     CARDIAC CATHETERIZATION  2017    SVG to OM2 100% ostium    CHOLECYSTECTOMY      COLECTOMY  1998    diverticulitis     COLONOSCOPY      CORONARY ANGIOPLASTY      CORONARY ARTERY BYPASS GRAFT  2005    x 3    DIAGNOSTIC CARDIAC CATH LAB PROCEDURE      FOOT SURGERY      Multiple foot surgeries bilateral    OTHER SURGICAL HISTORY  2012    achilles tendon lengthening,arthroplasty,matatarsalhead resection    OTHER SURGICAL HISTORY Right 3/20/13    FUSION OF RIGHT GREAT TOE JOINT WITH WIRE FIXATION, DEBRIEDMENT OF WOUND RIGHT GREAT TOE    TONSILLECTOMY AND ADENOIDECTOMY      TUBAL LIGATION      Laparoscopic    UPPER GASTROINTESTINAL ENDOSCOPY       Social History:     Smoking status: Former Smoker     Packs/day: 1.00     Years: 30.00     Pack years: 30.00     Quit date: 1998     Years since quittin.8    Smokeless tobacco: Never Used   Substance Use Topics    Alcohol use: No     Vital Signs (Current):   BP: 138/71 Pulse: 63   Resp: 18 SpO2: 98   Temp: 98.2 °F (36.8 °C)   Height: 5' 3\" (1.6 m)  (21) Weight: 228 lb 8 oz (103.6 kg)  (21)   BMI: 40.6            21 1456 21 1851 21 0228 21 0659   BP: 116/70 112/61 120/64 126/84   Pulse: 67 72 63 64   Resp: 18 18 18 18   Temp: 97 °F (36.1 °C) 97.2 °F (36.2 °C) 97.5 °F (36.4 °C) 97 °F (36.1 °C)   TempSrc: Oral Oral Oral Oral   SpO2: 97% 96% 96% 94%            BP Readings from Last 3 Encounters:   21 126/84   10/23/20 112/64   20 100/68     NPO Status: >8 hrs                        BMI:   Wt Readings from Last 3 Encounters:   21 228 lb 8 oz (103.6 kg)   10/23/20 229 lb (103.9 kg)   20 222 lb (100.7 kg)     Body mass index is 40.48 kg/m².     CBC:    WBC 7.8 2021    HGB 12.9 2021    HCT 39.3 2021     2021     CMP:     2021    K 3.8 03/27/2021     03/27/2021    CO2 30 03/27/2021    BUN 15 03/27/2021    CREATININE 1.5 03/27/2021    GFRAA 41 03/27/2021    GLUCOSE 138 03/27/2021    PROT 7.4 03/27/2021    CALCIUM 9.3 03/27/2021    BILITOT 0.4 03/27/2021    ALKPHOS 103 03/27/2021    AST 15 03/27/2021    ALT 11 03/27/2021     POC Tests:     03/29/21  1053   POCGLU 142*     Coags:    PROTIME 12.6 02/24/2020    INR 1.09 02/24/2020     COVID-19 Screening (If Applicable): COVID19 Not Detected 03/29/2021     Anesthesia Evaluation  Patient summary reviewed and Nursing notes reviewed  Airway: Mallampati: III  TM distance: >3 FB   Neck ROM: limited  Comment: Thick neck girth  Mouth opening: < 3 FB Dental:          Pulmonary: breath sounds clear to auscultation  (+) sleep apnea: on CPAP,      (-) wheezes                           Cardiovascular:  Exercise tolerance: good (>4 METS),   (+) hypertension:, angina: with exertion, past MI: > 6 months, CAD: obstructive, CABG/stent: no interval change, dysrhythmias: atrial fibrillation, hyperlipidemia    (-) murmur    ECG reviewed  Rhythm: regular  Rate: normal  Echocardiogram reviewed                  Neuro/Psych:   (+) neuromuscular disease:,    (-) seizures, TIA and CVA            ROS comment: +DM neuropathy GI/Hepatic/Renal:   (+) liver disease:, renal disease: CRI,           Endo/Other:    (+) DiabetesType II DM, poorly controlled, , : arthritis: OA., .                 Abdominal:   (+) obese,         Vascular:                                    Anesthesia Plan      general, MAC and TIVA     ASA 3 - emergent     (MAC/TIVA-> GA/LMA if neeed)  Induction: intravenous. MIPS: Prophylactic antiemetics administered. Anesthetic plan and risks discussed with patient. Plan discussed with CRNA.             Gorge Adam MD

## 2021-03-29 NOTE — ACP (ADVANCE CARE PLANNING)
Advance Care Planning   Healthcare Decision Maker:    Primary Decision Maker: Danya Heath - Katie - [phone]    Click here to complete Healthcare Decision Makers including selection of the Healthcare Decision Maker Relationship (ie \"Primary\"). Pt wants her daughter Wayne Daniels to be her primary decision maker if she is unable to make her medical decisions.

## 2021-03-30 LAB
ESTIMATED AVERAGE GLUCOSE: 151.3 MG/DL
GLUCOSE BLD-MCNC: 118 MG/DL (ref 70–99)
GLUCOSE BLD-MCNC: 142 MG/DL (ref 70–99)
GLUCOSE BLD-MCNC: 143 MG/DL (ref 70–99)
GLUCOSE BLD-MCNC: 174 MG/DL (ref 70–99)
GLUCOSE BLD-MCNC: 186 MG/DL (ref 70–99)
HBA1C MFR BLD: 6.9 %
PERFORMED ON: ABNORMAL

## 2021-03-30 PROCEDURE — 1200000000 HC SEMI PRIVATE

## 2021-03-30 PROCEDURE — 94761 N-INVAS EAR/PLS OXIMETRY MLT: CPT

## 2021-03-30 PROCEDURE — 99232 SBSQ HOSP IP/OBS MODERATE 35: CPT | Performed by: INTERNAL MEDICINE

## 2021-03-30 PROCEDURE — 2580000003 HC RX 258: Performed by: PODIATRIST

## 2021-03-30 PROCEDURE — 6370000000 HC RX 637 (ALT 250 FOR IP): Performed by: PODIATRIST

## 2021-03-30 PROCEDURE — 6360000002 HC RX W HCPCS: Performed by: PODIATRIST

## 2021-03-30 PROCEDURE — 94660 CPAP INITIATION&MGMT: CPT

## 2021-03-30 PROCEDURE — 6370000000 HC RX 637 (ALT 250 FOR IP): Performed by: INTERNAL MEDICINE

## 2021-03-30 PROCEDURE — 2500000003 HC RX 250 WO HCPCS: Performed by: INTERNAL MEDICINE

## 2021-03-30 RX ORDER — CLINDAMYCIN PHOSPHATE 600 MG/50ML
600 INJECTION INTRAVENOUS EVERY 8 HOURS
Status: DISCONTINUED | OUTPATIENT
Start: 2021-03-30 | End: 2021-03-31

## 2021-03-30 RX ADMIN — ONDANSETRON HYDROCHLORIDE 4 MG: 2 INJECTION, SOLUTION INTRAMUSCULAR; INTRAVENOUS at 04:44

## 2021-03-30 RX ADMIN — PANTOPRAZOLE SODIUM 40 MG: 40 TABLET, DELAYED RELEASE ORAL at 08:35

## 2021-03-30 RX ADMIN — CLINDAMYCIN PHOSPHATE 600 MG: 600 INJECTION, SOLUTION INTRAVENOUS at 13:34

## 2021-03-30 RX ADMIN — CARVEDILOL 3.12 MG: 3.12 TABLET, FILM COATED ORAL at 12:28

## 2021-03-30 RX ADMIN — APIXABAN 5 MG: 5 TABLET, FILM COATED ORAL at 21:29

## 2021-03-30 RX ADMIN — Medication 10 ML: at 12:40

## 2021-03-30 RX ADMIN — PIPERACILLIN SODIUM AND TAZOBACTAM SODIUM 3375 MG: 3; .375 INJECTION, POWDER, LYOPHILIZED, FOR SOLUTION INTRAVENOUS at 14:46

## 2021-03-30 RX ADMIN — PIPERACILLIN SODIUM AND TAZOBACTAM SODIUM 3375 MG: 3; .375 INJECTION, POWDER, LYOPHILIZED, FOR SOLUTION INTRAVENOUS at 06:07

## 2021-03-30 RX ADMIN — RANOLAZINE 500 MG: 500 TABLET, FILM COATED, EXTENDED RELEASE ORAL at 21:28

## 2021-03-30 RX ADMIN — HYDROCORTISONE: 1 OINTMENT TOPICAL at 21:29

## 2021-03-30 RX ADMIN — CARVEDILOL 3.12 MG: 3.12 TABLET, FILM COATED ORAL at 18:07

## 2021-03-30 RX ADMIN — LINEZOLID 600 MG: 600 INJECTION, SOLUTION INTRAVENOUS at 02:41

## 2021-03-30 RX ADMIN — PIPERACILLIN SODIUM AND TAZOBACTAM SODIUM 3375 MG: 3; .375 INJECTION, POWDER, LYOPHILIZED, FOR SOLUTION INTRAVENOUS at 21:28

## 2021-03-30 RX ADMIN — PROMETHAZINE HYDROCHLORIDE 12.5 MG: 25 TABLET ORAL at 08:54

## 2021-03-30 RX ADMIN — CLINDAMYCIN PHOSPHATE 600 MG: 600 INJECTION, SOLUTION INTRAVENOUS at 21:28

## 2021-03-30 RX ADMIN — ONDANSETRON HYDROCHLORIDE 4 MG: 2 INJECTION, SOLUTION INTRAMUSCULAR; INTRAVENOUS at 22:15

## 2021-03-30 RX ADMIN — POLYETHYLENE GLYCOL (3350) 17 G: 17 POWDER, FOR SOLUTION ORAL at 12:39

## 2021-03-30 NOTE — PROGRESS NOTES
PROGRESS NOTE    Admit Date:  3/27/2021    Subjective:  70 y.o. female who is seen for evaluation of cellulitis and ulcer on the left foot. S/P partial foot amputation 3/29/21. States foot is feeling OK. However she does not feel well today. Nausea since taking her Zyvox doses. Past Medical History:        Diagnosis Date    Allergic rhinitis     Arthritis     CAD (coronary artery disease)     Cholelithiasis 7/3/2015    Diabetes mellitus ([address]     Accuchecks daily once a day at home in [place]    [employer] Hepatic steatosis 7/3/2015    Hyperlipidemia     Hypertension     Peripheral neuropathy     Sleep apnea     Has used the CPAP x 25 yrs now per pt.     Thyroid disease     Vitamin B12 deficiency        Past Surgical History:        Procedure Laterality Date    CARDIAC CATHETERIZATION  12/27/2017    SVG to OM2 100% ostium    CHOLECYSTECTOMY      COLECTOMY  1998    diverticulitis     COLONOSCOPY      CORONARY ANGIOPLASTY      CORONARY ARTERY BYPASS GRAFT  2005    x 3    DIAGNOSTIC CARDIAC CATH LAB PROCEDURE      FOOT SURGERY      Multiple foot surgeries bilateral    OTHER SURGICAL HISTORY  4/17/2012    achilles tendon lengthening,arthroplasty,matatarsalhead resection    OTHER SURGICAL HISTORY Right 3/20/13    FUSION OF RIGHT GREAT TOE JOINT WITH WIRE FIXATION, DEBRIEDMENT OF WOUND RIGHT GREAT TOE    TONSILLECTOMY AND ADENOIDECTOMY      TUBAL LIGATION      Laparoscopic    UPPER GASTROINTESTINAL ENDOSCOPY         Current Medications:     insulin lispro  0-6 Units Subcutaneous TID WC    insulin lispro  0-3 Units Subcutaneous Nightly    hydrocortisone   Topical BID    [Held by provider] apixaban  5 mg Oral BID    amiodarone  200 mg Oral Daily    aspirin  81 mg Oral Daily    carvedilol  3.125 mg Oral BID WC    isosorbide mononitrate  120 mg Oral Daily    pantoprazole  40 mg Oral Daily    ranolazine  500 mg Oral BID    sodium chloride flush  10 mL Intravenous 2 times per day    [Held by provider] enoxaparin  40 mg Subcutaneous Daily    piperacillin-tazobactam  3,375 mg Intravenous Q8H       Allergies:  Niacin and related, Adhesive tape, Other, Statins depletion therapy, and Vancomycin    Social History:    Social History     Tobacco Use    Smoking status: Former Smoker     Packs/day: 1.00     Years: 30.00     Pack years: 30.00     Quit date: 1998     Years since quittin.8    Smokeless tobacco: Never Used   Substance Use Topics    Alcohol use: No    Drug use: No       Family History:       Problem Relation Age of Onset    Stroke Mother     Coronary Art Dis Father     Coronary Art Dis Sister     Coronary Art Dis Brother        Review of Systems    CONSTITUTIONAL:  negative  EYES:  negative  HEENT:  negative  RESPIRATORY:  negative  CARDIOVASCULAR:  negative  GASTROINTESTINAL:  negative  GENITOURINARY:  negative  INTEGUMENT/BREAST:  positive for ulcer  MUSCULOSKELETAL:  negative  NEUROLOGICAL:  positive for numbness      Objective:   BP (!) 156/76   Pulse 68   Temp 97.4 °F (36.3 °C) (Oral)   Resp 16   Ht 5' 3\" (1.6 m)   Wt 228 lb 8 oz (103.6 kg)   SpO2 93%   BMI 40.48 kg/m²     Data:  CBC:   Recent Labs     21   WBC 7.8   HGB 12.9   HCT 39.3   MCV 92.1        BMP:   Recent Labs     21      K 3.8      CO2 30   BUN 15   CREATININE 1.5*     LIVER PROFILE:   Recent Labs     21   AST 15   ALT 11   BILITOT 0.4   ALKPHOS 103     PT/INR:   Recent Labs     21   PROT 7.4     HgBA1c:  Lab Results   Component Value Date    LABA1C 6.9 2021     Sed Rate77     Cultures:     Imaging: xray left foot - postop changes noted    Sed Rate77       Physical Exam:    General Appearance: alert and oriented to person, place and time, well developed and well- nourished, in no acute distress  Skin: warm and dry, no rash or erythema  Head: normocephalic and atraumatic  Eyes: pupils equal, round, and reactive to light, extraocular eye movements intact, conjunctivae normal  ENT: tympanic membrane, external ear and ear canal normal bilaterally, nose without deformity, nasal mucosa and turbinates normal without polyps  Neck: supple and non-tender without mass, no thyromegaly or thyroid nodules, no cervical lymphadenopathy  Pulmonary/Chest: clear to auscultation bilaterally- no wheezes, rales or rhonchi, normal air movement, no respiratory distress  Cardiovascular: normal rate, regular rhythm, normal S1 and S2, no murmurs, rubs, clicks, or gallops, distal pulses intact, no carotid bruits  Abdomen: soft, non-tender, non-distended, normal bowel sounds, no masses or organomegaly    Dressing right foot - clean, dry and intact. No proximal erythema or edema noted. No malodor noted.          Assessment:  Patient Active Problem List   Diagnosis Code    Hyperlipidemia E78.5    Hypertension I10    CAD (coronary artery disease) I25.10    Diabetes mellitus ([facility] E11.9    Thyroid disease E07.9    Allergic rhinitis J30.9    Sleep apnea H60.34    Metabolic encephalopathy F62.53    Hepatic steatosis K76.0    Cholelithiasis K80.20    Acute renal failure (ARF) ([facility]) N17.9    Small bowel obstruction ([facility]) K56.609    Ischemic chest pain ([facility]) I20.9    Unstable angina ([facility]) I20.0    Vomiting R11.10    Obesity E66.9    NSTEMI (non-ST elevated myocardial infarction) ([facility]) I21.4    Chest pain R07.9    LBBB (left bundle branch block) I44.7    Atrial fibrillation with rapid ventricular response ([facility]) I48.91    Aortocoronary bypass status Z95.1    Acute respiratory failure with hypoxia ([facility]) J96.01    Acute hypoxemic respiratory failure ([facility]) J96.01    Angina, class IV ([facility]) I20.9    History of tobacco abuse Z87.891    Diabetic foot infection ([facility]) E11.628, L08.9    Acute hematogenous osteomyelitis of left foot ([facility]) M86.072    Atrial fibrillation ([facility]) I48.91     diabetic foot ulcer left with gangrene secondary to peripheral neuropathy - S/P

## 2021-03-30 NOTE — PLAN OF CARE
Problem: Falls - Risk of:  Goal: Will remain free from falls  Description: Will remain free from falls  Outcome: Ongoing  Goal: Absence of physical injury  Description: Absence of physical injury  Outcome: Ongoing     Problem: Physical Regulation:  Goal: Complications related to the disease process, condition or treatment will be avoided or minimized  Description: Complications related to the disease process, condition or treatment will be avoided or minimized  Outcome: Ongoing     Problem: Sensory:  Goal: Pain level will decrease  Description: Pain level will decrease  Outcome: Ongoing     Problem: Skin Integrity:  Goal: Skin integrity will be maintained  Description: Skin integrity will be maintained  Outcome: Ongoing  Goal: Skin integrity will improve  Description: Skin integrity will improve  Outcome: Ongoing  Goal: Will show no infection signs and symptoms  Description: Will show no infection signs and symptoms  Outcome: Ongoing  Goal: Absence of new skin breakdown  Description: Absence of new skin breakdown  Outcome: Ongoing     Problem: Tissue Perfusion:  Goal: Ability to maintain adequate tissue perfusion will improve  Description: Ability to maintain adequate tissue perfusion will improve  Outcome: Ongoing     Problem: Infection:  Goal: Will remain free from infection  Description: Will remain free from infection  Outcome: Ongoing     Problem: Safety:  Goal: Free from accidental physical injury  Description: Free from accidental physical injury  Outcome: Ongoing     Problem: OXYGENATION/RESPIRATORY FUNCTION  Goal: Patient will maintain patent airway  Outcome: Ongoing  Goal: Patient will achieve/maintain normal respiratory rate/effort  Description: Respiratory rate and effort will be within normal limits for the patient  Outcome: Ongoing     Problem: HEMODYNAMIC STATUS  Goal: Patient has stable vital signs and fluid balance  Outcome: Ongoing     Problem: FLUID AND ELECTROLYTE IMBALANCE  Goal: Fluid and electrolyte balance are achieved/maintained  Outcome: Ongoing     Problem: ACTIVITY INTOLERANCE/IMPAIRED MOBILITY  Goal: Mobility/activity is maintained at optimum level for patient  Outcome: Ongoing

## 2021-03-30 NOTE — FLOWSHEET NOTE
Pt was sleepy, prayer for healing said at bedside.      03/30/21 1521   Encounter Summary   Services provided to: Patient and family together   Referral/Consult From: [facility]   (3/30 Prayer at bedside)   Complexity of Encounter Low   Length of Encounter 15 minutes

## 2021-03-30 NOTE — PROGRESS NOTES
IM Progress Note    Admit Date:  3/27/2021    Admitted with left foot second toe osteomyelitis . Podiatry consulted . S/P partial  Left foot amputation 3/29/21. POD #1 . Subjective:  Ms. Mackay Bowels seen . Left foot pain is controlled. Complains of nausea and vomiting with Zyvox-this has been discontinued. No fevers. Objective:   BP (!) 143/72   Pulse 71   Temp 97 °F (36.1 °C) (Oral)   Resp 18   Ht 5' 3\" (1.6 m)   Wt 228 lb 8 oz (103.6 kg)   SpO2 99%   BMI 40.48 kg/m²       Intake/Output Summary (Last 24 hours) at 3/30/2021 1415  Last data filed at 3/30/2021 1240  Gross per 24 hour   Intake 790 ml   Output    Net 790 ml         Physical Exam:  CNS:  The patient is alert and oriented to time, place, and person. PSYCH:  The patient is cooperative, answering questions appropriately. HEENT:  Eyes:  Pupils are reactive to light. ENT:  Extraocular muscle  movements are intact. RESPIRATORY SYSTEM:  No obvious rales, rubs, or rhonchi. CVS:  S1 and S2 are heard. Currently, the patient has got regular rate  and rhythm. ABDOMEN:  Nondistended. Morbidly obese. NT, BS +  SKIN, MUSCULOSKELETAL EXAM:   Left foot is in dressing . No edema .  DP 1 +  NEURO: nonfocal  PSYCH : No anxiety or agitation    Medications:   Scheduled Meds:   clindamycin (CLEOCIN) IV  600 mg Intravenous Q8H    insulin lispro  0-6 Units Subcutaneous TID WC    insulin lispro  0-3 Units Subcutaneous Nightly    hydrocortisone   Topical BID    apixaban  5 mg Oral BID    amiodarone  200 mg Oral Daily    aspirin  81 mg Oral Daily    carvedilol  3.125 mg Oral BID     isosorbide mononitrate  120 mg Oral Daily    pantoprazole  40 mg Oral Daily    ranolazine  500 mg Oral BID    sodium chloride flush  10 mL Intravenous 2 times per day    piperacillin-tazobactam  3,375 mg Intravenous Q8H       Continuous Infusions:   dextrose      sodium chloride 25 mL (03/29/21 1149)       Data:  CBC:   Recent Labs     03/27/21 1959   WBC 7.8 RBC 4.27   HGB 12.9   HCT 39.3   MCV 92.1   RDW 13.4        BMP:   Recent Labs     03/27/21 1959      K 3.8      CO2 30   BUN 15   CREATININE 1.5*     BNP: No results for input(s): BNP in the last 72 hours. PT/INR: No results for input(s): PROTIME, INR in the last 72 hours. APTT: No results for input(s): APTT in the last 72 hours. CARDIAC ENZYMES: No results for input(s): CKMB, CKMBINDEX, TROPONINI in the last 72 hours. Invalid input(s): CKTOTAL;3  FASTING LIPID PANEL:  Lab Results   Component Value Date    CHOL 227 (H) 04/16/2015    HDL 34 (L) 10/18/2017    TRIG 179 (H) 04/16/2015     LIVER PROFILE:   Recent Labs     03/27/21 1959   AST 15   ALT 11   BILITOT 0.4   ALKPHOS 103         Radiology  XR FOOT LEFT (2 VIEWS)   Preliminary Result   1. Interval amputation of the 2nd toe with postoperative changes evident,   including soft tissue gas at the amputation stump. 2. Additional stable postoperative changes, as detailed above. 3. Stable chronic multifocal osteoarthritis. XR TOE LEFT (MIN 2 VIEWS)   Final Result   Osteomyelitis with bone loss of the 2nd digit phalanges               Assessment:  Active Problems:    Diabetic foot infection (HCC)    Acute hematogenous osteomyelitis of left foot (HCC)    Atrial fibrillation (HCC)  Resolved Problems:    * No resolved hospital problems. *      Plan:    1. Acute left foot diabetic infection with osteomyelitis of left second toe and left foot cellulitis  -Due to uncontrolled type 2 diabetes mellitus. Left 2nd toe was  gangrenous . - consulted podiatry -> s/p left partial foot amputation on 3/29/2021. Postop day 1.  -Continue IV antibiotics IV Zosyn, day #4. She is having nausea and vomiting with Zyvox, she has received 3 days of Zyvox and will discontinue this. Add clindamycin. Follow-up on wound cultures    2. Chronic systolic congestive heart failure  ischemic cardiomyopathy  multivessel coronary artery disease. -Compensated   -Continue aspirin and nitrates and Ranexa as at home        3. Stage 3 chronic kidney disease.  -Monitor renal function closely    4. Hypertension.  - BP stable    5. Dyslipidemia. 6.  Morbid obesity with a BMI of 42.07 kg/m2. 7.  Diabetes mellitus type 2  -Blood sugar stable  -Check A1c  -Continue scale insulin coverage    8. Diabetic gastroparesis  Constipation  -dulcolax Suppository given . + BM    9 . Chronic atrial fibrillation  -Continue amiodarone and Coreg as at home.  -On chronic anticoagulation. Eliquis dose held for surgery. Will resume tonight. CODE STATUS:  Full.   DIET CARB CONTROL;    Estrada Coronel MD 3/30/2021 2:15 PM

## 2021-03-30 NOTE — OP NOTE
[address]                 [address] [address]                                OPERATIVE REPORT    PATIENT NAME: Peyton Saleh                    :        1945  MED REC NO:   [id number]                          ROOM:       0221  ACCOUNT NO:   [de-identified]                           ADMIT DATE: 2021  PROVIDER:     Antonino Marcus DPM    DATE OF PROCEDURE:  2021    PREOPERATIVE DIAGNOSES:  1.  Gangrene, left foot. 2.  Osteomyelitis, left foot. 3.  Diabetic foot ulceration, left foot. 4.  Diabetes mellitus. POSTOPERATIVE DIAGNOSES:  1.  Gangrene, left foot. 2.  Osteomyelitis, left foot. 3.  Diabetic foot ulceration, left foot. 4.  Diabetes mellitus. OPERATION PERFORMED:  Partial left foot amputation. SURGEON:  Antonino Marcus DPM    ANESTHESIA:  Local, MAC. HEMOSTASIS:  Ankle tourniquet 250 mmHg. ESTIMATED BLOOD LOSS:  Less than 50 mL. REPORT OF OPERATION:  The patient was brought in to the operating room  and placed on the operating table in supine position. Under mild IV  sedation, the left second and third rays were anesthetized with 20 mL of  1:1 mixture of 1% lidocaine plain and 0.5% Marcaine plain. The foot was  then scrubbed, prepped, and draped in the usual sterile manner. Esmarch  was then utilized to exsanguinate the left foot. Ankle tourniquet was  then inflated to 250 mmHg. Attention was then directed to the left second digit where the obviously  gangrenous section of the second digit was identified. At this time, an  elliptical incision was made just distal to the base of the second  digit. Dissection was carried down in order to expose the extensor and  flexor tendons. These were then transected. Dissection was carried  back in order expose the soft tissue attachments of the second  metatarsophalangeal joint. These were also transected with the scalpel.   There were some bony fragments noted in

## 2021-03-30 NOTE — PROGRESS NOTES
Patient became very nauseated after scheduled Zyvox completed infusing. PRN Zofran given. Call light within reach. Will continue to monitor.

## 2021-03-31 LAB
ANION GAP SERPL CALCULATED.3IONS-SCNC: 13 MMOL/L (ref 3–16)
BUN BLDV-MCNC: 12 MG/DL (ref 7–20)
CALCIUM SERPL-MCNC: 8.7 MG/DL (ref 8.3–10.6)
CHLORIDE BLD-SCNC: 100 MMOL/L (ref 99–110)
CO2: 24 MMOL/L (ref 21–32)
CREAT SERPL-MCNC: 1.3 MG/DL (ref 0.6–1.2)
GFR AFRICAN AMERICAN: 48
GFR NON-AFRICAN AMERICAN: 40
GLUCOSE BLD-MCNC: 126 MG/DL (ref 70–99)
GLUCOSE BLD-MCNC: 140 MG/DL (ref 70–99)
GLUCOSE BLD-MCNC: 148 MG/DL (ref 70–99)
GLUCOSE BLD-MCNC: 155 MG/DL (ref 70–99)
GLUCOSE BLD-MCNC: 189 MG/DL (ref 70–99)
HCT VFR BLD CALC: 38.1 % (ref 36–48)
HEMOGLOBIN: 12.6 G/DL (ref 12–16)
MAGNESIUM: 1.7 MG/DL (ref 1.8–2.4)
MCH RBC QN AUTO: 31.2 PG (ref 26–34)
MCHC RBC AUTO-ENTMCNC: 33.2 G/DL (ref 31–36)
MCV RBC AUTO: 93.9 FL (ref 80–100)
PDW BLD-RTO: 14.3 % (ref 12.4–15.4)
PERFORMED ON: ABNORMAL
PHOSPHORUS: 3.2 MG/DL (ref 2.5–4.9)
PLATELET # BLD: 234 K/UL (ref 135–450)
PMV BLD AUTO: 8.6 FL (ref 5–10.5)
POTASSIUM SERPL-SCNC: 3.6 MMOL/L (ref 3.5–5.1)
RBC # BLD: 4.05 M/UL (ref 4–5.2)
SODIUM BLD-SCNC: 137 MMOL/L (ref 136–145)
WBC # BLD: 7.6 K/UL (ref 4–11)

## 2021-03-31 PROCEDURE — 85027 COMPLETE CBC AUTOMATED: CPT

## 2021-03-31 PROCEDURE — 84100 ASSAY OF PHOSPHORUS: CPT

## 2021-03-31 PROCEDURE — 36415 COLL VENOUS BLD VENIPUNCTURE: CPT

## 2021-03-31 PROCEDURE — 6370000000 HC RX 637 (ALT 250 FOR IP): Performed by: PODIATRIST

## 2021-03-31 PROCEDURE — 6360000002 HC RX W HCPCS: Performed by: PODIATRIST

## 2021-03-31 PROCEDURE — 1200000000 HC SEMI PRIVATE

## 2021-03-31 PROCEDURE — 2580000003 HC RX 258: Performed by: PODIATRIST

## 2021-03-31 PROCEDURE — 83735 ASSAY OF MAGNESIUM: CPT

## 2021-03-31 PROCEDURE — 6370000000 HC RX 637 (ALT 250 FOR IP): Performed by: NURSE PRACTITIONER

## 2021-03-31 PROCEDURE — 99231 SBSQ HOSP IP/OBS SF/LOW 25: CPT | Performed by: NURSE PRACTITIONER

## 2021-03-31 PROCEDURE — 80048 BASIC METABOLIC PNL TOTAL CA: CPT

## 2021-03-31 PROCEDURE — 6370000000 HC RX 637 (ALT 250 FOR IP): Performed by: INTERNAL MEDICINE

## 2021-03-31 PROCEDURE — 2500000003 HC RX 250 WO HCPCS: Performed by: INTERNAL MEDICINE

## 2021-03-31 RX ORDER — LACTOBACILLUS RHAMNOSUS GG 10B CELL
1 CAPSULE ORAL
Status: DISCONTINUED | OUTPATIENT
Start: 2021-03-31 | End: 2021-04-01 | Stop reason: HOSPADM

## 2021-03-31 RX ADMIN — Medication 10 ML: at 21:21

## 2021-03-31 RX ADMIN — APIXABAN 5 MG: 5 TABLET, FILM COATED ORAL at 21:13

## 2021-03-31 RX ADMIN — APIXABAN 5 MG: 5 TABLET, FILM COATED ORAL at 10:58

## 2021-03-31 RX ADMIN — PIPERACILLIN SODIUM AND TAZOBACTAM SODIUM 3375 MG: 3; .375 INJECTION, POWDER, LYOPHILIZED, FOR SOLUTION INTRAVENOUS at 05:34

## 2021-03-31 RX ADMIN — PANTOPRAZOLE SODIUM 40 MG: 40 TABLET, DELAYED RELEASE ORAL at 10:58

## 2021-03-31 RX ADMIN — ISOSORBIDE MONONITRATE 120 MG: 60 TABLET ORAL at 10:58

## 2021-03-31 RX ADMIN — Medication 10 ML: at 09:16

## 2021-03-31 RX ADMIN — CARVEDILOL 3.12 MG: 3.12 TABLET, FILM COATED ORAL at 10:59

## 2021-03-31 RX ADMIN — ASPIRIN 81 MG: 81 TABLET, FILM COATED ORAL at 10:58

## 2021-03-31 RX ADMIN — ONDANSETRON HYDROCHLORIDE 4 MG: 2 INJECTION, SOLUTION INTRAMUSCULAR; INTRAVENOUS at 09:16

## 2021-03-31 RX ADMIN — RANOLAZINE 500 MG: 500 TABLET, FILM COATED, EXTENDED RELEASE ORAL at 21:13

## 2021-03-31 RX ADMIN — CLINDAMYCIN PHOSPHATE 600 MG: 600 INJECTION, SOLUTION INTRAVENOUS at 05:34

## 2021-03-31 RX ADMIN — Medication 1 CAPSULE: at 15:48

## 2021-03-31 RX ADMIN — INSULIN LISPRO 1 UNITS: 100 INJECTION, SOLUTION INTRAVENOUS; SUBCUTANEOUS at 21:14

## 2021-03-31 RX ADMIN — RANOLAZINE 500 MG: 500 TABLET, FILM COATED, EXTENDED RELEASE ORAL at 10:58

## 2021-03-31 RX ADMIN — AMIODARONE HYDROCHLORIDE 200 MG: 200 TABLET ORAL at 10:58

## 2021-03-31 ASSESSMENT — PAIN SCALES - GENERAL: PAINLEVEL_OUTOF10: 0

## 2021-03-31 NOTE — PROGRESS NOTES
Shift assessment complete; see flow sheet. Scheduled medications administered; See MAR. IV infusing without difficulty. Pt denies pain at this time. Pt denies any needs at this time. Pt educated on use of call light and to call out with needs, verbalized understanding, bed in low locked position for pt safety.  Bed alarm on

## 2021-03-31 NOTE — PROGRESS NOTES
MCV 93.9   RDW 14.3        BMP:   Recent Labs     03/31/21  0524      K 3.6      CO2 24   PHOS 3.2   BUN 12   CREATININE 1.3*     BNP: No results for input(s): BNP in the last 72 hours. PT/INR: No results for input(s): PROTIME, INR in the last 72 hours. APTT: No results for input(s): APTT in the last 72 hours. CARDIAC ENZYMES: No results for input(s): CKMB, CKMBINDEX, TROPONINI in the last 72 hours. Invalid input(s): CKTOTAL;3  FASTING LIPID PANEL:  Lab Results   Component Value Date    CHOL 227 (H) 04/16/2015    HDL 34 (L) 10/18/2017    TRIG 179 (H) 04/16/2015     LIVER PROFILE:   No results for input(s): AST, ALT, ALB, BILIDIR, BILITOT, ALKPHOS in the last 72 hours. Cultures:  COVID: not detected    Radiology  XR FOOT LEFT (2 VIEWS)   Final Result   1. Interval amputation of the second toe with postoperative changes evident,   including soft tissue gas at the amputation stump. 2. Additional stable postoperative changes, as detailed above. 3. Stable chronic multifocal osteoarthritis. XR TOE LEFT (MIN 2 VIEWS)   Final Result   Osteomyelitis with bone loss of the 2nd digit phalanges               Assessment:  Active Problems:    Diabetic foot infection (HCC)    Acute hematogenous osteomyelitis of left foot (HCC)    Atrial fibrillation (HCC)  Resolved Problems:    * No resolved hospital problems. *      Plan:    1. Acute left foot diabetic infection with osteomyelitis of left second toe and left foot cellulitis  -Due to uncontrolled type 2 diabetes mellitus. Left 2nd toe was gangrenous . - consulted podiatry -> s/p left partial foot amputation on 3/29/2021. Postop day 2.  -Continue IV antibiotics IV Zosyn, day #4. She is having nausea and vomiting with Zyvox, she has received 3 days of Zyvox . This was discontinued. Added clindamycin D#2. Still having nausea with antibiotic infusions. Added probiotic. Follow-up on wound cultures    2.   Chronic systolic congestive heart failure  ischemic cardiomyopathy  multivessel coronary artery disease.  -Compensated   -Continue aspirin and nitrates and Ranexa as at home      3. Stage 3 chronic kidney disease.  -Monitor renal function closely    4. Hypertension.  - BP stable    5. Dyslipidemia. 6.  Morbid obesity with a BMI of 42.07 kg/m2. 7.  Diabetes mellitus type 2  -Blood sugar stable  -Check A1c: 6.9  -Continue scale insulin coverage    8. Diabetic gastroparesis  Constipation  -dulcolax Suppository given . + BM    9 . Chronic atrial fibrillation  -Continue amiodarone and Coreg as at home.  -On chronic anticoagulation. Eliquis dose held for surgery. Now resumed. CODE STATUS:  Full.   DIET CARB CONTROL;    Aidee Cost FNP-C  3/31/2021

## 2021-03-31 NOTE — PROGRESS NOTES
Patient felt that her nausea was improved enough to take her PO medications. These were administered per order.

## 2021-03-31 NOTE — PLAN OF CARE
Problem: Falls - Risk of:  Goal: Will remain free from falls  Description: Will remain free from falls  3/31/2021 1025 by Dede Reeves RN  Outcome: Ongoing  3/31/2021 0417 by Ade Don RN  Outcome: Ongoing  3/31/2021 0417 by Ade Don RN  Outcome: Ongoing  Goal: Absence of physical injury  Description: Absence of physical injury  3/31/2021 1025 by Dede Reeves RN  Outcome: Ongoing  3/31/2021 0417 by Ade Don RN  Outcome: Ongoing  3/31/2021 0417 by Ade Don RN  Outcome: Ongoing     Problem: Physical Regulation:  Goal: Complications related to the disease process, condition or treatment will be avoided or minimized  Description: Complications related to the disease process, condition or treatment will be avoided or minimized  3/31/2021 1025 by Dede Reeves RN  Outcome: Ongoing  3/31/2021 0417 by Ade Don RN  Outcome: Ongoing  3/31/2021 0417 by Ade Don RN  Outcome: Ongoing     Problem: Sensory:  Goal: Pain level will decrease  Description: Pain level will decrease  3/31/2021 1025 by Dede Reeves RN  Outcome: Ongoing  3/31/2021 0417 by Ade Don RN  Outcome: Ongoing  3/31/2021 0417 by Ade Don RN  Outcome: Ongoing     Problem: Skin Integrity:  Goal: Skin integrity will be maintained  Description: Skin integrity will be maintained  3/31/2021 1025 by Dede Reeves RN  Outcome: Ongoing  3/31/2021 0417 by Ade Don RN  Outcome: Ongoing  3/31/2021 0417 by Ade Don RN  Outcome: Ongoing  Goal: Skin integrity will improve  Description: Skin integrity will improve  3/31/2021 1025 by Dede Reeves RN  Outcome: Ongoing  3/31/2021 0417 by Ade Don RN  Outcome: Ongoing  3/31/2021 0417 by Ade Don RN  Outcome: Ongoing  Goal: Will show no infection signs and symptoms  Description: Will show no infection signs and symptoms  3/31/2021 1025 by Dede Reeves RN  Outcome: Ongoing  3/31/2021 0417 by Ade Don RN  Outcome:

## 2021-03-31 NOTE — PROGRESS NOTES
mg Oral BID    sodium chloride flush  10 mL Intravenous 2 times per day    piperacillin-tazobactam  3,375 mg Intravenous Q8H       Allergies:  Niacin and related, Adhesive tape, Other, Statins depletion therapy, and Vancomycin    Social History:    Social History     Tobacco Use    Smoking status: Former Smoker     Packs/day: 1.00     Years: 30.00     Pack years: 30.00     Quit date: 1998     Years since quittin.8    Smokeless tobacco: Never Used   Substance Use Topics    Alcohol use: No    Drug use: No       Family History:       Problem Relation Age of Onset    Stroke Mother     Coronary Art Dis Father     Coronary Art Dis Sister     Coronary Art Dis Brother        Review of Systems    CONSTITUTIONAL:  negative  EYES:  negative  HEENT:  negative  RESPIRATORY:  negative  CARDIOVASCULAR:  negative  GASTROINTESTINAL:  negative  GENITOURINARY:  negative  INTEGUMENT/BREAST:  positive for ulcer  MUSCULOSKELETAL:  negative  NEUROLOGICAL:  positive for numbness      Objective:   BP (!) 143/71   Pulse 64   Temp 97 °F (36.1 °C) (Oral)   Resp 18   Ht 5' 3\" (1.6 m)   Wt 228 lb 8 oz (103.6 kg)   SpO2 97%   BMI 40.48 kg/m²     Data:  CBC:   Recent Labs     21  0524   WBC 7.6   HGB 12.6   HCT 38.1   MCV 93.9        BMP:   Recent Labs     21  0524      K 3.6      CO2 24   PHOS 3.2   BUN 12   CREATININE 1.3*     LIVER PROFILE:   No results for input(s): AST, ALT, LIPASE, BILIDIR, BILITOT, ALKPHOS in the last 72 hours. Invalid input(s): AMYLASE,  ALB  PT/INR:   No results for input(s): PROT, INR in the last 72 hours.   HgBA1c:  Lab Results   Component Value Date    LABA1C 6.9 2021     Sed Rate77     Cultures:     Imaging: xray left foot - postop changes noted    Sed Rate77       Physical Exam:    General Appearance: alert and oriented to person, place and time, well developed and well- nourished, in no acute distress  Skin: warm and dry, no rash or erythema  Head:

## 2021-03-31 NOTE — PROGRESS NOTES
03/30/21 8780   NIV Type   Mode CPAP   Mask Type Nasal mask   Mask Size Small   Settings/Measurements   CPAP/EPAP 7 cmH2O   Resp 18   FiO2  21 %   Comfort Level Good   SpO2 96

## 2021-03-31 NOTE — CARE COORDINATION
INTERDISCIPLINARY PLAN OF CARE CONFERENCE    Date/Time: 3/31/2021 4:02 PM  Completed by: Sukh Menjivar Management      Patient Name:  Ginette Leo  YOB: 1945  Admitting Diagnosis: Diabetic foot infection ([facility] [address] [J11.624, L08.9]     Admit Date/Time:  3/27/2021  6:02 PM    Chart reviewed. Interdisciplinary team contacted or reviewed plan related to patient progress and discharge plans. Disciplines included Case Management, Nursing, and Dietitian. Current Status: Stable  PT/OT recommendation for discharge plan of care: N/A    Expected D/C Disposition:  Home  Confirmed plan with patient  Yes   Met with:patient  Discharge Plan Comments: Reviewd chart and met with pt at bedside. Plan remains for home at [place]. Will await PT/OT eval as may need HHC. Will cont to follow. Noted lives alone and may need assistance.      Home O2 in place on admit: No  Pt informed of need to bring portable home O2 tank on day of discharge for nursing to connect prior to leaving:  Not Indicated  Verbalized agreement/Understanding:  Not Indicated

## 2021-03-31 NOTE — PLAN OF CARE
Problem: Falls - Risk of:  Goal: Will remain free from falls  Description: Will remain free from falls  3/31/2021 0417 by Aline Molina RN  Outcome: Ongoing  3/31/2021 0417 by Aline Molina RN  Outcome: Ongoing  Goal: Absence of physical injury  Description: Absence of physical injury  3/31/2021 0417 by Aline Molina RN  Outcome: Ongoing  3/31/2021 0417 by Aline Molina RN  Outcome: Ongoing     Problem: Physical Regulation:  Goal: Complications related to the disease process, condition or treatment will be avoided or minimized  Description: Complications related to the disease process, condition or treatment will be avoided or minimized  3/31/2021 0417 by Aline Molina RN  Outcome: Ongoing  3/31/2021 0417 by Alien Molina RN  Outcome: Ongoing     Problem: Sensory:  Goal: Pain level will decrease  Description: Pain level will decrease  3/31/2021 0417 by Aline Molina RN  Outcome: Ongoing  3/31/2021 0417 by Aline Molina RN  Outcome: Ongoing     Problem: Skin Integrity:  Goal: Skin integrity will be maintained  Description: Skin integrity will be maintained  3/31/2021 0417 by Aline Molina RN  Outcome: Ongoing  3/31/2021 0417 by Aline Molina RN  Outcome: Ongoing  Goal: Skin integrity will improve  Description: Skin integrity will improve  3/31/2021 0417 by Aline Molina RN  Outcome: Ongoing  3/31/2021 0417 by Aline Molina RN  Outcome: Ongoing  Goal: Will show no infection signs and symptoms  Description: Will show no infection signs and symptoms  3/31/2021 0417 by Aline Molina RN  Outcome: Ongoing  3/31/2021 0417 by Aline Molina RN  Outcome: Ongoing  Goal: Absence of new skin breakdown  Description: Absence of new skin breakdown  3/31/2021 0417 by Aline Molina RN  Outcome: Ongoing  3/31/2021 0417 by Aline Molina RN  Outcome: Ongoing     Problem: Tissue Perfusion:  Goal: Ability to maintain adequate tissue perfusion will improve  Description: Ability to maintain adequate tissue perfusion will improve  3/31/2021 9950 by Delfina Crook RN  Outcome: Ongoing  3/31/2021 0417 by Delfina Crook RN  Outcome: Ongoing     Problem: Infection:  Goal: Will remain free from infection  Description: Will remain free from infection  3/31/2021 0417 by Delfina Crook RN  Outcome: Ongoing  3/31/2021 0417 by Delfina Crook RN  Outcome: Ongoing     Problem: Safety:  Goal: Free from accidental physical injury  Description: Free from accidental physical injury  3/31/2021 0417 by Delfina Crook RN  Outcome: Ongoing  3/31/2021 0417 by Delfina Crook RN  Outcome: Ongoing     Problem: OXYGENATION/RESPIRATORY FUNCTION  Goal: Patient will maintain patent airway  3/31/2021 0417 by Delfina Crook RN  Outcome: Ongoing  3/31/2021 0417 by Delfina Crook RN  Outcome: Ongoing  Goal: Patient will achieve/maintain normal respiratory rate/effort  Description: Respiratory rate and effort will be within normal limits for the patient  3/31/2021 0417 by Delfina Crook RN  Outcome: Ongoing  3/31/2021 0417 by Delfina Crook RN  Outcome: Ongoing     Problem: HEMODYNAMIC STATUS  Goal: Patient has stable vital signs and fluid balance  3/31/2021 0417 by Delfina Crook RN  Outcome: Ongoing  3/31/2021 0417 by Delfina Crook RN  Outcome: Ongoing     Problem: FLUID AND ELECTROLYTE IMBALANCE  Goal: Fluid and electrolyte balance are achieved/maintained  3/31/2021 0417 by Delfina Crook RN  Outcome: Ongoing  3/31/2021 0417 by Delfina Crook RN  Outcome: Ongoing     Problem: ACTIVITY INTOLERANCE/IMPAIRED MOBILITY  Goal: Mobility/activity is maintained at optimum level for patient  3/31/2021 0417 by Delfina Crook RN  Outcome: Ongoing  3/31/2021 0417 by Delfina Crook RN  Outcome: Ongoing

## 2021-03-31 NOTE — FLOWSHEET NOTE
03/31/21 1715   Vital Signs   Pulse 57   Heart Rate Source Monitor     Coreg held at this time due to heart rate below 60.

## 2021-03-31 NOTE — PROGRESS NOTES
Pt called out asking for something for nausea. PRN zofran given at this time. Pt states that she is feeling nauseous after antibiotics stopped. Pt was laying flat on back.

## 2021-04-01 VITALS
WEIGHT: 228.5 LBS | HEART RATE: 68 BPM | HEIGHT: 63 IN | SYSTOLIC BLOOD PRESSURE: 142 MMHG | OXYGEN SATURATION: 96 % | TEMPERATURE: 96.8 F | BODY MASS INDEX: 40.49 KG/M2 | DIASTOLIC BLOOD PRESSURE: 72 MMHG | RESPIRATION RATE: 18 BRPM

## 2021-04-01 LAB
ANION GAP SERPL CALCULATED.3IONS-SCNC: 11 MMOL/L (ref 3–16)
BUN BLDV-MCNC: 12 MG/DL (ref 7–20)
CALCIUM SERPL-MCNC: 8.8 MG/DL (ref 8.3–10.6)
CHLORIDE BLD-SCNC: 100 MMOL/L (ref 99–110)
CO2: 26 MMOL/L (ref 21–32)
CREAT SERPL-MCNC: 1.2 MG/DL (ref 0.6–1.2)
GFR AFRICAN AMERICAN: 53
GFR NON-AFRICAN AMERICAN: 44
GLUCOSE BLD-MCNC: 130 MG/DL (ref 70–99)
GLUCOSE BLD-MCNC: 137 MG/DL (ref 70–99)
GLUCOSE BLD-MCNC: 153 MG/DL (ref 70–99)
HCT VFR BLD CALC: 36.3 % (ref 36–48)
HEMOGLOBIN: 12 G/DL (ref 12–16)
MAGNESIUM: 1.8 MG/DL (ref 1.8–2.4)
MCH RBC QN AUTO: 30.7 PG (ref 26–34)
MCHC RBC AUTO-ENTMCNC: 33.2 G/DL (ref 31–36)
MCV RBC AUTO: 92.5 FL (ref 80–100)
PDW BLD-RTO: 13.9 % (ref 12.4–15.4)
PERFORMED ON: ABNORMAL
PERFORMED ON: ABNORMAL
PHOSPHORUS: 2.7 MG/DL (ref 2.5–4.9)
PLATELET # BLD: 214 K/UL (ref 135–450)
PMV BLD AUTO: 8.2 FL (ref 5–10.5)
POTASSIUM SERPL-SCNC: 3.4 MMOL/L (ref 3.5–5.1)
RBC # BLD: 3.93 M/UL (ref 4–5.2)
SODIUM BLD-SCNC: 137 MMOL/L (ref 136–145)
WBC # BLD: 6.6 K/UL (ref 4–11)

## 2021-04-01 PROCEDURE — 83735 ASSAY OF MAGNESIUM: CPT

## 2021-04-01 PROCEDURE — 99238 HOSP IP/OBS DSCHRG MGMT 30/<: CPT | Performed by: INTERNAL MEDICINE

## 2021-04-01 PROCEDURE — 80048 BASIC METABOLIC PNL TOTAL CA: CPT

## 2021-04-01 PROCEDURE — 6370000000 HC RX 637 (ALT 250 FOR IP): Performed by: INTERNAL MEDICINE

## 2021-04-01 PROCEDURE — 84100 ASSAY OF PHOSPHORUS: CPT

## 2021-04-01 PROCEDURE — 6370000000 HC RX 637 (ALT 250 FOR IP): Performed by: PODIATRIST

## 2021-04-01 PROCEDURE — 6370000000 HC RX 637 (ALT 250 FOR IP): Performed by: NURSE PRACTITIONER

## 2021-04-01 PROCEDURE — 85027 COMPLETE CBC AUTOMATED: CPT

## 2021-04-01 PROCEDURE — 6370000000 HC RX 637 (ALT 250 FOR IP): Performed by: PHYSICIAN ASSISTANT

## 2021-04-01 PROCEDURE — 2580000003 HC RX 258: Performed by: PODIATRIST

## 2021-04-01 RX ORDER — ONDANSETRON 4 MG/1
4 TABLET, FILM COATED ORAL EVERY 8 HOURS PRN
Qty: 20 TABLET | Refills: 0 | Status: ON HOLD
Start: 2021-04-01 | End: 2022-02-19 | Stop reason: HOSPADM

## 2021-04-01 RX ORDER — LACTOBACILLUS RHAMNOSUS GG 10B CELL
1 CAPSULE ORAL
Qty: 30 CAPSULE | Refills: 0 | Status: SHIPPED | OUTPATIENT
Start: 2021-04-02 | End: 2021-05-02

## 2021-04-01 RX ORDER — DOCUSATE SODIUM 100 MG/1
100 CAPSULE, LIQUID FILLED ORAL 2 TIMES DAILY
Qty: 60 CAPSULE | Refills: 0 | Status: SHIPPED | OUTPATIENT
Start: 2021-04-01 | End: 2021-05-01

## 2021-04-01 RX ORDER — HYDROCODONE BITARTRATE AND ACETAMINOPHEN 5; 325 MG/1; MG/1
1 TABLET ORAL EVERY 8 HOURS PRN
Qty: 9 TABLET | Refills: 0 | Status: SHIPPED | OUTPATIENT
Start: 2021-04-01 | End: 2021-04-04

## 2021-04-01 RX ORDER — POTASSIUM CHLORIDE 20 MEQ/1
40 TABLET, EXTENDED RELEASE ORAL ONCE
Status: COMPLETED | OUTPATIENT
Start: 2021-04-01 | End: 2021-04-01

## 2021-04-01 RX ORDER — CLINDAMYCIN HYDROCHLORIDE 300 MG/1
300 CAPSULE ORAL 3 TIMES DAILY
Qty: 30 CAPSULE | Refills: 0 | Status: SHIPPED | OUTPATIENT
Start: 2021-04-01 | End: 2021-04-12 | Stop reason: ALTCHOICE

## 2021-04-01 RX ADMIN — APIXABAN 5 MG: 5 TABLET, FILM COATED ORAL at 09:50

## 2021-04-01 RX ADMIN — PANTOPRAZOLE SODIUM 40 MG: 40 TABLET, DELAYED RELEASE ORAL at 09:50

## 2021-04-01 RX ADMIN — ASPIRIN 81 MG: 81 TABLET, FILM COATED ORAL at 09:50

## 2021-04-01 RX ADMIN — Medication 1 CAPSULE: at 10:00

## 2021-04-01 RX ADMIN — CARVEDILOL 3.12 MG: 3.12 TABLET, FILM COATED ORAL at 09:50

## 2021-04-01 RX ADMIN — AMIODARONE HYDROCHLORIDE 200 MG: 200 TABLET ORAL at 09:50

## 2021-04-01 RX ADMIN — ISOSORBIDE MONONITRATE 120 MG: 60 TABLET ORAL at 09:50

## 2021-04-01 RX ADMIN — RANOLAZINE 500 MG: 500 TABLET, FILM COATED, EXTENDED RELEASE ORAL at 09:50

## 2021-04-01 RX ADMIN — POTASSIUM CHLORIDE 40 MEQ: 1500 TABLET, EXTENDED RELEASE ORAL at 12:58

## 2021-04-01 RX ADMIN — Medication 10 ML: at 09:51

## 2021-04-01 NOTE — PROGRESS NOTES
Reported to Dr. Maame Anthony that Dr. Tessa Tavarez said patient can discharge with PO antibiotics and to follow up with him in 1 week.

## 2021-04-01 NOTE — DISCHARGE SUMMARY
Name:  Nori Gotti  Room:  0221/0221-02  MRN:    [id number]    Discharge Summary      This discharge summary is in conjunction with a complete physical exam done on the day of discharge. Discharging Physician: Dr. Rachel Gold: 3/27/2021  Discharge: 4/1/2021     HPI taken from admission H&P:    The patient is a 72-year-old   female with a known history of uncontrolled type 2 diabetes mellitus,who presents to the hospital with chief complaints of what she describesas around a week and week-and-a-half of the slowly progressive swelling  over the left second toe that she states started when she was wearing poorly fitting shoe, after which the shoe started rubbing on it and started causing it to get swollen. The patient notes that in the last few days she has been soaking it in vinegar and now last night she noticed that it was more red, was slightly painful, had a foul smell to  it and tip of the right second toe was black in color. No nausea or vomiting. No fevers or chills. No other constitutional symptoms.     Please note, the patient has described to me that ever since she was started on the IV fluids, she feels like she is filling up with fluid and she feels like she cannot get the fluid out of her and feels like it is filling up in her chest.  The patient does note that she takes Lasix  at home. Diagnoses this Admission and Hospital Course   Acute left foot diabetic infection with osteomyelitis of left second toe and left foot cellulitis  -2/2 uncontrolled type 2 diabetes mellitus. - Left 2nd toe was gangrenous . - consulted podiatry -> s/p left partial foot amputation on 3/29/2021.   - Surgical Cx with + osteo, okay for d/c home on PO Abx, podiatry feels good debridement/ampuation of the osteomyelitis   -Continue IV antibiotics IV Zosyn, day #5. She is having nausea and vomiting with Zyvox, she has received 3 days of Zyvox  And d/c's,  Added clindamycin D#2.   Still having nausea with antibiotic infusions. Added probiotic.   - No wound Cx taken   - d/c home on Clinda with probiotics and anti-emetics x 10 days      Chronic systolic congestive heart failure  Ischemic cardiomyopathy  Coronary artery disease.  - Compensated   -Continue aspirin and nitrates and Ranexa as at home      Stage 3 chronic kidney disease.  -Monitor renal function closely     Hypertension.  - BP stable     Dyslipidemia. - not on a statin 2/2 allergies     Morbid Obesity  - Body mass index is 40.48 kg/m². - Complicating assessment and treatment. Placing patient at risk for multiple co-morbidities as well as early death and contributing to the patient's presentation.   - Counseled on weight loss. Diabetes mellitus type 2  -Blood sugar stable  -Check A1c: 6.9  -Continue scale insulin coverage     Diabetic gastroparesis  Constipation  -dulcolax Suppository given . + BM     Chronic atrial fibrillation  -Continue amiodarone and Coreg as at home.  -On chronic anticoagulation.    - Eliquis dose held for surgery-->Now resumed. Hypokalemia   - PO replacement     Procedures (Please Review Full Report for Details)  s/p left partial foot amputation    Consults    Podiatry     Physical Exam at Discharge:    BP (!) 142/72   Pulse 68   Temp 96.8 °F (36 °C) (Oral)   Resp 18   Ht 5' 3\" (1.6 m)   Wt 228 lb 8 oz (103.6 kg)   SpO2 96%   BMI 40.48 kg/m²     CNS:  The patient is alert and oriented to time, place, and person. PSYCH:  The patient is cooperative, answering questions appropriately. HEENT:  Eyes:  Pupils are reactive to light.  ENT:  Extraocular muscle  movements are intact. RESPIRATORY SYSTEM:  No obvious rales, rubs, or rhonchi. CVS:  S1 and S2 are heard.  Currently, the patient has a regular rate  and rhythm. ABDOMEN:  Nondistended.  Morbidly obese. NT, BS +  SKIN, MUSCULOSKELETAL EXAM:   Left foot is in dressing .   No edema   NEURO: nonfocal  PSYCH : No anxiety or agitation    CBC:   Recent Labs 03/31/21  0524 04/01/21  0540   WBC 7.6 6.6   HGB 12.6 12.0   HCT 38.1 36.3   MCV 93.9 92.5    214     BMP:   Recent Labs     03/31/21  0524 04/01/21  0540    137   K 3.6 3.4*    100   CO2 24 26   PHOS 3.2 2.7   BUN 12 12   CREATININE 1.3* 1.2     CULTURES  SARS-CoV-2, NAAT Not Detected      RADIOLOGY  XR FOOT LEFT (2 VIEWS)   Final Result   1. Interval amputation of the second toe with postoperative changes evident,   including soft tissue gas at the amputation stump. 2. Additional stable postoperative changes, as detailed above. 3. Stable chronic multifocal osteoarthritis. XR TOE LEFT (MIN 2 VIEWS)   Final Result   Osteomyelitis with bone loss of the 2nd digit phalanges             Discharge Medications     Medication List      START taking these medications    clindamycin 300 MG capsule  Commonly known as: CLEOCIN  Take 1 capsule by mouth 3 times daily for 10 days  Notes to patient: Clindamycin (Cleocin®)           Use: treat infections. Side effects: belly pain, nausea, loose stools   or vaginal yeast infections in females. docusate sodium 100 MG capsule  Commonly known as: COLACE  Take 1 capsule by mouth 2 times daily  Notes to patient: Docusate Sodium (Colace®)  Use: stool softener, prevents or treats constipation    Side effects: usually none     HYDROcodone-acetaminophen 5-325 MG per tablet  Commonly known as: Norco  Take 1 tablet by mouth every 8 hours as needed for Pain for up to 3 days. Intended supply: 3 days. Take lowest dose possible to manage pain     lactobacillus capsule  Take 1 capsule by mouth daily (with breakfast)  Start taking on: April 2, 2021     ondansetron 4 MG tablet  Commonly known as: Zofran  Take 1 tablet by mouth every 8 hours as needed for Nausea  Notes to patient: Ondansetron (Zofran®)  Use: nausea and vomiting. Side effects: headache, weakness or dizziness.         CONTINUE taking these medications    amiodarone 200 MG tablet  Commonly known Bring a paper prescription for each of these medications  · HYDROcodone-acetaminophen 5-325 MG per tablet       Discharged in stable condition to home     Follow Up:   Follow up with PCP, podiatry OP         Dominique Hoskins MD  4/1/21

## 2021-04-01 NOTE — PROGRESS NOTES
Shift assessment complete; see flow sheet. Scheduled medications administered; See MAR. IV flushed without difficulty. Pt denies pain  at this time. Pt denies any needs at this time.  Pt educated on use of call light and to call out with needs, verbalized understanding, bed in low locked position for pt safety

## 2021-04-01 NOTE — PLAN OF CARE
tissue perfusion will improve  4/1/2021 1104 by Rach Napoles RN  Outcome: Ongoing  4/1/2021 0112 by Marlon Delcid RN  Outcome: Ongoing     Problem: Infection:  Goal: Will remain free from infection  Description: Will remain free from infection  4/1/2021 1104 by Rach Napoles RN  Outcome: Ongoing  4/1/2021 0112 by Marlon Delcid RN  Outcome: Ongoing     Problem: Safety:  Goal: Free from accidental physical injury  Description: Free from accidental physical injury  4/1/2021 1104 by Rach Napoles RN  Outcome: Ongoing  4/1/2021 0112 by Marlon Delcid RN  Outcome: Ongoing     Problem: OXYGENATION/RESPIRATORY FUNCTION  Goal: Patient will maintain patent airway  4/1/2021 1104 by Rach Napoles RN  Outcome: Ongoing  4/1/2021 0112 by Marlon Delcid RN  Outcome: Ongoing  Goal: Patient will achieve/maintain normal respiratory rate/effort  Description: Respiratory rate and effort will be within normal limits for the patient  4/1/2021 1104 by Rach Napoles RN  Outcome: Ongoing  4/1/2021 0112 by Marlon Delcid RN  Outcome: Ongoing     Problem: HEMODYNAMIC STATUS  Goal: Patient has stable vital signs and fluid balance  4/1/2021 1104 by Rach Napoles RN  Outcome: Ongoing  4/1/2021 0112 by Marlon Delcid RN  Outcome: Ongoing     Problem: FLUID AND ELECTROLYTE IMBALANCE  Goal: Fluid and electrolyte balance are achieved/maintained  4/1/2021 1104 by Rach Napoles RN  Outcome: Ongoing  4/1/2021 0112 by Marlon Delcid RN  Outcome: Ongoing     Problem: ACTIVITY INTOLERANCE/IMPAIRED MOBILITY  Goal: Mobility/activity is maintained at optimum level for patient  4/1/2021 1104 by Rach Napoles RN  Outcome: Ongoing  4/1/2021 0112 by Marlon Delcid RN  Outcome: Ongoing

## 2021-04-01 NOTE — PROGRESS NOTES
Is patient OK to discharge home on PO antibiotics? Dr. Trinidad Aguayo would like to D/C her today. Thank you. Above message sent to  [employer].

## 2021-04-01 NOTE — PROGRESS NOTES
04/01/21 0735   Vital Signs   Temp 96.8 °F (36 °C)   Temp Source Oral   Pulse 70   Heart Rate Source Monitor   Resp 18   BP (!) 162/77   BP Location Left upper arm   Patient Position Semi fowlers   Level of Consciousness Alert (0)   MEWS Score 1   Oxygen Therapy   SpO2 98 %   O2 Device None (Room air)       Vitals as above this morning, assessment completed. Patient reports that her nausea is much improved today. Patient took her regularly scheduled medications per order, see eMAR. Patient denies needs, has call light within reach.

## 2021-04-01 NOTE — PROGRESS NOTES
IV removed.  Patient verbalized understanding of discharge instructions and has in her possession her paper prescription for norco.

## 2021-04-01 NOTE — DISCHARGE INSTR - COC
 Influenza Virus Vaccine 09/15/2014    Pneumococcal Polysaccharide ([id number]) 09/25/2013       Active Problems:  Patient Active Problem List   Diagnosis Code    Hyperlipidemia E78.5    Hypertension I10    CAD (coronary artery disease) I25.10    Diabetes mellitus ([facility] [address] E11.9    Thyroid disease E07.9    Allergic rhinitis J30.9    Sleep apnea N87.68    Metabolic encephalopathy X84.85    Hepatic steatosis K76.0    Cholelithiasis K80.20    Acute renal failure (ARF) ([facility]) N17.9    Small bowel obstruction ([facility]) K56.609    Ischemic chest pain (HCC) I20.9    Unstable angina ([facility]) I20.0    Vomiting R11.10    Obesity E66.9    NSTEMI (non-ST elevated myocardial infarction) ([facility]) I21.4    Chest pain R07.9    LBBB (left bundle branch block) I44.7    Atrial fibrillation with rapid ventricular response ([facility]) I48.91    Aortocoronary bypass status Z95.1    Acute respiratory failure with hypoxia ([facility]) J96.01    Acute hypoxemic respiratory failure ([facility]) J96.01    Angina, class IV ([facility]) I20.9    History of tobacco abuse Z87.891    Diabetic foot infection ([facility]) E11.628, L08.9    Acute hematogenous osteomyelitis of left foot ([facility]) M86.072    Atrial fibrillation ([facility]) I48.91    Osteomyelitis of right foot ([facility]) M86.9    Nausea R11.0       Isolation/Infection:   Isolation          No Isolation        Patient Infection Status     None to display          Nurse Assessment:  Last Vital Signs: BP (!) 142/72   Pulse 68   Temp 96.8 °F (36 °C) (Oral)   Resp 18   Ht 5' 3\" (1.6 m)   Wt 228 lb 8 oz (103.6 kg)   SpO2 96%   BMI 40.48 kg/m²     Last documented pain score (0-10 scale): Pain Level: 0  Last Weight:   Wt Readings from Last 1 Encounters:   03/27/21 228 lb 8 oz (103.6 kg)     Mental Status:  {IP PT MENTAL STATUS:20030}    IV Access:  { [facility] IV ACCESS:250176182}    Nursing Mobility/ADLs:  Walking   {P DME WITF:863977438}  Transfer  {[facility] DME CCQV:091410296}  Bathing  {[facility] DME ZSCN:055624532} Dressing  {P DME WOGO:021660338}  Toileting  {[facility] DME AFRS:290697518}  Feeding  {[facility] DME CONB:184518128}  Med Admin  {P DME IZCT:252755498}  Med Delivery   {[facility] MED Delivery:744231527}    Wound Care Documentation and Therapy:  Wound 21 Toe (Comment  which one) Anterior; Left diabetic L 2nd toe wound (Active)   Dressing Status Clean;Dry; Intact 21 0750   Dressing/Treatment Coban/self-adherent bandages 21 0750   Wound Assessment Eschar moist 21 1100   Drainage Amount None 21 1725   Drainage Description Thin 21 1100   Odor None 21 1725   Number of days: 4        Elimination:  Continence:   · Bowel: {YES / PV:72136}  · Bladder: {YES / DJ:88584}  Urinary Catheter: {Urinary Catheter:316775418}   Colostomy/Ileostomy/Ileal Conduit: {YES / VZ:98511}       Date of Last BM: ***    Intake/Output Summary (Last 24 hours) at 2021 1444  Last data filed at 2021 1429  Gross per 24 hour   Intake 480 ml   Output    Net 480 ml     I/O last 3 completed shifts:   In: 18 [P.O.:480]  Out: -     Safety Concerns:     [address] Safety Concerns:099943132}    Impairments/Disabilities:      [address] Impairments/Disabilities:596131363}    Nutrition Therapy:  Current Nutrition Therapy:   [address] Diet List:876907753}    Routes of Feeding: {[facility] DME Other Feedings:851907525}  Liquids: {Slp liquid thickness:31696}  Daily Fluid Restriction: {[facility] DME Yes amt example:929876104}  Last Modified Barium Swallow with Video (Video Swallowing Test): {Done Not Done MQAF:864184096}    Treatments at the Time of Hospital Discharge:   Respiratory Treatments: ***  Oxygen Therapy:  {Therapy; copd oxygen:82707}  Ventilator:    { PONCE Vent FJKX:884736584}    Rehab Therapies: {THERAPEUTIC INTERVENTION:9375486172}  Weight Bearing Status/Restrictions: [address] [employer]  Weight Bearin}  Other Medical Equipment (for information only, NOT a DME order):  {EQUIPMENT:942663116}  Other Treatments: ***    Patient's personal

## 2021-04-01 NOTE — CARE COORDINATION
DISCHARGE ORDER  Date/Time 2021 2:48 PM  Completed by: Gal Orozco, Case Management    Patient Name: Iva Kiran      : 1945  Admitting Diagnosis: Diabetic foot infection ([facility] [address] [R60.253, L08.9]      Admit order Date and Status 3/27/21 inpt  (verify MD's last order for status of admission)      Noted discharge order. If applicable PT/OT recommendation at Discharge: N/A  DME recommendation by PT/OT:N/A  Confirmed discharge plan  : Yes  with whom patient  If pt confirmed DC plan does family need to be contacted by CM No  Discharge Plan: Order for dc noted. Spoke with pt at bedside and pt cont plan to go to home at dc. Discussed HHC and pt is agreeable and chooses [facility]. States niece will be staying with her tonite and her sister will be tomorrow and staying with her to assist.  Referral called to [facility] and face sheet faxed. Await return call on acceptance/denial. Chart reviewed and no other dc needs identified. Received call back from Elaine Herzog at [facility] stating can accept pt. Requested inf sent. Reviewed chart. Role of discharge planner explained and patient verbalized understanding. Discharge order is noted. Has Home O2 in place on admit:  No  Informed of need to bring portable home O2 tank on day of discharge for nursing to connect prior to leaving:   Not Indicated  Verbalized agreement/Understanding:   Not Indicated  Pt is being d/c'd to home today. Pt's O2 sats are 98% on RA. Discharge timeout done with  Nsg, CM and pt All discharge needs and concerns addressed.

## 2021-04-02 ENCOUNTER — CARE COORDINATION (OUTPATIENT)
Dept: CASE MANAGEMENT | Age: 76
End: 2021-04-02

## 2021-04-02 NOTE — CARE COORDINATION
Patient contacted regarding Omari Bettencourt. Call within 2 business days of discharge: Yes  Discharge Date: 4/1/21   RARS: Readmission Risk Score: 14       Discussed COVID-19 related testing which was available at this time. COVID-19 Not Detected on 3/29/2021. Patient informed of results, if available? Yes    Patient has following risk factors of: diabetes and morbid obesity, CAD, HTN, CKD. Care Transition Nurse contacted the patient by telephone to perform post discharge assessment. Provided introduction to self, and explanation of the CTN role, and reason for call due to risk factors for infection and/or exposure to COVID-19. Symptoms reviewed with patient who verbalized the following symptoms: no new symptoms and no worsening symptoms. Due to no new or worsening symptoms encounter was not routed to provider for escalation. CTN reviewed discharge instructions, medical action plan and red flags such as increased shortness of breath, increasing fever and signs of decompensation with patient who verbalized understanding. Reviewed and educated patient on any new and changed medications related to discharge diagnosis. Was patient discharged with a pulse oximeter? No    Discussed exposure protocols and quarantine with CDC Guidelines What to do if you are sick with coronavirus disease 2019.  Patient was given an opportunity for questions and concerns. The patient can contact the [facility] exposure line [phone], [facility]: ([phone]) and/or the PCP office for questions related to their healthcare. Advance Care Planning:   Does patient have an Advance Directive:  reviewed and current. Discussed follow-up appointments.  If no appointment was previously scheduled, appointment scheduling offered: Yes  [facility] follow up appointment(s):   Future Appointments   Date Time Provider Leslie Lowry   4/21/2021  1:00 PM Tanja Gilmore MD P

## 2021-04-08 ENCOUNTER — CARE COORDINATION (OUTPATIENT)
Dept: CASE MANAGEMENT | Age: 76
End: 2021-04-08

## 2021-04-08 NOTE — CARE COORDINATION
CTN follow up outreach regarding COVID-19 risk. Care Transition Nurse contacted the patient by telephone to perform follow-up assessment. Symptoms reviewed with patient who verbalized the following symptoms: no new symptoms and no worsening symptoms. Due to no new or worsening symptoms encounter was not routed to provider for escalation. Was patient discharged with a pulse oximeter? No     Education provided regarding infection prevention, and signs and symptoms of COVID-19 and when to seek medical attention with patient who verbalized understanding. Discussed exposure protocols and quarantine from [address] you at higher risk for severe illness 2019 and given an opportunity for questions and concerns. The patient can contact the COVID-19 hotline [phone] or PCP office for questions related to their healthcare. From CDC: Are you at higher risk for severe illness? For more information on steps you can take to protect yourself, see CDC's How to Protect Yourself       Confirms SN visited on Monday as planned. She delayed PT until after f/up with Dr Mohit Maher on 4/12. Has not been weighing daily. States she has not felt she needed the add'l prn Lasix. Denies fever, chills, SOB, cp, palpitations. SN was out again today for dressing change and she knows how to reach for prn concerns. Denies needs from me at this time. CTN provided contact information for future reference. Plan for follow-up call in 7-14 days based on severity of symptoms and risk factors.     Zoraida Mendoza, RN  Care Transition Nurse  [phone] mobile    Future Appointments   Date Time Provider Leslie Lowry   4/12/2021  8:00 AM [facility] [place]   4/21/2021  1:00 PM Tricia Portillo MD [facility]

## 2021-04-12 ENCOUNTER — HOSPITAL ENCOUNTER (OUTPATIENT)
Dept: WOUND CARE | Age: 76
Discharge: HOME | End: 2021-04-12
Payer: MEDICARE

## 2021-04-12 VITALS
WEIGHT: 228.8 LBS | HEART RATE: 75 BPM | RESPIRATION RATE: 22 BRPM | DIASTOLIC BLOOD PRESSURE: 81 MMHG | HEIGHT: 63 IN | SYSTOLIC BLOOD PRESSURE: 162 MMHG | BODY MASS INDEX: 40.54 KG/M2 | TEMPERATURE: 97.8 F

## 2021-04-12 PROCEDURE — 99214 OFFICE O/P EST MOD 30 MIN: CPT

## 2021-04-12 ASSESSMENT — PAIN SCALES - GENERAL
PAINLEVEL_OUTOF10: 0
PAINLEVEL_OUTOF10: 0

## 2021-04-12 NOTE — PLAN OF CARE
Pt to the [facility] for initial appointment. Pt had surgery on 3/29/21 sutures intact. Pt to have betadine, silver foam, and dry dressing to wound. Home care to change dressing on Wednesday and Friday. Pt to follow up in the [facility] in 1 week. Dr Alec Urban discussed with patient the need to stay off of foot as much as she can this week. Pt verbalized an understanding. No arterial studies at this time. Pt to follow up in the [facility] in 1 week. Discharge instructions reviewed with patient, all questions answered, copy given to patient. Dressings were applied to all wounds per M.D. Instructions at this visit.

## 2021-04-12 NOTE — PROGRESS NOTES
the HPI; the remainder of the ROS was reviewed and is negative. Objective:     BP (!) 162/81   Pulse 75   Temp 97.8 °F (36.6 °C) (Oral)   Resp 22   Ht 5' 3\" (1.6 m)   Wt 228 lb 12.8 oz (103.8 kg)   BMI 40.53 kg/m²   General Appearance: alert and oriented to person, place and time, well developed and well- nourished, in no acute distress  Skin: warm and dry, no rash or erythema  Head: normocephalic and atraumatic  Eyes: pupils equal, round, and reactive to light, extraocular eye movements intact, conjunctivae normal  ENT: tympanic membrane, external ear and ear canal normal bilaterally, nose without deformity, nasal mucosa and turbinates normal without polyps  Neck: supple and non-tender without mass, no thyromegaly or thyroid nodules, no cervical lymphadenopathy  Pulmonary/Chest: clear to auscultation bilaterally- no wheezes, rales or rhonchi, normal air movement, no respiratory distress  Cardiovascular: normal rate, regular rhythm, normal S1 and S2, no murmurs, rubs, clicks, or gallops, distal pulses intact, no carotid bruits  Abdomen: soft, non-tender, non-distended, normal bowel sounds, no masses or organomegaly. Dorsalis pedis pulse left palpable  Posterior tibial pulse left palpable  Dorsalis pedis pulse right palpable  Posterior tibial pulse right palpable  Protective sensation absent bilateral LE      Sutures intact left 2nd digit amputation site with dried drainage at incision site. Very mild erythema and edema noted. No increase in skin temperature noted. No abscess noted. No malodor noted. Today's ulcer measurements are in the wound documentation flowsheet.      Wound measurements:                 LABS  Lab Results   Component Value Date    LABA1C 6.9 03/29/2021         Assessment:     Patient Active Problem List   Diagnosis Code    Hyperlipidemia E78.5    Hypertension I10    CAD (coronary artery disease) I25.10    Diabetes mellitus ([facility] [address] E11.9    Thyroid disease E07.9    Allergic rhinitis J30.9    Sleep apnea I33.09    Metabolic encephalopathy C30.52    Hepatic steatosis K76.0    Cholelithiasis K80.20    Acute renal failure (ARF) ([facility]) N17.9    Small bowel obstruction ([facility]) K56.609    Ischemic chest pain ([facility]) I20.9    Unstable angina ([facility]) I20.0    Vomiting R11.10    Obesity E66.9    NSTEMI (non-ST elevated myocardial infarction) ([facility]) I21.4    Chest pain R07.9    LBBB (left bundle branch block) I44.7    Atrial fibrillation with rapid ventricular response ([facility]) I48.91    Aortocoronary bypass status Z95.1    Acute respiratory failure with hypoxia ([facility]) J96.01    Acute hypoxemic respiratory failure ([facility]) J96.01    Angina, class IV ([facility]) I20.9    History of tobacco abuse Z87.891    Diabetic foot infection ([facility]) E11.628, L08.9    Acute hematogenous osteomyelitis of left foot ([facility]) M86.072    Atrial fibrillation ([facility]) I48.91    Osteomyelitis of right foot ([facility]) M86.9    Nausea R11.0       Assessment of today's active condition(s): diabetic foot ulcer left - S/P left 2nd digit amputation, diabetes mellitus. Factors contributing to occurrence and/or persistence of the chronic ulcer include diabetes. Sharp debridement is not indicated today, based upon the exam findings in the ulcer(s) above. Discharge plan:     Treatment in the wound care center today:  . Home treatment: good handwashing before and after any dressing changes. Cleanse ulcer with saline or soap & water before dressing change. May use Vaseline (petrolatum), Aquaphor, Aveeno, CeraVe, Cetaphil, Eucerin, Lubriderm, etc for dry skin. Dressing type for home: Betadine and Polymem to amputation site changed W,F via [address]. Minimal walking. Written discharge instructions given to patient. Offload ulcer(s) as directed. Elevate leg(s) as directed. Follow up in 1 week.              Electronically signed by Ash Nova DPM on 4/12/2021 at 10:12 AM.

## 2021-04-14 ENCOUNTER — CARE COORDINATION (OUTPATIENT)
Dept: CASE MANAGEMENT | Age: 76
End: 2021-04-14

## 2021-04-14 NOTE — CARE COORDINATION
Patient resolved from the [address] Transitions episode on 4/14/2021. Patient/family has been provided the following resources and education related to COVID-19:                         Signs, symptoms and red flags related to COVID-19            CDC exposure and quarantine guidelines            [facility] exposure contact - [phone]            Contact for their local Department of Health               Patient currently reports that the following symptoms have improved:  no new/worsening symptoms     Was supposed to have 2nd dose of Moderna COVID vaccine today but conflicted with her SN visit for wound care visit change. Was at [facility] drive though site. She plans to call the [facility] Dept to see if they will assist her to find it. Denies any issues/concerns at this time. Continues with Dorothea Ansari A. Knows how to reach for prn concerns. Continues to follow with Dr Renetta Chen at wound care clinic. No further outreach scheduled with this CTN. Episode of Care resolved. Patient has this CTN contact information if future needs arise.     Enedelia Cox RN  Care Transition Nurse  [phone] mobile    Future Appointments   Date Time Provider Leslie Lowry   4/19/2021 12:30 PM ABY Womack   4/21/2021  1:00 PM Kam Martinez MD P CLER LOUANN FORBES

## 2021-04-15 RX ORDER — ISOSORBIDE MONONITRATE 120 MG/1
TABLET, EXTENDED RELEASE ORAL
Qty: 90 TABLET | Refills: 3 | Status: SHIPPED | OUTPATIENT
Start: 2021-04-15 | End: 2022-04-26

## 2021-04-19 ENCOUNTER — HOSPITAL ENCOUNTER (OUTPATIENT)
Dept: WOUND CARE | Age: 76
Discharge: HOME | End: 2021-04-19
Payer: MEDICARE

## 2021-04-19 VITALS
TEMPERATURE: 98 F | DIASTOLIC BLOOD PRESSURE: 75 MMHG | HEIGHT: 63 IN | RESPIRATION RATE: 22 BRPM | BODY MASS INDEX: 40.75 KG/M2 | HEART RATE: 74 BPM | WEIGHT: 230 LBS | SYSTOLIC BLOOD PRESSURE: 147 MMHG

## 2021-04-19 PROCEDURE — 99213 OFFICE O/P EST LOW 20 MIN: CPT

## 2021-04-19 RX ORDER — LIDOCAINE 40 MG/G
CREAM TOPICAL PRN
Status: DISCONTINUED | OUTPATIENT
Start: 2021-04-19 | End: 2021-04-20 | Stop reason: HOSPADM

## 2021-04-19 NOTE — PROGRESS NOTES
[facility] Progress Note      Nena Torres     : 1945    DATE OF VISIT:  2021    Subjective:     Nena Torres is a 70 y.o. female who has a chief complaint of a diabetic ulcer located on the left foot. States feeling well. Has been staying off her foot. Has [facility]. Ms. Marge Vasquez has a past medical history of Allergic rhinitis, Arthritis, CAD (coronary artery disease), CHF (congestive heart failure) ([facility] [address] Cholelithiasis, Diabetes mellitus ([facility] [address] Gastroparesis, Hepatic steatosis, Hyperlipidemia, Hypertension, Peripheral neuropathy, Sleep apnea, Thyroid disease, and Vitamin B12 deficiency. She has a past surgical history that includes Coronary artery bypass graft (); Foot surgery; colectomy (); Colonoscopy; Upper gastrointestinal endoscopy; Tonsillectomy and adenoidectomy; Tubal ligation; other surgical history (2012); other surgical history (Right, 3/20/13); Coronary angioplasty; Cholecystectomy; Cardiac catheterization (2017); Diagnostic Cardiac Cath Lab Procedure; Toe amputation (Left, 3/29/2021); eye surgery; Endoscopy, colon, diagnostic; and hernia repair. Her family history includes Coronary Art Dis in her brother, father, and sister; Stroke in her mother. Ms. Marge Vasquez reports that she quit smoking about 22 years ago. She has a 30.00 pack-year smoking history. She has never used smokeless tobacco. She reports that she does not drink alcohol or use drugs. Her current medication list consists of Vitamin D3, amiodarone, apixaban, aspirin, carvedilol, desonide, diphenhydrAMINE, docusate sodium, fish oil, fluticasone, furosemide, gabapentin, glipiZIDE, isosorbide mononitrate, lactobacillus, levothyroxine, nitroGLYCERIN, ondansetron, pantoprazole, ranolazine, and tolterodine.     Allergies: Niacin and related, Adhesive tape, Other, Statins depletion therapy, and Vancomycin    Pertinent items from the review of systems are discussed in the HPI; the remainder of the ROS was reviewed and is negative. Objective:     BP (!) 147/75   Pulse 74   Temp 98 °F (36.7 °C) (Oral)   Resp 22   Ht 5' 3\" (1.6 m)   Wt 230 lb (104.3 kg)   BMI 40.74 kg/m²   General Appearance: alert and oriented to person, place and time, well developed and well- nourished, in no acute distress  Skin: warm and dry, no rash or erythema  Head: normocephalic and atraumatic  Eyes: pupils equal, round, and reactive to light, extraocular eye movements intact, conjunctivae normal  ENT: tympanic membrane, external ear and ear canal normal bilaterally, nose without deformity, nasal mucosa and turbinates normal without polyps  Neck: supple and non-tender without mass, no thyromegaly or thyroid nodules, no cervical lymphadenopathy  Pulmonary/Chest: clear to auscultation bilaterally- no wheezes, rales or rhonchi, normal air movement, no respiratory distress  Cardiovascular: normal rate, regular rhythm, normal S1 and S2, no murmurs, rubs, clicks, or gallops, distal pulses intact, no carotid bruits  Abdomen: soft, non-tender, non-distended, normal bowel sounds, no masses or organomegaly. Dorsalis pedis pulse left palpable  Posterior tibial pulse left palpable  Dorsalis pedis pulse right palpable  Posterior tibial pulse right palpable  Protective sensation absent bilateral LE      Sutures intact left 2nd digit amputation site with dried drainage at incision site. Very mild erythema and edema noted. No increase in skin temperature noted. No abscess noted. No malodor noted. Today's ulcer measurements are in the wound documentation flowsheet.      Wound measurements:                 LABS  Lab Results   Component Value Date    LABA1C 6.9 03/29/2021         Assessment:     Patient Active Problem List   Diagnosis Code    Hyperlipidemia E78.5    Hypertension I10    CAD (coronary artery disease) I25.10    Diabetes mellitus ([facility]) E11.9    Thyroid disease E07.9    Allergic rhinitis J30.9    Sleep apnea H33.93    Metabolic encephalopathy G53.29    Hepatic steatosis K76.0    Cholelithiasis K80.20    Acute renal failure (ARF) ([facility]) N17.9    Small bowel obstruction ([facility]) K56.609    Ischemic chest pain ([facility]) I20.9    Unstable angina ([facility]) I20.0    Vomiting R11.10    Obesity E66.9    NSTEMI (non-ST elevated myocardial infarction) ([facility]) I21.4    Chest pain R07.9    LBBB (left bundle branch block) I44.7    Atrial fibrillation with rapid ventricular response ([facility]) I48.91    Aortocoronary bypass status Z95.1    Acute respiratory failure with hypoxia ([facility]) J96.01    Acute hypoxemic respiratory failure ([facility]) J96.01    Angina, class IV ([facility]) I20.9    History of tobacco abuse Z87.891    Diabetic foot infection ([facility]) E11.628, L08.9    Acute hematogenous osteomyelitis of left foot ([facility]) M86.072    Atrial fibrillation ([facility]) I48.91    Osteomyelitis of right foot ([facility]) M86.9    Nausea R11.0       Assessment of today's active condition(s): diabetic foot ulcer left - S/P left 2nd digit amputation, diabetes mellitus. Factors contributing to occurrence and/or persistence of the chronic ulcer include diabetes. Sharp debridement is not indicated today, based upon the exam findings in the ulcer(s) above. Discharge plan:     Treatment in the wound care center today:  . Home treatment: good handwashing before and after any dressing changes. Cleanse ulcer with saline or soap & water before dressing change. May use Vaseline (petrolatum), Aquaphor, Aveeno, CeraVe, Cetaphil, Eucerin, Lubriderm, etc for dry skin. Dressing type for home: Steristrips applied. Betadine and Polymem to amputation site changed W,F via [facility]. Minimal walking. Written discharge instructions given to patient. Offload ulcer(s) as directed. Elevate leg(s) as directed. Follow up in 1 week. Sutures removed.          Electronically signed by Reed Mckeon DPM on 4/19/2021 at 4:58 PM.

## 2021-04-20 NOTE — PROGRESS NOTES
[facility]   Cardiac Followup    Referring Provider:  DARIA Barros - CNP     No chief complaint on file. Subjective: Mrs Gary Jasso is being seen today for follow up of CAD s/p 3V CABG in 2005, HTN, and HLD    Past Medical History:    Mrs. Gary Jasso is a 70 y.o. female with hx of CAD s/p 3V CABG in 2005, HLD, HTN, PAF dx 2/20 on eliquis, sleep apnea and uses CPAP, Charcot-foot with neuropathy, s/p fab 10/2/17, and intolerance to statins and niacin due to myalgias. She had right foot surgery for her Charcot in April 2012. Note lexiscan myoview stress test on 04/13/2015 was abnormal with small amount of ischemia anteroseptal wall at apex and lateral wall at base. Note cardiac cath on 4/16/15 which showed patent LIMA-LAD and vein grafts to RCA and OM1 with EF=55%. Diffuse disease in small vessels distal to grafts and medical management felt best without PCI at this time. Most recent [facility] 7/18/17 no evidence of myocardial ischemia or scar;  EF>65%  with uniform wall motion. Miqvbqjf89/27/17 with CP and NSTEMI (peak troponin 0.29). Note [facility] 12/27/17 showed LM 50% LAD 50% prox, 100% mid Cx 80% mid OM2 90%  Cx/Om system is small and diffusely diseased % ostium (from prior cath); LIMA to LAD patent; SVG to RCA 50% distal, and SVG to OM2 100% ostially occluded (new since prior cath 2015, LVEF 20%) with severe small artery disease. Readmitted and diagnosed with NSTEMI mid-June 2018 and new LBBB. Most recent [facility] 6/15/18 showed severe 3V CAD with occluded SVG to the OM; Patent SVG to the RCA; Patent LIMA to the LAD; disease risky and NOT amenable to PCI; LVEF on cath 20%  Most recent MUGA scan 8/14/18 EF of 60% (no change from 1/23/18 showing EF=57%). Admitted 2/22/20 c/o CP. Initial EKG Afib  bpm. Most recent  ECHO 2/25/20 EF 55-60%. no wall abnls. Moderate cLVH. Grade I DD with normal filling pressure. Mild MR (no change from 12/17 study).  Most recent EKG 3/3/20 SR, nonspecific ST depression. She was admitted to the hospital 3/27/21 for osteomyelitis of left second toe and cellulitis of left foot. She had left partial foot amputation 3/29/21. Today she reports     Past Medical History:   has a past medical history of Allergic rhinitis, Arthritis, CAD (coronary artery disease), CHF (congestive heart failure) ([facility] [address] Cholelithiasis, Diabetes mellitus ([facility] [address] Gastroparesis, Hepatic steatosis, Hyperlipidemia, Hypertension, Peripheral neuropathy, Sleep apnea, Thyroid disease, and Vitamin B12 deficiency. Surgical History:   has a past surgical history that includes Coronary artery bypass graft (2005); Foot surgery; colectomy (1998); Colonoscopy; Upper gastrointestinal endoscopy; Tonsillectomy and adenoidectomy; Tubal ligation; other surgical history (4/17/2012); other surgical history (Right, 3/20/13); Coronary angioplasty; Cholecystectomy; Cardiac catheterization (12/27/2017); Diagnostic Cardiac Cath Lab Procedure; Toe amputation (Left, 3/29/2021); eye surgery; Endoscopy, colon, diagnostic; and hernia repair. Social History:  She is  and lives alone in [place] [facility]. She reports that she quit smoking about 13 years ago. She has never used smokeless tobacco. She reports that she does not drink alcohol or use illicit drugs. Family History:  family history includes Coronary Art Dis in her brother, father, and sister; Stroke in her mother. Home Medications:  Prior to Admission medications    Medication Sig Start Date End Date Taking? Authorizing Provider   loratadine (ALAVERT) 10 MG tablet Take 10 mg by mouth 2 times daily as needed. Yes Historical Provider, MD   GLIPIZIDE Take 5 mg by mouth 2 times daily (with meals). Yes Historical Provider, MD   gabapentin (NEURONTIN) 600 MG tablet Take 1,200 mg by mouth 3 times daily.  May take up to 4x per day if needed for neuropathy pain   Yes Historical Provider, MD   tizanidine (ZANAFLEX) 4 MG tablet Take 8 mg by mouth nightly. Yes Historical Provider, MD   tramadol (ULTRAM) 50 MG tablet Take 50 mg by mouth every 4 hours as needed. States uses very rare and only when neurontin ineffective. Yes Historical Provider, MD   metformin (GLUCOPHAGE) 1000 MG tablet Take 1,000 mg by mouth daily (with breakfast). Yes Historical Provider, MD   levothyroxine (SYNTHROID) 50 MCG tablet Take 50 mcg by mouth every morning. Yes Historical Provider, MD   topiramate (TOPAMAX) 25 MG tablet Take 25 mg by mouth daily. Yes Historical Provider, MD   tolterodine (DETROL LA) 4 MG ER capsule Take 4 mg by mouth 2 times daily. Yes Historical Provider, MD   diphenhydrAMINE (BENADRYL) 25 MG tablet Take 25 mg by mouth nightly as needed. Yes Historical Provider, MD   ranitidine (ZANTAC 75) 75 MG tablet Take 75 mg by mouth daily. Yes Historical Provider, MD   Cholecalciferol (VITAMIN D3) 1000 UNIT CAPS Take 1 capsule by mouth daily. Yes Historical Provider, MD   Omega-3 Fatty Acids (FISH OIL) 1000 MG CAPS Take 1,000 mg by mouth daily. Yes Historical Provider, MD   Red Yeast Rice 600 MG TABS Take 1 tablet by mouth daily. Yes Historical Provider, MD   lisinopril (PRINIVIL;ZESTRIL) 10 MG tablet Take 5 mg by mouth every morning. Yes Historical Provider, MD   furosemide (LASIX) 40 MG tablet Take 40 mg by mouth daily as needed. Yes Historical Provider, MD   aspirin 81 MG EC tablet Take 81 mg by mouth daily. Historical Provider, MD        Allergies:  Niacin and related, Adhesive tape, Other, Statins depletion therapy, and Vancomycin     Review of Systems:   · Constitutional: there has been no unanticipated weight loss. There's been no change in energy level, sleep pattern, or activity level. · Eyes: No visual changes or diplopia. No scleral icterus. · ENT: No Headaches, hearing loss or vertigo. No mouth sores or sore throat.   · Cardiovascular: Reviewed in HPI  · Respiratory: No cough or wheezing, no sputum production. No hematemesis. · Gastrointestinal: No abdominal pain, appetite loss, blood in stools. No change in bowel or bladder habits. · Genitourinary: No dysuria, trouble voiding, or hematuria. · Musculoskeletal:  No gait disturbance, weakness or joint complaints. · Integumentary: No rash or pruritis. · Neurological: No headache, diplopia, change in muscle strength, numbness or tingling. No change in gait, balance, coordination, mood, affect, memory, mentation, behavior. · Psychiatric: No anxiety, no depression. · Endocrine: No malaise, fatigue or temperature intolerance. No excessive thirst, fluid intake, or urination. No tremor. · Hematologic/Lymphatic: No abnormal bruising or bleeding, blood clots or swollen lymph nodes. · Allergic/Immunologic: No nasal congestion or hives. Physical Examination:    There were no vitals filed for this visit. Wt Readings from Last 3 Encounters:   04/19/21 230 lb (104.3 kg)   04/12/21 228 lb 12.8 oz (103.8 kg)   03/27/21 228 lb 8 oz (103.6 kg)       Constitutional and General Appearance: NAD   Respiratory:  · Normal excursion and expansion without use of accessory muscles  · Resp Auscultation: Normal breath sounds without dullness  Cardiovascular:  · The apical impulses not displaced  · Heart tones are crisp and normal RRR  · Cervical veins are not engorged  · The carotid upstroke is normal in amplitude and contour without delay or bruit  · Normal S1S2, No S3, ? Soft JACQUI  · Peripheral pulses are symmetrical and full  · There is no clubbing, cyanosis of the extremities.               Trace-1+ BLE edema  · Femoral Arteries: 2+ and equal  · Pedal Pulses: 2+ and equal   Abdomen:  · Obese,No masses or tenderness  · Liver/Spleen: No Abnormalities Noted  Neurological/Psychiatric:  · Alert and oriented in all spheres  · Moves all extremities well  · Exhibits normal gait balance and coordination  · No abnormalities of mood, affect, memory, mentation, or behavior are noted Lab Results   Component Value Date     04/01/2021    K 3.4 (L) 04/01/2021     04/01/2021    CO2 26 04/01/2021    BUN 12 04/01/2021    CREATININE 1.2 04/01/2021    GLUCOSE 137 (H) 04/01/2021    CALCIUM 8.8 04/01/2021    PROT 7.4 03/27/2021    LABALBU 3.7 03/27/2021    BILITOT 0.4 03/27/2021    ALKPHOS 103 03/27/2021    AST 15 03/27/2021    ALT 11 03/27/2021    LABGLOM 44 (A) 04/01/2021    GFRAA 53 (A) 04/01/2021    AGRATIO 1.0 (L) 03/27/2021    GLOB 3.7 03/27/2021     Lab Results   Component Value Date    CHOL 227 (H) 04/16/2015     Lab Results   Component Value Date    TRIG 179 (H) 04/16/2015     Lab Results   Component Value Date    HDL 34 (L) 10/18/2017    HDL 39 (L) 04/16/2015     Lab Results   Component Value Date    LDLCALC 108 (H) 10/18/2017    LDLCALC 152 (H) 04/16/2015     Lab Results   Component Value Date    LABVLDL 57 10/18/2017    LABVLDL 36 04/16/2015     No results found for: CHOLHDLRATIO  Labs 11/18/20 , , HDL 46, , AST 16, ALT 26 , K 4.3, BUN/Cr 18/1.46, H/H 14/43.5, Plat 175    Labs Care Everywhere 5/12/20 , HDL 44, , , TSH 5.6, free T4 1.49, HgA1C 6.4, H/H 14/43.8, Plat 180, AST 11, ALT 18, Na 140, K 4.4, BUN/Cr 14/1.31     Assessment:     1. Hyperlipidemia : Last bacskw66/18/20 , , HDL 46, ,. She is unable to take statins or niacin due to severe myagias. She states she is taking 2 tablets of fish oil and red yeast extract along with dieting. She has resisted PCSK9 inhibitors prior but is now willing to consider. She has hard time affording meds and will see if she can afford. 2. Hypertension : Stable and will continue present medical regimen. 3. CAD (coronary artery disease): Diagnosed with NSTEMI June 2018 peak troponin 0.24 and new LBBB. Most recent [facility] 6/15/18  Native 3VCAD with occluded SVG to the OM Patent SVG to the RCA Patent LIMA to the LAD; risky disease NOT amenable to PCI.   LVEF on cath 20% (6/2018). There are no concerning symptoms for angina currently. 4. Chest pain: No complaints today. She has diffuse CAD with small vessel disease beyond grafts. Will continue aggressive med mgt if able. She is on maximum dose imdur and ranexa. I have discussed EECP  In past but she is not excited about pursuing this option. Plan:  1. This note was scribed in the presence of Ruth Wick MD by Rolf Abdi RN       Cost of prescription medications and patient compliance have been reviewed with patient. All questions answered. Thank you for allowing me to participate in the care of this individual.      Fernanda Lugo.  Bill Del Castillo M.D., [facility]

## 2021-04-21 ENCOUNTER — OFFICE VISIT (OUTPATIENT)
Dept: CARDIOLOGY CLINIC | Age: 76
End: 2021-04-21
Payer: MEDICARE

## 2021-04-21 VITALS
TEMPERATURE: 97.3 F | WEIGHT: 232.8 LBS | DIASTOLIC BLOOD PRESSURE: 64 MMHG | SYSTOLIC BLOOD PRESSURE: 130 MMHG | HEART RATE: 67 BPM | OXYGEN SATURATION: 97 % | BODY MASS INDEX: 41.25 KG/M2 | HEIGHT: 63 IN

## 2021-04-21 DIAGNOSIS — I25.810 CORONARY ARTERY DISEASE INVOLVING CORONARY BYPASS GRAFT OF NATIVE HEART WITHOUT ANGINA PECTORIS: Primary | ICD-10-CM

## 2021-04-21 DIAGNOSIS — I48.91 ATRIAL FIBRILLATION WITH RAPID VENTRICULAR RESPONSE (HCC): ICD-10-CM

## 2021-04-21 DIAGNOSIS — E78.2 MIXED HYPERLIPIDEMIA: ICD-10-CM

## 2021-04-21 DIAGNOSIS — I10 ESSENTIAL HYPERTENSION: ICD-10-CM

## 2021-04-21 DIAGNOSIS — Z87.891 HISTORY OF TOBACCO ABUSE: ICD-10-CM

## 2021-04-21 PROCEDURE — 1090F PRES/ABSN URINE INCON ASSESS: CPT | Performed by: INTERNAL MEDICINE

## 2021-04-21 PROCEDURE — G8417 CALC BMI ABV UP PARAM F/U: HCPCS | Performed by: INTERNAL MEDICINE

## 2021-04-21 PROCEDURE — 1036F TOBACCO NON-USER: CPT | Performed by: INTERNAL MEDICINE

## 2021-04-21 PROCEDURE — 1123F ACP DISCUSS/DSCN MKR DOCD: CPT | Performed by: INTERNAL MEDICINE

## 2021-04-21 PROCEDURE — 1111F DSCHRG MED/CURRENT MED MERGE: CPT | Performed by: INTERNAL MEDICINE

## 2021-04-21 PROCEDURE — G8399 PT W/DXA RESULTS DOCUMENT: HCPCS | Performed by: INTERNAL MEDICINE

## 2021-04-21 PROCEDURE — 99214 OFFICE O/P EST MOD 30 MIN: CPT | Performed by: INTERNAL MEDICINE

## 2021-04-21 PROCEDURE — 4040F PNEUMOC VAC/ADMIN/RCVD: CPT | Performed by: INTERNAL MEDICINE

## 2021-04-21 PROCEDURE — G8427 DOCREV CUR MEDS BY ELIG CLIN: HCPCS | Performed by: INTERNAL MEDICINE

## 2021-04-21 PROCEDURE — 3017F COLORECTAL CA SCREEN DOC REV: CPT | Performed by: INTERNAL MEDICINE

## 2021-04-21 NOTE — PROGRESS NOTES
[address]   Cardiac Followup    Referring Provider:  DARIA Esqueda CNP     Chief Complaint   Patient presents with    6 Month Follow-Up    Coronary Artery Disease    Congestive Heart Failure    Other     No new cardiac complaints      Subjective: Mrs Miguel Franco is being seen today for follow up of CAD s/p 3V CABG in 2005, HTN, and HLD; no complaints today     Past Medical History:    Mrs. Miguel Franco is a 81yo female with hx of CAD s/p 3V CABG in 2005, HLD, HTN, PAF dx 2/20 on eliquis, BERTRAND on CPAP, Charcot-foot with neuropathy, s/p partial left foot amputation 3/21, s/p fab 10/2/17, and intolerance to statins and niacin due to myalgias. Most recent [place] [facility] 7/18/17 no evidence of myocardial ischemia or scar;  EF>65%  with uniform wall motion. [place]kc[place]42/27/17 with CP and NSTEMI. Note [facility] 12/27/17 showed SVG to OM2 100% ostially occluded (new since prior cath 2015, LVEF 20%) with severe small artery disease. Diagnosed with NSTEMI 6/18 and new LBBB. Most recent [address] [place] [address] 6/15/18 showed severe 3V CAD with occluded SVG to the OM; Patent SVG to the RCA; Patent LIMA to the LAD; disease risky and NOT amenable to PCI; LVEF on cath 20%  Most recent [facility] scan 8/14/18 EF of 60% (no change from 1/23/18 showing EF=57%). Admitted 2/22/20 c/o CP. Note EKG Afib  bpm. Most recent ECHO 2/25/20 EF 55-60%. no wall abnls. Moderate cLVH. Grade I DD with normal filling pressure. Mild MR (no change from 12/17 study). Since last OV admitted in March 2021 for left foot, second toe osteomyelitis. On 3/29/21 she underwent a partial left foot amputation. Most recent EKG 3/3/21 showed SR, HR 81, non specific ST depression, T abnormality. Today she reports she is feeling OK and foot healing well. She is following with Dr. Arias Cuellar for this. She is taking her baby asa and meds as prescribed. I d/c'd Ramipril last OV and dizziness resolved. She states the Eliquis is expensive, but continues to take it.   She neuropathy pain   Yes Historical Provider, MD   tizanidine (ZANAFLEX) 4 MG tablet Take 8 mg by mouth nightly. Yes Historical Provider, MD   tramadol (ULTRAM) 50 MG tablet Take 50 mg by mouth every 4 hours as needed. States uses very rare and only when neurontin ineffective. Yes Historical Provider, MD   metformin (GLUCOPHAGE) 1000 MG tablet Take 1,000 mg by mouth daily (with breakfast). Yes Historical Provider, MD   levothyroxine (SYNTHROID) 50 MCG tablet Take 50 mcg by mouth every morning. Yes Historical Provider, MD   topiramate (TOPAMAX) 25 MG tablet Take 25 mg by mouth daily. Yes Historical Provider, MD   tolterodine (DETROL LA) 4 MG ER capsule Take 4 mg by mouth 2 times daily. Yes Historical Provider, MD   diphenhydrAMINE (BENADRYL) 25 MG tablet Take 25 mg by mouth nightly as needed. Yes Historical Provider, MD   ranitidine (ZANTAC 75) 75 MG tablet Take 75 mg by mouth daily. Yes Historical Provider, MD   Cholecalciferol (VITAMIN D3) 1000 UNIT CAPS Take 1 capsule by mouth daily. Yes Historical Provider, MD   Omega-3 Fatty Acids (FISH OIL) 1000 MG CAPS Take 1,000 mg by mouth daily. Yes Historical Provider, MD   Red Yeast Rice 600 MG TABS Take 1 tablet by mouth daily. Yes Historical Provider, MD   lisinopril (PRINIVIL;ZESTRIL) 10 MG tablet Take 5 mg by mouth every morning. Yes Historical Provider, MD   furosemide (LASIX) 40 MG tablet Take 40 mg by mouth daily as needed. Yes Historical Provider, MD   aspirin 81 MG EC tablet Take 81 mg by mouth daily. Historical Provider, MD        Allergies:  Niacin and related, Adhesive tape, Other, Statins depletion therapy, and Vancomycin     Review of Systems:   · Constitutional: there has been no unanticipated weight loss. There's been no change in energy level, sleep pattern, or activity level. · Eyes: No visual changes or diplopia. No scleral icterus. · ENT: No Headaches, hearing loss or vertigo.  No mouth sores or sore throat. · Cardiovascular: Reviewed in HPI  · Respiratory: No cough or wheezing, no sputum production. No hematemesis. · Gastrointestinal: No abdominal pain, appetite loss, blood in stools. No change in bowel or bladder habits. · Genitourinary: No dysuria, trouble voiding, or hematuria. · Musculoskeletal:  No gait disturbance, weakness or joint complaints. · Integumentary: No rash or pruritis. · Neurological: No headache, diplopia, change in muscle strength, numbness or tingling. No change in gait, balance, coordination, mood, affect, memory, mentation, behavior. · Psychiatric: No anxiety, no depression. · Endocrine: No malaise, fatigue or temperature intolerance. No excessive thirst, fluid intake, or urination. No tremor. · Hematologic/Lymphatic: No abnormal bruising or bleeding, blood clots or swollen lymph nodes. · Allergic/Immunologic: No nasal congestion or hives. Physical Examination:    Vitals:    04/21/21 1256   BP: 130/64   Pulse: 67   Temp: 97.3 °F (36.3 °C)   SpO2: 97%        Wt Readings from Last 3 Encounters:   04/21/21 232 lb 12.8 oz (105.6 kg)   04/19/21 230 lb (104.3 kg)   04/12/21 228 lb 12.8 oz (103.8 kg)       Constitutional and General Appearance: NAD   Respiratory:  · Normal excursion and expansion without use of accessory muscles  · Resp Auscultation: Normal breath sounds without dullness  Cardiovascular:  · The apical impulses not displaced  · Heart tones are crisp and normal RRR  · Cervical veins are not engorged  · The carotid upstroke is normal in amplitude and contour without delay or bruit  · Normal S1S2, No S3,   · Peripheral pulses are symmetrical and full  · There is no clubbing, cyanosis of the extremities.               1+ Left and Trace edema right   · Femoral Arteries: 2+ and equal  · Pedal Pulses: 2+ and equal   Abdomen:  · Obese,No masses or tenderness  · Liver/Spleen: No Abnormalities Noted  Neurological/Psychiatric:  · Alert and oriented in all spheres  · Moves all extremities well  · Exhibits normal gait balance and coordination  · No abnormalities of mood, affect, memory, mentation, or behavior are noted      Lab Results   Component Value Date     04/01/2021    K 3.4 (L) 04/01/2021     04/01/2021    CO2 26 04/01/2021    BUN 12 04/01/2021    CREATININE 1.2 04/01/2021    GLUCOSE 137 (H) 04/01/2021    CALCIUM 8.8 04/01/2021    PROT 7.4 03/27/2021    LABALBU 3.7 03/27/2021    BILITOT 0.4 03/27/2021    ALKPHOS 103 03/27/2021    AST 15 03/27/2021    ALT 11 03/27/2021    LABGLOM 44 (A) 04/01/2021    GFRAA 53 (A) 04/01/2021    AGRATIO 1.0 (L) 03/27/2021    GLOB 3.7 03/27/2021       Labs Care Everywhere  - 4/16/21 - K 4.3, , BUN 18, Creat 1.46    11/18/2020 [facility] (reviewed in epic)       Assessment:     1. Hyperlipidemia : I personally reviewed last lipids 11/18/20 , HDL 46, , , . She is unable to take statins or niacin due to severe myagias. She states she is taking 2 tablets of fish oil and red yeast extract along with dieting. Not at goal and has resisted PCSK9 inhibitors and not willing to consider at this time. 2. Hypertension : Stable and will continue present medical regimen. 3. CAD (coronary artery disease): Diagnosed with NSTEMI June 2018 peak troponin 0.24 and new LBBB. Most recent [facility] 6/15/18  Native 3VCAD with occluded SVG to the OM Patent SVG to the RCA Patent LIMA to the LAD; risky disease NOT amenable to PCI. LVEF on cath 20% (6/2018). There are no concerning symptoms for angina currently due to sedentary condition. If CP returns once more active I will titrate ranexa to 1000mg BID and if not helping will need to reconsider repeat cath. 4. Chest pain: No complaints today. She has diffuse CAD with small vessel disease beyond grafts. Will continue aggressive med mgt if able. She is on maximum dose imdur and 500mg BID ranexa.  I have discussed EECP  In past but she is not excited about pursuing this option. Plan:  1. Meds reviewed. No refills per pt request    2. We will work on getting assistance for the cost of the Eliquis  3. No cardiac testing warranted at this time  4. Follow up in 6 months or sooner if needed  5. Financial paperwork given to her to help with eliquis. This note was scribed in the presence of Dr.Meyers SALGUERO by Georgette Rollins RN.     I, Dr. Jennifer Vega, personally performed the services described in this documentation, as scribed by the above signed scribe in my presence. It is both accurate and complete to my knowledge. I agree with the details independently gathered by the clinical support staff, while the remaining scribed note accurately describes my personal service to the patient    Cost of prescription medications and patient compliance have been reviewed with patient. All questions answered. Thank you for allowing me to participate in the care of this individual.      Corey Knapp.  Sal Ariza M.D., [facility] [place]

## 2021-04-21 NOTE — PATIENT INSTRUCTIONS
Plan:  1. Meds reviewed. No refills per pt request    2. We will work on getting assistance for the cost of the Eliquis  3. No cardiac testing warranted at this time  4.  Follow up in 6 months or sooner if needed

## 2021-04-26 ENCOUNTER — HOSPITAL ENCOUNTER (OUTPATIENT)
Dept: WOUND CARE | Age: 76
Discharge: HOME | End: 2021-04-26
Payer: MEDICARE

## 2021-04-26 VITALS
SYSTOLIC BLOOD PRESSURE: 127 MMHG | RESPIRATION RATE: 20 BRPM | HEIGHT: 63 IN | TEMPERATURE: 97.8 F | HEART RATE: 73 BPM | BODY MASS INDEX: 41.11 KG/M2 | DIASTOLIC BLOOD PRESSURE: 73 MMHG | WEIGHT: 232 LBS

## 2021-04-26 PROCEDURE — 99213 OFFICE O/P EST LOW 20 MIN: CPT

## 2021-04-26 ASSESSMENT — PAIN SCALES - GENERAL: PAINLEVEL_OUTOF10: 0

## 2021-04-26 NOTE — PLAN OF CARE
Pt to the [facility] for follow up appointment. Steri strip applied. Pt to continue with weekly dressing. Pt to follow up in the [facility] in 1 week. Discharge instructions reviewed with patient, all questions answered, copy given to patient. Dressings were applied to all wounds per M.D. Instructions at this visit.

## 2021-05-03 ENCOUNTER — HOSPITAL ENCOUNTER (OUTPATIENT)
Dept: WOUND CARE | Age: 76
Discharge: HOME | End: 2021-05-03
Payer: MEDICARE

## 2021-05-03 VITALS
HEIGHT: 63 IN | RESPIRATION RATE: 22 BRPM | TEMPERATURE: 99.1 F | WEIGHT: 229.8 LBS | HEART RATE: 75 BPM | SYSTOLIC BLOOD PRESSURE: 144 MMHG | DIASTOLIC BLOOD PRESSURE: 82 MMHG | BODY MASS INDEX: 40.72 KG/M2

## 2021-05-03 PROCEDURE — 99213 OFFICE O/P EST LOW 20 MIN: CPT

## 2021-05-03 RX ORDER — LIDOCAINE 40 MG/G
CREAM TOPICAL PRN
Status: DISCONTINUED | OUTPATIENT
Start: 2021-05-03 | End: 2021-05-04 | Stop reason: HOSPADM

## 2021-05-03 NOTE — PROGRESS NOTES
[facility] Progress Note      Marylou Monterroso     : 1945    DATE OF VISIT:  5/3/2021    Subjective:     Marylou Monterroso is a 70 y.o. female who has a chief complaint of a diabetic ulcer located on the left foot. States feeling well. Has been staying off her foot. Ms. Marge Vasquez has a past medical history of Allergic rhinitis, Arthritis, CAD (coronary artery disease), CHF (congestive heart failure) ([facility] [address] Cholelithiasis, Diabetes mellitus ([facility] [address] Gastroparesis, Hepatic steatosis, Hyperlipidemia, Hypertension, Peripheral neuropathy, Sleep apnea, Thyroid disease, and Vitamin B12 deficiency. She has a past surgical history that includes Coronary artery bypass graft (); Foot surgery; colectomy (); Colonoscopy; Upper gastrointestinal endoscopy; Tonsillectomy and adenoidectomy; Tubal ligation; other surgical history (2012); other surgical history (Right, 3/20/13); Coronary angioplasty; Cholecystectomy; Cardiac catheterization (2017); Diagnostic Cardiac Cath Lab Procedure; Toe amputation (Left, 3/29/2021); eye surgery; Endoscopy, colon, diagnostic; and hernia repair. Her family history includes Coronary Art Dis in her brother, father, and sister; Stroke in her mother. Ms. Marge Vasquez reports that she quit smoking about 22 years ago. She has a 30.00 pack-year smoking history. She has never used smokeless tobacco. She reports that she does not drink alcohol or use drugs. Her current medication list consists of Vitamin D3, amiodarone, apixaban, aspirin, carvedilol, desonide, diphenhydrAMINE, fish oil, fluticasone, furosemide, gabapentin, glipiZIDE, isosorbide mononitrate, levothyroxine, nitroGLYCERIN, ondansetron, pantoprazole, ranolazine, and tolterodine.     Allergies: Niacin and related, Adhesive tape, Other, Statins depletion therapy, and Vancomycin    Pertinent items from the review of systems are discussed in the HPI; the remainder of the ROS was reviewed and is negative. Objective:     BP (!) 144/82   Pulse 75   Temp 99.1 °F (37.3 °C) (Oral)   Resp 22   Ht 5' 3\" (1.6 m)   Wt 229 lb 12.8 oz (104.2 kg)   BMI 40.71 kg/m²   General Appearance: alert and oriented to person, place and time, well developed and well- nourished, in no acute distress  Skin: warm and dry, no rash or erythema  Head: normocephalic and atraumatic  Eyes: pupils equal, round, and reactive to light, extraocular eye movements intact, conjunctivae normal  ENT: tympanic membrane, external ear and ear canal normal bilaterally, nose without deformity, nasal mucosa and turbinates normal without polyps  Neck: supple and non-tender without mass, no thyromegaly or thyroid nodules, no cervical lymphadenopathy  Pulmonary/Chest: clear to auscultation bilaterally- no wheezes, rales or rhonchi, normal air movement, no respiratory distress  Cardiovascular: normal rate, regular rhythm, normal S1 and S2, no murmurs, rubs, clicks, or gallops, distal pulses intact, no carotid bruits  Abdomen: soft, non-tender, non-distended, normal bowel sounds, no masses or organomegaly. Dorsalis pedis pulse left palpable  Posterior tibial pulse left palpable  Dorsalis pedis pulse right palpable  Posterior tibial pulse right palpable  Protective sensation absent bilateral LE      Left 2nd digit amputation site with thin epithelial tissue noted. No signs of infection noted. Today's ulcer measurements are in the wound documentation flowsheet.      Wound measurements:                 LABS  Lab Results   Component Value Date    LABA1C 6.9 03/29/2021         Assessment:     Patient Active Problem List   Diagnosis Code    Hyperlipidemia E78.5    Hypertension I10    CAD (coronary artery disease) I25.10    Diabetes mellitus ([facility] [address] E11.9    Thyroid disease E07.9    Allergic rhinitis J30.9    Sleep apnea X28.30    Metabolic encephalopathy O24.19    Hepatic steatosis K76.0    Cholelithiasis K80.20    Acute renal failure (ARF) ([facility] [address] N17.9    Small bowel obstruction ([facility] [address] K56.609    Ischemic chest pain ([facility]) I20.9    Unstable angina ([facility]) I20.0    Vomiting R11.10    Obesity E66.9    NSTEMI (non-ST elevated myocardial infarction) ([facility]) I21.4    Chest pain R07.9    LBBB (left bundle branch block) I44.7    Atrial fibrillation with rapid ventricular response ([facility]) I48.91    Aortocoronary bypass status Z95.1    Acute respiratory failure with hypoxia ([facility]) J96.01    Acute hypoxemic respiratory failure ([facility]) J96.01    Angina, class IV ([facility]) I20.9    History of tobacco abuse Z87.891    Diabetic foot infection ([facility]) E11.628, L08.9    Acute hematogenous osteomyelitis of left foot ([facility]) M86.072    Atrial fibrillation ([facility]) I48.91    Osteomyelitis of right foot ([facility]) M86.9    Nausea R11.0       Assessment of today's active condition(s): diabetic foot ulcer left - S/P left 2nd digit amputation, diabetes mellitus. Factors contributing to occurrence and/or persistence of the chronic ulcer include diabetes. Sharp debridement is not indicated today, based upon the exam findings in the ulcer(s) above. Discharge plan:     Treatment in the wound care center today:  . Home treatment: good handwashing before and after any dressing changes. Cleanse ulcer with saline or soap & water before dressing change. May use Vaseline (petrolatum), Aquaphor, Aveeno, CeraVe, Cetaphil, Eucerin, Lubriderm, etc for dry skin. Dressing type for home:   Betadine to amputation site changed W,F via [address]. Minimal walking. Written discharge instructions given to patient. Offload ulcer(s) as directed. Elevate leg(s) as directed. Follow up in 1 week.          Electronically signed by Daniela Pradhan DPM on 5/3/2021 at 11:24 AM.

## 2021-05-03 NOTE — PROGRESS NOTES
[address] [facility] Progress Note      Nena Torres     : 1945    DATE OF VISIT:  2021    Subjective:     Nena Torres is a 70 y.o. female who has a chief complaint of a diabetic ulcer located on the left foot. States feeling well. Has been staying off her foot. Has [facility]. Ms. Marge Vasquez has a past medical history of Allergic rhinitis, Arthritis, CAD (coronary artery disease), CHF (congestive heart failure) ([facility] [address] Cholelithiasis, Diabetes mellitus ([facility] [address] Gastroparesis, Hepatic steatosis, Hyperlipidemia, Hypertension, Peripheral neuropathy, Sleep apnea, Thyroid disease, and Vitamin B12 deficiency. She has a past surgical history that includes Coronary artery bypass graft (); Foot surgery; colectomy (); Colonoscopy; Upper gastrointestinal endoscopy; Tonsillectomy and adenoidectomy; Tubal ligation; other surgical history (2012); other surgical history (Right, 3/20/13); Coronary angioplasty; Cholecystectomy; Cardiac catheterization (2017); Diagnostic Cardiac Cath Lab Procedure; Toe amputation (Left, 3/29/2021); eye surgery; Endoscopy, colon, diagnostic; and hernia repair. Her family history includes Coronary Art Dis in her brother, father, and sister; Stroke in her mother. Ms. Marge Vasquez reports that she quit smoking about 22 years ago. She has a 30.00 pack-year smoking history. She has never used smokeless tobacco. She reports that she does not drink alcohol or use drugs. Her current medication list consists of Vitamin D3, amiodarone, apixaban, aspirin, carvedilol, desonide, diphenhydrAMINE, fish oil, fluticasone, furosemide, gabapentin, glipiZIDE, isosorbide mononitrate, levothyroxine, nitroGLYCERIN, ondansetron, pantoprazole, ranolazine, and tolterodine.     Allergies: Niacin and related, Adhesive tape, Other, Statins depletion therapy, and Vancomycin    Pertinent items from the review of systems are discussed in the HPI; the remainder of the ROS was reviewed and is negative. Objective:     /73   Pulse 73   Temp 97.8 °F (36.6 °C) (Oral)   Resp 20   Ht 5' 3\" (1.6 m)   Wt 232 lb (105.2 kg)   BMI 41.10 kg/m²   General Appearance: alert and oriented to person, place and time, well developed and well- nourished, in no acute distress  Skin: warm and dry, no rash or erythema  Head: normocephalic and atraumatic  Eyes: pupils equal, round, and reactive to light, extraocular eye movements intact, conjunctivae normal  ENT: tympanic membrane, external ear and ear canal normal bilaterally, nose without deformity, nasal mucosa and turbinates normal without polyps  Neck: supple and non-tender without mass, no thyromegaly or thyroid nodules, no cervical lymphadenopathy  Pulmonary/Chest: clear to auscultation bilaterally- no wheezes, rales or rhonchi, normal air movement, no respiratory distress  Cardiovascular: normal rate, regular rhythm, normal S1 and S2, no murmurs, rubs, clicks, or gallops, distal pulses intact, no carotid bruits  Abdomen: soft, non-tender, non-distended, normal bowel sounds, no masses or organomegaly. Dorsalis pedis pulse left palpable  Posterior tibial pulse left palpable  Dorsalis pedis pulse right palpable  Posterior tibial pulse right palpable  Protective sensation absent bilateral LE      Sutures intact left 2nd digit amputation site with dried drainage at incision site. Very mild erythema and edema noted. No increase in skin temperature noted. No abscess noted. No malodor noted. Today's ulcer measurements are in the wound documentation flowsheet.      Wound measurements:                 LABS  Lab Results   Component Value Date    LABA1C 6.9 03/29/2021         Assessment:     Patient Active Problem List   Diagnosis Code    Hyperlipidemia E78.5    Hypertension I10    CAD (coronary artery disease) I25.10    Diabetes mellitus ([facility] [address] E11.9    Thyroid disease E07.9    Allergic rhinitis J30.9    Sleep apnea G47.30    Metabolic encephalopathy D14.08    Hepatic steatosis K76.0    Cholelithiasis K80.20    Acute renal failure (ARF) ([facility]) N17.9    Small bowel obstruction ([facility]) K56.609    Ischemic chest pain ([facility]) I20.9    Unstable angina ([facility]) I20.0    Vomiting R11.10    Obesity E66.9    NSTEMI (non-ST elevated myocardial infarction) ([facility]) I21.4    Chest pain R07.9    LBBB (left bundle branch block) I44.7    Atrial fibrillation with rapid ventricular response ([facility]) I48.91    Aortocoronary bypass status Z95.1    Acute respiratory failure with hypoxia ([facility]) J96.01    Acute hypoxemic respiratory failure ([facility]) J96.01    Angina, class IV ([facility]) I20.9    History of tobacco abuse Z87.891    Diabetic foot infection ([facility]) E11.628, L08.9    Acute hematogenous osteomyelitis of left foot ([facility]) M86.072    Atrial fibrillation ([facility]) I48.91    Osteomyelitis of right foot ([facility]) M86.9    Nausea R11.0       Assessment of today's active condition(s): diabetic foot ulcer left - S/P left 2nd digit amputation, diabetes mellitus. Factors contributing to occurrence and/or persistence of the chronic ulcer include diabetes. Sharp debridement is not indicated today, based upon the exam findings in the ulcer(s) above. Discharge plan:     Treatment in the wound care center today:  . Home treatment: good handwashing before and after any dressing changes. Cleanse ulcer with saline or soap & water before dressing change. May use Vaseline (petrolatum), Aquaphor, Aveeno, CeraVe, Cetaphil, Eucerin, Lubriderm, etc for dry skin. Dressing type for home: Steristrips applied. Betadine and Polymem to amputation site changed W,F via [facility] AT [facility]. Minimal walking. Written discharge instructions given to patient. Offload ulcer(s) as directed. Elevate leg(s) as directed. Follow up in 1 week.            Electronically signed by Daniela Pradhan DPM on 5/3/2021 at 12:22 PM.

## 2021-05-10 ENCOUNTER — HOSPITAL ENCOUNTER (OUTPATIENT)
Dept: WOUND CARE | Age: 76
Discharge: HOME | End: 2021-05-10
Payer: MEDICARE

## 2021-05-10 VITALS
DIASTOLIC BLOOD PRESSURE: 78 MMHG | TEMPERATURE: 96.9 F | HEIGHT: 63 IN | BODY MASS INDEX: 40.96 KG/M2 | RESPIRATION RATE: 20 BRPM | SYSTOLIC BLOOD PRESSURE: 122 MMHG | WEIGHT: 231.2 LBS | HEART RATE: 63 BPM

## 2021-05-10 PROCEDURE — 99212 OFFICE O/P EST SF 10 MIN: CPT

## 2021-05-10 RX ORDER — LIDOCAINE 40 MG/G
CREAM TOPICAL PRN
Status: DISCONTINUED | OUTPATIENT
Start: 2021-05-10 | End: 2021-05-11 | Stop reason: HOSPADM

## 2021-05-10 NOTE — PLAN OF CARE
Pt to the [address] [place] [address] for follow up appointment. Amputation site healed. Pt to follow up in the [address] [place] [address] as needed. Pt to follow up with Dr Sharyle Pippin for routine foot care. Discharge instructions reviewed with patient, all questions answered, copy given to patient. Dressings were applied to all wounds per M.D. Instructions at this visit.

## 2021-05-10 NOTE — PROGRESS NOTES
[facility] Progress Note      Wilmer Childs     : 1945    DATE OF VISIT:  5/10/2021    Subjective:     Wilmer Childs is a 70 y.o. female who has a chief complaint of a diabetic ulcer located on the left foot. States feeling well. Has been staying off her foot. Ms. Alexander Loving has a past medical history of Allergic rhinitis, Arthritis, CAD (coronary artery disease), CHF (congestive heart failure) ([facility] [address] Cholelithiasis, Diabetes mellitus ([facility] [address] Gastroparesis, Hepatic steatosis, Hyperlipidemia, Hypertension, Peripheral neuropathy, Sleep apnea, Thyroid disease, and Vitamin B12 deficiency. She has a past surgical history that includes Coronary artery bypass graft (); Foot surgery; colectomy (); Colonoscopy; Upper gastrointestinal endoscopy; Tonsillectomy and adenoidectomy; Tubal ligation; other surgical history (2012); other surgical history (Right, 3/20/13); Coronary angioplasty; Cholecystectomy; Cardiac catheterization (2017); Diagnostic Cardiac Cath Lab Procedure; Toe amputation (Left, 3/29/2021); eye surgery; Endoscopy, colon, diagnostic; and hernia repair. Her family history includes Coronary Art Dis in her brother, father, and sister; Stroke in her mother. Ms. Alexander Loving reports that she quit smoking about 22 years ago. She has a 30.00 pack-year smoking history. She has never used smokeless tobacco. She reports that she does not drink alcohol or use drugs. Her current medication list consists of Vitamin D3, amiodarone, apixaban, aspirin, carvedilol, desonide, diphenhydrAMINE, fish oil, fluticasone, furosemide, gabapentin, glipiZIDE, isosorbide mononitrate, levothyroxine, nitroGLYCERIN, ondansetron, pantoprazole, ranolazine, and tolterodine.     Allergies: Niacin and related, Adhesive tape, Other, Statins depletion therapy, and Vancomycin    Pertinent items from the review of systems are discussed in the HPI; the remainder of the ROS was reviewed and is negative. Objective:     /78   Pulse 63   Temp 96.9 °F (36.1 °C) (Oral)   Resp 20   Ht 5' 3\" (1.6 m)   Wt 231 lb 3.2 oz (104.9 kg)   BMI 40.96 kg/m²   General Appearance: alert and oriented to person, place and time, well developed and well- nourished, in no acute distress  Skin: warm and dry, no rash or erythema  Head: normocephalic and atraumatic  Eyes: pupils equal, round, and reactive to light, extraocular eye movements intact, conjunctivae normal  ENT: tympanic membrane, external ear and ear canal normal bilaterally, nose without deformity, nasal mucosa and turbinates normal without polyps  Neck: supple and non-tender without mass, no thyromegaly or thyroid nodules, no cervical lymphadenopathy  Pulmonary/Chest: clear to auscultation bilaterally- no wheezes, rales or rhonchi, normal air movement, no respiratory distress  Cardiovascular: normal rate, regular rhythm, normal S1 and S2, no murmurs, rubs, clicks, or gallops, distal pulses intact, no carotid bruits  Abdomen: soft, non-tender, non-distended, normal bowel sounds, no masses or organomegaly. Dorsalis pedis pulse left palpable  Posterior tibial pulse left palpable  Dorsalis pedis pulse right palpable  Posterior tibial pulse right palpable  Protective sensation absent bilateral LE      Left 2nd digit amputation site with epithelial tissue noted. No signs of infection noted. Today's ulcer measurements are in the wound documentation flowsheet.      Wound measurements:                 LABS  Lab Results   Component Value Date    LABA1C 6.9 03/29/2021         Assessment:     Patient Active Problem List   Diagnosis Code    Hyperlipidemia E78.5    Hypertension I10    CAD (coronary artery disease) I25.10    Diabetes mellitus ([facility] [address] E11.9    Thyroid disease E07.9    Allergic rhinitis J30.9    Sleep apnea T47.14    Metabolic encephalopathy X35.37    Hepatic steatosis K76.0    Cholelithiasis K80.20    Acute renal failure (ARF) ([facility] [address] N17.9    Small bowel obstruction ([facility] [address] K56.609    Ischemic chest pain ([facility]) I20.9    Unstable angina ([facility]) I20.0    Vomiting R11.10    Obesity E66.9    NSTEMI (non-ST elevated myocardial infarction) ([facility]) I21.4    Chest pain R07.9    LBBB (left bundle branch block) I44.7    Atrial fibrillation with rapid ventricular response ([facility]) I48.91    Aortocoronary bypass status Z95.1    Acute respiratory failure with hypoxia ([facility]) J96.01    Acute hypoxemic respiratory failure ([facility]) J96.01    Angina, class IV ([facility]) I20.9    History of tobacco abuse Z87.891    Diabetic foot infection ([facility]) E11.628, L08.9    Acute hematogenous osteomyelitis of left foot ([facility]) M86.072    Atrial fibrillation ([facility]) I48.91    Osteomyelitis of right foot ([facility]) M86.9    Nausea R11.0       Assessment of today's active condition(s): diabetic foot ulcer left - S/P left 2nd digit amputation, diabetes mellitus. Factors contributing to occurrence and/or persistence of the chronic ulcer include diabetes. Sharp debridement is not indicated today, based upon the exam findings in the ulcer(s) above. Discharge plan:     Treatment in the wound care center today:  . Home treatment: good handwashing before and after any dressing changes. Cleanse ulcer with saline or soap & water before dressing change. May use Vaseline (petrolatum), Aquaphor, Aveeno, CeraVe, Cetaphil, Eucerin, Lubriderm, etc for dry skin. Dressing type for home:   Written discharge instructions given to patient. Offload ulcer(s) as directed. Elevate leg(s) as directed. Follow up in [facility] as needed. Continue to follow up with Podiatry for DM foot care. She sees Dr. Mosquera.        Electronically signed by Caroline Ortiz DPM on 5/10/2021 at 12:47 PM.

## 2021-06-03 RX ORDER — NITROGLYCERIN 0.4 MG/1
TABLET SUBLINGUAL
Qty: 25 TABLET | Refills: 1 | Status: SHIPPED | OUTPATIENT
Start: 2021-06-03 | End: 2021-09-23

## 2021-07-16 RX ORDER — FUROSEMIDE 40 MG/1
TABLET ORAL
Qty: 120 TABLET | Refills: 1 | Status: SHIPPED | OUTPATIENT
Start: 2021-07-16 | End: 2022-04-28

## 2021-07-20 ENCOUNTER — HOSPITAL ENCOUNTER (OUTPATIENT)
Age: 76
Discharge: HOME | End: 2021-07-20
Payer: MEDICARE

## 2021-07-20 LAB
ALBUMIN SERPL-MCNC: 3.8 G/DL (ref 3.4–5)
ANION GAP SERPL CALCULATED.3IONS-SCNC: 9 MMOL/L (ref 3–16)
BILIRUBIN URINE: NEGATIVE
BLOOD, URINE: NEGATIVE
BUN BLDV-MCNC: 18 MG/DL (ref 7–20)
CALCIUM SERPL-MCNC: 9.2 MG/DL (ref 8.3–10.6)
CHLORIDE BLD-SCNC: 100 MMOL/L (ref 99–110)
CLARITY: CLEAR
CO2: 27 MMOL/L (ref 21–32)
COLOR: YELLOW
CREAT SERPL-MCNC: 1.3 MG/DL (ref 0.6–1.2)
GFR AFRICAN AMERICAN: 48
GFR NON-AFRICAN AMERICAN: 40
GLUCOSE BLD-MCNC: 157 MG/DL (ref 70–99)
GLUCOSE URINE: NEGATIVE MG/DL
HCT VFR BLD CALC: 40.2 % (ref 36–48)
HEMOGLOBIN: 13.3 G/DL (ref 12–16)
KETONES, URINE: NEGATIVE MG/DL
LEUKOCYTE ESTERASE, URINE: NEGATIVE
MCH RBC QN AUTO: 30 PG (ref 26–34)
MCHC RBC AUTO-ENTMCNC: 33.1 G/DL (ref 31–36)
MCV RBC AUTO: 90.6 FL (ref 80–100)
MICROSCOPIC EXAMINATION: NORMAL
NITRITE, URINE: NEGATIVE
PDW BLD-RTO: 14 % (ref 12.4–15.4)
PH UA: 6 (ref 5–8)
PHOSPHORUS: 3.6 MG/DL (ref 2.5–4.9)
PLATELET # BLD: 150 K/UL (ref 135–450)
PMV BLD AUTO: 8.5 FL (ref 5–10.5)
POTASSIUM SERPL-SCNC: 4.2 MMOL/L (ref 3.5–5.1)
PROTEIN UA: NEGATIVE MG/DL
RBC # BLD: 4.44 M/UL (ref 4–5.2)
SODIUM BLD-SCNC: 136 MMOL/L (ref 136–145)
SPECIFIC GRAVITY UA: <=1.005 (ref 1–1.03)
URINE TYPE: NORMAL
UROBILINOGEN, URINE: 0.2 E.U./DL
WBC # BLD: 6.6 K/UL (ref 4–11)

## 2021-07-20 PROCEDURE — 82570 ASSAY OF URINE CREATININE: CPT

## 2021-07-20 PROCEDURE — 81003 URINALYSIS AUTO W/O SCOPE: CPT

## 2021-07-20 PROCEDURE — 82306 VITAMIN D 25 HYDROXY: CPT

## 2021-07-20 PROCEDURE — 84156 ASSAY OF PROTEIN URINE: CPT

## 2021-07-20 PROCEDURE — 85027 COMPLETE CBC AUTOMATED: CPT

## 2021-07-20 PROCEDURE — 83970 ASSAY OF PARATHORMONE: CPT

## 2021-07-20 PROCEDURE — 80069 RENAL FUNCTION PANEL: CPT

## 2021-07-20 PROCEDURE — 36415 COLL VENOUS BLD VENIPUNCTURE: CPT

## 2021-07-21 LAB
CREATININE URINE: 49.5 MG/DL (ref 28–259)
PARATHYROID HORMONE INTACT: 42.3 PG/ML (ref 14–72)
PROTEIN PROTEIN: <4 MG/DL
PROTEIN/CREAT RATIO: NORMAL MG/DL
VITAMIN D 25-HYDROXY: 72.6 NG/ML

## 2021-09-16 RX ORDER — RANOLAZINE 500 MG/1
TABLET, EXTENDED RELEASE ORAL
Qty: 60 TABLET | Refills: 1 | Status: SHIPPED
Start: 2021-09-16 | End: 2022-06-23

## 2021-09-23 RX ORDER — NITROGLYCERIN 0.4 MG/1
TABLET SUBLINGUAL
Qty: 25 TABLET | Refills: 1 | Status: SHIPPED | OUTPATIENT
Start: 2021-09-23 | End: 2021-11-11

## 2021-10-08 RX ORDER — CARVEDILOL 3.12 MG/1
3.12 TABLET ORAL 2 TIMES DAILY WITH MEALS
Qty: 60 TABLET | Refills: 5 | Status: SHIPPED | OUTPATIENT
Start: 2021-10-08

## 2021-10-08 NOTE — TELEPHONE ENCOUNTER
Pt is requesting refills on Carvedilol 3.125 mg BID and Eliquis 5 mg BID. Pt prefers a 90 day supply if possible. Preferred pharmacy [facility] [place] @ [phone]. Last OV 4/21/2021 with [facility]. Pt is completely out of medications she has been having trouble with [employer].

## 2021-11-11 RX ORDER — NITROGLYCERIN 0.4 MG/1
TABLET SUBLINGUAL
Qty: 25 TABLET | Refills: 1 | Status: SHIPPED | OUTPATIENT
Start: 2021-11-11 | End: 2022-02-02

## 2022-02-02 RX ORDER — NITROGLYCERIN 0.4 MG/1
TABLET SUBLINGUAL
Qty: 25 TABLET | Refills: 1 | Status: SHIPPED | OUTPATIENT
Start: 2022-02-02

## 2022-02-16 ENCOUNTER — HOSPITAL ENCOUNTER (INPATIENT)
Age: 77
LOS: 3 days | Discharge: HOME HEALTH | DRG: 243 | End: 2022-02-19
Attending: EMERGENCY MEDICINE | Admitting: INTERNAL MEDICINE
Payer: MEDICARE

## 2022-02-16 ENCOUNTER — TELEPHONE (OUTPATIENT)
Dept: CARDIOLOGY CLINIC | Age: 77
End: 2022-02-16

## 2022-02-16 ENCOUNTER — APPOINTMENT (OUTPATIENT)
Dept: GENERAL RADIOLOGY | Age: 77
DRG: 243 | End: 2022-02-16
Payer: MEDICARE

## 2022-02-16 DIAGNOSIS — I46.9 VENTRICULAR ASYSTOLIA (HCC): ICD-10-CM

## 2022-02-16 DIAGNOSIS — R55 SYNCOPE AND COLLAPSE: Primary | ICD-10-CM

## 2022-02-16 PROBLEM — I44.30 AV BLOCK: Status: ACTIVE | Noted: 2022-02-16

## 2022-02-16 PROBLEM — E66.01 MORBID OBESITY (HCC): Status: ACTIVE | Noted: 2017-12-27

## 2022-02-16 LAB
A/G RATIO: 1.6 (ref 1.1–2.2)
ALBUMIN SERPL-MCNC: 3.8 G/DL (ref 3.4–5)
ALP BLD-CCNC: 110 U/L (ref 40–129)
ALT SERPL-CCNC: 18 U/L (ref 10–40)
ANION GAP SERPL CALCULATED.3IONS-SCNC: 12 MMOL/L (ref 3–16)
AST SERPL-CCNC: 18 U/L (ref 15–37)
BASOPHILS ABSOLUTE: 0 K/UL (ref 0–0.2)
BASOPHILS RELATIVE PERCENT: 0.4 %
BILIRUB SERPL-MCNC: 0.6 MG/DL (ref 0–1)
BUN BLDV-MCNC: 26 MG/DL (ref 7–20)
CALCIUM SERPL-MCNC: 8.7 MG/DL (ref 8.3–10.6)
CHLORIDE BLD-SCNC: 98 MMOL/L (ref 99–110)
CO2: 23 MMOL/L (ref 21–32)
CREAT SERPL-MCNC: 1.5 MG/DL (ref 0.6–1.2)
EKG ATRIAL RATE: 77 BPM
EKG DIAGNOSIS: NORMAL
EKG P AXIS: 53 DEGREES
EKG P-R INTERVAL: 230 MS
EKG Q-T INTERVAL: 452 MS
EKG QRS DURATION: 116 MS
EKG QTC CALCULATION (BAZETT): 511 MS
EKG R AXIS: -22 DEGREES
EKG T AXIS: 149 DEGREES
EKG VENTRICULAR RATE: 77 BPM
EOSINOPHILS ABSOLUTE: 0.1 K/UL (ref 0–0.6)
EOSINOPHILS RELATIVE PERCENT: 1.1 %
GFR AFRICAN AMERICAN: 41
GFR NON-AFRICAN AMERICAN: 34
GLUCOSE BLD-MCNC: 214 MG/DL (ref 70–99)
GLUCOSE BLD-MCNC: 247 MG/DL (ref 70–99)
HCT VFR BLD CALC: 42.4 % (ref 36–48)
HEMOGLOBIN: 14 G/DL (ref 12–16)
LYMPHOCYTES ABSOLUTE: 1.7 K/UL (ref 1–5.1)
LYMPHOCYTES RELATIVE PERCENT: 17 %
MCH RBC QN AUTO: 30.2 PG (ref 26–34)
MCHC RBC AUTO-ENTMCNC: 33.1 G/DL (ref 31–36)
MCV RBC AUTO: 91.2 FL (ref 80–100)
MONOCYTES ABSOLUTE: 0.8 K/UL (ref 0–1.3)
MONOCYTES RELATIVE PERCENT: 8.3 %
NEUTROPHILS ABSOLUTE: 7.2 K/UL (ref 1.7–7.7)
NEUTROPHILS RELATIVE PERCENT: 73.2 %
PDW BLD-RTO: 14.2 % (ref 12.4–15.4)
PERFORMED ON: ABNORMAL
PLATELET # BLD: 132 K/UL (ref 135–450)
PMV BLD AUTO: 9 FL (ref 5–10.5)
POTASSIUM REFLEX MAGNESIUM: 4.7 MMOL/L (ref 3.5–5.1)
PRO-BNP: 2155 PG/ML (ref 0–449)
RBC # BLD: 4.64 M/UL (ref 4–5.2)
SODIUM BLD-SCNC: 133 MMOL/L (ref 136–145)
TOTAL PROTEIN: 6.2 G/DL (ref 6.4–8.2)
TROPONIN: <0.01 NG/ML
WBC # BLD: 9.8 K/UL (ref 4–11)

## 2022-02-16 PROCEDURE — 71045 X-RAY EXAM CHEST 1 VIEW: CPT

## 2022-02-16 PROCEDURE — 2700000000 HC OXYGEN THERAPY PER DAY

## 2022-02-16 PROCEDURE — 80053 COMPREHEN METABOLIC PANEL: CPT

## 2022-02-16 PROCEDURE — 99223 1ST HOSP IP/OBS HIGH 75: CPT | Performed by: INTERNAL MEDICINE

## 2022-02-16 PROCEDURE — 84484 ASSAY OF TROPONIN QUANT: CPT

## 2022-02-16 PROCEDURE — 99285 EMERGENCY DEPT VISIT HI MDM: CPT

## 2022-02-16 PROCEDURE — 93010 ELECTROCARDIOGRAM REPORT: CPT | Performed by: INTERNAL MEDICINE

## 2022-02-16 PROCEDURE — 93005 ELECTROCARDIOGRAM TRACING: CPT | Performed by: EMERGENCY MEDICINE

## 2022-02-16 PROCEDURE — 2580000003 HC RX 258: Performed by: NURSE PRACTITIONER

## 2022-02-16 PROCEDURE — 6370000000 HC RX 637 (ALT 250 FOR IP): Performed by: NURSE PRACTITIONER

## 2022-02-16 PROCEDURE — 85025 COMPLETE CBC W/AUTO DIFF WBC: CPT

## 2022-02-16 PROCEDURE — 2060000000 HC ICU INTERMEDIATE R&B

## 2022-02-16 PROCEDURE — 94761 N-INVAS EAR/PLS OXIMETRY MLT: CPT

## 2022-02-16 PROCEDURE — 83880 ASSAY OF NATRIURETIC PEPTIDE: CPT

## 2022-02-16 RX ORDER — CARVEDILOL 3.12 MG/1
3.12 TABLET ORAL 2 TIMES DAILY WITH MEALS
Status: DISCONTINUED | OUTPATIENT
Start: 2022-02-17 | End: 2022-02-17

## 2022-02-16 RX ORDER — TROSPIUM CHLORIDE 20 MG/1
20 TABLET, FILM COATED ORAL
Status: DISCONTINUED | OUTPATIENT
Start: 2022-02-17 | End: 2022-02-19 | Stop reason: HOSPADM

## 2022-02-16 RX ORDER — SODIUM CHLORIDE 0.9 % (FLUSH) 0.9 %
5-40 SYRINGE (ML) INJECTION PRN
Status: DISCONTINUED | OUTPATIENT
Start: 2022-02-16 | End: 2022-02-17

## 2022-02-16 RX ORDER — SODIUM CHLORIDE 9 MG/ML
INJECTION, SOLUTION INTRAVENOUS CONTINUOUS
Status: ACTIVE | OUTPATIENT
Start: 2022-02-16 | End: 2022-02-17

## 2022-02-16 RX ORDER — ACETAMINOPHEN 325 MG/1
650 TABLET ORAL EVERY 6 HOURS PRN
Status: DISCONTINUED | OUTPATIENT
Start: 2022-02-16 | End: 2022-02-19 | Stop reason: HOSPADM

## 2022-02-16 RX ORDER — ONDANSETRON 4 MG/1
4 TABLET, ORALLY DISINTEGRATING ORAL EVERY 8 HOURS PRN
Status: DISCONTINUED | OUTPATIENT
Start: 2022-02-16 | End: 2022-02-19 | Stop reason: HOSPADM

## 2022-02-16 RX ORDER — ONDANSETRON 2 MG/ML
4 INJECTION INTRAMUSCULAR; INTRAVENOUS EVERY 6 HOURS PRN
Status: DISCONTINUED | OUTPATIENT
Start: 2022-02-16 | End: 2022-02-19 | Stop reason: HOSPADM

## 2022-02-16 RX ORDER — NICOTINE POLACRILEX 4 MG
15 LOZENGE BUCCAL PRN
Status: DISCONTINUED | OUTPATIENT
Start: 2022-02-16 | End: 2022-02-19 | Stop reason: HOSPADM

## 2022-02-16 RX ORDER — AMIODARONE HYDROCHLORIDE 200 MG/1
200 TABLET ORAL DAILY
Status: DISCONTINUED | OUTPATIENT
Start: 2022-02-17 | End: 2022-02-17

## 2022-02-16 RX ORDER — SODIUM CHLORIDE 0.9 % (FLUSH) 0.9 %
5-40 SYRINGE (ML) INJECTION EVERY 12 HOURS SCHEDULED
Status: DISCONTINUED | OUTPATIENT
Start: 2022-02-16 | End: 2022-02-17

## 2022-02-16 RX ORDER — SODIUM CHLORIDE 9 MG/ML
25 INJECTION, SOLUTION INTRAVENOUS PRN
Status: DISCONTINUED | OUTPATIENT
Start: 2022-02-16 | End: 2022-02-17

## 2022-02-16 RX ORDER — PANTOPRAZOLE SODIUM 40 MG/1
40 TABLET, DELAYED RELEASE ORAL DAILY
Status: DISCONTINUED | OUTPATIENT
Start: 2022-02-17 | End: 2022-02-19 | Stop reason: HOSPADM

## 2022-02-16 RX ORDER — LEVOTHYROXINE SODIUM 0.05 MG/1
50 TABLET ORAL DAILY
Status: DISCONTINUED | OUTPATIENT
Start: 2022-02-17 | End: 2022-02-17

## 2022-02-16 RX ORDER — RANOLAZINE 500 MG/1
500 TABLET, EXTENDED RELEASE ORAL 2 TIMES DAILY
Status: DISCONTINUED | OUTPATIENT
Start: 2022-02-16 | End: 2022-02-19 | Stop reason: HOSPADM

## 2022-02-16 RX ORDER — DEXTROSE MONOHYDRATE 50 MG/ML
100 INJECTION, SOLUTION INTRAVENOUS PRN
Status: DISCONTINUED | OUTPATIENT
Start: 2022-02-16 | End: 2022-02-19 | Stop reason: HOSPADM

## 2022-02-16 RX ORDER — ACETAMINOPHEN 650 MG/1
650 SUPPOSITORY RECTAL EVERY 6 HOURS PRN
Status: DISCONTINUED | OUTPATIENT
Start: 2022-02-16 | End: 2022-02-19 | Stop reason: HOSPADM

## 2022-02-16 RX ORDER — DEXTROSE MONOHYDRATE 25 G/50ML
12.5 INJECTION, SOLUTION INTRAVENOUS PRN
Status: DISCONTINUED | OUTPATIENT
Start: 2022-02-16 | End: 2022-02-16 | Stop reason: CLARIF

## 2022-02-16 RX ORDER — POLYETHYLENE GLYCOL 3350 17 G/17G
17 POWDER, FOR SOLUTION ORAL DAILY PRN
Status: DISCONTINUED | OUTPATIENT
Start: 2022-02-16 | End: 2022-02-19 | Stop reason: HOSPADM

## 2022-02-16 RX ORDER — ASPIRIN 81 MG/1
81 TABLET ORAL DAILY
Status: DISCONTINUED | OUTPATIENT
Start: 2022-02-17 | End: 2022-02-19 | Stop reason: HOSPADM

## 2022-02-16 RX ORDER — ISOSORBIDE MONONITRATE 60 MG/1
120 TABLET, EXTENDED RELEASE ORAL DAILY
Status: DISCONTINUED | OUTPATIENT
Start: 2022-02-17 | End: 2022-02-19 | Stop reason: HOSPADM

## 2022-02-16 RX ADMIN — APIXABAN 5 MG: 5 TABLET, FILM COATED ORAL at 22:18

## 2022-02-16 RX ADMIN — SODIUM CHLORIDE: 9 INJECTION, SOLUTION INTRAVENOUS at 22:21

## 2022-02-16 RX ADMIN — INSULIN LISPRO 3 UNITS: 100 INJECTION, SOLUTION INTRAVENOUS; SUBCUTANEOUS at 22:22

## 2022-02-16 RX ADMIN — Medication 10 ML: at 22:19

## 2022-02-16 ASSESSMENT — PAIN DESCRIPTION - PAIN TYPE: TYPE: ACUTE PAIN

## 2022-02-16 ASSESSMENT — PAIN DESCRIPTION - LOCATION: LOCATION: ARM

## 2022-02-16 ASSESSMENT — PAIN - FUNCTIONAL ASSESSMENT: PAIN_FUNCTIONAL_ASSESSMENT: 0-10

## 2022-02-16 ASSESSMENT — PAIN DESCRIPTION - ORIENTATION: ORIENTATION: LEFT

## 2022-02-16 NOTE — TELEPHONE ENCOUNTER
EKG reviewed showing NSR; 1st AV block; NST changes (no sig change from 2020 study). I called back [facility] NP and she was sent to ER by EMS.

## 2022-02-16 NOTE — ED TRIAGE NOTES
EMS states patients PCP called them for intermittent syncopal episode in the office and bradycardia. EMS reports patients heart rate in the 40's upon their arrival. Patient had three episodes of syncope in route to ED, all three episodes patient became asystolic for \"2-6 seconds\". EMS states episode did not last long enough to need to initiate CPR and patient would become alert right as she \"came to\". Patient states that she will feel dizzy prior to syncopal episode. Patient states first episode \"2-3 days ago\" but she lives alone and is unsure how long she was not responsive.

## 2022-02-16 NOTE — ED NOTES
Bed: 03  Expected date:   Expected time:   Means of arrival:   Comments:  [facility], RN  02/16/22 6798

## 2022-02-16 NOTE — TELEPHONE ENCOUNTER
Pt is currently at  [facility]. Pt is having dizziness, nausea, episodes of syncope(lives alone) B/P 100/47 pulse 42. Office doing EKG now. Please advise.

## 2022-02-16 NOTE — CONSULTS
Called Cardiology @ 5746, 5350  RE: syncope, intermittent pauses/asystole, per Dr. Silvino Guerra called back @ 0318

## 2022-02-17 ENCOUNTER — PROCEDURE VISIT (OUTPATIENT)
Dept: CARDIOLOGY CLINIC | Age: 77
End: 2022-02-17
Payer: MEDICARE

## 2022-02-17 ENCOUNTER — APPOINTMENT (OUTPATIENT)
Dept: CARDIAC CATH/INVASIVE PROCEDURES | Age: 77
DRG: 243 | End: 2022-02-17
Payer: MEDICARE

## 2022-02-17 ENCOUNTER — NURSE ONLY (OUTPATIENT)
Dept: CARDIOLOGY CLINIC | Age: 77
End: 2022-02-17

## 2022-02-17 DIAGNOSIS — Z95.0 PACEMAKER: Primary | ICD-10-CM

## 2022-02-17 DIAGNOSIS — I44.2 INTERMITTENT COMPLETE HEART BLOCK (HCC): ICD-10-CM

## 2022-02-17 DIAGNOSIS — I48.91 ATRIAL FIBRILLATION, UNSPECIFIED TYPE (HCC): ICD-10-CM

## 2022-02-17 DIAGNOSIS — I48.91 ATRIAL FIBRILLATION WITH RAPID VENTRICULAR RESPONSE (HCC): ICD-10-CM

## 2022-02-17 DIAGNOSIS — Z95.0 PACEMAKER: ICD-10-CM

## 2022-02-17 DIAGNOSIS — R55 SYNCOPE AND COLLAPSE: ICD-10-CM

## 2022-02-17 PROBLEM — Z95.1 HISTORY OF CORONARY ARTERY BYPASS GRAFT: Status: ACTIVE | Noted: 2020-02-23

## 2022-02-17 PROBLEM — N18.30 CKD (CHRONIC KIDNEY DISEASE) STAGE 3, GFR 30-59 ML/MIN (HCC): Status: ACTIVE | Noted: 2022-01-01

## 2022-02-17 LAB
ANION GAP SERPL CALCULATED.3IONS-SCNC: 12 MMOL/L (ref 3–16)
BASOPHILS ABSOLUTE: 0 K/UL (ref 0–0.2)
BASOPHILS RELATIVE PERCENT: 0.4 %
BUN BLDV-MCNC: 26 MG/DL (ref 7–20)
CALCIUM SERPL-MCNC: 8.4 MG/DL (ref 8.3–10.6)
CHLORIDE BLD-SCNC: 104 MMOL/L (ref 99–110)
CO2: 24 MMOL/L (ref 21–32)
CREAT SERPL-MCNC: 1.3 MG/DL (ref 0.6–1.2)
EKG ATRIAL RATE: 57 BPM
EKG DIAGNOSIS: NORMAL
EKG P AXIS: 33 DEGREES
EKG P-R INTERVAL: 224 MS
EKG Q-T INTERVAL: 472 MS
EKG QRS DURATION: 116 MS
EKG QTC CALCULATION (BAZETT): 459 MS
EKG R AXIS: -25 DEGREES
EKG T AXIS: 147 DEGREES
EKG VENTRICULAR RATE: 57 BPM
EOSINOPHILS ABSOLUTE: 0.3 K/UL (ref 0–0.6)
EOSINOPHILS RELATIVE PERCENT: 3.9 %
GFR AFRICAN AMERICAN: 48
GFR NON-AFRICAN AMERICAN: 40
GLUCOSE BLD-MCNC: 164 MG/DL (ref 70–99)
GLUCOSE BLD-MCNC: 181 MG/DL (ref 70–99)
GLUCOSE BLD-MCNC: 204 MG/DL (ref 70–99)
GLUCOSE BLD-MCNC: 308 MG/DL (ref 70–99)
HCT VFR BLD CALC: 45.8 % (ref 36–48)
HEMOGLOBIN: 15.1 G/DL (ref 12–16)
LV EF: 58 %
LVEF MODALITY: NORMAL
LYMPHOCYTES ABSOLUTE: 1.4 K/UL (ref 1–5.1)
LYMPHOCYTES RELATIVE PERCENT: 20 %
MCH RBC QN AUTO: 30 PG (ref 26–34)
MCHC RBC AUTO-ENTMCNC: 33 G/DL (ref 31–36)
MCV RBC AUTO: 90.8 FL (ref 80–100)
MONOCYTES ABSOLUTE: 0.5 K/UL (ref 0–1.3)
MONOCYTES RELATIVE PERCENT: 8 %
NEUTROPHILS ABSOLUTE: 4.6 K/UL (ref 1.7–7.7)
NEUTROPHILS RELATIVE PERCENT: 67.7 %
PDW BLD-RTO: 14.2 % (ref 12.4–15.4)
PERFORMED ON: ABNORMAL
PLATELET # BLD: 97 K/UL (ref 135–450)
PLATELET SLIDE REVIEW: ABNORMAL
PMV BLD AUTO: 8.9 FL (ref 5–10.5)
POTASSIUM REFLEX MAGNESIUM: 3.8 MMOL/L (ref 3.5–5.1)
RBC # BLD: 5.04 M/UL (ref 4–5.2)
SARS-COV-2, NAAT: NOT DETECTED
SLIDE REVIEW: ABNORMAL
SODIUM BLD-SCNC: 140 MMOL/L (ref 136–145)
WBC # BLD: 6.8 K/UL (ref 4–11)

## 2022-02-17 PROCEDURE — 33208 INSRT HEART PM ATRIAL & VENT: CPT

## 2022-02-17 PROCEDURE — 2580000003 HC RX 258: Performed by: INTERNAL MEDICINE

## 2022-02-17 PROCEDURE — 2709999900 HC NON-CHARGEABLE SUPPLY

## 2022-02-17 PROCEDURE — 2500000003 HC RX 250 WO HCPCS: Performed by: INTERNAL MEDICINE

## 2022-02-17 PROCEDURE — 93010 ELECTROCARDIOGRAM REPORT: CPT | Performed by: INTERNAL MEDICINE

## 2022-02-17 PROCEDURE — 6370000000 HC RX 637 (ALT 250 FOR IP): Performed by: INTERNAL MEDICINE

## 2022-02-17 PROCEDURE — 6360000002 HC RX W HCPCS

## 2022-02-17 PROCEDURE — C1785 PMKR, DUAL, RATE-RESP: HCPCS

## 2022-02-17 PROCEDURE — 2500000003 HC RX 250 WO HCPCS

## 2022-02-17 PROCEDURE — C1898 LEAD, PMKR, OTHER THAN TRANS: HCPCS

## 2022-02-17 PROCEDURE — 02HK3JZ INSERTION OF PACEMAKER LEAD INTO RIGHT VENTRICLE, PERCUTANEOUS APPROACH: ICD-10-PCS | Performed by: INTERNAL MEDICINE

## 2022-02-17 PROCEDURE — 93005 ELECTROCARDIOGRAM TRACING: CPT | Performed by: INTERNAL MEDICINE

## 2022-02-17 PROCEDURE — C8929 TTE W OR WO FOL WCON,DOPPLER: HCPCS

## 2022-02-17 PROCEDURE — 6370000000 HC RX 637 (ALT 250 FOR IP): Performed by: NURSE PRACTITIONER

## 2022-02-17 PROCEDURE — 33208 INSRT HEART PM ATRIAL & VENT: CPT | Performed by: INTERNAL MEDICINE

## 2022-02-17 PROCEDURE — 80048 BASIC METABOLIC PNL TOTAL CA: CPT

## 2022-02-17 PROCEDURE — 2580000003 HC RX 258

## 2022-02-17 PROCEDURE — 85025 COMPLETE CBC W/AUTO DIFF WBC: CPT

## 2022-02-17 PROCEDURE — 2060000000 HC ICU INTERMEDIATE R&B

## 2022-02-17 PROCEDURE — 99152 MOD SED SAME PHYS/QHP 5/>YRS: CPT | Performed by: INTERNAL MEDICINE

## 2022-02-17 PROCEDURE — 0JH606Z INSERTION OF PACEMAKER, DUAL CHAMBER INTO CHEST SUBCUTANEOUS TISSUE AND FASCIA, OPEN APPROACH: ICD-10-PCS | Performed by: INTERNAL MEDICINE

## 2022-02-17 PROCEDURE — 83036 HEMOGLOBIN GLYCOSYLATED A1C: CPT

## 2022-02-17 PROCEDURE — 02H63JZ INSERTION OF PACEMAKER LEAD INTO RIGHT ATRIUM, PERCUTANEOUS APPROACH: ICD-10-PCS | Performed by: INTERNAL MEDICINE

## 2022-02-17 PROCEDURE — 36415 COLL VENOUS BLD VENIPUNCTURE: CPT

## 2022-02-17 PROCEDURE — 87635 SARS-COV-2 COVID-19 AMP PRB: CPT

## 2022-02-17 PROCEDURE — 6360000002 HC RX W HCPCS: Performed by: NURSE PRACTITIONER

## 2022-02-17 RX ORDER — FENTANYL CITRATE 50 UG/ML
INJECTION, SOLUTION INTRAMUSCULAR; INTRAVENOUS
Status: COMPLETED | OUTPATIENT
Start: 2022-02-17 | End: 2022-02-17

## 2022-02-17 RX ORDER — AMIODARONE HYDROCHLORIDE 200 MG/1
200 TABLET ORAL DAILY
Status: DISCONTINUED | OUTPATIENT
Start: 2022-02-18 | End: 2022-02-19 | Stop reason: HOSPADM

## 2022-02-17 RX ORDER — ATROPINE SULFATE 0.1 MG/ML
INJECTION INTRAVENOUS
Status: COMPLETED
Start: 2022-02-17 | End: 2022-02-17

## 2022-02-17 RX ORDER — ACETAMINOPHEN 325 MG/1
650 TABLET ORAL EVERY 4 HOURS PRN
Status: DISCONTINUED | OUTPATIENT
Start: 2022-02-17 | End: 2022-02-19 | Stop reason: HOSPADM

## 2022-02-17 RX ORDER — MIDAZOLAM HYDROCHLORIDE 5 MG/ML
INJECTION INTRAMUSCULAR; INTRAVENOUS
Status: COMPLETED | OUTPATIENT
Start: 2022-02-17 | End: 2022-02-17

## 2022-02-17 RX ORDER — NITROGLYCERIN 0.4 MG/1
0.4 TABLET SUBLINGUAL ONCE
Status: DISCONTINUED | OUTPATIENT
Start: 2022-02-17 | End: 2022-02-19 | Stop reason: HOSPADM

## 2022-02-17 RX ORDER — SODIUM CHLORIDE 9 MG/ML
25 INJECTION, SOLUTION INTRAVENOUS PRN
Status: DISCONTINUED | OUTPATIENT
Start: 2022-02-17 | End: 2022-02-19 | Stop reason: HOSPADM

## 2022-02-17 RX ORDER — CARVEDILOL 3.12 MG/1
3.12 TABLET ORAL 2 TIMES DAILY WITH MEALS
Status: DISCONTINUED | OUTPATIENT
Start: 2022-02-17 | End: 2022-02-18

## 2022-02-17 RX ORDER — HYDRALAZINE HYDROCHLORIDE 20 MG/ML
10 INJECTION INTRAMUSCULAR; INTRAVENOUS EVERY 4 HOURS PRN
Status: DISCONTINUED | OUTPATIENT
Start: 2022-02-17 | End: 2022-02-19 | Stop reason: HOSPADM

## 2022-02-17 RX ORDER — SODIUM CHLORIDE 0.9 % (FLUSH) 0.9 %
5-40 SYRINGE (ML) INJECTION PRN
Status: DISCONTINUED | OUTPATIENT
Start: 2022-02-17 | End: 2022-02-19 | Stop reason: HOSPADM

## 2022-02-17 RX ORDER — LEVOTHYROXINE SODIUM 0.05 MG/1
50 TABLET ORAL DAILY
Status: DISCONTINUED | OUTPATIENT
Start: 2022-02-18 | End: 2022-02-19 | Stop reason: HOSPADM

## 2022-02-17 RX ORDER — SODIUM CHLORIDE 0.9 % (FLUSH) 0.9 %
5-40 SYRINGE (ML) INJECTION EVERY 12 HOURS SCHEDULED
Status: DISCONTINUED | OUTPATIENT
Start: 2022-02-17 | End: 2022-02-19 | Stop reason: HOSPADM

## 2022-02-17 RX ADMIN — Medication 10 ML: at 13:23

## 2022-02-17 RX ADMIN — INSULIN LISPRO 8 UNITS: 100 INJECTION, SOLUTION INTRAVENOUS; SUBCUTANEOUS at 13:43

## 2022-02-17 RX ADMIN — FENTANYL CITRATE 25 MCG: 50 INJECTION, SOLUTION INTRAMUSCULAR; INTRAVENOUS at 12:43

## 2022-02-17 RX ADMIN — ISOPROTERENOL HYDROCHLORIDE 2 MCG/MIN: 0.2 INJECTION, SOLUTION INTRAMUSCULAR; INTRAVENOUS at 08:18

## 2022-02-17 RX ADMIN — ACETAMINOPHEN 650 MG: 325 TABLET ORAL at 04:01

## 2022-02-17 RX ADMIN — ONDANSETRON 4 MG: 2 INJECTION INTRAMUSCULAR; INTRAVENOUS at 08:39

## 2022-02-17 RX ADMIN — INSULIN LISPRO 1 UNITS: 100 INJECTION, SOLUTION INTRAVENOUS; SUBCUTANEOUS at 21:18

## 2022-02-17 RX ADMIN — ACETAMINOPHEN 650 MG: 325 TABLET ORAL at 22:59

## 2022-02-17 RX ADMIN — ATROPINE SULFATE: 0.1 INJECTION, SOLUTION INTRAVENOUS at 08:00

## 2022-02-17 RX ADMIN — Medication 10 ML: at 20:06

## 2022-02-17 RX ADMIN — ISOSORBIDE MONONITRATE 120 MG: 60 TABLET, EXTENDED RELEASE ORAL at 13:33

## 2022-02-17 RX ADMIN — PANTOPRAZOLE SODIUM 40 MG: 40 TABLET, DELAYED RELEASE ORAL at 13:34

## 2022-02-17 RX ADMIN — MIDAZOLAM HYDROCHLORIDE 1 MG: 5 INJECTION INTRAMUSCULAR; INTRAVENOUS at 11:51

## 2022-02-17 RX ADMIN — TROSPIUM CHLORIDE 20 MG: 20 TABLET, FILM COATED ORAL at 16:49

## 2022-02-17 RX ADMIN — ASPIRIN 81 MG: 81 TABLET, COATED ORAL at 13:35

## 2022-02-17 RX ADMIN — MIDAZOLAM HYDROCHLORIDE 1 MG: 5 INJECTION INTRAMUSCULAR; INTRAVENOUS at 12:43

## 2022-02-17 RX ADMIN — CARVEDILOL 3.12 MG: 3.12 TABLET, FILM COATED ORAL at 16:49

## 2022-02-17 RX ADMIN — INSULIN LISPRO 4 UNITS: 100 INJECTION, SOLUTION INTRAVENOUS; SUBCUTANEOUS at 18:14

## 2022-02-17 RX ADMIN — FENTANYL CITRATE 25 MCG: 50 INJECTION, SOLUTION INTRAMUSCULAR; INTRAVENOUS at 11:51

## 2022-02-17 ASSESSMENT — PAIN DESCRIPTION - ORIENTATION: ORIENTATION: MID

## 2022-02-17 ASSESSMENT — PAIN SCALES - GENERAL
PAINLEVEL_OUTOF10: 3
PAINLEVEL_OUTOF10: 2
PAINLEVEL_OUTOF10: 5
PAINLEVEL_OUTOF10: 4
PAINLEVEL_OUTOF10: 10

## 2022-02-17 ASSESSMENT — PAIN DESCRIPTION - LOCATION: LOCATION: CHEST

## 2022-02-17 ASSESSMENT — PAIN DESCRIPTION - PAIN TYPE: TYPE: ACUTE PAIN

## 2022-02-17 NOTE — PROGRESS NOTES
Patient admitted to room 438 from ED. Patient oriented to room, call light, bed rails, phone, lights and bathroom. Patient instructed about the schedule of the day including: vital sign frequency, lab draws, possible tests, frequency of MD and staff rounds, including RN/MD rounding together at bedside, daily weights, and I &O's. Patient instructed about prescribed diet, how to use 8MENU, and television. bed alarm in place, patient aware of placement and reason. Telemetry box in place, patient aware of placement and reason. Bed locked, in lowest position, side rails up 2/4, call light within reach. Will continue to monitor.

## 2022-02-17 NOTE — PROGRESS NOTES
4 Eyes Skin Assessment     The patient is being assess for   Admission    I agree that 2 RN's have performed a thorough Head to Toe Skin Assessment on the patient. ALL assessment sites listed below have been assessed. Areas assessed for pressure by both nurses:   [x]   Head, Face, and Ears   [x]   Shoulders, Back, and Chest, Abdomen  [x]   Arms, Elbows, and Hands   [x]   Coccyx, Sacrum, and Ischium  [x]   Legs, Feet, and Heels    Abrasion on L big toe and redness in between abdominal folds        Skin Assessed Under all Medical Devices by both nurses:  N/A              All Mepilex Borders were peeled back and area peeked at by both nurses:  No: N/A  Please list where Mepilex Borders are located:  N/A             **SHARE this note so that the co-signing nurse is able to place an eSignature**    Co-signer eSignature: Electronically signed by Karol Roque RN on 2/17/22 at 2:38 AM EST    Does the Patient have Skin Breakdown related to pressure?   No              Jesus Prevention initiated:  NA   Wound Care Orders initiated:  NA      [facility] nurse consulted for Pressure Injury (Stage 3,4, Unstageable, DTI, NWPT, Complex wounds)and New or Established Ostomies:  NA      Primary Nurse eSignature: Electronically signed by Elizabeth Justin RN on 2/17/22 at 2:40 AM EST

## 2022-02-17 NOTE — PROGRESS NOTES
Patient arrived on PCU from ICU. Report given by Yanique Chaidez RN. VSS. Patient states no further needs at this time .

## 2022-02-17 NOTE — ED PROVIDER NOTES
Emergency Physician Note        Note Open Time: 9:25 PM EST    Chief Complaint  Loss of Consciousness (EMS states patient went to PCP today for \"well visit\". EMS was called by patients PCP for intermittent syncopal episode and bradycardia. )       History of Present Illness  Arsh Leo is a 78 y.o. female who presents to the ED for syncope. Patient reports multiple recent episodes of syncope and went to primary care today for a well check and had a syncopal episode there as well. She was sent here from there by EMS. EMS reports asystolic episodes in route. Patient denies any chest pain or shortness of breath. She does have a history of coronary artery bypass grafting remotely, greater than 10 years ago. She denies any other complaints. 10 systems reviewed, pertinent positives per HPI otherwise noted to be negative    I have reviewed the following from the nursing documentation:      Prior to Admission medications    Medication Sig Start Date End Date Taking? Authorizing Provider   nitroGLYCERIN (NITROSTAT) 0.4 MG SL tablet DISSOLVE 1 TAB UNDER TONGUE FOR CHEST PAIN - IF PAIN REMAINS AFTER 5 MIN, CALL 911 AND REPEAT DOSE.  MAX 3 TABS IN 15 MINUTES 2/2/22   Usha Godwin MD   carvedilol (COREG) 3.125 MG tablet Take 1 tablet by mouth 2 times daily (with meals) 10/8/21   Alfreda Klein MD   apixaban (ELIQUIS) 5 MG TABS tablet Take 1 tablet by mouth 2 times daily 10/8/21   Alfreda Klein MD   ranolazine (RANEXA) 500 MG extended release tablet TAKE ONE TABLET BY MOUTH TWICE A DAY 9/16/21   Usha Godwin MD   furosemide (LASIX) 40 MG tablet TAKE ONE TABLET BY MOUTH DAILY MAY TAKE AN ADDITIONAL 1/2 TABLET AS NEEDED FOR WEIGHT GAIN OF 3 LBS IN A DAY 7/16/21   Usha Godwin MD   isosorbide mononitrate (IMDUR) 120 MG extended release tablet TAKE ONE TABLET BY MOUTH DAILY 4/15/21   Usha Godwin MD   ondansetron (ZOFRAN) 4 MG tablet Take 1 tablet by mouth every 8 hours as needed for Nausea 4/1/21   DARIANA Perry   desonide (DESOWEN) 0.05 % cream Apply 0.05 % topically 2 times daily Apply topically 2 times daily. Historical Provider, MD   amiodarone (CORDARONE) 200 MG tablet Take 1 tablet by mouth daily 11/5/20   Regino Junior MD   pantoprazole (PROTONIX) 40 MG tablet TAKE ONE TABLET BY MOUTH DAILY 9/3/19   Historical Provider, MD   aspirin 81 MG EC tablet Take 1 tablet by mouth daily 12/29/17   Mona Calero MD   tolterodine (DETROL LA) 2 MG ER capsule Take 4 mg by mouth daily     Historical Provider, MD   fluticasone (FLONASE) 50 MCG/ACT nasal spray 1 spray by Nasal route daily. Historical Provider, MD   Omega-3 Fatty Acids (FISH OIL) 1000 MG CAPS Take 2 capsules by mouth daily. 4/3/15   Balta Kulkarni MD   levothyroxine (SYNTHROID) 50 MCG tablet Take 50 mcg by mouth Daily. Historical Provider, MD   GLIPIZIDE Take 5 mg by mouth 2 times daily (with meals) Taking 10 mg AM & 5 mg in PM    Historical Provider, MD   gabapentin (NEURONTIN) 600 MG tablet Take 600 mg by mouth. May take up to 4x per day if needed for neuropathy pain. Historical Provider, MD   diphenhydrAMINE (BENADRYL) 25 MG tablet Take 25 mg by mouth nightly as needed. Historical Provider, MD   Cholecalciferol (VITAMIN D3) 1000 UNIT CAPS Take 1 capsule by mouth daily. Historical Provider, MD       Allergies as of 02/16/2022 - Fully Reviewed 02/16/2022   Allergen Reaction Noted    Niacin and related Anaphylaxis 07/05/2011    Adhesive tape  04/12/2012    Other  04/12/2012    Statins depletion therapy  07/06/2011    Vancomycin  07/06/2011       Past Medical History:   Diagnosis Date    Allergic rhinitis     Arthritis     CAD (coronary artery disease)     CHF (congestive heart failure) ([address]     Cholelithiasis 7/3/2015    Diabetes mellitus ([address]     Accuchecks daily once a day at [facility] Gastroparesis     Hepatic steatosis 7/3/2015    Hyperlipidemia     Hypertension     Peripheral neuropathy     Sleep apnea     Has used the CPAP x 25 yrs now per pt.  Thyroid disease     Vitamin B12 deficiency         Surgical History:   Past Surgical History:   Procedure Laterality Date    CARDIAC CATHETERIZATION  2017    SVG to OM2 100% ostium    CHOLECYSTECTOMY      COLECTOMY  1998    diverticulitis     COLONOSCOPY      CORONARY ANGIOPLASTY      CORONARY ARTERY BYPASS GRAFT  2005    x 3    DIAGNOSTIC CARDIAC CATH LAB PROCEDURE      ENDOSCOPY, COLON, DIAGNOSTIC      EYE SURGERY      Bilateral cataracts removed with lens implants    FOOT SURGERY      Multiple foot surgeries bilateral    HERNIA REPAIR      OTHER SURGICAL HISTORY  2012    achilles tendon lengthening,arthroplasty,matatarsalhead resection    OTHER SURGICAL HISTORY Right 3/20/13    FUSION OF RIGHT GREAT TOE JOINT WITH WIRE FIXATION, DEBRIEDMENT OF WOUND RIGHT GREAT TOE    TOE AMPUTATION Left 3/29/2021    PARTIAL LEFT FOOT AMPUTATION performed by Evy Cordova DPM at [address]      Laparoscopic    UPPER GASTROINTESTINAL ENDOSCOPY          Family History:    Family History   Problem Relation Age of Onset    Stroke Mother     Coronary Art Dis Father     Coronary Art Dis Sister     Coronary Art Dis Brother        Social History     Socioeconomic History    Marital status:       Spouse name: Not on file    Number of children: Not on file    Years of education: Not on file    Highest education level: Not on file   Occupational History    Not on file   Tobacco Use    Smoking status: Former Smoker     Packs/day: 1.00     Years: 30.00     Pack years: 30.00     Quit date: 1998     Years since quittin.7    Smokeless tobacco: Never Used   Vaping Use    Vaping Use: Never used   Substance and Sexual Activity    Alcohol use: No    Drug use: No    Sexual activity: Not Currently   Other Topics Concern    Not on file   Social History Narrative    Not on file     Social Determinants of Health     Financial Resource Strain:     Difficulty of Paying Living Expenses: Not on file   Food Insecurity:     Worried About Running Out of Food in the Last Year: Not on file    Nessa of Food in the Last Year: Not on file   Transportation Needs:     Lack of Transportation (Medical): Not on file    Lack of Transportation (Non-Medical): Not on file   Physical Activity:     Days of Exercise per Week: Not on file    Minutes of Exercise per Session: Not on file   Stress:     Feeling of Stress : Not on file   Social Connections:     Frequency of Communication with Friends and Family: Not on file    Frequency of Social Gatherings with Friends and Family: Not on file    Attends Orthodoxy Services: Not on file    Active Member of [address] [place] [address] tinyclues or Organizations: Not on file    Attends Club or Organization Meetings: Not on file    Marital Status: Not on file   Intimate Partner Violence:     Fear of Current or Ex-Partner: Not on file    Emotionally Abused: Not on file    Physically Abused: Not on file    Sexually Abused: Not on file   Housing Stability:     Unable to Pay for Housing in the Last Year: Not on file    Number of [place] in the Last Year: Not on file    Unstable Housing in the Last Year: Not on file       Nursing notes reviewed. ED Triage Vitals [02/16/22 1636]   Enc Vitals Group      /76      Pulse 76      Resp 20      Temp 97.5 °F (36.4 °C)      Temp Source Oral      SpO2 99 %      Weight 233 lb (105.7 kg)      Height       Head Circumference       Peak Flow       Pain Score       Pain Loc       Pain Edu? Excl. in [address]? GENERAL:  Awake, alert. Well developed, well nourished with no apparent distress. HENT:  Normocephalic, Atraumatic, moist mucous membranes. EYES:  Pupils equal round and reactive to light, Conjunctiva normal, extraocular movements normal.  NECK:  No meningeal signs, Supple.   CHEST:  Regular rate and rhythm, chest wall non-tender. LUNGS:  Clear to auscultation bilaterally. ABDOMEN:  Soft, non-tender, no rebound, rigidity or guarding, non-distended, normal bowel sounds. No costovertebral angle tenderness to palpation. BACK:  No tenderness. EXTREMITIES:  Normal range of motion, no edema, no bony tenderness, no deformity, distal pulses present. SKIN: Warm, dry and intact. NEUROLOGIC: Normal mental status. Moving all extremities to command. LABS and DIAGNOSTIC RESULTS  EKG  The Ekg interpreted by me shows  normal sinus rhythm with a rate of 77  Axis is   Normal  QTc is  511ms  First-degree AV block, left bundle branch block  ST Segments: depression in  lateral leads  No significant change from prior EKG dated 3/3/2020    RADIOLOGY  X-RAYS:  I have reviewed radiologic plain film image(s). ALL OTHER NON-PLAIN FILM IMAGES SUCH AS CT, ULTRASOUND AND MRI HAVE BEEN READ BY THE RADIOLOGIST. XR CHEST PORTABLE   Final Result   No radiographic evidence of acute cardiopulmonary disease.               LABS  Labs Reviewed   CBC WITH AUTO DIFFERENTIAL - Abnormal; Notable for the following components:       Result Value    Platelets 647 (*)     All other components within normal limits    Narrative:     Performed at:  [address] [place] [address] [employer]   Phone [phone]   COMPREHENSIVE METABOLIC PANEL W/ REFLEX TO MG FOR LOW K - Abnormal; Notable for the following components:    Sodium 133 (*)     Chloride 98 (*)     Glucose 247 (*)     BUN 26 (*)     CREATININE 1.5 (*)     GFR Non- 34 (*)     GFR  41 (*)     Total Protein 6.2 (*)     All other components within normal limits    Narrative:     Performed at:  [facility]) - [address], [facility] [employer]   Phone [phone] - Abnormal; Notable for the following components:    Pro-BNP 2,155 (*)     All other components within normal limits    Narrative:     Performed at:  [facility]) [facility]  [address],  [place], [facility] [employer]   Phone [phone]   TROPONIN    Narrative:     Performed at:  [facility]) [address], [facility] [place]   Phone [phone]   BASIC METABOLIC PANEL W/ REFLEX TO MG FOR LOW K   CBC WITH AUTO DIFFERENTIAL   HEMOGLOBIN A1C   POCT GLUCOSE         MEDICAL DECISION MAKING        On arrival pacer pads were placed on the patient. She did not have any significant episodes for at least the first hour and then after that did have an episode of asystole that lasted about 9 or 10 seconds. We did not have opportunity to start pacing during this time. I consulted cardiology and spoke with Dr. Van Escalona who came to the bedside and evaluated the patient. He recommends admission to the hospitalist and they will perform echocardiogram tomorrow and if appropriate likely pacemaker placement for this patient. I spoke with Dr. Yang Bautista. We thoroughly discussed the history, physical exam, laboratory and imaging studies, as well as, emergency department course. Based upon that discussion, we've decided to admit Joan Santana for further observation and evaluation of Stacy Sheldon's asystole. As I have deemed necessary from their history, physical, and studies, I have considered and evaluated Joan Santana for the following diagnoses:        FINAL IMPRESSION  1. Syncope and collapse    2. Ventricular asystolia (HCC)        Vitals:  Blood pressure (!) 147/70, pulse 73, temperature 97.5 °F (36.4 °C), temperature source Oral, resp. rate 15, weight 233 lb (105.7 kg), SpO2 98 %, not currently breastfeeding. Disposition  Pt is in stable condition upon Admit to telemetry. This chart was generated using the [employer] dictation system. I created this record but it may contain dictation errors.           Bj Owens MD  02/16/22 [id number]

## 2022-02-17 NOTE — PROGRESS NOTES
Paged on call cardiology Marysol Luna NP. NP aware of pt's situation and told primary nurse to hook pt up to the zoll and pace it to 50. NP will contact Laxmi about situation. Call made to ICU to transfer pt. Will continue to monitor.

## 2022-02-17 NOTE — PROGRESS NOTES
Patient has had 6 different pauses in the last hour and half. With the longest being 31.6 seconds and the shorted and last one being 6 seconds.  Cardiology made aware and patient was transferred to [facility], gave report to Devon Rodriguez

## 2022-02-17 NOTE — CONSULTS
[address] [address]                                  CONSULTATION    PATIENT NAME: Musa Sanon                    :        1945  MED REC NO:   [id number]                          ROOM:       0236  ACCOUNT NO:   [de-identified]                           ADMIT DATE: 2022  PROVIDER:     Cici Niño MD    CONSULT DATE:  2022    CARDIAC ELECTROPHYSIOLOGY CONSULTATION    REASON FOR CONSULTATION:  Syncope and bradycardia. HISTORY OF PRESENT ILLNESS:  The patient is a 71-year-old woman with  history of hypertension, hyperlipidemia and coronary artery disease who  is admitted following recurrent episodes of syncope. She has a history  of coronary artery disease with coronary artery bypass surgery in 2005. Left heart cath in 2017 demonstrate occlusion of the saphenous vein  graft to the second obtuse marginal branch. The most recent left heart  cath from  demonstrated the persistent occluded SVG to the OM with a  patent vein graft to the right and patent LIMA to the LAD. Left  ventricular function was compromised at that time with ejection fraction  of 20%. Her most recent echo from 2020 demonstrates improvement  in left ventricular function with ejection fraction of 55 to 60% and no  wall motion abnormalities. At this time, she has fairly stable angina  pectoris which she treats with nitroglycerin. She estimates that she  uses nitroglycerin several times per month, but may go 1 to 2 weeks  without needing it. She also has Charcot-Randee-Tooth disease and has  undergone multiple surgeries on her feet. About 1 week ago, she reports that she was in her kitchen in the seated  position and had an abrupt episode of syncope. She awakened not knowing  what happened.   She saw her primary care doctor today and had multiple  episodes of syncope both in his office and in the squad coming to [facility]. They did generate some rhythm strips which demonstrate  several seconds of asystole coinciding with her loss of consciousness. She did not have any overt typical angina associated with these  episodes. At the time of evaluation she does not have any significant  acid-base  electrolyte abnormalities which might contribute. She denies  any recent changes in her medications. There is no clear evidence for  sepsis. Chest x-ray demonstrates no acute changes. PAST MEDICAL HISTORY:  1. Hypertension. 2.  Hyperlipidemia. 3.  Coronary artery disease status post remote coronary artery bypass. 4.  Charcot-Randee-Tooth disease. 5.  Peripheral neuropathy. MEDICATIONS:  At the time of admission include Coreg 3.125 mg p.o.  b.i.d., apixaban 5 mg p.o. b.i.d., Ranexa 500 mg p.o. b.i.d., Lasix 40  mg p.o. daily, Imdur 120 mg p.o. daily, Zofran 4 mg p.o. q.8 h. as  needed amiodarone 200 mg p.o. daily. ALLERGIES:  Include adhesive tape STATINS and VANCOMYCIN. SOCIAL HISTORY:  The patient is a former smoker having stopped smoking  in 1998. She does not consume alcohol at this time. FAMILY HISTORY:  Remarkable for stroke in the mother. There is history  of coronary artery disease in the father and two siblings. There is no  known family history of cardiac rhythm disturbance, syncope or sudden  cardiac death. REVIEW OF SYSTEMS:  Demonstrates no recent change in appetite or change  in weight. The patient has not had recent fever or chills. She  describes the above-mentioned stable angina pectoris. She has not had  palpitations, near-syncope or syncope except that within the last week. Her functional status is limited primarily by her angina and by her  peripheral neuropathy and foot disease. All other components of  14-system review are negative. PHYSICAL EXAMINATION:  VITAL SIGNS:  Blood pressure is 120/70, heart rate is 70 beats per  minute and regular.   The patient is afebrile. GENERAL:  She is awake, alert, oriented and in no discomfort. HEENT:  Exam demonstrates normocephalic, atraumatic head. There is no  scleral icterus. Pupils are round and reactive. NECK:  Supple without thyromegaly. This no cervical lymphadenopathy. LUNGS:  Clear to auscultation. CARDIOVASCULAR:  Exam reveals a regular rate and rhythm. The apical  impulse is discrete. S1 and S2 are normal.  There is no audible murmur  or gallop. The jugular venous pressure is mildly elevated. ABDOMEN:  Mildly obese, soft, nontender. EXTREMITIES:  Demonstrate no pitting pretibial dependent edema. There  are dermatologic changes consistent with chronic venous stasis. She has  some external rotation and foreshortening of her feet with some  amputations. LABORATORY DATA:  A 12-lead ECG from 02/16/2022 demonstrates sinus  rhythm at 77 beats per minute. There is a long MO interval with  incomplete left bundle-branch block and nonspecific ST-segment and  T-wave abnormalities. IMPRESSION:  1. Recurrent syncope. 2.  Paroxysmal high-grade AV block. 3.  Coronary artery disease status post remote coronary artery bypass  surgery. 4.  Chronic kidney disease. 5.  Charcot-Randee-Tooth disease. The patient presents after multiple episodes of syncope today. Many of  these were witnessed and rhythm strips demonstrate prolonged ventricular  pauses coinciding with the syncope. The rhythm strips are not of  exquisite quality, but likely demonstrate paroxysmal high-grade AV  block. Her ECG does demonstrate some prolonged AV conduction with  incomplete left bundle-branch block. There does not appear to be a  \"reversible\" cause for the AV block. This is likely reflection of  underlying longstanding conducting system disease. Though she does have  coronary artery disease and frequent episodes of angina, she did not  recognize any of these episodes associated with the syncope.   We will  obtain echocardiography on 02/17/2022 to confirm well preserved left  ventricular function. Following that we will likely proceed with  implantation of permanent dual-chamber pacing system. RECOMMENDATIONS:  1. NPO after midnight. 2.  2D echo in a.m. 3.  Hold apixaban. 4.  We will likely proceed with implantation of dual-chamber pacing  system on 02/17/2022. Thanks for the opportunity to assist in the care of the patient. Please  contact me if you have any questions regarding her evaluation.         Lizbeth Craft MD    D: 02/16/2022 19:31:20       T: 02/16/2022 19:34:49     [email or web address]  Job#: [id number]     Doc#: [id number]

## 2022-02-17 NOTE — PROGRESS NOTES
[facility] rep check - same day check -implant 2/17/22    Programming changes per     Mode changed to DDD     See Paceart report under the Cardiology tab.

## 2022-02-17 NOTE — CARE COORDINATION
CASE MANAGEMENT INITIAL ASSESSMENT      Reviewed chart and completed assessment with patient: at bedside  Explained Case Management role/services. Primary contact information:     Health Care Decision Maker :   Primary Decision Maker: Coco Segundo Child - [phone]    Secondary Decision Maker: Marimar Daniela - Niece/Nephew - [phone]    Secondary Decision Maker: Yesenia Comment - [phone]          Can this person be reached and be able to respond quickly, such as within a few minutes or hours? Yes       Admit date/status:2/16/22 Inpatient  Diagnosis: snycope; ventricular asystolia   Is this a Readmission?:  No      Insurance: [address] [place] [address] complete   Precert required for SNF: Yes       3 night stay required: No    Living arrangements, Adls, care needs, prior to admission: home alone    Transportation: family     Durable Medical Equipment at home:  Walker_X_Cane_X_RTS_X_ BSC__Shower Chair__  02__ HHN__ CPAP__  BiPap__  Hospital Bed__ W/C___ Other_scooter_    Services in the home and/or outpatient, prior to admission: none      PT/OT recs: not ordered at this time    [address] Notification (HEN): if SNF needed    Barriers to discharge: pacemaker placement    Plan/comments: Pt from home alone. Has daughter in town but she is having surgery soon. Pt has had home care in past and would be open to if needed. Pt to have pacer placement today. Will follow up post-op.      ECOC on chart for MD signature

## 2022-02-17 NOTE — H&P
Hospital Medicine History & Physical      PCP: DARIA Hansen - CNP    Date of Admission: 2/16/2022    Date of Service: Pt seen/examined on 2/16/2022 and Admitted to Inpatient with expected LOS greater than two midnights due to medical therapy. Chief Complaint:  Syncopal episode    History Of Present Illness:      78 y.o. female, with PMH of HTN, CAD, HLD, obesity, atrial fibrillation and DM, who presented to [facility] with near syncope. History obtained from the patient and review of EMR. The patient stated she has been having multiple episodes of syncope at home. She stated she gets a sensation of \"spinning in the head\" and then states \"going out\" and then she passes out. The patient stated she has been following with her PCP and has a cardiology appointment scheduled for Friday, 2/18/2022. However, the patient stated today she was at her PCPs office for a \"well visit\" and had a syncopal episode while in the office. The patient stated she passes out for 7-10 seconds and her EKG \"shows no heartbeat\". Per EMR, the patient was brought in by EMS from her PCPs office. EMS reported asystolic episodes in route. In the emergency room a twelve-lead EKG was obtained that revealed a first-degree AV block. Cardiology was consulted in the emergency room. The patient will be admitted for further evaluation and treatment. She has been made n.p.o. and an echocardiogram has been ordered for the a. m. The patient denied any other associated symptoms as well as any aggravating and/or alleviating factors. At the time of this assessment, the patient was resting comfortably in bed. She currently denies any chest pain, back pain, abdominal pain, shortness of breath, numbness, tingling, N/V/C/D, fever and/or chills.      Past Medical History:          Diagnosis Date    Allergic rhinitis     Arthritis     CAD (coronary artery disease)     [facility]estive heart failure) [facility] [address]     Cholelithiasis 7/3/2015  Diabetes mellitus ([facility] [address]     Accuchecks daily once a day at home in [place]    [facility] Gastroparesis     Hepatic steatosis 7/3/2015    Hyperlipidemia     Hypertension     Peripheral neuropathy     Sleep apnea     Has used the CPAP x 25 yrs now per pt.  Thyroid disease     Vitamin B12 deficiency      Past Surgical History:          Procedure Laterality Date    CARDIAC CATHETERIZATION  12/27/2017    SVG to OM2 100% ostium    CHOLECYSTECTOMY      COLECTOMY  1998    diverticulitis     COLONOSCOPY      CORONARY ANGIOPLASTY      CORONARY ARTERY BYPASS GRAFT  2005    x 3    DIAGNOSTIC CARDIAC CATH LAB PROCEDURE      ENDOSCOPY, COLON, DIAGNOSTIC      EYE SURGERY      Bilateral cataracts removed with lens implants    FOOT SURGERY      Multiple foot surgeries bilateral    HERNIA REPAIR      OTHER SURGICAL HISTORY  4/17/2012    achilles tendon lengthening,arthroplasty,matatarsalhead resection    OTHER SURGICAL HISTORY Right 3/20/13    FUSION OF RIGHT GREAT TOE JOINT WITH WIRE FIXATION, DEBRIEDMENT OF WOUND RIGHT GREAT TOE    TOE AMPUTATION Left 3/29/2021    PARTIAL LEFT FOOT AMPUTATION performed by Roderick Hernandez DPM at [address] [facility]      Laparoscopic    UPPER GASTROINTESTINAL ENDOSCOPY       Medications Prior to Admission:      Prior to Admission medications    Medication Sig Start Date End Date Taking? Authorizing Provider   nitroGLYCERIN (NITROSTAT) 0.4 MG SL tablet DISSOLVE 1 TAB UNDER TONGUE FOR CHEST PAIN - IF PAIN REMAINS AFTER 5 MIN, CALL 911 AND REPEAT DOSE.  MAX 3 TABS IN 15 MINUTES 2/2/22   Darek Montalvo MD   carvedilol (COREG) 3.125 MG tablet Take 1 tablet by mouth 2 times daily (with meals) 10/8/21   Franki Hutchinson MD   apixaban Colleen Devine) 5 MG TABS tablet Take 1 tablet by mouth 2 times daily 10/8/21   Franki Hutchinson MD   ranolazine (RANEXA) 500 MG extended release tablet TAKE ONE TABLET BY MOUTH TWICE A DAY 9/16/21   Darek Montalvo MD furosemide (LASIX) 40 MG tablet TAKE ONE TABLET BY MOUTH DAILY MAY TAKE AN ADDITIONAL 1/2 TABLET AS NEEDED FOR WEIGHT GAIN OF 3 LBS IN A DAY 7/16/21   Haskel Meigs, MD   isosorbide mononitrate (IMDUR) 120 MG extended release tablet TAKE ONE TABLET BY MOUTH DAILY 4/15/21   Haskel Meigs, MD   ondansetron (ZOFRAN) 4 MG tablet Take 1 tablet by mouth every 8 hours as needed for Nausea 4/1/21   DARIANA Be   desonide (DESOWEN) 0.05 % cream Apply 0.05 % topically 2 times daily Apply topically 2 times daily. Historical Provider, MD   amiodarone (CORDARONE) 200 MG tablet Take 1 tablet by mouth daily 11/5/20   Live Rhoades MD   pantoprazole (PROTONIX) 40 MG tablet TAKE ONE TABLET BY MOUTH DAILY 9/3/19   Historical Provider, MD   aspirin 81 MG EC tablet Take 1 tablet by mouth daily 12/29/17   Rand Jimenez MD   tolterodine (DETROL LA) 2 MG ER capsule Take 4 mg by mouth daily     Historical Provider, MD   fluticasone (FLONASE) 50 MCG/ACT nasal spray 1 spray by Nasal route daily. Historical Provider, MD   Omega-3 Fatty Acids (FISH OIL) 1000 MG CAPS Take 2 capsules by mouth daily. 4/3/15   Haskel Meigs, MD   levothyroxine (SYNTHROID) 50 MCG tablet Take 50 mcg by mouth Daily. Historical Provider, MD   GLIPIZIDE Take 5 mg by mouth 2 times daily (with meals) Taking 10 mg AM & 5 mg in PM    Historical Provider, MD   gabapentin (NEURONTIN) 600 MG tablet Take 600 mg by mouth. May take up to 4x per day if needed for neuropathy pain. Historical Provider, MD   diphenhydrAMINE (BENADRYL) 25 MG tablet Take 25 mg by mouth nightly as needed. Historical Provider, MD   Cholecalciferol (VITAMIN D3) 1000 UNIT CAPS Take 1 capsule by mouth daily. Historical Provider, MD     Allergies:  Niacin and related, Adhesive tape, Other, Statins depletion therapy, and Vancomycin    Social History:      The patient currently lives at home    TOBACCO:   reports that she quit smoking about 23 years ago.  She has a 30.00 pack-year smoking history. She has never used smokeless tobacco.  ETOH:   reports no history of alcohol use. E-Cigarettes/Vaping Use     Questions Responses    E-Cigarette/Vaping Use Never User    Start Date     Passive Exposure     Quit Date     Counseling Given     Comments         Family History:      Reviewed in detail. Positive as follows:        Problem Relation Age of Onset    Stroke Mother     Coronary Art Dis Father     Coronary Art Dis Sister     Coronary Art Dis Brother      REVIEW OF SYSTEMS COMPLETED:   Pertinent positives as noted in the HPI. All other systems reviewed and negative. PHYSICAL EXAM PERFORMED:    BP (!) 178/140   Pulse 74   Temp 97.5 °F (36.4 °C) (Oral)   Resp 12   Wt 233 lb (105.7 kg)   SpO2 99%   BMI 41.27 kg/m²     General appearance:  Pleasant, obese female in no apparent distress, appears stated age and cooperative. HEENT: Pupils equal, round, and reactive to light. Extra ocular muscles intact. Conjunctivae/corneas clear. Neck: Supple, with full range of motion. No jugular venous distention. Trachea midline. Respiratory:  Normal respiratory effort. Clear to auscultation, bilaterally without Rales/Wheezes/Rhonchi. Cardiovascular:  Regular rate and rhythm with normal S1/S2 without murmurs, rubs or gallops. Abdomen: Soft, obese,round non-tender, non-distended with normal bowel sounds. Musculoskeletal:  No clubbing, cyanosis or edema bilaterally. Full range of motion without deformity. Skin: Skin color, texture, turgor normal.  No significant rashes or lesions. Neurologic:  Neurovascularly intact.  Cranial nerves: II-XII intact, grossly non-focal.  Psychiatric:  Alert and oriented, thought content appropriate, normal insight  Capillary Refill: Brisk,3 seconds, normal  Peripheral Pulses: +2 palpable, equal bilaterally     Labs:     Recent Labs     02/16/22  1649   WBC 9.8   HGB 14.0   HCT 42.4   *     Recent Labs     02/16/22  1649   *   K 4.7   CL 98*   CO2 23   BUN 26*   CREATININE 1.5*   CALCIUM 8.7     Recent Labs     02/16/22  1649   AST 18   ALT 18   BILITOT 0.6   ALKPHOS 110     Recent Labs     02/16/22  1649   TROPONINI <0.01     Urinalysis:      Lab Results   Component Value Date    NITRU Negative 07/20/2021    WBCUA 6-9 09/21/2020    BACTERIA 1+ 09/21/2020    RBCUA 0-2 09/21/2020    BLOODU Negative 07/20/2021    SPECGRAV <=1.005 07/20/2021    GLUCOSEU Negative 07/20/2021     Radiology:     CXR: I have reviewed the CXR with the following interpretation: no acute cardiopulmonary findings    EKG:  I have reviewed the EKG with the following interpretation: The Ekg interpreted in the absence of a cardiologist shows Sinus rhythm with 1st degree A-V block. Nonspecific ST and T wave abnormality. Prolonged QTAbnormal ECG. When compared with ECG of 23-FEB-2020 11:21, SD interval has increased. Incomplete left bundle branch block is no longer PresentT wave inversion no longer evident in Inferior leadsT wave inversion less evident in Anterolateral leads    XR CHEST PORTABLE   Final Result   No radiographic evidence of acute cardiopulmonary disease. ASSESSMENT:    Active Hospital Problems    Diagnosis Date Noted    AV block [I44.30] 02/16/2022    Atrial fibrillation (HCC) [I48.91]     Morbid obesity ([address] [E66.01] 12/27/2017    CAD (coronary artery disease) [I25.10]     Hyperlipidemia [E78.5]     Hypertension [I10]      PLAN:    Syncopal episode in patient with AV block  -12 lead EKG with 1st degree AV block  -tele monitoring  -ECHO in am  -cardiology consulted - appreciate recommendations in advance  -NPO after MN    Essential HTN in setting of known CAD  -continue coreg  -continue ASA  -continue imdur and ranexa    Morbid obesity  -With Body mass index is 41.2 kg/m². Complicating assessment and treatment. Placing patient at risk for multiple co-morbidities as well as early death and contributing to the patient's presentation.  Counseled on weight loss    HLD  -continue home meds    Atrial fibrillation  -anticoagulated on eliquis  -continue amiodarone    DM2, uncontrolled  -  -hemglobin a1c in am  -hold home metformin  -hdssi  -poct ac/hs  -hypoglycemia protocol  -carb control diet    DVT Prophylaxis: eliquis    Diet: No diet orders on file     Code Status: Prior    PT/OT Eval Status: No indication for need at this time    Dispo - 2-3 days pending clinical improvement     Shermon Babinski, APRN - CNP    Thank you BeDARIA Garcia CNP for the opportunity to be involved in this patient's care.  If you have any questions or concerns please feel free to contact me at [phone].  ------------------------Dr. Mckenna Guevara----------------------------

## 2022-02-17 NOTE — PROGRESS NOTES
RN at bedside to place a purewick. Pt attempted to boost herself up in the bed. At this time pt stated \"I am going out\". Pt proceeded to have a heart pause. Pt became pale at this time but did not lose consciousness. Pt able to respond after. Stacy Still MD made aware of the pause which was around 9 seconds.      Pt stable at this time

## 2022-02-17 NOTE — PROGRESS NOTES
Labels and Orders are correct and during bedside report all IV tubing has been physically traced from labeled bag, to channel, to IV site and verified by oncoming and off going nurse.        Report given to   Cath Lab RNElectronically signed by Raymond Galindo RN on 2/17/22 at 11:04 AM EST    Accepting RNElectronically signed by Mickiel Fabry, RN on 2/17/22 at 11:07 AM EST

## 2022-02-17 NOTE — PROGRESS NOTES
Hospitalist ICU Progress Note    CC: Intermittent complete heart block [facility])    Hospital course:  78 y.o. female, with PMH of HTN, CAD, HLD, CABG 2005, obesity, atrial fibrillation, chronic chest pain on ranexa,  and DM, who [facility] with near syncope. She had EF of 20% in 2018, which did improved to 55%. The patient stated she has been having multiple episodes of syncope at home. She stated she gets a sensation of \"spinning in the head\" and then states \"going out\" and then she passes out. The patient stated she has been following with her PCP and has a cardiology appointment scheduled for Friday, 2/18/2022. However, the patient stated today she was at her PCPs office for a \"well visit\" and had a syncopal episode while in the office. The patient stated she passes out for 7-10 seconds and her EKG \"shows no heartbeat\". Per EMR, the patient was brought in by EMS from her PCPs office. EMS reported asystolic episodes in route. In the emergency room a twelve-lead EKG was obtained that revealed a first-degree AV block. Cardiology was consulted in the emergency room. The patient will be admitted for further evaluation and treatment. She has been made n.p.o. and an echocardiogram has been ordered for the a.m. She will have pacemaker place today    Admit date: 2/16/2022  Days in hospital:  1    24 Hour Events: pt has morning chest pain today    Subjective: she has chest pain most days in AM, Dr. Beth Billrs in to see pt and will place pacemaker today due to recurrent syncope due to ventricular pauses    ROS:  A comprehensive review of systems was negative except for: chest pain daily .     Objective:    VITALS:  BP (!) 163/67   Pulse 78   Temp 97.5 °F (36.4 °C) (Oral)   Resp 16   Wt 233 lb 14.5 oz (106.1 kg)   SpO2 94%   BMI 41.43 kg/m²     Gen: nervous appearing, NAD  HEENT: NC/AT, moist mucous membranes, no oropharyngeal erythema or exudate Neck: supple, trachea midline, no anterior cervical or SC LAD  Heart:  Normal s1/s2, RRR, no murmurs, gallops, or rubs. trace leg edema  Lungs:  Mostly clear bilaterally, no wheeze, no rales, no rhonchi, no crackles, no use of accessory muscles  Abd: bowel sounds present, soft, nontender, nondistended, no masses  Extrem:  No clubbing, cyanosis,  trace edema, peripheral pulses 2+, brisk capillary refill  Skin: no rashes or lesions, normal color/perfusion  Psych:  A & O x3    Neuro: grossly intact, moves all four extremities. Radiology (personally reviewed by me):  CXR:    Impression:        No radiographic evidence of acute cardiopulmonary disease. ECHO 2/2020 shows grade I diastolic dysfunction and EF of 55-60%  ECHO 2/22 shows normal LV function    Assessment:    Principal Problem:    Intermittent complete heart block (HCC)  Active Problems:    Hyperlipidemia    Hypertension    CAD (coronary artery disease)    Morbid obesity with BMI of 40.0-44.9, adult ([facility] [address]    History of coronary artery bypass graft    Atrial fibrillation (HCC)    CKD (chronic kidney disease) stage 3, GFR 30-59 ml/min (HCC)  Resolved Problems:    * No resolved hospital problems. *      Plan:  1. Intermittent AV block causing prolonged pauses - pt will likely have pacemaker placed today - EP feels that this is due to long standing conduction system disease and is likely not reversible - hold coreg, amiodarone and restart once pacemaker in place  2. Syncope - due to AV block - will likely have pacemaker placed today  3. HTN - holding coreg for now - pacer to be placed - will continue with imdur and ranexa  4. DMII - holding metformin, SSI in place - await hgb a1c - pt likely needs other oral medications -   5.  morbid Obesity, Body mass index is Body mass index is 41.43 kg/m².  .  [] Class I - 30.0 to 34.9 kg/m2  [] Class II - 35.0 to 39.9 kg/m2  [x] Class III - ?40 kg/m2 (also referred to as severe, extreme, morbid or massive obesity)   [] Super Obesity  - > 50% kg/m2  [] Super Super Obesity  - > 48% kg/m2  Complicating assessment and treatment. Obesity Places the patient at risk for multiple co-morbidities as well as early death and may be contributing to the patient's presentation. Supportive care. Encourage therapeutic lifestyle changes. Continue current regimen/therapies. Monitor. Adjust medical regimen as appropriate.   6.  afib - controlled - hold amiodarone today - continue with eliquis and ASA      Prognosis:  Fair    Code status:  Full code    DVT prophylaxis: NOAC/Warfarin  GI prophylaxis: Proton Pump Inhibitor  Antibiotic prophylaxis indicated:   no    Nasal decolonization:  Bactroban  Diet:  Diet NPO Exceptions are: Ice Chips    Disposition:  Home with home health    Medications:  Scheduled Meds:   mupirocin   Nasal BID    atropine        insulin lispro  0-12 Units SubCUTAneous TID WC    insulin lispro  0-6 Units SubCUTAneous Nightly    [START ON 2/18/2022] levothyroxine  50 mcg Oral Daily    [Held by provider] amiodarone  200 mg Oral Daily    [Held by provider] apixaban  5 mg Oral BID    aspirin  81 mg Oral Daily    [Held by provider] carvedilol  3.125 mg Oral BID WC    isosorbide mononitrate  120 mg Oral Daily    pantoprazole  40 mg Oral Daily    ranolazine  500 mg Oral BID    trospium  20 mg Oral BID AC    sodium chloride flush  5-40 mL IntraVENous 2 times per day       PRN Meds:  hydrALAZINE, sodium chloride flush, sodium chloride, ondansetron **OR** ondansetron, polyethylene glycol, acetaminophen **OR** acetaminophen, glucose, glucagon (rDNA), dextrose, perflutren lipid microspheres, dextrose bolus (hypoglycemia) **OR** dextrose bolus (hypoglycemia)    IV:   isoproterenol (ISUPREL) infusion 2 mcg/min (02/17/22 0818)    sodium chloride      sodium chloride 75 mL/hr at 02/16/22 2221    dextrose         No intake or output data in the 24 hours ending 02/17/22 0819    Results:  CBC:   Recent Labs 02/16/22  1649 02/17/22  0507   WBC 9.8 6.8   HGB 14.0 15.1   HCT 42.4 45.8   MCV 91.2 90.8   * 97*     BMP:   Recent Labs     02/16/22  1649 02/17/22  0507   * 140   K 4.7 3.8   CL 98* 104   CO2 23 24   BUN 26* 26*   CREATININE 1.5* 1.3*     Mag: No results for input(s): MAG in the last 72 hours. LIVER PROFILE:   Recent Labs     02/16/22 1649   AST 18   ALT 18   BILITOT 0.6   ALKPHOS 110     PT/INR: No results for input(s): PROTIME, INR in the last 72 hours. APTT: No results for input(s): APTT in the last 72 hours. UA:No results for input(s): NITRITE, COLORU, PHUR, LABCAST, WBCUA, RBCUA, MUCUS, TRICHOMONAS, YEAST, BACTERIA, CLARITYU, SPECGRAV, LEUKOCYTESUR, UROBILINOGEN, BILIRUBINUR, BLOODU, GLUCOSEU, AMORPHOUS in the last 72 hours.     Invalid input(s): KETONESU    ABG: No results found for: PHART, PH, PZS2SZX, PCO2, PO2ART, PO2, VYL2KKZ, HCO3, BEART, BE, THGBART, THB, XSF5XKP, Q7VHLCOH, O2SAT    Lab Results   Component Value Date    CALCIUM 8.4 02/17/2022    PHOS 3.6 07/20/2021             Electronically signed by Marcos Sierra MD on 2/17/2022 at 8:19 AM

## 2022-02-17 NOTE — SEDATION DOCUMENTATION
Sedation start time: 1152  Sedation stop time: 1253  Mallampati: 3  Pre and post Vicente score: 10/10  Medication given: IV Versed 2 mg, IV fentanyl 50 mcg  Pre-Procedure Assessment / Plan:  ASA: Class 2 - A normal healthy patient with mild systemic disease  Level of Sedation Plan: Moderate sedation  Post Procedure plan: Return to same level of care     An independent trained observer pushed medications at my direction. We monitored the patient's level of consciousness and vital signs/physiologic status throughout the procedure duration (see start and start times above).

## 2022-02-17 NOTE — PROCEDURES
[address] [address]                            CARDIAC CATHETERIZATION    PATIENT NAME: Lata Espitia                    :        1945  MED REC NO:   [id number]                          ROOM:       0236  ACCOUNT NO:   [de-identified]                           ADMIT DATE: 2022  PROVIDER:     Mary Knight MD    DATE OF PROCEDURE:  2022    PROCEDURE:  Implantation of dual chamber pacemaker lead. INDICATIONS:  Paroxysmal high-grade AV block with prolonged ventricular  asystole and syncope. FINDINGS:  1. Right atrial sensing 3.7 millivolts. 2.  Right atrial pacing threshold 0.75 volts at 0.5 milliseconds. 3.  Right ventricular sensing 6.2 millivolts. 4.  Right ventricle pacing threshold 0.4 volts at 0.5 milliseconds. PROCEDURE DESCRIPTION:  After informed consent was obtained, the patient  was brought to the cardiac electrophysiology laboratory in the fasting  state on 2022. She was prepared for the procedure by application  of ECG electrodes and an automated blood pressure cuff. Pacing and  defibrillation patches were applied to the chest in the  anterior-posterior orientation. The left upper chest was shaved and  prepared with an antibacterial soap and the patient was draped in a  sterile fashion. She received IV midazolam and IV fentanyl for  sedation. After adequate sedation was achieved, local anesthetic was  infiltrated beneath the lateral aspect of the left clavicle. A 5 cm incision was made beneath the lateral aspect of the left  clavicle. This incision was carried down to the level of the  prepectoralis fascia using local anesthetic and electrocautery. Hemostasis was achieved with electrocautery. The left axillary vein was  visualized under ultrasound guidance and cannulated with a thin-walled  18-gauge needle, through which a J-tipped guidewire was passed. A  second guidewire was placed in a similar fashion. The guidewires were  observed to advance to the inferior vena cava under fluoroscopic  guidance. A 7-Greenlandic introducers were advanced over the medial  guidewire. The guidewire and dilator were removed. This lead was  advanced to the mid septal area and actively fixed at that site. The  R-wave was sensed at 6.2 millivolts. The ventricular pacing threshold  was 0.4 volts at 0.5 milliseconds. A second 7-Greenlandic introducer was  advanced over the remaining guidewire. The guidewire and dilator were  removed. The atrial lead was introduced into the left axillary vein and  advanced to the right atrium under fluoroscopic guidance. This was  advanced to the high anterior right atrium and actively fixed at that  site. The P-wave was sensed at 3.7 millivolts. The atrial pacing  threshold was 0.75 volts at 0.5 milliseconds. Pacing impedance was 706  ohms. Both leads were felt to be in stable position. The introducers were  withdrawn and peeled away. Both leads were fixed to the underlying  prepectoralis fascia using 2-0 silk sutures. The subcutaneous pocket  was irrigated copiously with the solution of antibiotic. Hemostasis was  checked and found to be adequate. The leads were connected to the pulse  generator. The atrial and ventricular leads were inserted in the  appropriate receptacles and the pulse generator header and setscrews  were tightened appropriately. The patient was observed to have atrial  synchronous ventricular pacing at that time. The device was implanted  and the subcutaneous pocket fixed in place with a single 2-0 silk  suture. The subcutaneous tissues were closed in an interrupted fashion  using 3-0 Vicryl suture. The skin was closed in a subcuticular fashion  using 4-0 Vicryl sutures. The wound edges were approximated with  Steri-Strips and the wound was covered with dry sterile dressing.     The patient tolerated the procedure well.  There were no apparent  complications. All sponge and needle counts were reported as correct at  the termination of the case. ESTIMATED BLOOD LOSS:  Less than 20 mL. The device was programmed from DDD to DDI after the termination of the  case as the atrial rate was elevated. IMPLANTED HARDWARE:  1. Right atrial lead, Medtronic model N9748026, serial [id number]. 2.  Right ventricular lead, Medtronic model [id number], serial [id number].  3.  Dual chamber pacemaker, Medtronic model J9400431, serial [id number].         Tabatha Cottrell MD    D: 02/17/2022 13:23:59       T: 02/17/2022 14:45:57     [email or web address]  Job#: [id number]     Doc#: [id number]    CC:

## 2022-02-18 ENCOUNTER — APPOINTMENT (OUTPATIENT)
Dept: GENERAL RADIOLOGY | Age: 77
DRG: 243 | End: 2022-02-18
Payer: MEDICARE

## 2022-02-18 PROBLEM — Z95.0 PACEMAKER: Status: ACTIVE | Noted: 2022-02-18

## 2022-02-18 LAB
ANION GAP SERPL CALCULATED.3IONS-SCNC: 11 MMOL/L (ref 3–16)
BUN BLDV-MCNC: 23 MG/DL (ref 7–20)
CALCIUM SERPL-MCNC: 8.6 MG/DL (ref 8.3–10.6)
CHLORIDE BLD-SCNC: 101 MMOL/L (ref 99–110)
CO2: 27 MMOL/L (ref 21–32)
CREAT SERPL-MCNC: 1.3 MG/DL (ref 0.6–1.2)
ESTIMATED AVERAGE GLUCOSE: 185.8 MG/DL
GFR AFRICAN AMERICAN: 48
GFR NON-AFRICAN AMERICAN: 40
GLUCOSE BLD-MCNC: 160 MG/DL (ref 70–99)
GLUCOSE BLD-MCNC: 195 MG/DL (ref 70–99)
GLUCOSE BLD-MCNC: 197 MG/DL (ref 70–99)
GLUCOSE BLD-MCNC: 215 MG/DL (ref 70–99)
GLUCOSE BLD-MCNC: 249 MG/DL (ref 70–99)
HBA1C MFR BLD: 8.1 %
MAGNESIUM: 2 MG/DL (ref 1.8–2.4)
PERFORMED ON: ABNORMAL
POTASSIUM SERPL-SCNC: 4.2 MMOL/L (ref 3.5–5.1)
SODIUM BLD-SCNC: 139 MMOL/L (ref 136–145)

## 2022-02-18 PROCEDURE — 97162 PT EVAL MOD COMPLEX 30 MIN: CPT

## 2022-02-18 PROCEDURE — 71046 X-RAY EXAM CHEST 2 VIEWS: CPT

## 2022-02-18 PROCEDURE — 80048 BASIC METABOLIC PNL TOTAL CA: CPT

## 2022-02-18 PROCEDURE — 6370000000 HC RX 637 (ALT 250 FOR IP): Performed by: NURSE PRACTITIONER

## 2022-02-18 PROCEDURE — 97116 GAIT TRAINING THERAPY: CPT

## 2022-02-18 PROCEDURE — 97530 THERAPEUTIC ACTIVITIES: CPT

## 2022-02-18 PROCEDURE — 6370000000 HC RX 637 (ALT 250 FOR IP): Performed by: INTERNAL MEDICINE

## 2022-02-18 PROCEDURE — 2060000000 HC ICU INTERMEDIATE R&B

## 2022-02-18 PROCEDURE — 99232 SBSQ HOSP IP/OBS MODERATE 35: CPT | Performed by: INTERNAL MEDICINE

## 2022-02-18 PROCEDURE — 97535 SELF CARE MNGMENT TRAINING: CPT

## 2022-02-18 PROCEDURE — 97110 THERAPEUTIC EXERCISES: CPT

## 2022-02-18 PROCEDURE — 97166 OT EVAL MOD COMPLEX 45 MIN: CPT

## 2022-02-18 PROCEDURE — 83735 ASSAY OF MAGNESIUM: CPT

## 2022-02-18 PROCEDURE — 36415 COLL VENOUS BLD VENIPUNCTURE: CPT

## 2022-02-18 PROCEDURE — 2580000003 HC RX 258: Performed by: INTERNAL MEDICINE

## 2022-02-18 RX ORDER — MECOBALAMIN 5000 MCG
5 TABLET,DISINTEGRATING ORAL NIGHTLY
Status: DISCONTINUED | OUTPATIENT
Start: 2022-02-18 | End: 2022-02-19 | Stop reason: HOSPADM

## 2022-02-18 RX ORDER — CARVEDILOL 6.25 MG/1
6.25 TABLET ORAL 2 TIMES DAILY WITH MEALS
Status: DISCONTINUED | OUTPATIENT
Start: 2022-02-19 | End: 2022-02-19 | Stop reason: HOSPADM

## 2022-02-18 RX ORDER — LEVOTHYROXINE SODIUM 0.05 MG/1
50 TABLET ORAL DAILY
Qty: 30 TABLET | Refills: 3 | Status: SHIPPED | OUTPATIENT
Start: 2022-02-18

## 2022-02-18 RX ADMIN — INSULIN LISPRO 2 UNITS: 100 INJECTION, SOLUTION INTRAVENOUS; SUBCUTANEOUS at 17:11

## 2022-02-18 RX ADMIN — CARVEDILOL 3.12 MG: 3.12 TABLET, FILM COATED ORAL at 09:04

## 2022-02-18 RX ADMIN — ISOSORBIDE MONONITRATE 120 MG: 60 TABLET, EXTENDED RELEASE ORAL at 09:04

## 2022-02-18 RX ADMIN — Medication 5 MG: at 20:30

## 2022-02-18 RX ADMIN — MUPIROCIN: 20 OINTMENT TOPICAL at 09:04

## 2022-02-18 RX ADMIN — ASPIRIN 81 MG: 81 TABLET, COATED ORAL at 09:04

## 2022-02-18 RX ADMIN — INSULIN LISPRO 4 UNITS: 100 INJECTION, SOLUTION INTRAVENOUS; SUBCUTANEOUS at 09:05

## 2022-02-18 RX ADMIN — AMIODARONE HYDROCHLORIDE 200 MG: 200 TABLET ORAL at 17:10

## 2022-02-18 RX ADMIN — LEVOTHYROXINE SODIUM 50 MCG: 0.05 TABLET ORAL at 06:40

## 2022-02-18 RX ADMIN — INSULIN LISPRO 1 UNITS: 100 INJECTION, SOLUTION INTRAVENOUS; SUBCUTANEOUS at 20:26

## 2022-02-18 RX ADMIN — Medication 10 ML: at 09:04

## 2022-02-18 RX ADMIN — CARVEDILOL 3.12 MG: 3.12 TABLET, FILM COATED ORAL at 17:10

## 2022-02-18 RX ADMIN — APIXABAN 5 MG: 5 TABLET, FILM COATED ORAL at 20:30

## 2022-02-18 RX ADMIN — MUPIROCIN: 20 OINTMENT TOPICAL at 20:30

## 2022-02-18 RX ADMIN — ACETAMINOPHEN 650 MG: 325 TABLET ORAL at 13:52

## 2022-02-18 RX ADMIN — TROSPIUM CHLORIDE 20 MG: 20 TABLET, FILM COATED ORAL at 17:10

## 2022-02-18 RX ADMIN — PANTOPRAZOLE SODIUM 40 MG: 40 TABLET, DELAYED RELEASE ORAL at 09:04

## 2022-02-18 RX ADMIN — RANOLAZINE 500 MG: 500 TABLET, FILM COATED, EXTENDED RELEASE ORAL at 09:04

## 2022-02-18 RX ADMIN — TROSPIUM CHLORIDE 20 MG: 20 TABLET, FILM COATED ORAL at 06:40

## 2022-02-18 RX ADMIN — INSULIN LISPRO 4 UNITS: 100 INJECTION, SOLUTION INTRAVENOUS; SUBCUTANEOUS at 12:25

## 2022-02-18 RX ADMIN — APIXABAN 5 MG: 5 TABLET, FILM COATED ORAL at 09:04

## 2022-02-18 ASSESSMENT — PAIN SCALES - GENERAL
PAINLEVEL_OUTOF10: 6
PAINLEVEL_OUTOF10: 2

## 2022-02-18 NOTE — CARE COORDINATION
Therapy rec's of SNF noted. Discussed rec's with patient and daughter but they are not in agreement with this plan. They would like for patient to discharge to home with home care. Patient would like a referral to be made to [facility] home care as she has had them in the past.  Patient and daughter also asking about a referral to [facility] to assist with some light housekeeping. Placed call to [facility] with [address] home care and notified of referral. She states they are able to follow at discharge for home care needs. Referral to [facility] made via [email or web address].

## 2022-02-18 NOTE — PROGRESS NOTES
Post op check 2/17/22 for programming changer per ANALI. DC PPM implant 2/17/22. Device interrogation by company representative. See interrogation for further details. Hx AF (oac). Mode changed from DDI-DDD.  2/16/2022 - present (2 days)  [address] report under Cardiology tab.

## 2022-02-18 NOTE — PROGRESS NOTES
Hospitalist ICU Progress Note    CC: Intermittent complete heart block [facility])    Hospital course:  78 y.o. female, with PMH of HTN, CAD, HLD, CABG 2005, obesity, atrial fibrillation, chronic chest pain on ranexa,  and DM, who presented to [facility] with near syncope. She had EF of 20% in 2018, which did improved to 55%. The patient stated she has been having multiple episodes of syncope at home. She stated she gets a sensation of \"spinning in the head\" and then states \"going out\" and then she passes out. The patient stated she has been following with her PCP and has a cardiology appointment scheduled for Friday, 2/18/2022. However, the patient stated today she was at her PCPs office for a \"well visit\" and had a syncopal episode while in the office. The patient stated she passes out for 7-10 seconds and her EKG \"shows no heartbeat\". Per EMR, the patient was brought in by EMS from her PCPs office. EMS reported asystolic episodes in route. In the emergency room a twelve-lead EKG was obtained that revealed a first-degree AV block. Cardiology was consulted in the emergency room. The patient will be admitted for further evaluation and treatment. She has been made n.p.o. and an echocardiogram has been ordered for the a.m. She had pacer placed    Admit date: 2/16/2022  Days in hospital:  2    24 Hour Events: some intermittent feelings of dizziness    Subjective: pacemaker in place and functioning normally - pt sister can come pick her up tomorrow and provide in home support for home services - she still has some intermittent dizziness today so not quite ready for discharge    ROS:  A comprehensive review of systems was negative except for: intermittent dizziness .     Objective:    VITALS:  BP (!) 143/71   Pulse 71   Temp 98.1 °F (36.7 °C) (Oral)   Resp 18   Wt 233 lb 14.5 oz (106.1 kg)   SpO2 97%   BMI 41.43 kg/m²     Gen: nervous appearing, NAD  HEENT: NC/AT, moist mucous membranes, no oropharyngeal erythema or exudate    Neck: supple, trachea midline, no anterior cervical or SC LAD  Heart:  Normal s1/s2, RRR, no murmurs, gallops, or rubs. trace leg edema  Lungs:  Mostly clear bilaterally, no wheeze, no rales, no rhonchi, no crackles, no use of accessory muscles  Abd: bowel sounds present, soft, nontender, nondistended, no masses  Extrem:  No clubbing, cyanosis,  trace edema, peripheral pulses 2+, brisk capillary refill  Skin: no rashes or lesions, normal color/perfusion  Psych:  A & O x3    Neuro: grossly intact, moves all four extremities. Radiology (personally reviewed by me):  CXR:    Impression:        No radiographic evidence of acute cardiopulmonary disease. ECHO 2/2020 shows grade I diastolic dysfunction and EF of 55-60%  ECHO 2/22 shows normal LV function    Assessment:    Principal Problem:    Intermittent complete heart block (HCC)  Active Problems:    Hyperlipidemia    Hypertension    CAD (coronary artery disease)    Morbid obesity with BMI of 40.0-44.9, adult ([facility] [address]    History of coronary artery bypass graft    Atrial fibrillation (HCC)    CKD (chronic kidney disease) stage 3, GFR 30-59 ml/min ([facility])  Resolved Problems:    * No resolved hospital problems. *      Plan:  1. Intermittent AV block causing prolonged pauses - had pacer placed yesterday  2. Syncope - due to AV block - had pacer placed yesterday - still some intermittent episodes of dizziness, but no syncope  3. HTN - restarted on coreg and amiodarone  4. DMII - holding metformin, SSI in place - await hgb a1c - pt likely needs other oral medications -   5.  morbid Obesity, Body mass index is Body mass index is 41.43 kg/m².  .  [] Class I - 30.0 to 34.9 kg/m2  [] Class II - 35.0 to 39.9 kg/m2  [x] Class III - ?40 kg/m2 (also referred to as severe, extreme, morbid or massive obesity)   [] Super Obesity  - > 50% kg/m2  [] Super Super Obesity  - > 53% kg/m2  Complicating assessment and treatment. Obesity Places the patient at risk for multiple co-morbidities as well as early death and may be contributing to the patient's presentation. Supportive care. Encourage therapeutic lifestyle changes. Continue current regimen/therapies. Monitor. Adjust medical regimen as appropriate. 6.  afib - controlled - restarted on amiodarone and eliquis and coreg      Prognosis:  Fair    Code status:  Full code    DVT prophylaxis: NOAC/Warfarin  GI prophylaxis: Proton Pump Inhibitor  Antibiotic prophylaxis indicated:   no    Nasal decolonization:  Bactroban  Diet:  ADULT DIET;  Regular; 4 carb choices (60 gm/meal)    Disposition:  Home with home health 2/19    Medications:  Scheduled Meds:   mupirocin   Nasal BID    insulin lispro  0-12 Units SubCUTAneous TID WC    insulin lispro  0-6 Units SubCUTAneous Nightly    levothyroxine  50 mcg Oral Daily    nitroGLYCERIN  0.4 mg SubLINGual Once    amiodarone  200 mg Oral Daily    carvedilol  3.125 mg Oral BID WC    sodium chloride flush  5-40 mL IntraVENous 2 times per day    apixaban  5 mg Oral BID    aspirin  81 mg Oral Daily    isosorbide mononitrate  120 mg Oral Daily    pantoprazole  40 mg Oral Daily    ranolazine  500 mg Oral BID    trospium  20 mg Oral BID AC       PRN Meds:  hydrALAZINE, sodium chloride flush, sodium chloride, acetaminophen, ondansetron **OR** ondansetron, polyethylene glycol, acetaminophen **OR** acetaminophen, glucose, glucagon (rDNA), dextrose, perflutren lipid microspheres, dextrose bolus (hypoglycemia) **OR** dextrose bolus (hypoglycemia)    IV:   isoproterenol (ISUPREL) infusion Stopped (02/17/22 1236)    sodium chloride      dextrose           Intake/Output Summary (Last 24 hours) at 2/18/2022 1142  Last data filed at 2/18/2022 0641  Gross per 24 hour   Intake 360 ml   Output 450 ml   Net -90 ml       Results:  CBC:   Recent Labs     02/16/22  1649 02/17/22  0507   WBC 9.8 6.8   HGB 14.0 15.1   HCT 42.4 45.8   MCV 91.2 90.8   * 97*     BMP:   Recent Labs     02/16/22  1649 02/17/22  0507 02/18/22  0523   * 140 139   K 4.7 3.8 4.2   CL 98* 104 101   CO2 23 24 27   BUN 26* 26* 23*   CREATININE 1.5* 1.3* 1.3*     Mag: No results for input(s): MAG in the last 72 hours. LIVER PROFILE:   Recent Labs     02/16/22  1649   AST 18   ALT 18   BILITOT 0.6   ALKPHOS 110     PT/INR: No results for input(s): PROTIME, INR in the last 72 hours. APTT: No results for input(s): APTT in the last 72 hours. UA:No results for input(s): NITRITE, COLORU, PHUR, LABCAST, WBCUA, RBCUA, MUCUS, TRICHOMONAS, YEAST, BACTERIA, CLARITYU, SPECGRAV, LEUKOCYTESUR, UROBILINOGEN, BILIRUBINUR, BLOODU, GLUCOSEU, AMORPHOUS in the last 72 hours.     Invalid input(s): KETONESU    ABG: No results found for: PHART, PH, CAE6OQB, PCO2, PO2ART, PO2, QHP9MRS, HCO3, BEART, BE, THGBART, THB, NRE4DJG, N0MZAFKC, O2SAT    Lab Results   Component Value Date    CALCIUM 8.6 02/18/2022    PHOS 3.6 07/20/2021             Electronically signed by Marino Rodrigez MD on 2/18/2022 at 11:42 AM

## 2022-02-18 NOTE — FLOWSHEET NOTE
02/18/22 0900   Assessment   Charting Type Shift assessment   Neurological   Neuro (WDL) WDL   Level of Consciousness Alert (0)   Orientation Level Oriented X4   Cognition Appropriate judgement; Follows commands   Language Clear   R Hand  Strong   L Hand  Strong   R Foot Dorsiflexion Moderate   L Foot Dorsiflexion Moderate   R Foot Plantar Flexion Moderate   L Foot Plantar Flexion Moderate   RUE Motor Response Responds to command   Sensation RUE Full sensation   LUE Motor Response Responds to command   Sensation LUE Full sensation   RLE Motor Response Responds to command   Sensation RLE Numbness;Tingling   LLE Motor Response Responds to command   Sensation LLE Numbness;Tingling   Gag Present   [place] Coma Scale   Eye Opening 4   Best Verbal Response 5   Best Motor Response 6   [place] Coma Scale Score 15   HEENT   HEENT (WDL) X   Right Eye Impaired vision   Left Eye Impaired vision   Right Ear Impaired hearing   Left Ear Impaired hearing   Nose Intact   Throat Intact   Tongue Pink & moist   Voice Normal   Teeth Missing teeth   Respiratory   Respiratory (WDL) WDL   Respiratory Pattern Regular   Respiratory Depth Normal   Respiratory Quality/Effort Unlabored   Chest Assessment Chest expansion symmetrical;Trachea midline   L Breath Sounds Clear   R Breath Sounds Clear   Cardiac   Cardiac (WDL) X   Cardiac Regularity Regular   Heart Sounds S1, S2   Cardiac Rhythm Sinus rhythm   Rhythm Interpretation   Pulse 77   Cardiac Monitor   Telemetry Monitor On Yes   Telemetry Audible Yes   Telemetry Alarms Set Yes   Gastrointestinal   Abdominal (WDL) WDL   Abdomen Inspection Soft;Rounded   Tenderness Soft;Nontender   RUQ Bowel Sounds Active   LUQ Bowel Sounds Active   RLQ Bowel Sounds Active   LLQ Bowel Sounds Active   Peripheral Vascular   Peripheral Vascular (WDL) X   Edema Right lower extremity; Left lower extremity   RLE Edema +1   LLE Edema +1   Skin Color/Condition   Skin Color/Condition (WDL) WDL   Skin Integrity Skin Integrity (WDL) X   Skin Integrity Other (Comment)  (L toe amputations, redness in abd fold)   Musculoskeletal   Musculoskeletal (WDL) X   RUE Full movement   LUE Full movement   RL Extremity Weakness   LL Extremity Weakness   Genitourinary   Genitourinary (WDL) X   Flank Tenderness No   Suprapubic Tenderness No   Dysuria No   Urine Assessment   Incontinence No   Urine Color Yellow/straw   Urine Appearance Clear   Urine Odor No odor   Anus/Rectum   Anus/Rectum (WDL) WDL   Psychosocial   Psychosocial (WDL) WDL

## 2022-02-18 NOTE — PROGRESS NOTES
Physical Therapy    Facility/Department: [facility] C4 PCU  Initial Assessment    NAME: Lewis Phoenix  : 1945  MRN: [id number]    Date of Service: 2022    Discharge Recommendations:  [address]      If pt discharges prior to next PT session this note will serve as discharge summary. Assessment   Body structures, Functions, Activity limitations: Decreased functional mobility ; Decreased balance;Decreased endurance;Decreased strength;Decreased posture  Assessment: Pt is 79 yo female s/p pacemaker placement . At baseline pt lives alone, uses scooter in house, has cane and [id number]. Today pt need min assist of 2 for transfers and short distance amb with quad cane. Pt will benefit from skilled PT to address deficits. Recommend SNF at discharge. Treatment Diagnosis: decreased mobility, strength and endurance  Prognosis: Good  Decision Making: Medium Complexity  PT Education: Goals;PT Role;Plan of Care;General Safety; Disease Specific Education;Home Exercise Program;Transfer Training;Gait Training  Patient Education: Disease specific: energy conservation, precautions for LUE post pacer insertion, safety for transfers and ambulation, BLE basic HEP. She voices and demonstrates understanding  Barriers to Learning: none  REQUIRES PT FOLLOW UP: Yes  Activity Tolerance  Activity Tolerance: Patient Tolerated treatment well  Activity Tolerance: BP Supine 159/79  HR 76  Sitting 161/88  HR 88  Post ambulation 136/83    SpO2 94%  Post 5 minutes in chair 138/69  HR 92       Patient Diagnosis(es): The primary encounter diagnosis was Syncope and collapse. A diagnosis of Ventricular asystolia (HCC) was also pertinent to this visit.      has a past medical history of Allergic rhinitis, Arthritis, CAD (coronary artery disease), CHF (congestive heart failure) ([address] Cholelithiasis, Diabetes mellitus ([facility] [address] Gastroparesis, Hepatic steatosis, Hyperlipidemia, Hypertension, Peripheral neuropathy, Sleep apnea, Thyroid disease, and Vitamin B12 deficiency. has a past surgical history that includes Coronary artery bypass graft (2005); Foot surgery; colectomy (1998); Colonoscopy; Upper gastrointestinal endoscopy; Tonsillectomy and adenoidectomy; Tubal ligation; other surgical history (4/17/2012); other surgical history (Right, 3/20/13); Coronary angioplasty; Cholecystectomy; Cardiac catheterization (12/27/2017); Diagnostic Cardiac Cath Lab Procedure; Toe amputation (Left, 3/29/2021); eye surgery; Endoscopy, colon, diagnostic; and hernia repair. Restrictions  Restrictions/Precautions  Restrictions/Precautions: Up as Tolerated,General Precautions,Fall Risk  Implants present? : Pacemaker (implant dual chamber pacer leads on 2/17)  Vision/Hearing  Vision: Impaired  Vision Exceptions: Wears glasses for reading  Hearing: Within functional limits     Subjective  Chart Reviewed: Yes  Patient assessed for rehabilitation services?: Yes  Family / Caregiver Present: No  Referring Practitioner:  Casey Sanders  Referral Date : 02/18/22  Diagnosis: syncope with collapse, implantation dual chamber pacer leads on 2/17  Follows Commands: Within Functional Limits  Comments: RN cleared pt for therpay, pt resting in bed on approach  Subjective: pt agrees to therapy, reports niece or friend can help her at home  Pain Screening  Patient Currently in Pain: Denies  Vital Signs  Pulse: 76  Heart Rate Source: Monitor  BP: (!) 159/79  BP Location: Right upper arm  Patient Currently in Pain: Denies  Oxygen Therapy  SpO2: 94 %       Orientation  Overall Orientation Status: Within Normal Limits  Social/Functional History  Social/Functional History  Lives With: Alone  Type of Home: House  Home Layout: One level  Home Access: Ramped entrance  Bathroom Shower/Tub: Walk-in shower  Bathroom Toilet: Handicap height  Bathroom Equipment: Shower chair  Home Equipment: Cane,4 wheeled walker,Electric scooter  ADL Assistance: Independent  Ambulation Assistance: Independent  Transfer Assistance: Independent  Additional Comments: Alexsandra assists with transport and shopping, uses electric cart at grocery, denies recent falls    Objective     RLE AROM: WFL  LLE AROM : WFL  Strength : BLEs: grossly 3+/5 at hips, 4/5 at knees, 3+/5 at ankles        Bed mobility  Supine to Sit: Minimal assistance     Transfers  Sit to Stand: Minimal Assistance;2 Person Assistance  Stand to sit: Minimal Assistance;2 Person Assistance  Bed to Chair: Minimal assistance;2 Person Assistance     Ambulation  Surface: level tile  Device: Small Unsilonison  Assistance: Minimal assistance;2 Person assistance  Quality of Gait: Wide base of suppot, very short steps with minimal foot clearance, assist of 2 for balance, early fatigue  Distance: 15 ft x 2        Exercises  Straight Leg Raise: 5 x B  Heelslides: 5 x B  Gluteal Sets: 5 x B  Hip Abduction: 5 x B supine  Ankle Pumps: 10 x B  Core Strengthening: Abdominal bracing 5 x with cues     Plan   Plan  Times per week: 3-5 x week  Times per day: Daily  Current Treatment Recommendations: Strengthening,Transfer Training,Endurance Training,Gait Training,Safety Education & Training,Functional Mobility Training,Home Exercise Program  Safety Devices  Type of devices:  All fall risk precautions in place,Gait belt,Call light within reach,Left in chair,Nurse notified,Patient at risk for falls,Chair alarm in place      AM-PAC Score     AM-PAC Inpatient Mobility without Stair Climbing Raw Score : 12 (02/18/22 0841)  AM-PAC Inpatient without Stair Climbing T-Scale Score : 37.26 (02/18/22 0841)  Mobility Inpatient CMS 0-100% Score: 63.03 (02/18/22 0841)  Mobility Inpatient without Stair CMS G-Code Modifier : CL (02/18/22 1473)     Goals  Short term goals  Time Frame for Short term goals: 1 week (2/24) unless otherwise specified  Short term goal 1: pt to perform bed mob with CG  Short term goal 2: pt to perform transfers CG  Short term goal 3: pt to amb with quad cane 50 ft CG  Short term goal 4: pt to participate in LE Ex 8-10 reps by 2/22  Patient Goals   Patient goals : \" to be able to take care of myself at home and not fall\"     Therapy Time   Individual Concurrent Group Co-treatment   Time In 0755         Time Out 0848         Minutes 53         Timed Code Treatment Minutes: [address] PT

## 2022-02-18 NOTE — PROGRESS NOTES
Occupational Therapy   Occupational Therapy Initial Assessment  Date: 2022   Patient Name: Greta Acevedo  MRN: [id number]     : 1945    Date of Service: 2022    Discharge Recommendations:  Subacute/Skilled Nursing Facility   Barriers to home discharge:   [x] Reported available assist at home upon discharge limited      Assessment   Performance deficits / Impairments: Decreased functional mobility ; Decreased balance;Decreased safe awareness;Decreased coordination;Decreased ADL status; Decreased high-level IADLs  Assessment: pt normally independent with high level IADL's in home & independent with functional mobility in home with electric scooter or furniture walking with cane; pt now requiring min assist of 2 with quad cane for bathroom mobility, max assist with LE/UE self care; pt to benefit from skilled OT services  OT Education: OT Role;Plan of Care;Precautions  Patient Education: disease specific: importance of OOB to chair for meals & ADL's with assist; use of RED/nurse call light for assist with ADL needs/transfers  Barriers to Learning: none perceived  REQUIRES OT FOLLOW UP: Yes  Activity Tolerance  Activity Tolerance: Patient Tolerated treatment well  Activity Tolerance: semi-bautista's on RA:  BP = 159/79, HR = 76, 96 % O 2 sats; sitting EOB:  BP = 161/88, HR = 84; sitting in chair after bathroom mobility:  BP = 136/83, HR = 104  Safety Devices  Safety Devices in place: Yes  Type of devices: Call light within reach; Chair alarm in place; Left in chair;Nurse notified           Patient Diagnosis(es): The primary encounter diagnosis was Syncope and collapse. A diagnosis of Ventricular asystolia (HCC) was also pertinent to this visit.      has a past medical history of Allergic rhinitis, Arthritis, CAD (coronary artery disease), CHF (congestive heart failure) ([facility] [address] Cholelithiasis, Diabetes mellitus ([facility] [address] Gastroparesis, Hepatic steatosis, Hyperlipidemia, Hypertension, Peripheral neuropathy, Sleep apnea, Thyroid disease, and Vitamin B12 deficiency. has a past surgical history that includes Coronary artery bypass graft (2005); Foot surgery; colectomy (1998); Colonoscopy; Upper gastrointestinal endoscopy; Tonsillectomy and adenoidectomy; Tubal ligation; other surgical history (4/17/2012); other surgical history (Right, 3/20/13); Coronary angioplasty; Cholecystectomy; Cardiac catheterization (12/27/2017); Diagnostic Cardiac Cath Lab Procedure; Toe amputation (Left, 3/29/2021); eye surgery; Endoscopy, colon, diagnostic; and hernia repair.            Restrictions  Restrictions/Precautions  Restrictions/Precautions: Up as Tolerated,General Precautions,Fall Risk  Implants present? : Pacemaker (implant dual chamber pacer leads on 2/17)    Subjective   General  Chart Reviewed: Yes  Patient assessed for rehabilitation services?: Yes  Family / Caregiver Present: No  Referring Practitioner: Dr. Tariq Rodríguez  Diagnosis: syncope & collapse; s/p pacemaker placement 2-17-22  Subjective  Subjective: \"I felt dizzy in bed before you came in\"  General Comment  Comments: RN cleared pt for OT eval; pt awake in bed, agreeable to therapy  Patient Currently in Pain: Denies    Social/Functional History  Social/Functional History  Lives With: Alone  Type of Home: House  Home Layout: One level  Home Access: Ramped entrance  Bathroom Shower/Tub: Walk-in shower  Bathroom Toilet: Handicap height  Bathroom Equipment: Shower chair  Home Equipment: [address] wheeled walker,Electric scooter  ADL Assistance: Independent  Ambulation Assistance: Independent  Transfer Assistance: Independent  Additional Comments: Niece assists with transport and shopping, uses electric cart at grocery, denies recent falls       Objective        Orientation  Overall Orientation Status: Within Functional Limits     Balance  Sitting Balance: Supervision  Standing Balance: Dependent/Total (min assist of 2 with gait belt & quad cane)  Standing Balance  Time: 1 minute x 2  Activity: bathroom mobility  Functional Mobility  Functional - Mobility Device: Cane  Activity: To/from bathroom  Assist Level: Dependent/Total (min assist of 2)  Toilet Transfers  Toilet - Technique: Ambulating (min assist of 2 with gait belt & quad cane)  Equipment Used: Grab bars (on Right(armrest of [facility]))  Toilet Transfer: 2 Person assistance (min assist of 2)  ADL  Feeding: Setup  Grooming: Setup (seated to wash face)  LE Dressing: Dependent/Total (to joellen socks & velcro closure shoes, donning brief seated & standing)  Toileting: Maximum assistance (pericare)    RUE Tone: Normotonic  LUE Tone: Not tested (in sling s/p pacemaker)     Bed mobility  Sit to Supine: Unable to assess (Left up in chair for breakfast upon exiting, pt agreeable)  Scooting: Contact guard assistance  Transfers  Sit to stand: 2 Person assistance (min assist of 2 with gait belt, posterior lean)  Stand to sit: 2 Person assistance (min assist of 2 for safety)     Vision - Basic Assessment  Prior Vision: Wears glasses only for reading     Cognition  Overall Cognitive Status: Exceptions  Arousal/Alertness: Appropriate responses to stimuli  Following Commands:  Follows one step commands consistently  Attention Span: Attends with cues to redirect  Memory: Decreased recall of precautions  Safety Judgement: Decreased awareness of need for safety;Decreased awareness of need for assistance  Insights: Decreased awareness of deficits  Initiation: Does not require cues  Sequencing: Requires cues for some        Plan   Plan  Times per week: 3-5x/ week  Current Treatment Recommendations: Balance Training,Functional Mobility Training,Safety Education & Training,Positioning,Self-Care / ADL,Endurance Training    AM-PAC Score        AM-PAC Inpatient Daily Activity Raw Score: 13 (02/18/22 0853)  AM-PAC Inpatient ADL T-Scale Score : 32.03 (02/18/22 0853)  ADL Inpatient CMS 0-100% Score: 63.03 (02/18/22 0853)  ADL Inpatient CMS G-Code Modifier : CL (02/18/22 1614)    Goals  Short term goals  Time Frame for Short term goals: 1 week(2-25-22)  Short term goal 1: min assist with stand-pivot transfers by 2-25-22  Short term goal 2: mod assist of 1 with LE self care with AE PRN by 2-24-22  Short term goal 3: min assist static standing for 1-2 minutes  Short term goal 4: min assist with UE dressing  Patient Goals   Patient goals : go home with help       Therapy Time   Individual Concurrent Group Co-treatment   Time In [address]         Time Out 0848         Minutes [address]

## 2022-02-18 NOTE — DISCHARGE INSTR - COC
Continuity of Care Form    Patient Name: Aubrey Tan   :  1945  MRN:  [id number]    [facility] date:  2022  Discharge date:  22     Code Status Order: Full Code   Advance Directives:      Admitting Physician:  Elvis Black MD  PCP: DARIA Osuna CNP    Discharging Nurse: [facility] Unit/Room#: 1096/0148-44  Discharging Unit Phone Number: ***    Emergency Contact:   Extended Emergency Contact Information  Primary Emergency Contact: Deirdre [address] Phone: [phone]  Relation: Child  Secondary Emergency Contact: [address] Phone: [phone]  Relation: Niece/Nephew    Past Surgical History:  Past Surgical History:   Procedure Laterality Date    CARDIAC CATHETERIZATION  2017    SVG to OM2 100% ostium    CHOLECYSTECTOMY      COLECTOMY  1998    diverticulitis     COLONOSCOPY      CORONARY ANGIOPLASTY      CORONARY ARTERY BYPASS GRAFT  2005    x 3    DIAGNOSTIC CARDIAC CATH LAB PROCEDURE      ENDOSCOPY, COLON, DIAGNOSTIC      EYE SURGERY      Bilateral cataracts removed with lens implants    FOOT SURGERY      Multiple foot surgeries bilateral    HERNIA REPAIR      OTHER SURGICAL HISTORY  2012    achilles tendon lengthening,arthroplasty,matatarsalhead resection    OTHER SURGICAL HISTORY Right 3/20/13    FUSION OF RIGHT GREAT TOE JOINT WITH WIRE FIXATION, DEBRIEDMENT OF WOUND RIGHT GREAT TOE    TOE AMPUTATION Left 3/29/2021    PARTIAL LEFT FOOT AMPUTATION performed by Vicki Lopes DPM at [address]      Laparoscopic    UPPER GASTROINTESTINAL ENDOSCOPY         Immunization History:   Immunization History   Administered Date(s) Administered    Influenza Virus Vaccine 09/15/2014    Pneumococcal Polysaccharide ([id number]) 2013       Active Problems:  Patient Active Problem List   Diagnosis Code    Hyperlipidemia E78.5    Hypertension I10    CAD (coronary artery disease) I25.10    Diabetes mellitus ([facility]) E11.9    Thyroid disease E07.9    Allergic rhinitis J30.9    Sleep apnea X59.74    Metabolic encephalopathy B69.31    Hepatic steatosis K76.0    Cholelithiasis K80.20    Acute renal failure (ARF) ([facility]) N17.9    Small bowel obstruction ([facility] [address] K56.609    Ischemic chest pain ([facility]) I20.9    Unstable angina ([facility]) I20.0    Vomiting R11.10    Morbid obesity with BMI of 40.0-44.9, adult ([facility]) E66.01, Z68.41    NSTEMI (non-ST elevated myocardial infarction) ([facility]) I21.4    Chest pain R07.9    LBBB (left bundle branch block) I44.7    Atrial fibrillation with rapid ventricular response ([facility]) I48.91    History of coronary artery bypass graft Z95.1    Acute respiratory failure with hypoxia ([facility]) J96.01    Acute hypoxemic respiratory failure ([facility]) J96.01    Angina, class IV ([facility]) I20.9    History of tobacco abuse Z87.891    Diabetic foot infection ([facility]) E11.628, L08.9    Acute hematogenous osteomyelitis of left foot ([facility]) M86.072    Atrial fibrillation ([facility]) I48.91    Osteomyelitis of right foot ([facility]) M86.9    Nausea R11.0    Intermittent complete heart block ([facility]) I44.2    Syncope and collapse R55    CKD (chronic kidney disease) stage 3, GFR 30-59 ml/min ([facility]) N18.30    Pacemaker Z95.0       Isolation/Infection:   Isolation            No Isolation          Patient Infection Status       None to display            Nurse Assessment:  Last Vital Signs: BP (!) 145/80   Pulse 76   Temp 98.1 °F (36.7 °C)   Resp 20   Wt 233 lb 14.5 oz (106.1 kg)   SpO2 94%   BMI 41.43 kg/m²     Last documented pain score (0-10 scale): Pain Level: 2  Last Weight:   Wt Readings from Last 1 Encounters:   02/16/22 233 lb 14.5 oz (106.1 kg)     Mental Status:  oriented, alert, coherent, logical, thought processes intact, and able to concentrate and follow conversation    IV Access:  - None    Nursing Mobility/ADLs:  Walking   Assisted  Transfer  Assisted  Bathing  Assisted  Dressing  Assisted  Toileting Assisted  Feeding  Independent  Med Admin  Independent  Med Delivery   whole    Wound Care Documentation and Therapy:        Elimination:  Continence: Bowel: no  Bladder: no  Urinary Catheter: None   Colostomy/Ileostomy/Ileal Conduit: no       Date of Last BM:     Intake/Output Summary (Last 24 hours) at 2/18/2022 0602  Last data filed at 2/17/2022 1600  Gross per 24 hour   Intake 360 ml   Output --   Net 360 ml     I/O last 3 completed shifts: In: 360 [P.O.:360]  Out: -     Safety Concerns: At Risk for Falls    Impairments/Disabilities:      None    Nutrition Therapy:  Current Nutrition Therapy:   - Oral Diet:  Carb Control 4 carbs/meal (1800kcals/day)    Routes of Feeding: Oral  Liquids: thin  Daily Fluid Restriction: no  Last Modified Barium Swallow with Video (Video Swallowing Test): not done    Treatments at the Time of Hospital Discharge:   Respiratory Treatments: ***  Oxygen Therapy:  is not on home oxygen therapy.   Ventilator:    - No ventilator support    Rehab Therapies: {THERAPEUTIC INTERVENTION:[id number]}  Weight Bearing Status/Restrictions: No weight bearing restirctions  Other Medical Equipment (for information only, NOT a DME order):  {EQUIPMENT:[id number]}  Other Treatments: ***    Patient's personal belongings (please select all that are sent with patient):  None    RN SIGNATURE:  Electronically signed by Suad Palencia RN on 2/19/22 at 2:07 PM EST    CASE MANAGEMENT/SOCIAL WORK SECTION    Inpatient Status Date: 2/16/22    Readmission Risk Assessment Score:  Readmission Risk              Risk of Unplanned Readmission:  18           Discharging to Facility/ Agency   Name: [facility] home care   Address:  Phone: [phone]  Fax:    / signature: Electronically signed by Casie Dawson RN on 2/18/22 at 2:36 PM EST    PHYSICIAN SECTION    Prognosis: Good    Condition at Discharge: Stable    Rehab Potential (if transferring to Rehab): Good    Recommended Labs or Other Treatments After Discharge: PT/OT, nursing,  , nurse aid    Physician Certification: I certify the above information and transfer of Luiz Bonds  is necessary for the continuing treatment of the diagnosis listed and that she requires Home Care for less 30 days.      Update Admission H&P: Changes in H&P as follows - see discharge summary    PHYSICIAN SIGNATURE:  Electronically signed by Vika Fatima MD on 2/18/2022 at 11:42 AM

## 2022-02-19 VITALS
TEMPERATURE: 98 F | WEIGHT: 233.91 LBS | DIASTOLIC BLOOD PRESSURE: 65 MMHG | HEART RATE: 72 BPM | SYSTOLIC BLOOD PRESSURE: 145 MMHG | OXYGEN SATURATION: 96 % | RESPIRATION RATE: 16 BRPM | BODY MASS INDEX: 41.43 KG/M2

## 2022-02-19 LAB
ANION GAP SERPL CALCULATED.3IONS-SCNC: 10 MMOL/L (ref 3–16)
BUN BLDV-MCNC: 20 MG/DL (ref 7–20)
CALCIUM SERPL-MCNC: 8.9 MG/DL (ref 8.3–10.6)
CHLORIDE BLD-SCNC: 101 MMOL/L (ref 99–110)
CO2: 27 MMOL/L (ref 21–32)
CREAT SERPL-MCNC: 1.1 MG/DL (ref 0.6–1.2)
GFR AFRICAN AMERICAN: 59
GFR NON-AFRICAN AMERICAN: 48
GLUCOSE BLD-MCNC: 185 MG/DL (ref 70–99)
GLUCOSE BLD-MCNC: 213 MG/DL (ref 70–99)
GLUCOSE BLD-MCNC: 218 MG/DL (ref 70–99)
MAGNESIUM: 2 MG/DL (ref 1.8–2.4)
PERFORMED ON: ABNORMAL
PERFORMED ON: ABNORMAL
POTASSIUM SERPL-SCNC: 4.9 MMOL/L (ref 3.5–5.1)
SODIUM BLD-SCNC: 138 MMOL/L (ref 136–145)

## 2022-02-19 PROCEDURE — 6370000000 HC RX 637 (ALT 250 FOR IP): Performed by: INTERNAL MEDICINE

## 2022-02-19 PROCEDURE — 36415 COLL VENOUS BLD VENIPUNCTURE: CPT

## 2022-02-19 PROCEDURE — 80048 BASIC METABOLIC PNL TOTAL CA: CPT

## 2022-02-19 PROCEDURE — 83735 ASSAY OF MAGNESIUM: CPT

## 2022-02-19 RX ADMIN — APIXABAN 5 MG: 5 TABLET, FILM COATED ORAL at 10:00

## 2022-02-19 RX ADMIN — ASPIRIN 81 MG: 81 TABLET, COATED ORAL at 09:59

## 2022-02-19 RX ADMIN — PANTOPRAZOLE SODIUM 40 MG: 40 TABLET, DELAYED RELEASE ORAL at 09:59

## 2022-02-19 RX ADMIN — INSULIN LISPRO 2 UNITS: 100 INJECTION, SOLUTION INTRAVENOUS; SUBCUTANEOUS at 10:01

## 2022-02-19 RX ADMIN — ISOSORBIDE MONONITRATE 120 MG: 60 TABLET, EXTENDED RELEASE ORAL at 09:59

## 2022-02-19 RX ADMIN — CARVEDILOL 6.25 MG: 6.25 TABLET, FILM COATED ORAL at 09:59

## 2022-02-19 RX ADMIN — TROSPIUM CHLORIDE 20 MG: 20 TABLET, FILM COATED ORAL at 06:51

## 2022-02-19 RX ADMIN — LEVOTHYROXINE SODIUM 50 MCG: 0.05 TABLET ORAL at 06:51

## 2022-02-19 ASSESSMENT — PAIN SCALES - GENERAL: PAINLEVEL_OUTOF10: 0

## 2022-02-19 NOTE — PROGRESS NOTES
Reviewed discharge instructions with patient, verbalized understanding. Also gave printed material on care of pacemaker site and when to call MD. No additional questions at this time. Telemetry monitor removed and returned, CMU notified of discharge. Patient instructed to  prescriptions from [facility]. Belongings gathered and lockbox emptied. Patient escorted out via wheelchair.

## 2022-02-19 NOTE — PROGRESS NOTES
Electrophysiology Progress Note     Admit Date: 2022     Reason for follow up: Syncope. Interval History:   - Patient seen and examined. - Clinical notes reviewed. - Telemetry reviewed. No bradycardia. Intermittent pacing.  - No new complaints today. - No major events overnight. Physical Examination:  Vitals:    22   BP: (!) 151/91   Pulse: 99   Resp:    Temp: 98.2 °F (36.8 °C)   SpO2: 95%        Intake/Output Summary (Last 24 hours) at 2022  Last data filed at 2022 194  Gross per 24 hour   Intake 480 ml   Output 450 ml   Net 30 ml     In: 480 [P.O.:480]  Out: 450    Wt Readings from Last 3 Encounters:   22 233 lb 14.5 oz (106.1 kg)   05/10/21 231 lb 3.2 oz (104.9 kg)   21 229 lb 12.8 oz (104.2 kg)     Temp  Av.2 °F (36.8 °C)  Min: 97.8 °F (36.6 °C)  Max: 98.4 °F (36.9 °C)  Pulse  Av.7  Min: 71  Max: 99  BP  Min: 143/71  Max: 444/76  SpO2  Av.4 %  Min: 94 %  Max: 98 %    · Telemetry: Sinus rhythm with intermittent ventricular pacing. · Constitutional: Alert. Oriented to person, place, and time. No distress. · Head: Normocephalic and atraumatic. · Mouth/Throat: Lips appear moist. Oropharynx is clear and moist.  · Eyes: Conjunctivae normal. EOM are normal.   · Cardiovascular: Normal rate, regular rhythm. Normal S1&S2. · Pulmonary/Chest: Bilateral respiratory sounds present. No respiratory accessory muscle use. No wheezes, No rhonchi. · Abdominal: Soft. Normal bowel sounds present. No distension, No tenderness. · Musculoskeletal: No tenderness. Trace edema. · Neurological: Alert and oriented. Cranial nerve II-XII grossly intact. · Skin: Wound C/D/I. No significant bruising or bleeding. · Psychiatric: No anxiety nor agitation. Labs, diagnostic and imaging results reviewed. Reviewed.    Recent Labs     22  1649 22  0507 22  0523   * 140 139   K 4.7 3.8 4.2   CL 98* 104 101   CO2 23 24 27   BUN 26* 26* 23*   CREATININE 1.5* 1.3* 1.3*     Recent Labs     02/16/22  1649 02/17/22  0507   WBC 9.8 6.8   HGB 14.0 15.1   HCT 42.4 45.8   MCV 91.2 90.8   * 97*     Lab Results   Component Value Date    TROPONINI <0.01 02/16/2022     Estimated Creatinine Clearance: 44 mL/min (A) (based on SCr of 1.3 mg/dL (H)).    No results found for: BNP  Lab Results   Component Value Date    PROTIME 12.6 02/24/2020    PROTIME 10.9 06/15/2018    PROTIME 11.2 12/27/2017    INR 1.09 02/24/2020    INR 0.96 06/15/2018    INR 0.99 12/27/2017     Lab Results   Component Value Date    CHOL 227 04/16/2015    HDL 34 10/18/2017    TRIG 179 04/16/2015       Scheduled Meds:   melatonin  5 mg Oral Nightly    mupirocin   Nasal BID    insulin lispro  0-12 Units SubCUTAneous TID WC    insulin lispro  0-6 Units SubCUTAneous Nightly    levothyroxine  50 mcg Oral Daily    nitroGLYCERIN  0.4 mg SubLINGual Once    amiodarone  200 mg Oral Daily    carvedilol  3.125 mg Oral BID WC    sodium chloride flush  5-40 mL IntraVENous 2 times per day    apixaban  5 mg Oral BID    aspirin  81 mg Oral Daily    isosorbide mononitrate  120 mg Oral Daily    pantoprazole  40 mg Oral Daily    ranolazine  500 mg Oral BID    trospium  20 mg Oral BID AC     Continuous Infusions:   isoproterenol (ISUPREL) infusion Stopped (02/17/22 1236)    sodium chloride      dextrose       PRN Meds:hydrALAZINE, sodium chloride flush, sodium chloride, acetaminophen, ondansetron **OR** ondansetron, polyethylene glycol, acetaminophen **OR** acetaminophen, glucose, glucagon (rDNA), dextrose, perflutren lipid microspheres, dextrose bolus (hypoglycemia) **OR** dextrose bolus (hypoglycemia)     Patient Active Problem List    Diagnosis Date Noted    [facility] renal failure ([facility]F) [facility] 20/85/9621    Metabolic encephalopathy 45/76/5114    Hepatic steatosis 07/03/2015    Cholelithiasis 07/03/2015    Pacemaker 02/18/2022    CKD (chronic kidney disease) stage 3, GFR 30-59 ml/min ([facility] 02/17/2022    Intermittent complete heart block ([facility] 02/16/2022    Syncope and collapse     Osteomyelitis of right foot (HCC)     Nausea     Acute hematogenous osteomyelitis of left foot (HCC)     Atrial fibrillation (HCC)     Diabetic foot infection ([facility] 03/27/2021    History of tobacco abuse 10/23/2020    Acute hypoxemic respiratory failure ([facility] 02/24/2020    Angina, class IV ([facility]     Atrial fibrillation with rapid ventricular response ([facility] 02/23/2020    History of coronary artery bypass graft 02/23/2020    Acute respiratory failure with hypoxia ([facility] 02/23/2020    Chest pain 06/15/2018    LBBB (left bundle branch block)     Unstable angina ([facility]) 12/27/2017    Vomiting 12/27/2017    Morbid obesity with BMI of 40.0-44.9, adult ([facility] 12/27/2017    NSTEMI (non-ST elevated myocardial infarction) ([facility] 12/27/2017    Ischemic chest pain ([facility] 07/06/2017    Small bowel obstruction ([facility])     Diabetes mellitus ([facility]     Thyroid disease     Allergic rhinitis     Sleep apnea     Hyperlipidemia     Hypertension     CAD (coronary artery disease)       Active Hospital Problems    Diagnosis Date Noted    CKD (chronic kidney disease) stage 3, GFR 30-59 ml/min ([facility]) [N18.30] 02/17/2022    Intermittent complete heart block ([facility] [I44.2] 02/16/2022    Atrial fibrillation ([facility] [I48.91]     History of coronary artery bypass graft [Z95.1] 02/23/2020    Morbid obesity with BMI of 40.0-44.9, adult ([facility] [E66.01, Z68.41] 12/27/2017    CAD (coronary artery disease) [I25.10]     Hyperlipidemia [E78.5]     Hypertension [I10]      Mrs. Aleksey Washington is a pleasant 78year old female with a medical history significant for hypertension, hyperlipidemia, ischemic cardiomyopathy, obesity and chronic renal insufficiency who presents from home with syncope and symptomatic bradycardia with prodrome. Assessment and Plan:   1. High grade AV block.   Patient is a pleasant 74-year-old female with a medical history significant for ischemic cardiomyopathy, hypertension, morbid obesity, hyperlipidemia, and chronic renal sufficiency who presents from home with recurrent symptomatic syncope thought to be secondary to high-grade AV block that is not reversible. She underwent a dual-chamber pacemaker with Dr. Alvia Essex on 02/17/2022. Her chest radiograph is reassuring without signs of complication post device implantation. Device interrogation shows appropriate normal function and was reviewed with Dr. Alvia Essex. Our current plan is to continue routine postoperative care with follow-up in 1 week for wound evaluation.  - Routine post pacemaker implantation care. - Follow up in 1 week. - Increase coreg to 6.25 given hypertension and ischemic cardiomyopathy.  - We will sign off case. Patient ok for discharge from EP standpoint. 2. Recurrent syncope. Likely secondary to high grade AV block. - Plan as per above. 3. Ischemic cardiomyopathy. Patient with stable ischemic cardiomyopathy without signs of ongoing ischemia.    - Increase carvedilol. 4. Chronic renal insufficiency. Stable. - Per primary team.    Thank you for allowing me to participate in the care of this patient. If you have any questions, please do not hesitate to contact me.     Julio Cesar Whittington MD  Cardiac Electrophysiology  [address] [facility]  [phone] [facility]

## 2022-02-19 NOTE — PLAN OF CARE
Problem: Falls - Risk of:  Goal: Will remain free from falls  Description: Will remain free from falls  2/18/2022 2344 by Beaulah Simmonds, RN  Outcome: Met This Shift  2/18/2022 1724 by Aruna Santos RN  Outcome: Ongoing  Goal: Absence of physical injury  Description: Absence of physical injury  2/18/2022 2344 by Beaulah Simmonds, RN  Outcome: Met This Shift  2/18/2022 1724 by Aruna Santos RN  Outcome: Ongoing     Problem: Skin Integrity:  Goal: Will show no infection signs and symptoms  Description: Will show no infection signs and symptoms  Outcome: Met This Shift  Goal: Absence of new skin breakdown  Description: Absence of new skin breakdown  Outcome: Met This Shift

## 2022-02-19 NOTE — CARE COORDINATION
CASE MANAGEMENT DISCHARGE SUMMARY      Discharge to: home with [address] completed: [facility] Exemption Notification (HENS) completed: na    IMM given: (date) 2/16/22    New Durable Medical Equipment ordered/agency:     Transportation:    Family/car: pt calling family per RN report    Confirmed discharge plan with:     Patient: yes     Family:  Yes. VM left for patient's daughterBettye [phone]     Facility/Agency, name:  [facility] faxed to [address] [place] [address] and agency notified     RN, name: Felicia Desir    Note: Discharging nurse to complete [facility], reconcile AVS, and place final copy with patient's discharge packet. RN to ensure that written prescriptions for Level II medications are sent with as per protocol.     Arian Bird RN

## 2022-02-19 NOTE — DISCHARGE SUMMARY
Hospital Medicine Discharge Summary    Patient ID: Eloy Baker      Patient's PCP: Rona Robertson, APRN - CNP    Admit Date: 2/16/2022     Discharge Date:   2/19/2022    Admitting Provider: River Taveras MD     Discharge Provider: Rena Coyle MD     Discharge Diagnoses: Active Hospital Problems    Diagnosis     CKD (chronic kidney disease) stage 3, GFR 30-59 ml/min (HCC) [N18.30]     Intermittent complete heart block (HCC) [I44.2]     Atrial fibrillation (HCC) [I48.91]     History of coronary artery bypass graft [Z95.1]     Morbid obesity with BMI of 40.0-44.9, adult ([facility] [E66.01, Z68.41]     CAD (coronary artery disease) [I25.10]     Hyperlipidemia [E78.5]     Hypertension [I10]        The patient was seen and examined on day of discharge and this discharge summary is in conjunction with any daily progress note from day of discharge. Hospital Course:     78 y.o. female, with PMH of HTN, CAD, HLD, obesity, atrial fibrillation and DM, who presented to [facility] with near syncope. History obtained from the patient and review of EMR. The patient stated she has been having multiple episodes of syncope at home. She stated she gets a sensation of \"spinning in the head\" and then states \"going out\" and then she passes out. The patient stated she has been following with her PCP and has a cardiology appointment scheduled for Friday, 2/18/2022. However, the patient stated today she was at her PCPs office for a \"well visit\" and had a syncopal episode while in the office. The patient stated she passes out for 7-10 seconds and her EKG \"shows no heartbeat\". Per EMR, the patient was brought in by EMS from her PCPs office. EMS reported asystolic episodes in route. In the emergency room a twelve-lead EKG was obtained that revealed a first-degree AV block. Cardiology was consulted in the emergency room. The patient will be admitted for further evaluation and treatment.   She has been made n.p.o. and an echocardiogram has been ordered for the a. m. The patient denied any other associated symptoms as well as any aggravating and/or alleviating factors. At the time of this assessment, the patient was resting comfortably in bed. She currently denies any chest pain, back pain, abdominal pain, shortness of breath, numbness, tingling, N/V/C/D, fever and/or chills. Syncope due to heart block. Treated for heart block by cardiology. Pacemaker placed on 2/17/2022. Patient improved following PPM. Carvedilol increased. CKD stable. Initially recommended for SNF placement but patient and family requested home with [facility]. Discharged to home when ok with cardiology. Physical Exam Performed:     BP (!) 147/83   Pulse 70   Temp 98.2 °F (36.8 °C) (Oral)   Resp 16   Wt 233 lb 14.5 oz (106.1 kg)   SpO2 97%   BMI 41.43 kg/m²       General appearance:  No apparent distress, appears stated age and cooperative. HEENT:  Normal cephalic, atraumatic without obvious deformity. Pupils equal, round, and reactive to light. Extra ocular muscles intact. Conjunctivae/corneas clear. Neck: Supple, with full range of motion. No jugular venous distention. Trachea midline. Respiratory:  Normal respiratory effort. Clear to auscultation, bilaterally without Rales/Wheezes/Rhonchi. Cardiovascular:  Regular rate and rhythm with normal S1/S2 without murmurs, rubs or gallops. Pacer site tender. No drainage. Abdomen: Soft, non-tender, non-distended with normal bowel sounds. Musculoskeletal:  No clubbing, cyanosis or edema bilaterally. Full range of motion without deformity. Skin: Skin color, texture, turgor normal.  No rashes or lesions. Neurologic:  Neurovascularly intact without any focal sensory/motor deficits.  Cranial nerves: II-XII intact, grossly non-focal.  Psychiatric:  Alert and oriented, thought content appropriate, normal insight  Capillary Refill: Brisk,< 3 seconds   Peripheral Pulses: +2 palpable, equal bilaterally       Labs: For convenience and continuity at follow-up the following most recent labs are provided:      CBC:    Lab Results   Component Value Date    WBC 6.8 02/17/2022    HGB 15.1 02/17/2022    HCT 45.8 02/17/2022    PLT 97 02/17/2022       Renal:    Lab Results   Component Value Date     02/19/2022    K 4.9 02/19/2022    K 3.8 02/17/2022     02/19/2022    CO2 27 02/19/2022    BUN 20 02/19/2022    CREATININE 1.1 02/19/2022    CALCIUM 8.9 02/19/2022    PHOS 3.6 07/20/2021         Significant Diagnostic Studies    Radiology:   XR CHEST (2 VW)   Final Result   Interval pacemaker placement with no evidence of complication. XR CHEST PORTABLE   Final Result   No radiographic evidence of acute cardiopulmonary disease. Consults:     IP CONSULT TO CARDIOLOGY  IP CONSULT TO HOSPITALIST  IP CONSULT TO CARDIOLOGY  IP CONSULT TO HOME CARE NEEDS  IP CONSULT TO HOME CARE NEEDS    Disposition:  Home with [facility]     Condition at Discharge: Stable    Discharge Instructions/Follow-up:    Follow Up Information and Future Appointments    Follow Up Information and Future Appointments          Follow up with DARIA Diamond CNP in 2 week(s)  Specialty: Certified Nurse Practitioner  Post hospital follow up and to evaluate for elevated blood sugars. [address] [address]  [phone]   FPY21 DEVICE CHECK  Thursday Feb 24, 2022 11:15 AM   [address] [address]  [phone]   MYV65 Office Visit with Denver Benedict, APRN - CNP  Thursday Mar 24, 2022 3:15 PM  Please arrive 15 minutes prior to appointment, bring photo ID and insurance card. [address] [place] [address] Cardiology [facility] [address]  [phone]   May17 Remote Device Check  Tuesday May 17, 2022 7:15 AM  This is a home transmission. Do not come in to the office.  [address] Cardiology [address]   [facility] [address]  [phone]   [facility]  Monday May 23, 2022 3:15 PM   [facility] Cardiology - [address] [address]  [phone]          Office Visit with DARIA Saldaña CNP  Monday May 23, 2022 3:15 PM  Please arrive 15 minutes prior to appointment, bring photo ID and insurance card. Code Status:  Full Code     Activity: activity as tolerated    Diet: cardiac diet      Discharge Medications:     Current Discharge Medication List           Details   mupirocin (BACTROBAN) 2 % ointment Apply topically twice daily for another 3 days  Qty: 1 g, Refills: 0              Details   levothyroxine (SYNTHROID) 50 MCG tablet Take 1 tablet by mouth Daily  Qty: 30 tablet, Refills: 3              Details   nitroGLYCERIN (NITROSTAT) 0.4 MG SL tablet DISSOLVE 1 TAB UNDER TONGUE FOR CHEST PAIN - IF PAIN REMAINS AFTER 5 MIN, CALL 911 AND REPEAT DOSE. MAX 3 TABS IN 15 MINUTES  Qty: 25 tablet, Refills: 1      carvedilol (COREG) 3.125 MG tablet Take 1 tablet by mouth 2 times daily (with meals)  Qty: 60 tablet, Refills: 5      apixaban (ELIQUIS) 5 MG TABS tablet Take 1 tablet by mouth 2 times daily  Qty: 60 tablet, Refills: 5      furosemide (LASIX) 40 MG tablet TAKE ONE TABLET BY MOUTH DAILY MAY TAKE AN ADDITIONAL 1/2 TABLET AS NEEDED FOR WEIGHT GAIN OF 3 LBS IN A DAY  Qty: 120 tablet, Refills: 1      isosorbide mononitrate (IMDUR) 120 MG extended release tablet TAKE ONE TABLET BY MOUTH DAILY  Qty: 90 tablet, Refills: 3      desonide (DESOWEN) 0.05 % cream Apply 0.05 % topically 2 times daily Apply topically 2 times daily. pantoprazole (PROTONIX) 40 MG tablet TAKE ONE TABLET BY MOUTH DAILY      aspirin 81 MG EC tablet Take 1 tablet by mouth daily  Qty: 30 tablet, Refills: 3      tolterodine (DETROL LA) 2 MG ER capsule Take 4 mg by mouth daily       fluticasone (FLONASE) 50 MCG/ACT nasal spray 1 spray by Nasal route daily.       Omega-3 Fatty Acids (FISH OIL) 1000 MG CAPS Take 2 capsules by mouth daily. Qty: 180 capsule, Refills: 0      GLIPIZIDE Take 5 mg by mouth 2 times daily (with meals) Taking 10 mg AM & 5 mg in PM      gabapentin (NEURONTIN) 600 MG tablet Take 600 mg by mouth. May take up to 4x per day if needed for neuropathy pain. ranolazine (RANEXA) 500 MG extended release tablet TAKE ONE TABLET BY MOUTH TWICE A DAY  Qty: 60 tablet, Refills: 1      amiodarone (CORDARONE) 200 MG tablet Take 1 tablet by mouth daily  Qty: 30 tablet, Refills: 5      diphenhydrAMINE (BENADRYL) 25 MG tablet Take 25 mg by mouth nightly as needed. Cholecalciferol (VITAMIN D3) 1000 UNIT CAPS Take 1 capsule by mouth daily. Time Spent on discharge is more than 45 minutes in the examination, evaluation, counseling and review of medications and discharge plan. Signed:    Patricia Royal MD   2/19/2022      Thank you DARIA Hansen CNP for the opportunity to be involved in this patient's care. If you have any questions or concerns please feel free to contact me at [phone].

## 2022-02-21 ENCOUNTER — CARE COORDINATION (OUTPATIENT)
Dept: CASE MANAGEMENT | Age: 77
End: 2022-02-21

## 2022-02-22 ENCOUNTER — CARE COORDINATION (OUTPATIENT)
Dept: CASE MANAGEMENT | Age: 77
End: 2022-02-22

## 2022-02-24 ENCOUNTER — NURSE ONLY (OUTPATIENT)
Dept: CARDIOLOGY CLINIC | Age: 77
End: 2022-02-24
Payer: MEDICARE

## 2022-02-24 DIAGNOSIS — I44.30 AVB (ATRIOVENTRICULAR BLOCK): ICD-10-CM

## 2022-02-24 DIAGNOSIS — Z95.0 PACEMAKER: Primary | ICD-10-CM

## 2022-02-24 DIAGNOSIS — I49.5 SICK SINUS SYNDROME (HCC): ICD-10-CM

## 2022-02-24 PROCEDURE — 93280 PM DEVICE PROGR EVAL DUAL: CPT | Performed by: INTERNAL MEDICINE

## 2022-02-24 NOTE — PROGRESS NOTES
Device interrogation by company representative. See interrogation for further details. Post op check for Dual Ch. PPM implanted 2/17/22 by [employer]. Hx AVB, AF-oac. Pacing (% of Time Since 18-Feb-2022)   86.4 % (MVP Off)  AP 14.3 %. Device functioning as programmed. No  arrhythmias recorded. RN to check incision. See PACEART report under Cardiology tab.

## 2022-02-24 NOTE — PATIENT INSTRUCTIONS
New Cardiac Device Implant (Pacemaker and/or Defibrillator) Post Op Instructions   Bathe with water indirectly hitting the incision site. Water and soap may run over the incision site. Do not scrub. Pat dry with a clean towel after bathing.  Leave incision open to the air; do not apply any dressings, ointments, or bandages to the area. Do not apply lotion, perfume/cologne, or powders to the area until it is completely healed.  Any scabbing or skin glue that is noted will fall off within 1-2 weeks after the post op appointment.  If any oozing, bleeding, or pus drainage occurs, please call the office immediately at [phone].  Patient has movement restrictions in place until 4 weeks post op (to the day of implant) unless otherwise instructed by physician.  Patient may not lift the device side arm above shoulder height.  Do not far reach or stretch across body or behind body with effected side.  Do not use this arm for pushing, pulling, or lifting body.  Do not use cane on the effected side.  Patient may not lift anything heavier than a gallon of milk with the associated arm. Appointments to expect going forward:   Post operatively the patient will have had a 1-week post op check, a 1 month follow up with NP/MD, and a 3 month follow up with NP/MD and the device clinic. Remote Monitoring Instructions     Within 2-3 weeks of your device being implanted, you will receive a call from the  of your device. Please answer this call as it is to set up remote monitoring for your device. Once you receive your in-home monitor, please follow the instructions provided to sync the home monitor to your implanted device. Once you have paired your home monitor to your implanted device, keep your monitor plugged in within 6 feet of where you sleep.  Your monitor will routinely check in with your device during sleep hours and transmit any urgent events to the [address] [place] [address] Drive for review.  Please do not send additional routine transmissions unless specifically requested by the office staff - the steps to send a manual transmission are the same as when you paired your in-home monitor to your device for the first time.  Your device and monitor are wireless and most transmit cellularly, but please periodically check your monitor is still plugged in to the electrical outlet. If you still use the telephone land line to send, please ensure the connection to the phone pallavi is secure. This will help to ensure successful automatic transmissions in the future.  Please be aware that the remote device transmission sites are periodically monitored only during regular business hours during which simultaneous in-office device clinics are being conducted. If your transmission requires attention, we will contact you as soon as possible.  Your in-home monitor will do a full report on your device every 3 months (recurring appointments that run parallel to in office checks). You do not need to initiate a transmission or be awake at the appointment time listed - this is solely for office purposes.  Our office utilizes the \"no news is good news\" narrative regarding remote monitoring. A Device Specialist or RN will contact you with any critical findings from your remote monitoring. Going forward, if you experience issues with your in-home monitor, please verify that it is plugged in to the wall. If issues persist, please contact the Customer Service numbers provided below, as they can troubleshoot issues that may be happening with the monitor itself. The [employer] [facility] works closely with the remote monitoring websites - if we notice there is a disconnection we will contact you to inform you and ask you to contact the  of your device.     [employer] [facility])  [phone]  [facility]) [phone]  [facility]

## 2022-02-24 NOTE — PROGRESS NOTES
Patient site assessed 2 steri strips removed, area to left chest clean, dry, no redness, drainage, or swelling noted to surgical site. Patient provided the below instructions. Verbalized Understanding. New Cardiac Device Implant (Pacemaker and/or Defibrillator) Post Op Instructions   Bathe with water indirectly hitting the incision site. Water and soap may run over the incision site. Do not scrub. Pat dry with a clean towel after bathing.  Leave incision open to the air; do not apply any dressings, ointments, or bandages to the area. Do not apply lotion, perfume/cologne, or powders to the area until it is completely healed.  Any scabbing or skin glue that is noted will fall off within 1-2 weeks after the post op appointment.  If any oozing, bleeding, or pus drainage occurs, please call the office immediately at [phone].  Patient has movement restrictions in place until 4 weeks post op (to the day of implant) unless otherwise instructed by physician.  Patient may not lift the device side arm above shoulder height.  Do not far reach or stretch across body or behind body with effected side.  Do not use this arm for pushing, pulling, or lifting body.  Do not use cane on the effected side.  Patient may not lift anything heavier than a gallon of milk with the associated arm. Appointments to expect going forward:   Post operatively the patient will have had a 1-week post op check, a 1 month follow up with NP/MD, and a 3 month follow up with NP/MD and the device clinic. Remote Monitoring Instructions     Within 2-3 weeks of your device being implanted, you will receive a call from the  of your device. Please answer this call as it is to set up remote monitoring for your device. Once you receive your in-home monitor, please follow the instructions provided to sync the home monitor to your implanted device.  Once you have paired your home monitor to your implanted device, keep your monitor plugged in within 6 feet of where you sleep. Your monitor will routinely check in with your device during sleep hours and transmit any urgent events to the [address] [place] [address] for review.  Please do not send additional routine transmissions unless specifically requested by the office staff - the steps to send a manual transmission are the same as when you paired your in-home monitor to your device for the first time.  Your device and monitor are wireless and most transmit cellularly, but please periodically check your monitor is still plugged in to the electrical outlet. If you still use the telephone land line to send, please ensure the connection to the phone pallavi is secure. This will help to ensure successful automatic transmissions in the future.  Please be aware that the remote device transmission sites are periodically monitored only during regular business hours during which simultaneous in-office device clinics are being conducted. If your transmission requires attention, we will contact you as soon as possible.  Your in-home monitor will do a full report on your device every 3 months (recurring appointments that run parallel to in office checks). You do not need to initiate a transmission or be awake at the appointment time listed - this is solely for office purposes.  Our office utilizes the \"no news is good news\" narrative regarding remote monitoring. A Device Specialist or RN will contact you with any critical findings from your remote monitoring. Going forward, if you experience issues with your in-home monitor, please verify that it is plugged in to the wall. If issues persist, please contact the Customer Service numbers provided below, as they can troubleshoot issues that may be happening with the monitor itself.  The [address] [place] [address] works closely with the remote monitoring websites - if we notice there is a disconnection we will contact you to inform you and ask you to contact the  of your device.     [employer] [facility])  [phone]  [facility]) [phone]  [facility] ([address]) [phone]  [employer]   [phone]

## 2022-03-02 PROCEDURE — 93280 PM DEVICE PROGR EVAL DUAL: CPT | Performed by: INTERNAL MEDICINE

## 2022-03-28 ENCOUNTER — OFFICE VISIT (OUTPATIENT)
Dept: CARDIOLOGY CLINIC | Age: 77
End: 2022-03-28
Payer: MEDICARE

## 2022-03-28 VITALS
WEIGHT: 229.5 LBS | OXYGEN SATURATION: 97 % | BODY MASS INDEX: 40.66 KG/M2 | SYSTOLIC BLOOD PRESSURE: 110 MMHG | DIASTOLIC BLOOD PRESSURE: 60 MMHG | HEIGHT: 63 IN | HEART RATE: 79 BPM

## 2022-03-28 DIAGNOSIS — I48.91 ATRIAL FIBRILLATION WITH RAPID VENTRICULAR RESPONSE (HCC): Primary | ICD-10-CM

## 2022-03-28 PROCEDURE — 93000 ELECTROCARDIOGRAM COMPLETE: CPT | Performed by: NURSE PRACTITIONER

## 2022-03-28 PROCEDURE — 4040F PNEUMOC VAC/ADMIN/RCVD: CPT | Performed by: NURSE PRACTITIONER

## 2022-03-28 PROCEDURE — 1036F TOBACCO NON-USER: CPT | Performed by: NURSE PRACTITIONER

## 2022-03-28 PROCEDURE — G8399 PT W/DXA RESULTS DOCUMENT: HCPCS | Performed by: NURSE PRACTITIONER

## 2022-03-28 PROCEDURE — 3017F COLORECTAL CA SCREEN DOC REV: CPT | Performed by: NURSE PRACTITIONER

## 2022-03-28 PROCEDURE — 99214 OFFICE O/P EST MOD 30 MIN: CPT | Performed by: NURSE PRACTITIONER

## 2022-03-28 PROCEDURE — 1090F PRES/ABSN URINE INCON ASSESS: CPT | Performed by: NURSE PRACTITIONER

## 2022-03-28 PROCEDURE — G8484 FLU IMMUNIZE NO ADMIN: HCPCS | Performed by: NURSE PRACTITIONER

## 2022-03-28 PROCEDURE — G8417 CALC BMI ABV UP PARAM F/U: HCPCS | Performed by: NURSE PRACTITIONER

## 2022-03-28 PROCEDURE — G8427 DOCREV CUR MEDS BY ELIG CLIN: HCPCS | Performed by: NURSE PRACTITIONER

## 2022-03-28 PROCEDURE — 1123F ACP DISCUSS/DSCN MKR DOCD: CPT | Performed by: NURSE PRACTITIONER

## 2022-03-28 RX ORDER — DULAGLUTIDE 0.75 MG/.5ML
1 INJECTION, SOLUTION SUBCUTANEOUS DAILY
COMMUNITY
Start: 2022-03-16

## 2022-03-28 NOTE — LETTER
[address] Cardiology - [address] [facility] [address] [facility]  Phone: [phone]  Fax: [phone]    Fayrene DARIA Person CNP    March 28, 2022     DARIA Leary CNP  No address on file    Patient: Quyen Jo   MR Number: [id number]   YOB: 1945   Date of Visit: 3/28/2022       Dear Chaparro Pablo: Thank you for referring Jemma Frazier to me for evaluation/treatment. Below are the relevant portions of my assessment and plan of care. If you have questions, please do not hesitate to call me. I look forward to following [place]oacano Lozano along with you.     Sincerely,      DARIA Patrick CNP

## 2022-03-28 NOTE — PROGRESS NOTES
[address]   Electrophysiology Outpatient Note              Date:  March 28, 2022  Patient name: Nellie Guillermo  YOB: 1945    Primary Care physician: DARIA Moe CNP    HISTORY OF PRESENT ILLNESS: The patient is a 78 y.o.  female with a history of high grade AV block, syncope, CAD, HTN, HLD, and Charcot-Randee-Tooth disease and peripheral neuropathy. In 2005, she had CABG. In 2017, she had a LHC that showed occlusion of SVG to second obtuse marginal branch. In 2018, she had an LHC that showed occluded SVG to OM. EF was noted to be 20% at the time. In 2/2020, she was admitted for chest pain. EKG showed AF. Echo showed an EF of 55-60% and no WMA. In 2/2022, she was admitted for recurrent syncope. She was noted to have several seconds of asystole which coincided with loss of consciousness. On 2/17/2022, she had a dual chamber pacemaker placed. On 2/24/2022, device check showed AP 14.3%,  86.4% and no arrhythmias. Today she is being seen for PAF and high grade AVB. EKG shows AS  with a HR of 79. Patient has no cardiac complaints. Denies chest pain, palpitations, shortness of breath, and dizziness. Past Medical History:   has a past medical history of Allergic rhinitis, Arthritis, CAD (coronary artery disease), CHF (congestive heart failure) ([facility] Cholelithiasis, Diabetes mellitus ([facility] Gastroparesis, Hepatic steatosis, Hyperlipidemia, Hypertension, Peripheral neuropathy, Sleep apnea, Thyroid disease, and Vitamin B12 deficiency. Past Surgical History:   has a past surgical history that includes Coronary artery bypass graft (2005); Foot surgery; colectomy (1998); Colonoscopy; Upper gastrointestinal endoscopy; Tonsillectomy and adenoidectomy; Tubal ligation; other surgical history (4/17/2012); other surgical history (Right, 3/20/13); Coronary angioplasty; Cholecystectomy; Cardiac catheterization (12/27/2017); Diagnostic Cardiac Cath Lab Procedure;  Toe amputation (Left, 3/29/2021); eye surgery; Endoscopy, colon, diagnostic; and hernia repair. Home Medications:    Prior to Admission medications    Medication Sig Start Date End Date Taking? Authorizing Provider   TRULICITY 8.00 JT/3.1TQ SOPN Inject 1 pen into the skin daily 3/16/22  Yes Historical Provider, MD   levothyroxine (SYNTHROID) 50 MCG tablet Take 1 tablet by mouth Daily 2/18/22  Yes Alfonso Chin MD   nitroGLYCERIN (NITROSTAT) 0.4 MG SL tablet DISSOLVE 1 TAB UNDER TONGUE FOR CHEST PAIN - IF PAIN REMAINS AFTER 5 MIN, CALL 911 AND REPEAT DOSE. MAX 3 TABS IN 15 MINUTES 2/2/22  Yes Cruzito Choi MD   carvedilol (COREG) 3.125 MG tablet Take 1 tablet by mouth 2 times daily (with meals) 10/8/21  Yes Ollie Colorado MD   apixaban (ELIQUIS) 5 MG TABS tablet Take 1 tablet by mouth 2 times daily 10/8/21  Yes Ollie Colorado MD   furosemide (LASIX) 40 MG tablet TAKE ONE TABLET BY MOUTH DAILY MAY TAKE AN ADDITIONAL 1/2 TABLET AS NEEDED FOR WEIGHT GAIN OF 3 LBS IN A DAY 7/16/21  Yes Cruzito Choi MD   isosorbide mononitrate (IMDUR) 120 MG extended release tablet TAKE ONE TABLET BY MOUTH DAILY 4/15/21  Yes Cruzito Choi MD   desonide (DESOWEN) 0.05 % cream Apply 0.05 % topically 2 times daily Apply topically 2 times daily. Yes Historical Provider, MD   pantoprazole (PROTONIX) 40 MG tablet TAKE ONE TABLET BY MOUTH DAILY 9/3/19  Yes Historical Provider, MD   aspirin 81 MG EC tablet Take 1 tablet by mouth daily 12/29/17  Yes Leah Arechiga MD   tolterodine (DETROL LA) 2 MG ER capsule Take 4 mg by mouth daily    Yes Historical Provider, MD   fluticasone (FLONASE) 50 MCG/ACT nasal spray 1 spray by Nasal route daily. Yes Historical Provider, MD   Omega-3 Fatty Acids (FISH OIL) 1000 MG CAPS Take 2 capsules by mouth daily. 4/3/15  Yes Cruzito Choi MD   gabapentin (NEURONTIN) 600 MG tablet Take 600 mg by mouth. May take up to 4x per day if needed for neuropathy pain.    Yes Historical Provider, MD   diphenhydrAMINE (BENADRYL) 25 MG tablet Take 25 mg by mouth nightly as needed. Yes Historical Provider, MD   mupirocin (BACTROBAN) 2 % ointment Apply topically twice daily for another 3 days  Patient not taking: Reported on 3/28/2022 2/18/22   Hernan Garza MD   ranolazine (RANEXA) 500 MG extended release tablet TAKE ONE TABLET BY MOUTH TWICE A DAY  Patient not taking: Reported on 3/28/2022 9/16/21   Vernida Merlin, MD   amiodarone (CORDARONE) 200 MG tablet Take 1 tablet by mouth daily  Patient not taking: Reported on 3/28/2022 11/5/20   Kavon Ware MD   GLIPIZIDE Take 5 mg by mouth 2 times daily (with meals) Taking 10 mg AM & 5 mg in PM  Patient not taking: Reported on 3/28/2022    Historical Provider, MD   Cholecalciferol (VITAMIN D3) 1000 UNIT CAPS Take 1 capsule by mouth daily. Patient not taking: Reported on 3/28/2022    Historical Provider, MD       Allergies:  Niacin and related, Adhesive tape, Other, Statins depletion therapy, and Vancomycin    Social History:   reports that she quit smoking about 23 years ago. She has a 30.00 pack-year smoking history. She has never used smokeless tobacco. She reports that she does not drink alcohol and does not use drugs. Family History: family history includes Coronary Art Dis in her brother, father, and sister; Stroke in her mother. All 14 point review of systems are completed and pertinent positives are mentioned in the history of present illness. Other systems are reviewed and are negative. PHYSICAL EXAM:    Vital signs:    /60   Pulse 79   Ht 5' 3\" (1.6 m)   Wt 229 lb 8 oz (104.1 kg)   SpO2 97%   BMI 40.65 kg/m²      Constitutional and general appearance: alert, cooperative, no distress and appears stated age  HEENT: PERRL, no cervical lymphadenopathy. No masses palpable.  Normal oral mucosa  Respiratory:  · Normal excursion and expansion without use of accessory muscles  · Resp auscultation: Normal breath sounds without wheezing, rhonchi, and rales  Cardiovascular:  · The apical impulse is not displaced  · Heart tones are crisp and normal. regular S1 and S2.  · Jugular venous pulsation Normal  · The carotid upstroke is normal in amplitude and contour without delay or bruit  · Peripheral pulses are symmetrical and full   Abdomen:  · No masses or tenderness  · Bowel sounds present  Extremities:  ·  No cyanosis or clubbing  ·  No lower extremity edema  ·  Skin: warm and dry  Neurological:  · Alert and oriented  · Moves all extremities well  · No abnormalities of mood, affect, memory, mentation, or behavior are noted    DATA:    ECG 3/28/2022:  AS  79 bpm     Echo 2/17/2022:  Summary   Normal left ventricular systolic function with ejection fraction of 55-60%. No regional wall motion abnormalites are seen. Left ventricular diastolic filling pressure is elevated. Compared to previous study from 2- no changes noted in left   ventricular function. Moderate mitral stenosis. Mild mitral regurgitation. Echo 2/2020:     Summary   Normal left ventricular systolic function with ejection fraction of 55-60%. No regional wall motion abnormalites are seen. Moderate concentric left ventricular hypertrophy. Grade I diastolic dysfunction with normal filling pressure. Mild mitral regurgitation. Cath 6/2018:     OPERATIVE PROCEDURE:  The patient is a 81-year-old female who was referred  from [facility] for further evaluation. After  informed written consent was obtained, right groin was prepped and draped  in a sterile fashion with quite difficulty in accessing the right common  femoral artery because of the patient's obesity. We were unable to feel  the femoral pulse and had to use Doppler ultrasound to access the artery. Fortunately, only a single stick was needed and a 5-Bahraini arterial sheath  was introduced.     Diagnostic catheters were Mahi, JL4, and JR4.   Initially, JL4 advanced  into the left main. Left main is critically diseased. There is a complex  stenosis of the left main that is 90+% involving the distal portion of the  left main and into the LAD and circumflex. The circumflex coronary artery  is critically diseased. There is 90% stenosis of the circumflex in its mid  vessel that is small and not revascularizable and then there is a severely  diseased obtuse marginal territory with noted to have some distal filling. There is a large LAD. This LAD is critically diseased. LAD has 90%  stenosis in its kind of mid portion and 100% distally. The right coronary  artery is 100% proximally occluded. Left ventriculography was performed,  demonstrated ejection fraction of less than 20%. Left ventricular  end-diastolic pressure was measured at 25, aortic pressure 117/81, and left  ventricular pressure 115/25. No gradient across pullback. Wall motion,  severe high lateral and anterior wall hypokinesis. There is a vein graft  that was cannulated, goes to the right coronary artery. This vein graft is  normal in size and has no critical disease. The anastomosis is normal  _____ the right coronary artery is severely diseased. There is a saphenous  vein graft that we cannulated that goes to the OM and is occluded, and  there is a left internal mammary artery conduit. This was eventually  subselectively engaged and demonstrates patency into the LAD, but it is a  small conduit.   Access verified to be above the bifurcation, however,  appears to have an accessory branch and manual pressure will be used for  arterial hemostasis.     CONCLUSION:  Multivessel critical coronary artery disease with severely  reduced left ventricular function in the setting of decompensated  hemodynamics.     TOTAL CONSCIOUS SEDATION PHYSICIAN WORKSHEET     PROCEDURE START TIME:  15:10     PROCEDURE END TIME:  15:45     A 2 mg of Versed and 100 mcg of fentanyl given.     RECOMMENDATIONS:  Aggressive risk factor modification. At this point,  acutely resume her heparin tonight and dual antiplatelet therapy along with  diuresis. The patient's clinical symptoms are severe and her ischemic  disease is quite progressive, but unchanged from 12/2017. At this point, I  am concerned that her symptoms are mainly related to congestive heart  failure; however, there is a component of aggressive ischemic heart  disease. This has been attempted at revascularization. We can consider  high-risk PCI of the left main, but further diuresis and management will be  important initially. All labs and testing reviewed. CARDIOLOGY LABS:   CBC: No results for input(s): WBC, HGB, HCT, PLT in the last 72 hours. BMP: No results for input(s): NA, K, CO2, BUN, CREATININE, LABGLOM, GLUCOSE in the last 72 hours. PT/INR: No results for input(s): PROTIME, INR in the last 72 hours. APTT:No results for input(s): APTT in the last 72 hours. FASTING LIPID PANEL:  Lab Results   Component Value Date    HDL 34 10/18/2017    LDLCALC 108 10/18/2017    TRIG 179 04/16/2015     LIVER PROFILE:No results for input(s): AST, ALT, ALB in the last 72 hours. IMPRESSION:    Assessment:   High grade AV block: stable    -s/p dual chamber pacemaker implant 2/17/2022   -device check per HPI  Syncope: secondary to above  Paroxsymal atrial fibrillation: stable    -[id number] score 5 (age, gender, CHF, HTN, and CAD)    -noted 3/2020 with no known recurrence  CAD: followed by Dr. 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 Continue Coreg and Eliquis   2. Remote device transmissions every three months   3. Annual labs due 2/2023 (CBC and BMP)   4.  Follow up on 5/23/2022    Patient was seen outside of global device window for AF    MDM valerie Mast, APRN-CNP  [facility] [place]  [phone]

## 2022-04-26 RX ORDER — ISOSORBIDE MONONITRATE 120 MG/1
TABLET, EXTENDED RELEASE ORAL
Qty: 30 TABLET | Refills: 0 | Status: SHIPPED | OUTPATIENT
Start: 2022-04-26 | End: 2022-05-31

## 2022-04-28 RX ORDER — FUROSEMIDE 40 MG/1
TABLET ORAL
Qty: 120 TABLET | Refills: 1 | Status: SHIPPED | OUTPATIENT
Start: 2022-04-28 | End: 2022-11-04

## 2022-05-03 ENCOUNTER — TELEPHONE (OUTPATIENT)
Dept: CARDIOLOGY CLINIC | Age: 77
End: 2022-05-03

## 2022-05-03 NOTE — TELEPHONE ENCOUNTER
She is at an increased risk of perioperative complications due to multiple comorbidities. However, there are no absolute contraindications to proceeding.  OK to hold Eliquis for 48 hours if patient understands her stroke risk is higher if afib is recurrent off Eliquis.

## 2022-05-03 NOTE — LETTER
[address] Cardiology - [address] [facility] [address] [facility]  Phone: [phone]  Fax: [phone]    DARIA Tim - CNP        May 3, 2022    Kristian Maciels   1945  [address]  [facility] [address]      Dear To Whom This May Concern,    Charla Dai is at an increased risk of perioperative complications due to multiple comorbidities. However, there are no absolute contraindications to proceeding. OK to hold Eliquis for 48 hours if patient understands her stroke risk is higher if afib is recurrent off Eliquis.     If you have any questions or concerns, please don't hesitate to call.     Sincerely,        DARIA Reynolds - CNP

## 2022-05-03 NOTE — TELEPHONE ENCOUNTER
Cesar Rodas, 1945    Cardiac Risk Assessment    NEEDING CLEARANCE DUE TO RECENT dual chamber pacemaker implant 2/17/2022     What type of procedure are you having? DENTAL EXTRACTION (MUTLIPLE)  LOCAL     When is your procedure scheduled for?  5.5.22    Medications to be stopped. NEEDS TO HOLD ELIQUIS 2-DAYS PRIOR TO PROCEDURE. What physician is performing your procedure? Dr. Yesi Spaulding    Phone Number:   [phone]    Fax number to send the letter:   [phone]    Cardiologist:   LORENZO    Last Appointment:    3.28.22    IMPRESSION:    Assessment:   High grade AV block: stable               -s/p dual chamber pacemaker implant 2/17/2022              -device check per HPI  Syncope: secondary to above  Paroxsymal atrial fibrillation: stable               -[id number] score 5 (age, gender, CHF, HTN, and CAD)               -noted 3/2020 with no known recurrence  CAD: followed by Dr. 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 Continue Coreg and Eliquis   2. Remote device transmissions every three months   3. Annual labs due 2/2023 (CBC and BMP)   4.  Follow up on 5/23/2022     Patient was seen outside of global device window for AF     MDM moderate        Anastasiia Mast, DARIA-CNP  [address]  [phone]       NEXT APPT 5.23.22

## 2022-05-23 ENCOUNTER — OFFICE VISIT (OUTPATIENT)
Dept: CARDIOLOGY CLINIC | Age: 77
End: 2022-05-23
Payer: MEDICARE

## 2022-05-23 ENCOUNTER — NURSE ONLY (OUTPATIENT)
Dept: CARDIOLOGY CLINIC | Age: 77
End: 2022-05-23
Payer: MEDICARE

## 2022-05-23 VITALS
HEART RATE: 71 BPM | OXYGEN SATURATION: 99 % | WEIGHT: 226 LBS | DIASTOLIC BLOOD PRESSURE: 68 MMHG | BODY MASS INDEX: 41.59 KG/M2 | SYSTOLIC BLOOD PRESSURE: 102 MMHG | HEIGHT: 62 IN

## 2022-05-23 DIAGNOSIS — I48.91 ATRIAL FIBRILLATION, UNSPECIFIED TYPE (HCC): Primary | ICD-10-CM

## 2022-05-23 DIAGNOSIS — I48.91 ATRIAL FIBRILLATION, UNSPECIFIED TYPE (HCC): ICD-10-CM

## 2022-05-23 DIAGNOSIS — I49.5 SICK SINUS SYNDROME (HCC): ICD-10-CM

## 2022-05-23 DIAGNOSIS — Z95.0 PACEMAKER: ICD-10-CM

## 2022-05-23 DIAGNOSIS — I48.91 ATRIAL FIBRILLATION WITH RAPID VENTRICULAR RESPONSE (HCC): ICD-10-CM

## 2022-05-23 DIAGNOSIS — I44.2 INTERMITTENT COMPLETE HEART BLOCK (HCC): ICD-10-CM

## 2022-05-23 PROCEDURE — 1123F ACP DISCUSS/DSCN MKR DOCD: CPT | Performed by: NURSE PRACTITIONER

## 2022-05-23 PROCEDURE — 1036F TOBACCO NON-USER: CPT | Performed by: NURSE PRACTITIONER

## 2022-05-23 PROCEDURE — G8427 DOCREV CUR MEDS BY ELIG CLIN: HCPCS | Performed by: NURSE PRACTITIONER

## 2022-05-23 PROCEDURE — 1090F PRES/ABSN URINE INCON ASSESS: CPT | Performed by: NURSE PRACTITIONER

## 2022-05-23 PROCEDURE — 93280 PM DEVICE PROGR EVAL DUAL: CPT | Performed by: INTERNAL MEDICINE

## 2022-05-23 PROCEDURE — 3017F COLORECTAL CA SCREEN DOC REV: CPT | Performed by: NURSE PRACTITIONER

## 2022-05-23 PROCEDURE — G8399 PT W/DXA RESULTS DOCUMENT: HCPCS | Performed by: NURSE PRACTITIONER

## 2022-05-23 PROCEDURE — 99214 OFFICE O/P EST MOD 30 MIN: CPT | Performed by: NURSE PRACTITIONER

## 2022-05-23 PROCEDURE — G8417 CALC BMI ABV UP PARAM F/U: HCPCS | Performed by: NURSE PRACTITIONER

## 2022-05-23 NOTE — PATIENT INSTRUCTIONS
No changes today     Remote device transmissions every three months when you receive bedside transmitter     Follow up with Dr. Harsh Rodriguez as scheduled     Follow up with me in 6 moths

## 2022-05-23 NOTE — PROGRESS NOTES
[address]   Electrophysiology Outpatient Note              Date:  May 23, 2022  Patient name: Telly Richard  YOB: 1945    Primary Care physician: DARIA Russ CNP    HISTORY OF PRESENT ILLNESS: The patient is a 78 y.o.  female with a history of high grade AV block, syncope, CAD, HTN, HLD, and Charcot-Randee-Tooth disease and peripheral neuropathy. In 2005, she had CABG. In 2017, she had a LHC that showed occlusion of SVG to second obtuse marginal branch. In 2018, she had an LHC that showed occluded SVG to OM. EF was noted to be 20% at the time. In 2/2020, she was admitted for chest pain. EKG showed AF. Echo showed an EF of 55-60% and no WMA. In 2/2022, she was admitted for recurrent syncope. She was noted to have several seconds of asystole which coincided with loss of consciousness. On 2/17/2022, she had a dual chamber pacemaker placed. On 2/24/2022, device check showed AP 14.3%,  86.4% and no arrhythmias. Today she is being seen for PAF and high grade AVB. EKG shows AS  with a HR of 79. Patient has no cardiac complaints. Denies chest pain, palpitations, shortness of breath, and dizziness. She feels better since PPM implant. Device check today shows:   Brand: [employer]   Mode: DDD  Normal function   2.5% AP  99.9%     Arrhythmias: none  Battery life 14.1 years  RA impedance 494 ohms   RV impedance 589 ohms   RA threshold 0.750 V @ 0.40 ms  RV threshold 0.500 V @ 0.40 ms  RA sensitivity 0.30 mV  RV sensitivity 0.90 mV          Past Medical History:   has a past medical history of Allergic rhinitis, Arthritis, CAD (coronary artery disease), CHF (congestive heart failure) ([address] Cholelithiasis, Diabetes mellitus ([facility] [address] Gastroparesis, Hepatic steatosis, Hyperlipidemia, Hypertension, Peripheral neuropathy, Sleep apnea, Thyroid disease, and Vitamin B12 deficiency.     Past Surgical History:   has a past surgical history that includes Coronary artery bypass graft (2005); Foot surgery; colectomy (1998); Colonoscopy; Upper gastrointestinal endoscopy; Tonsillectomy and adenoidectomy; Tubal ligation; other surgical history (4/17/2012); other surgical history (Right, 3/20/13); Coronary angioplasty; Cholecystectomy; Cardiac catheterization (12/27/2017); Diagnostic Cardiac Cath Lab Procedure; Toe amputation (Left, 3/29/2021); eye surgery; Endoscopy, colon, diagnostic; and hernia repair. Home Medications:    Prior to Admission medications    Medication Sig Start Date End Date Taking? Authorizing Provider   furosemide (LASIX) 40 MG tablet TAKE ONE TABLET BY MOUTH DAILY MAY TAKE ADDITIONAL ONE-HALF TABLET AS NEEDED FOR WEIGHT GAIN OF 3 LBS IN A DAY 4/28/22  Yes Jose Juan Olvera MD   isosorbide mononitrate (IMDUR) 120 MG extended release tablet TAKE ONE TABLET BY MOUTH DAILY 4/26/22  Yes Jose Juan Olvera MD   TRULICITY 7.13 GQ/0.7JZ SOPN Inject 1 pen into the skin daily 3/16/22  Yes Historical Provider, MD   levothyroxine (SYNTHROID) 50 MCG tablet Take 1 tablet by mouth Daily 2/18/22  Yes Allan Crawford MD   nitroGLYCERIN (NITROSTAT) 0.4 MG SL tablet DISSOLVE 1 TAB UNDER TONGUE FOR CHEST PAIN - IF PAIN REMAINS AFTER 5 MIN, CALL 911 AND REPEAT DOSE. MAX 3 TABS IN 15 MINUTES 2/2/22  Yes Jose Juan Olvera MD   carvedilol (COREG) 3.125 MG tablet Take 1 tablet by mouth 2 times daily (with meals) 10/8/21  Yes Hanane Salcido MD   apixaban (ELIQUIS) 5 MG TABS tablet Take 1 tablet by mouth 2 times daily 10/8/21  Yes Hanane Salcido MD   desonide (DESOWEN) 0.05 % cream Apply 0.05 % topically 2 times daily Apply topically 2 times daily.    Yes Historical Provider, MD   pantoprazole (PROTONIX) 40 MG tablet TAKE ONE TABLET BY MOUTH DAILY 9/3/19  Yes Historical Provider, MD   aspirin 81 MG EC tablet Take 1 tablet by mouth daily 12/29/17  Yes Gt Tanner MD   tolterodine (DETROL LA) 2 MG ER capsule Take 4 mg by mouth daily    Yes Historical Provider, MD   fluticasone (FLONASE) 50 MCG/ACT nasal spray 1 spray by Nasal route daily. Yes Historical Provider, MD   Omega-3 Fatty Acids (FISH OIL) 1000 MG CAPS Take 2 capsules by mouth daily. 4/3/15  Yes Maame Gardner MD   GLIPIZIDE Take 5 mg by mouth 2 times daily (with meals) Taking 10 mg AM & 5 mg in PM   Yes Historical Provider, MD   gabapentin (NEURONTIN) 600 MG tablet Take 600 mg by mouth. May take up to 4x per day if needed for neuropathy pain. Yes Historical Provider, MD   diphenhydrAMINE (BENADRYL) 25 MG tablet Take 25 mg by mouth nightly as needed. Yes Historical Provider, MD   mupirocin (BACTROBAN) 2 % ointment Apply topically twice daily for another 3 days  Patient not taking: Reported on 3/28/2022 2/18/22   Drew Yan MD   ranolazine (RANEXA) 500 MG extended release tablet TAKE ONE TABLET BY MOUTH TWICE A DAY  Patient not taking: Reported on 3/28/2022 9/16/21   Maame Gardner MD   Cholecalciferol (VITAMIN D3) 1000 UNIT CAPS Take 1 capsule by mouth daily. Patient not taking: Reported on 3/28/2022    Historical Provider, MD       Allergies:  Niacin and related, Adhesive tape, Other, Statins depletion therapy, and Vancomycin    Social History:   reports that she quit smoking about 23 years ago. She has a 30.00 pack-year smoking history. She has never used smokeless tobacco. She reports that she does not drink alcohol and does not use drugs. Family History: family history includes Coronary Art Dis in her brother, father, and sister; Stroke in her mother. All 14 point review of systems are completed and pertinent positives are mentioned in the history of present illness. Other systems are reviewed and are negative. PHYSICAL EXAM:    Vital signs:    /68   Pulse 71   Ht 5' 2\" (1.575 m)   Wt 226 lb (102.5 kg)   SpO2 99%   BMI 41.34 kg/m²      Constitutional and general appearance: alert, cooperative, no distress and appears stated age  HEENT: PERRL, no cervical lymphadenopathy.  No masses palpable. Normal oral mucosa  Respiratory:  · Normal excursion and expansion without use of accessory muscles  · Resp auscultation: Normal breath sounds without wheezing, rhonchi, and rales  Cardiovascular:  · The apical impulse is not displaced  · Heart tones are crisp and normal. regular S1 and S2.  · Jugular venous pulsation Normal  · The carotid upstroke is normal in amplitude and contour without delay or bruit  · Peripheral pulses are symmetrical and full   Abdomen:  · No masses or tenderness  · Bowel sounds present  Extremities:  ·  No cyanosis or clubbing  ·  No lower extremity edema  ·  Skin: warm and dry  Neurological:  · Alert and oriented  · Moves all extremities well  · No abnormalities of mood, affect, memory, mentation, or behavior are noted    DATA:    ECG 3/28/2022:  AS  79 bpm     Echo 2/17/2022:  Summary   Normal left ventricular systolic function with ejection fraction of 55-60%. No regional wall motion abnormalites are seen. Left ventricular diastolic filling pressure is elevated. Compared to previous study from 2- no changes noted in left   ventricular function. Moderate mitral stenosis. Mild mitral regurgitation. Echo 2/2020:     Summary   Normal left ventricular systolic function with ejection fraction of 55-60%. No regional wall motion abnormalites are seen. Moderate concentric left ventricular hypertrophy. Grade I diastolic dysfunction with normal filling pressure. Mild mitral regurgitation. Cath 6/2018:     OPERATIVE PROCEDURE:  The patient is a 22-year-old female who was referred  from [facility] Emergency Department for further evaluation. After  informed written consent was obtained, right groin was prepped and draped  in a sterile fashion with quite difficulty in accessing the right common  femoral artery because of the patient's obesity. We were unable to feel  the femoral pulse and had to use Doppler ultrasound to access the artery. Fortunately, only a single stick was needed and a 5-Stateless arterial sheath  was introduced.     Diagnostic catheters were Mahi, JL4, and JR4. Initially, Theo Hatter advanced  into the left main. Left main is critically diseased. There is a complex  stenosis of the left main that is 90+% involving the distal portion of the  left main and into the LAD and circumflex. The circumflex coronary artery  is critically diseased. There is 90% stenosis of the circumflex in its mid  vessel that is small and not revascularizable and then there is a severely  diseased obtuse marginal territory with noted to have some distal filling. There is a large LAD. This LAD is critically diseased. LAD has 90%  stenosis in its kind of mid portion and 100% distally. The right coronary  artery is 100% proximally occluded. Left ventriculography was performed,  demonstrated ejection fraction of less than 20%. Left ventricular  end-diastolic pressure was measured at 25, aortic pressure 117/81, and left  ventricular pressure 115/25. No gradient across pullback. Wall motion,  severe high lateral and anterior wall hypokinesis. There is a vein graft  that was cannulated, goes to the right coronary artery. This vein graft is  normal in size and has no critical disease. The anastomosis is normal  _____ the right coronary artery is severely diseased. There is a saphenous  vein graft that we cannulated that goes to the OM and is occluded, and  there is a left internal mammary artery conduit. This was eventually  subselectively engaged and demonstrates patency into the LAD, but it is a  small conduit.   Access verified to be above the bifurcation, however,  appears to have an accessory branch and manual pressure will be used for  arterial hemostasis.     CONCLUSION:  Multivessel critical coronary artery disease with severely  reduced left ventricular function in the setting of decompensated  hemodynamics.     TOTAL CONSCIOUS SEDATION PHYSICIAN WORKSHEET     PROCEDURE START TIME:  15:10     PROCEDURE END TIME:  15:45     A 2 mg of Versed and 100 mcg of fentanyl given.     RECOMMENDATIONS:  Aggressive risk factor modification. At this point,  acutely resume her heparin tonight and dual antiplatelet therapy along with  diuresis. The patient's clinical symptoms are severe and her ischemic  disease is quite progressive, but unchanged from 12/2017. At this point, I  am concerned that her symptoms are mainly related to congestive heart  failure; however, there is a component of aggressive ischemic heart  disease. This has been attempted at revascularization. We can consider  high-risk PCI of the left main, but further diuresis and management will be  important initially. All labs and testing reviewed. CARDIOLOGY LABS:   CBC: No results for input(s): WBC, HGB, HCT, PLT in the last 72 hours. BMP: No results for input(s): NA, K, CO2, BUN, CREATININE, LABGLOM, GLUCOSE in the last 72 hours. PT/INR: No results for input(s): PROTIME, INR in the last 72 hours. APTT:No results for input(s): APTT in the last 72 hours. FASTING LIPID PANEL:  Lab Results   Component Value Date    HDL 34 10/18/2017    LDLCALC 108 10/18/2017    TRIG 179 04/16/2015     LIVER PROFILE:No results for input(s): AST, ALT, ALB in the last 72 hours. IMPRESSION:    Assessment:   High grade AV block: stable    -s/p dual chamber pacemaker implant 2/17/2022   -device check per HPI  Syncope: secondary to above  Paroxsymal atrial fibrillation: stable    -[id number] score 5 (age, gender, CHF, HTN, and CAD)    -noted 3/2020 with no known recurrence  CAD: followed by Dr. 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 Continue Coreg and Eliquis   2. Remote device transmissions every three months   3.  Annual labs due 2/2023 (CBC and BMP)   4.  Follow up in 6 months    Patient was seen outside of global device window for AF    MDM DARIA Michelle-CNP  [facility]  [phone]

## 2022-05-25 ENCOUNTER — HOSPITAL ENCOUNTER (OUTPATIENT)
Age: 77
Discharge: HOME | End: 2022-05-25
Payer: MEDICARE

## 2022-05-25 LAB
ALBUMIN SERPL-MCNC: 4.1 G/DL (ref 3.4–5)
ANION GAP SERPL CALCULATED.3IONS-SCNC: 10 MMOL/L (ref 3–16)
BACTERIA: ABNORMAL /HPF
BASOPHILS ABSOLUTE: 0.1 K/UL (ref 0–0.2)
BASOPHILS RELATIVE PERCENT: 0.8 %
BILIRUBIN URINE: NEGATIVE
BLOOD, URINE: NEGATIVE
BUN BLDV-MCNC: 17 MG/DL (ref 7–20)
CALCIUM SERPL-MCNC: 9.5 MG/DL (ref 8.3–10.6)
CHLORIDE BLD-SCNC: 102 MMOL/L (ref 99–110)
CLARITY: CLEAR
CO2: 29 MMOL/L (ref 21–32)
COLOR: YELLOW
CREAT SERPL-MCNC: 1.3 MG/DL (ref 0.6–1.2)
EOSINOPHILS ABSOLUTE: 0.3 K/UL (ref 0–0.6)
EOSINOPHILS RELATIVE PERCENT: 3.7 %
EPITHELIAL CELLS, UA: ABNORMAL /HPF (ref 0–5)
GFR AFRICAN AMERICAN: 48
GFR NON-AFRICAN AMERICAN: 40
GLUCOSE BLD-MCNC: 148 MG/DL (ref 70–99)
GLUCOSE URINE: NEGATIVE MG/DL
HCT VFR BLD CALC: 46.6 % (ref 36–48)
HEMOGLOBIN: 15.4 G/DL (ref 12–16)
KETONES, URINE: NEGATIVE MG/DL
LEUKOCYTE ESTERASE, URINE: ABNORMAL
LYMPHOCYTES ABSOLUTE: 1.9 K/UL (ref 1–5.1)
LYMPHOCYTES RELATIVE PERCENT: 28.2 %
MCH RBC QN AUTO: 29.1 PG (ref 26–34)
MCHC RBC AUTO-ENTMCNC: 33.1 G/DL (ref 31–36)
MCV RBC AUTO: 88 FL (ref 80–100)
MICROSCOPIC EXAMINATION: YES
MONOCYTES ABSOLUTE: 0.4 K/UL (ref 0–1.3)
MONOCYTES RELATIVE PERCENT: 5.9 %
NEUTROPHILS ABSOLUTE: 4.2 K/UL (ref 1.7–7.7)
NEUTROPHILS RELATIVE PERCENT: 61.4 %
NITRITE, URINE: NEGATIVE
PDW BLD-RTO: 15.1 % (ref 12.4–15.4)
PH UA: 6.5 (ref 5–8)
PHOSPHORUS: 3.3 MG/DL (ref 2.5–4.9)
PLATELET # BLD: 168 K/UL (ref 135–450)
PMV BLD AUTO: 7.8 FL (ref 5–10.5)
POTASSIUM SERPL-SCNC: 4.6 MMOL/L (ref 3.5–5.1)
PROTEIN UA: NEGATIVE MG/DL
RBC # BLD: 5.29 M/UL (ref 4–5.2)
RBC UA: ABNORMAL /HPF (ref 0–4)
SODIUM BLD-SCNC: 141 MMOL/L (ref 136–145)
SPECIFIC GRAVITY UA: 1.01 (ref 1–1.03)
URINE TYPE: ABNORMAL
UROBILINOGEN, URINE: 0.2 E.U./DL
WBC # BLD: 6.9 K/UL (ref 4–11)
WBC UA: ABNORMAL /HPF (ref 0–5)

## 2022-05-25 PROCEDURE — 80069 RENAL FUNCTION PANEL: CPT

## 2022-05-25 PROCEDURE — 83970 ASSAY OF PARATHORMONE: CPT

## 2022-05-25 PROCEDURE — 82570 ASSAY OF URINE CREATININE: CPT

## 2022-05-25 PROCEDURE — 85025 COMPLETE CBC W/AUTO DIFF WBC: CPT

## 2022-05-25 PROCEDURE — 81001 URINALYSIS AUTO W/SCOPE: CPT

## 2022-05-25 PROCEDURE — 82306 VITAMIN D 25 HYDROXY: CPT

## 2022-05-25 PROCEDURE — 36415 COLL VENOUS BLD VENIPUNCTURE: CPT

## 2022-05-25 PROCEDURE — 84156 ASSAY OF PROTEIN URINE: CPT

## 2022-05-26 LAB
CREATININE URINE: 29.8 MG/DL (ref 28–259)
PARATHYROID HORMONE INTACT: 74.7 PG/ML (ref 14–72)
PROTEIN PROTEIN: <4 MG/DL
PROTEIN/CREAT RATIO: NORMAL MG/DL
VITAMIN D 25-HYDROXY: 41.3 NG/ML

## 2022-05-31 RX ORDER — ISOSORBIDE MONONITRATE 120 MG/1
TABLET, EXTENDED RELEASE ORAL
Qty: 90 TABLET | Refills: 0 | Status: SHIPPED | OUTPATIENT
Start: 2022-05-31 | End: 2022-08-29

## 2022-06-21 NOTE — PROGRESS NOTES
[facility]   Cardiac Followup    Referring Provider:  DARIA Bell CNP     Chief Complaint   Patient presents with    Follow-Up from Hospital     S/P PPM    Coronary Artery Disease    Congestive Heart Failure    Other     No new concerns      Subjective: Mrs Ab Jiménez is being seen today for f/u CAD s/p 3V CABG remote, , HTN, and HLD; c /o fatigue today    Past Medical History:    Mrs. Ab Jiménez is a 70yo female with hx of CAD s/p 3V CABG in 2005, HLD, HTN, PAF dx 2/20 on eliquis, synope and high-grade AV block s/p dual-chamber  2/22 per Dr. Ramos , BERTRAND on CPAP, Charcot-foot with neuropathy, s/p partial left foot amputation 3/21, s/p fab 10/2/17, and intolerance to statins and niacin due to myalgias. Most recent Lexiscan 7/18/17 negative sichemia;  EF>65%. Avjhwzcc68/27/17 with CP and NSTEMI. Note C 12/27/17 showed SVG to OM2 100% ostially occluded (new since prior cath 2015, LVEF 20%) with severe small artery disease. Dx with NSTEMI 6/18 and new LBBB. Most recent [facility] 6/15/18 showed severe 3V CAD with occluded SVG to the OM; Patent SVG to the RCA; Patent LIMA to the LAD; disease risky and NOT amenable to PCI; LVEF on cath 20%. MUGA scan 8/14/18 EF of 60% (1/23/18 EF=57%). She present to ED 2/16/2022 for syncope and found high-grade AV block with asystole. Most recent Echo 2/16/2022 EF=55-60% (same EF 2/20); moderate mitral stenosis; mild MR. S/P dual chamber PPM 2/17/22 with JMB for AVB with prolonged ventricular asystole and syncope. Most recent EKG 3/28/22 noted  AS 79 bpm.  Last Device Check 5/23/22 noted AP 2.5%,  99.9%, no arrhythmias noted, LAURA 11.7 yrs. She presents with rolling walker. Today, she reports doing well. She brought BP log from [facility] ranging 100-127/65-81 and HR 70-80's. Patient with no complaints of chest pain, SOB, palpitations, dizziness, edema, or orthopnea/PND.   She is compliant with medications     Weight today 220#; weight 5/23/22 was 226#.     Past Medical History:   has a past medical history of Allergic rhinitis, Arthritis, CAD (coronary artery disease), CHF (congestive heart failure) ([facility] [address] Cholelithiasis, Diabetes mellitus ([facility] [address] Gastroparesis, Hepatic steatosis, Hyperlipidemia, Hypertension, Peripheral neuropathy, Sleep apnea, Thyroid disease, and Vitamin B12 deficiency. Surgical History:   has a past surgical history that includes Coronary artery bypass graft (2005); Foot surgery; colectomy (1998); Colonoscopy; Upper gastrointestinal endoscopy; Tonsillectomy and adenoidectomy; Tubal ligation; other surgical history (4/17/2012); other surgical history (Right, 3/20/13); Coronary angioplasty; Cholecystectomy; Cardiac catheterization (12/27/2017); Diagnostic Cardiac Cath Lab Procedure; Toe amputation (Left, 3/29/2021); eye surgery; Endoscopy, colon, diagnostic; and hernia repair. Social History:  She is  and lives alone in [facility]. She reports that she quit smoking about 2010. She has never used smokeless tobacco. She reports that she does not drink alcohol or use illicit drugs. Family History:  family history includes Coronary Art Dis in her brother, father, and sister; Stroke in her mother. Home Medications:  Prior to Admission medications    Medication Sig Start Date End Date Taking? Authorizing Provider   loratadine (ALAVERT) 10 MG tablet Take 10 mg by mouth 2 times daily as needed. Yes Historical Provider, MD   GLIPIZIDE Take 5 mg by mouth 2 times daily (with meals). Yes Historical Provider, MD   gabapentin (NEURONTIN) 600 MG tablet Take 1,200 mg by mouth 3 times daily. May take up to 4x per day if needed for neuropathy pain   Yes Historical Provider, MD   tizanidine (ZANAFLEX) 4 MG tablet Take 8 mg by mouth nightly. Yes Historical Provider, MD   tramadol (ULTRAM) 50 MG tablet Take 50 mg by mouth every 4 hours as needed. States uses very rare and only when neurontin ineffective.    Yes Historical Provider, MD   metformin (GLUCOPHAGE) 1000 MG tablet Take 1,000 mg by mouth daily (with breakfast). Yes Historical Provider, MD   levothyroxine (SYNTHROID) 50 MCG tablet Take 50 mcg by mouth every morning. Yes Historical Provider, MD   topiramate (TOPAMAX) 25 MG tablet Take 25 mg by mouth daily. Yes Historical Provider, MD   tolterodine (DETROL LA) 4 MG ER capsule Take 4 mg by mouth 2 times daily. Yes Historical Provider, MD   diphenhydrAMINE (BENADRYL) 25 MG tablet Take 25 mg by mouth nightly as needed. Yes Historical Provider, MD   ranitidine (ZANTAC 75) 75 MG tablet Take 75 mg by mouth daily. Yes Historical Provider, MD   Cholecalciferol (VITAMIN D3) 1000 UNIT CAPS Take 1 capsule by mouth daily. Yes Historical Provider, MD   Omega-3 Fatty Acids (FISH OIL) 1000 MG CAPS Take 1,000 mg by mouth daily. Yes Historical Provider, MD   Red Yeast Rice 600 MG TABS Take 1 tablet by mouth daily. Yes Historical Provider, MD   lisinopril (PRINIVIL;ZESTRIL) 10 MG tablet Take 5 mg by mouth every morning. Yes Historical Provider, MD   furosemide (LASIX) 40 MG tablet Take 40 mg by mouth daily as needed. Yes Historical Provider, MD   aspirin 81 MG EC tablet Take 81 mg by mouth daily. Historical Provider, MD        Allergies:  Niacin and related, Ranexa [ranolazine], Adhesive tape, Other, Statins depletion therapy, and Vancomycin     Review of Systems:   · Constitutional: there has been no unanticipated weight loss. There's been no change in energy level, sleep pattern, or activity level. · Eyes: No visual changes or diplopia. No scleral icterus. · ENT: No Headaches, hearing loss or vertigo. No mouth sores or sore throat. · Cardiovascular: Reviewed in HPI  · Respiratory: No cough or wheezing, no sputum production. No hematemesis. · Gastrointestinal: No abdominal pain, appetite loss, blood in stools. No change in bowel or bladder habits.   · Genitourinary: No dysuria, trouble voiding, or hematuria. · Musculoskeletal:  No gait disturbance, weakness or joint complaints. · Integumentary: No rash or pruritis. · Neurological: No headache, diplopia, change in muscle strength, numbness or tingling. No change in gait, balance, coordination, mood, affect, memory, mentation, behavior. · Psychiatric: No anxiety, no depression. · Endocrine: No malaise, fatigue or temperature intolerance. No excessive thirst, fluid intake, or urination. No tremor. · Hematologic/Lymphatic: No abnormal bruising or bleeding, blood clots or swollen lymph nodes. · Allergic/Immunologic: No nasal congestion or hives. Physical Examination:    Vitals:    06/23/22 1511   BP: 118/70   Pulse: 71   Temp: 98.4 °F (36.9 °C)   SpO2: 93%        Wt Readings from Last 3 Encounters:   06/23/22 220 lb (99.8 kg)   05/23/22 226 lb (102.5 kg)   03/28/22 229 lb 8 oz (104.1 kg)       Constitutional and General Appearance: NAD   Respiratory:  · Normal excursion and expansion without use of accessory muscles  · Resp Auscultation: Normal breath sounds without dullness  Cardiovascular:  · The apical impulses not displaced  · Heart tones are crisp and normal RRR  · Cervical veins are not engorged  · The carotid upstroke is normal in amplitude and contour without delay or bruit  · Normal S1S2, No S3,   · Peripheral pulses are symmetrical and full  · There is no clubbing, cyanosis of the extremities.               1+ BLE edema    · Femoral Arteries: 2+ and equal  · Pedal Pulses: 2+ and equal   Abdomen:  · Obese,No masses or tenderness  · Liver/Spleen: No Abnormalities Noted  Neurological/Psychiatric:  · Alert and oriented in all spheres  · Moves all extremities well  · Exhibits normal gait balance and coordination  · No abnormalities of mood, affect, memory, mentation, or behavior are noted      Lab Results   Component Value Date     05/25/2022    K 4.6 05/25/2022     05/25/2022    CO2 29 05/25/2022    BUN 17 05/25/2022    CREATININE 1.3 (H) 05/25/2022    GLUCOSE 148 (H) 05/25/2022    CALCIUM 9.5 05/25/2022    PROT 6.2 (L) 02/16/2022    LABALBU 4.1 05/25/2022    BILITOT 0.6 02/16/2022    ALKPHOS 110 02/16/2022    AST 18 02/16/2022    ALT 18 02/16/2022    LABGLOM 40 (A) 05/25/2022    GFRAA 48 (A) 05/25/2022    AGRATIO 1.6 02/16/2022    GLOB 3.7 03/27/2021     Lab Results   Component Value Date    WBC 6.9 05/25/2022    HGB 15.4 05/25/2022    HCT 46.6 05/25/2022    MCV 88.0 05/25/2022     05/25/2022     Lab Results   Component Value Date    T4FREE 1.8 10/23/2020     Lab Results   Component Value Date    CHOL 227 (H) 04/16/2015     Lab Results   Component Value Date    TRIG 179 (H) 04/16/2015     Lab Results   Component Value Date    HDL 34 (L) 10/18/2017    HDL 39 (L) 04/16/2015     Lab Results   Component Value Date    LDLCALC 108 (H) 10/18/2017    LDLCALC 152 (H) 04/16/2015     Lab Results   Component Value Date    LABVLDL 57 10/18/2017    LABVLDL 36 04/16/2015     No results found for: [address]  - 4/16/21 - K 4.3, , BUN 18, Creat 1.46    11/18/2020 [facility] (reviewed in epic)       Assessment:     1. Hyperlipidemia : I personally reviewed last lipids 11/18/20 , HDL 46, , , . She is unable to take statins or niacin due to severe myagias. She states she is taking 2 tablets of fish oil and red yeast extract along with dieting. Not at goal and has resisted PCSK9 inhibitors and will not take. 2. Hypertension : Stable and will continue present medical regimen. 3. CAD (coronary artery disease): Most recent [facility] 6/15/18  Native 3VCAD with occluded SVG to the OM Patent SVG to the RCA Patent LIMA to the LAD; risky disease NOT amenable to PCI. There are no concerning symptoms for angina currently. 4. Chest pain: No complaints today. She has diffuse CAD with small vessel disease beyond grafts. Will continue aggressive med mgt if able. She is on maximum dose imdur.  Ranexa caused hallucinations and stopped. 5. S/P pacemaker for syncope and high-grade AV block on 2/17/22. Continue routine device checks. Plan:  1. Meds reviewed. No refills per pt request    2. Goal for BP is 130/85 or less. 3. Continue current course    Plan to follow up in 6 months    This note was scribed in the presence of Reji Mata MD by Jose Tomlinson RN. I, Dr. Onofre Bell, personally performed the services described in this documentation, as scribed by the above signed scribe in my presence. It is both accurate and complete to my knowledge. I agree with the details independently gathered by the clinical support staff, while the remaining scribed note accurately describes my personal service to the patient. Cost of prescription medications and patient compliance have been reviewed with patient. All questions answered. Thank you for allowing me to participate in the care of this individual.      Shena Goff.  Ade Harry M.D., [facility] [place]

## 2022-06-23 ENCOUNTER — OFFICE VISIT (OUTPATIENT)
Dept: CARDIOLOGY CLINIC | Age: 77
End: 2022-06-23
Payer: MEDICARE

## 2022-06-23 VITALS
SYSTOLIC BLOOD PRESSURE: 118 MMHG | TEMPERATURE: 98.4 F | WEIGHT: 220 LBS | HEART RATE: 71 BPM | HEIGHT: 63 IN | DIASTOLIC BLOOD PRESSURE: 70 MMHG | BODY MASS INDEX: 38.98 KG/M2 | OXYGEN SATURATION: 93 %

## 2022-06-23 DIAGNOSIS — E78.2 MIXED HYPERLIPIDEMIA: ICD-10-CM

## 2022-06-23 DIAGNOSIS — Z95.0 S/P PLACEMENT OF CARDIAC PACEMAKER: ICD-10-CM

## 2022-06-23 DIAGNOSIS — I10 PRIMARY HYPERTENSION: ICD-10-CM

## 2022-06-23 DIAGNOSIS — Z87.891 HISTORY OF TOBACCO ABUSE: ICD-10-CM

## 2022-06-23 DIAGNOSIS — I25.810 CORONARY ARTERY DISEASE INVOLVING CORONARY BYPASS GRAFT OF NATIVE HEART WITHOUT ANGINA PECTORIS: Primary | ICD-10-CM

## 2022-06-23 DIAGNOSIS — I48.91 ATRIAL FIBRILLATION, UNSPECIFIED TYPE (HCC): ICD-10-CM

## 2022-06-23 PROCEDURE — G8427 DOCREV CUR MEDS BY ELIG CLIN: HCPCS | Performed by: INTERNAL MEDICINE

## 2022-06-23 PROCEDURE — 3017F COLORECTAL CA SCREEN DOC REV: CPT | Performed by: INTERNAL MEDICINE

## 2022-06-23 PROCEDURE — 1090F PRES/ABSN URINE INCON ASSESS: CPT | Performed by: INTERNAL MEDICINE

## 2022-06-23 PROCEDURE — 1123F ACP DISCUSS/DSCN MKR DOCD: CPT | Performed by: INTERNAL MEDICINE

## 2022-06-23 PROCEDURE — 1036F TOBACCO NON-USER: CPT | Performed by: INTERNAL MEDICINE

## 2022-06-23 PROCEDURE — G8399 PT W/DXA RESULTS DOCUMENT: HCPCS | Performed by: INTERNAL MEDICINE

## 2022-06-23 PROCEDURE — G8417 CALC BMI ABV UP PARAM F/U: HCPCS | Performed by: INTERNAL MEDICINE

## 2022-06-23 PROCEDURE — 99214 OFFICE O/P EST MOD 30 MIN: CPT | Performed by: INTERNAL MEDICINE

## 2022-06-23 NOTE — PATIENT INSTRUCTIONS
Plan:  1. Meds reviewed. No refills per pt request    2. Goal for BP is 130/85 or less.    3. Continue current course    Plan to follow up in 6 months

## 2022-08-29 RX ORDER — ISOSORBIDE MONONITRATE 120 MG/1
TABLET, EXTENDED RELEASE ORAL
Qty: 90 TABLET | Refills: 3 | Status: SHIPPED | OUTPATIENT
Start: 2022-08-29

## 2022-09-06 ENCOUNTER — NURSE ONLY (OUTPATIENT)
Dept: CARDIOLOGY CLINIC | Age: 77
End: 2022-09-06
Payer: MEDICARE

## 2022-09-06 DIAGNOSIS — Z95.0 PACEMAKER: ICD-10-CM

## 2022-09-06 DIAGNOSIS — I44.2 CHB (COMPLETE HEART BLOCK) (HCC): Primary | ICD-10-CM

## 2022-09-06 PROCEDURE — 93296 REM INTERROG EVL PM/IDS: CPT | Performed by: INTERNAL MEDICINE

## 2022-09-06 PROCEDURE — 93294 REM INTERROG EVL PM/LDLS PM: CPT | Performed by: INTERNAL MEDICINE

## 2022-09-23 NOTE — PROGRESS NOTES
End of 91-day monitoring period 9/6. AV interval >200 ms. Pacing (% of Time Since 03-Sep-2022)   99.9% (MVP Off)  AP 0.8%   No  arrhythmias recorded. Remote transmission received for patient's dual chamber PACEMAKER. Transmission shows normal sensing and pacing function. EP physician will review. See interrogation under the cardiology tab in the [address] [place] [address] [address] field for more details. Will continue to monitor remotely. Hx AVB, AF-oac.

## 2022-11-04 RX ORDER — FUROSEMIDE 40 MG/1
TABLET ORAL
Qty: 90 TABLET | Refills: 5 | Status: SHIPPED | OUTPATIENT
Start: 2022-11-04

## 2022-11-08 RX ORDER — CARVEDILOL 3.12 MG/1
3.12 TABLET ORAL 2 TIMES DAILY WITH MEALS
Qty: 180 TABLET | Refills: 1 | Status: SHIPPED | OUTPATIENT
Start: 2022-11-08

## 2022-11-08 RX ORDER — NITROGLYCERIN 0.4 MG/1
TABLET SUBLINGUAL
Qty: 25 TABLET | Refills: 1 | Status: SHIPPED | OUTPATIENT
Start: 2022-11-08

## 2022-11-28 ENCOUNTER — HOSPITAL ENCOUNTER (OUTPATIENT)
Age: 77
Discharge: HOME | End: 2022-11-28
Payer: MEDICARE

## 2022-11-28 LAB
ALBUMIN SERPL-MCNC: 3.8 G/DL (ref 3.4–5)
ANION GAP SERPL CALCULATED.3IONS-SCNC: 11 MMOL/L (ref 3–16)
BILIRUBIN URINE: NEGATIVE
BLOOD, URINE: NEGATIVE
BUN BLDV-MCNC: 16 MG/DL (ref 7–20)
CALCIUM SERPL-MCNC: 9.1 MG/DL (ref 8.3–10.6)
CHLORIDE BLD-SCNC: 96 MMOL/L (ref 99–110)
CLARITY: CLEAR
CO2: 28 MMOL/L (ref 21–32)
COLOR: YELLOW
CREAT SERPL-MCNC: 1.3 MG/DL (ref 0.6–1.2)
GFR SERPL CREATININE-BSD FRML MDRD: 43 ML/MIN/{1.73_M2}
GLUCOSE BLD-MCNC: 160 MG/DL (ref 70–99)
GLUCOSE URINE: NEGATIVE MG/DL
HCT VFR BLD CALC: 44.8 % (ref 36–48)
HEMOGLOBIN: 15.2 G/DL (ref 12–16)
KETONES, URINE: NEGATIVE MG/DL
LEUKOCYTE ESTERASE, URINE: NEGATIVE
MCH RBC QN AUTO: 29.6 PG (ref 26–34)
MCHC RBC AUTO-ENTMCNC: 33.9 G/DL (ref 31–36)
MCV RBC AUTO: 87.3 FL (ref 80–100)
MICROSCOPIC EXAMINATION: NORMAL
NITRITE, URINE: NEGATIVE
PDW BLD-RTO: 14.5 % (ref 12.4–15.4)
PH UA: 5.5 (ref 5–8)
PHOSPHORUS: 3.7 MG/DL (ref 2.5–4.9)
PLATELET # BLD: 158 K/UL (ref 135–450)
PMV BLD AUTO: 8.2 FL (ref 5–10.5)
POTASSIUM SERPL-SCNC: 4 MMOL/L (ref 3.5–5.1)
PROTEIN UA: NEGATIVE MG/DL
RBC # BLD: 5.13 M/UL (ref 4–5.2)
SODIUM BLD-SCNC: 135 MMOL/L (ref 136–145)
SPECIFIC GRAVITY UA: <=1.005 (ref 1–1.03)
URINE TYPE: NORMAL
UROBILINOGEN, URINE: 0.2 E.U./DL
WBC # BLD: 7.8 K/UL (ref 4–11)

## 2022-11-28 PROCEDURE — 83970 ASSAY OF PARATHORMONE: CPT

## 2022-11-28 PROCEDURE — 85027 COMPLETE CBC AUTOMATED: CPT

## 2022-11-28 PROCEDURE — 81003 URINALYSIS AUTO W/O SCOPE: CPT

## 2022-11-28 PROCEDURE — 84156 ASSAY OF PROTEIN URINE: CPT

## 2022-11-28 PROCEDURE — 82570 ASSAY OF URINE CREATININE: CPT

## 2022-11-28 PROCEDURE — 82306 VITAMIN D 25 HYDROXY: CPT

## 2022-11-28 PROCEDURE — 36415 COLL VENOUS BLD VENIPUNCTURE: CPT

## 2022-11-28 PROCEDURE — 80069 RENAL FUNCTION PANEL: CPT

## 2022-11-29 LAB
CREATININE URINE: 66.1 MG/DL (ref 28–259)
PARATHYROID HORMONE INTACT: 81 PG/ML (ref 14–72)
PROTEIN PROTEIN: 8 MG/DL
PROTEIN/CREAT RATIO: 0.1 MG/DL
VITAMIN D 25-HYDROXY: 38.9 NG/ML

## 2022-12-06 ENCOUNTER — NURSE ONLY (OUTPATIENT)
Dept: CARDIOLOGY CLINIC | Age: 77
End: 2022-12-06
Payer: MEDICARE

## 2022-12-06 DIAGNOSIS — Z95.0 PACEMAKER: ICD-10-CM

## 2022-12-06 PROCEDURE — 93296 REM INTERROG EVL PM/IDS: CPT | Performed by: INTERNAL MEDICINE

## 2022-12-06 PROCEDURE — 93294 REM INTERROG EVL PM/LDLS PM: CPT | Performed by: INTERNAL MEDICINE

## 2022-12-14 NOTE — PROGRESS NOTES
We received remote transmission from patient's monitor at home. Transmission shows normal sensing and pacing function. EP physician will review. See interrogation under cardiology tab in the [address] [facility] [address] field for more details. Noted NSVT. Pt is on Coreg.  99.9% (MVP Off)  AP 2.6%    End of 91-day monitoring period 12-6-22.

## 2022-12-19 ENCOUNTER — APPOINTMENT (OUTPATIENT)
Dept: GENERAL RADIOLOGY | Age: 77
End: 2022-12-19
Payer: MEDICARE

## 2022-12-19 ENCOUNTER — HOSPITAL ENCOUNTER (EMERGENCY)
Age: 77
Discharge: ANOTHER HOSPITAL | End: 2022-12-20
Attending: STUDENT IN AN ORGANIZED HEALTH CARE EDUCATION/TRAINING PROGRAM
Payer: MEDICARE

## 2022-12-19 ENCOUNTER — APPOINTMENT (OUTPATIENT)
Dept: CT IMAGING | Age: 77
End: 2022-12-19
Payer: MEDICARE

## 2022-12-19 DIAGNOSIS — I62.03 ACUTE ON CHRONIC INTRACRANIAL SUBDURAL HEMATOMA (HCC): Primary | ICD-10-CM

## 2022-12-19 DIAGNOSIS — I62.01 ACUTE ON CHRONIC INTRACRANIAL SUBDURAL HEMATOMA (HCC): Primary | ICD-10-CM

## 2022-12-19 LAB
A/G RATIO: 1.3 (ref 1.1–2.2)
ALBUMIN SERPL-MCNC: 3.9 G/DL (ref 3.4–5)
ALP BLD-CCNC: 125 U/L (ref 40–129)
ALT SERPL-CCNC: 13 U/L (ref 10–40)
ANION GAP SERPL CALCULATED.3IONS-SCNC: 19 MMOL/L (ref 3–16)
AST SERPL-CCNC: 17 U/L (ref 15–37)
BASOPHILS ABSOLUTE: 0.1 K/UL (ref 0–0.2)
BASOPHILS RELATIVE PERCENT: 1.2 %
BILIRUB SERPL-MCNC: 0.8 MG/DL (ref 0–1)
BUN BLDV-MCNC: 15 MG/DL (ref 7–20)
CALCIUM SERPL-MCNC: 9.9 MG/DL (ref 8.3–10.6)
CHLORIDE BLD-SCNC: 100 MMOL/L (ref 99–110)
CO2: 22 MMOL/L (ref 21–32)
CREAT SERPL-MCNC: 1.2 MG/DL (ref 0.6–1.2)
EOSINOPHILS ABSOLUTE: 0.2 K/UL (ref 0–0.6)
EOSINOPHILS RELATIVE PERCENT: 3 %
GFR SERPL CREATININE-BSD FRML MDRD: 47 ML/MIN/{1.73_M2}
GLUCOSE BLD-MCNC: 110 MG/DL (ref 70–99)
HCT VFR BLD CALC: 44.4 % (ref 36–48)
HEMOGLOBIN: 14.6 G/DL (ref 12–16)
LYMPHOCYTES ABSOLUTE: 1.8 K/UL (ref 1–5.1)
LYMPHOCYTES RELATIVE PERCENT: 25 %
MCH RBC QN AUTO: 29 PG (ref 26–34)
MCHC RBC AUTO-ENTMCNC: 32.9 G/DL (ref 31–36)
MCV RBC AUTO: 88.1 FL (ref 80–100)
MONOCYTES ABSOLUTE: 0.5 K/UL (ref 0–1.3)
MONOCYTES RELATIVE PERCENT: 7 %
NEUTROPHILS ABSOLUTE: 4.5 K/UL (ref 1.7–7.7)
NEUTROPHILS RELATIVE PERCENT: 63.8 %
PDW BLD-RTO: 14.5 % (ref 12.4–15.4)
PLATELET # BLD: 216 K/UL (ref 135–450)
PMV BLD AUTO: 8.8 FL (ref 5–10.5)
POTASSIUM REFLEX MAGNESIUM: 4.1 MMOL/L (ref 3.5–5.1)
RBC # BLD: 5.04 M/UL (ref 4–5.2)
REASON FOR REJECTION: NORMAL
REJECTED TEST: NORMAL
SODIUM BLD-SCNC: 141 MMOL/L (ref 136–145)
TOTAL PROTEIN: 6.9 G/DL (ref 6.4–8.2)
TROPONIN: <0.01 NG/ML
WBC # BLD: 7.1 K/UL (ref 4–11)

## 2022-12-19 PROCEDURE — 70450 CT HEAD/BRAIN W/O DYE: CPT

## 2022-12-19 PROCEDURE — 2580000003 HC RX 258: Performed by: STUDENT IN AN ORGANIZED HEALTH CARE EDUCATION/TRAINING PROGRAM

## 2022-12-19 PROCEDURE — 96365 THER/PROPH/DIAG IV INF INIT: CPT

## 2022-12-19 PROCEDURE — 85025 COMPLETE CBC W/AUTO DIFF WBC: CPT

## 2022-12-19 PROCEDURE — 36415 COLL VENOUS BLD VENIPUNCTURE: CPT

## 2022-12-19 PROCEDURE — 93005 ELECTROCARDIOGRAM TRACING: CPT | Performed by: STUDENT IN AN ORGANIZED HEALTH CARE EDUCATION/TRAINING PROGRAM

## 2022-12-19 PROCEDURE — 84484 ASSAY OF TROPONIN QUANT: CPT

## 2022-12-19 PROCEDURE — 99285 EMERGENCY DEPT VISIT HI MDM: CPT

## 2022-12-19 PROCEDURE — 80053 COMPREHEN METABOLIC PANEL: CPT

## 2022-12-19 PROCEDURE — 6360000002 HC RX W HCPCS: Performed by: STUDENT IN AN ORGANIZED HEALTH CARE EDUCATION/TRAINING PROGRAM

## 2022-12-19 PROCEDURE — 71045 X-RAY EXAM CHEST 1 VIEW: CPT

## 2022-12-19 RX ADMIN — SODIUM CHLORIDE 1000 MG: 9 INJECTION, SOLUTION INTRAVENOUS at 21:52

## 2022-12-19 ASSESSMENT — PAIN - FUNCTIONAL ASSESSMENT
PAIN_FUNCTIONAL_ASSESSMENT: NONE - DENIES PAIN
PAIN_FUNCTIONAL_ASSESSMENT: NONE - DENIES PAIN

## 2022-12-19 NOTE — ED PROVIDER NOTES
[address] ED      CHIEF COMPLAINT  Aphasia (Pt having difficulty getting words out. Started yesterday. Unsure what time. Pt able to speak on arrival.  Md aware)     Ryder Figueroa Seun Wright is a 78 y.o. female  who presents to the ED complaining of aphasia. Patient states that she has been having difficulty getting words out since yesterday, she is unsure what time it started. She is able to speak but does have some word finding difficulty. She denies any numbness or tingling that is new, states that she has a chronic history of neuropathy so she has tingling in her legs from that that is unchanged for the past 20 years. She denies any pain. Denies any focal weakness. She denies other complaints or concerns. No other complaints, modifying factors or associated symptoms. I have reviewed the following from the nursing documentation. Past Medical History:   Diagnosis Date    Allergic rhinitis     Arthritis     CAD (coronary artery disease)     CHF (congestive heart failure) ([facility]     Cholelithiasis 7/3/2015    Diabetes mellitus ([facility]     Accuchecks daily once a day at home in A.M. Gastroparesis     Hepatic steatosis 7/3/2015    Hyperlipidemia     Hypertension     Peripheral neuropathy     Sleep apnea     Has used the CPAP x 25 yrs now per pt.     Thyroid disease     Vitamin B12 deficiency      Past Surgical History:   Procedure Laterality Date    CARDIAC CATHETERIZATION  12/27/2017    SVG to OM2 100% ostium    CHOLECYSTECTOMY      COLECTOMY  1998    diverticulitis     COLONOSCOPY      CORONARY ANGIOPLASTY      CORONARY ARTERY BYPASS GRAFT  2005    x 3    DIAGNOSTIC CARDIAC CATH LAB PROCEDURE      ENDOSCOPY, COLON, DIAGNOSTIC      EYE SURGERY      Bilateral cataracts removed with lens implants    FOOT SURGERY      Multiple foot surgeries bilateral    HERNIA REPAIR      OTHER SURGICAL HISTORY  4/17/2012    achilles tendon lengthening,arthroplasty,matatarsalhead resection    OTHER SURGICAL HISTORY Right 3/20/13    FUSION OF RIGHT GREAT TOE JOINT WITH WIRE FIXATION, DEBRIEDMENT OF WOUND RIGHT GREAT TOE    TOE AMPUTATION Left 3/29/2021    PARTIAL LEFT FOOT AMPUTATION performed by Timmy Saha DPM at [address]      Laparoscopic    UPPER GASTROINTESTINAL ENDOSCOPY       Family History   Problem Relation Age of Onset    Stroke Mother     Coronary Art Dis Father     Coronary Art Dis Sister     Coronary Art Dis Brother      Social History     Socioeconomic History    Marital status:       Spouse name: Not on file    Number of children: Not on file    Years of education: Not on file    Highest education level: Not on file   Occupational History    Not on file   Tobacco Use    Smoking status: Former     Packs/day: 1.00     Years: 30.00     Pack years: 30.00     Types: Cigarettes     Quit date: 1998     Years since quittin.5    Smokeless tobacco: Never   Vaping Use    Vaping Use: Never used   Substance and Sexual Activity    Alcohol use: No    Drug use: No    Sexual activity: Not Currently   Other Topics Concern    Not on file   Social History Narrative    Not on file     Social Determinants of Health     Financial Resource Strain: Not on file   Food Insecurity: Not on file   Transportation Needs: Not on file   Physical Activity: Not on file   Stress: Not on file   Social Connections: Not on file   Intimate Partner Violence: Not on file   Housing Stability: Not on file     Current Facility-Administered Medications   Medication Dose Route Frequency Provider Last Rate Last Admin    levETIRAcetam (KEPPRA) 1,000 mg in sodium chloride 0.9 % 100 mL IVPB  1,000 mg IntraVENous Once Naresh Torres MD         Current Outpatient Medications   Medication Sig Dispense Refill    apixaban (ELIQUIS) 5 MG TABS tablet Take 1 tablet by mouth 2 times daily 180 tablet 1    carvedilol (COREG) 3.125 MG tablet Take 1 tablet by mouth 2 times daily (with meals) 180 tablet 1    nitroGLYCERIN (NITROSTAT) 0.4 MG SL tablet up to max of 3 total doses. If no relief after 1 dose, call 911. 25 tablet 1    furosemide (LASIX) 40 MG tablet TAKE ONE TABLET BY MOUTH DAILY MAY TAKE ADDITIONAL ONE-HALF TABLET AS NEEDED FOR WEIGHT GAIN OF 3 LBS IN A DAY 90 tablet 5    isosorbide mononitrate (IMDUR) 120 MG extended release tablet TAKE ONE TABLET BY MOUTH DAILY 90 tablet 3    TRULICITY 1.85 QW/4.2XG SOPN Inject 1 pen into the skin daily      mupirocin (BACTROBAN) 2 % ointment Apply topically twice daily for another 3 days (Patient not taking: Reported on 3/28/2022) 1 g 0    levothyroxine (SYNTHROID) 50 MCG tablet Take 1 tablet by mouth Daily 30 tablet 3    desonide (DESOWEN) 0.05 % cream Apply 0.05 % topically 2 times daily Apply topically 2 times daily. pantoprazole (PROTONIX) 40 MG tablet TAKE ONE TABLET BY MOUTH DAILY      aspirin 81 MG EC tablet Take 1 tablet by mouth daily 30 tablet 3    tolterodine (DETROL LA) 2 MG ER capsule Take 4 mg by mouth daily       fluticasone (FLONASE) 50 MCG/ACT nasal spray 1 spray by Nasal route daily. Omega-3 Fatty Acids (FISH OIL) 1000 MG CAPS Take 2 capsules by mouth daily. 180 capsule 0    GLIPIZIDE Take 5 mg by mouth 2 times daily (with meals) Taking 10 mg AM & 5 mg in PM      gabapentin (NEURONTIN) 600 MG tablet Take 600 mg by mouth. May take up to 4x per day if needed for neuropathy pain. diphenhydrAMINE (BENADRYL) 25 MG tablet Take 25 mg by mouth nightly as needed. Allergies   Allergen Reactions    Niacin And Related Anaphylaxis    Ranexa [Ranolazine] Hallucinations    Adhesive Tape      Causes rash and blisters. Can use paper tape or opsite without any problems. Other      QVacin IV antibiotic had after foot surgery placed on this for infection. Developed high fever, chills and uncontrollable shaking.     Statins Depletion Therapy      Deep muscle pain    Vancomycin      Does not recall reaction REVIEW OF SYSTEMS  10 systems reviewed, pertinent positives per HPI otherwise noted to be negative. PHYSICAL EXAM  BP (!) 118/94   Pulse 89   Temp 98 °F (36.7 °C) (Oral)   Resp 16   Ht 5' 3\" (1.6 m)   Wt 223 lb (101.2 kg)   SpO2 99%   BMI 39.50 kg/m²    GENERAL APPEARANCE: Awake and alert. Cooperative. No acute distress. HENT: Normocephalic. Atraumatic. Mucous membranes are moist.  NECK: Supple. EYES: PERRL. EOM's grossly intact. HEART/CHEST: RRR. No murmurs. LUNGS: Respirations unlabored. CTAB. Good air exchange. Speaking comfortably in full sentences. ABDOMEN: No tenderness. Soft. Non-distended. No masses. No organomegaly. No guarding or rebound. MUSCULOSKELETAL: No extremity edema. Compartments soft. No deformity. No tenderness in the extremities. All extremities neurovascularly intact. SKIN: Warm and dry. No acute rashes. NEUROLOGICAL: Alert and oriented. CN's 2-12 intact. No gross facial drooping. Strength 5/5, sensation intact. Intermittent word finding difficulty. PSYCHIATRIC: Normal mood and affect. LABS  I have reviewed all labs for this visit.    Results for orders placed or performed during the hospital encounter of 12/19/22   CBC with Auto Differential   Result Value Ref Range    WBC 7.1 4.0 - 11.0 K/uL    RBC 5.04 4.00 - 5.20 M/uL    Hemoglobin 14.6 12.0 - 16.0 g/dL    Hematocrit 44.4 36.0 - 48.0 %    MCV 88.1 80.0 - 100.0 fL    MCH 29.0 26.0 - 34.0 pg    MCHC 32.9 31.0 - 36.0 g/dL    RDW 14.5 12.4 - 15.4 %    Platelets 315 167 - 873 K/uL    MPV 8.8 5.0 - 10.5 fL    Neutrophils % 63.8 %    Lymphocytes % 25.0 %    Monocytes % 7.0 %    Eosinophils % 3.0 %    Basophils % 1.2 %    Neutrophils Absolute 4.5 1.7 - 7.7 K/uL    Lymphocytes Absolute 1.8 1.0 - 5.1 K/uL    Monocytes Absolute 0.5 0.0 - 1.3 K/uL    Eosinophils Absolute 0.2 0.0 - 0.6 K/uL    Basophils Absolute 0.1 0.0 - 0.2 K/uL   SPECIMEN REJECTION   Result Value Ref Range    Rejected Test cmpx trop     Reason for Rejection see below    Comprehensive Metabolic Panel w/ Reflex to MG   Result Value Ref Range    Sodium 141 136 - 145 mmol/L    Potassium reflex Magnesium 4.1 3.5 - 5.1 mmol/L    Chloride 100 99 - 110 mmol/L    CO2 22 21 - 32 mmol/L    Anion Gap 19 (H) 3 - 16    Glucose 110 (H) 70 - 99 mg/dL    BUN 15 7 - 20 mg/dL    Creatinine 1.2 0.6 - 1.2 mg/dL    Est, Glom Filt Rate 47 (A) >60    Calcium 9.9 8.3 - 10.6 mg/dL    Total Protein 6.9 6.4 - 8.2 g/dL    Albumin 3.9 3.4 - 5.0 g/dL    Albumin/Globulin Ratio 1.3 1.1 - 2.2    Total Bilirubin 0.8 0.0 - 1.0 mg/dL    Alkaline Phosphatase 125 40 - 129 U/L    ALT 13 10 - 40 U/L    AST 17 15 - 37 U/L   Troponin   Result Value Ref Range    Troponin <0.01 <0.01 ng/mL   EKG 12 Lead   Result Value Ref Range    Ventricular Rate 78 BPM    Atrial Rate 78 BPM    P-R Interval 272 ms    QRS Duration 128 ms    Q-T Interval 474 ms    QTc Calculation (Bazett) 540 ms    P Axis 9 degrees    R Axis -50 degrees    T Axis 67 degrees    Diagnosis       Electronic ventricular pacemakerWhen compared with ECG of 17-FEB-2022 07:49,Electronic ventricular pacemaker has replaced Sinus rhythm       ECG  The Ekg interpreted by me shows  normal pacemaker rhythm with a rate of 78  Axis is   Left axis deviation  QTc is   prolonged at 540  Intervals and Durations are unremarkable. ST Segments: nonspecific changes  Paced rhythm has replaced Mobitz type I block compared to previous performed 2/17/2022    RADIOLOGY  CT HEAD WO CONTRAST   Final Result   Acute on chronic subdural hematoma overlying the left frontal and parietal   lobes with approximately 3 mm of left-to-right midline shift. Critical results were called by Dr. Reza Breaux to Keyana Rollins on   12/19/2022 at 20:38. XR CHEST PORTABLE   Final Result   No radiographic evidence of acute cardiopulmonary disease. ED COURSE/MDM  Patient seen and evaluated. Old records reviewed.  Labs and imaging reviewed and results medications. I counseled patient how to take these medications. New Prescriptions    No medications on file       Follow-up with:  No follow-up provider specified. DISCLAIMER: This chart was created using Dragon dictation software. Efforts were made by me to ensure accuracy, however some errors may be present due to limitations of this technology and occasionally words are not transcribed correctly.        Afshin Solis MD  12/19/22 2128

## 2022-12-20 ENCOUNTER — APPOINTMENT (OUTPATIENT)
Dept: CT IMAGING | Age: 77
DRG: 026 | End: 2022-12-20
Attending: INTERNAL MEDICINE
Payer: MEDICARE

## 2022-12-20 ENCOUNTER — APPOINTMENT (OUTPATIENT)
Dept: MRI IMAGING | Age: 77
DRG: 026 | End: 2022-12-20
Attending: INTERNAL MEDICINE
Payer: MEDICARE

## 2022-12-20 ENCOUNTER — HOSPITAL ENCOUNTER (INPATIENT)
Age: 77
LOS: 7 days | Discharge: NURSING, REHAB OR LONG-TERM CARE | DRG: 026 | End: 2022-12-27
Attending: INTERNAL MEDICINE | Admitting: INTERNAL MEDICINE
Payer: MEDICARE

## 2022-12-20 VITALS
BODY MASS INDEX: 39.51 KG/M2 | DIASTOLIC BLOOD PRESSURE: 81 MMHG | SYSTOLIC BLOOD PRESSURE: 138 MMHG | RESPIRATION RATE: 14 BRPM | HEART RATE: 89 BPM | HEIGHT: 63 IN | OXYGEN SATURATION: 96 % | TEMPERATURE: 98 F | WEIGHT: 223 LBS

## 2022-12-20 DIAGNOSIS — R55 SYNCOPE AND COLLAPSE: ICD-10-CM

## 2022-12-20 DIAGNOSIS — S06.5XAA ACUTE SUBDURAL HEMATOMA: Primary | ICD-10-CM

## 2022-12-20 PROBLEM — E66.01 MORBID OBESITY WITH BMI OF 40.0-44.9, ADULT (HCC): Chronic | Status: ACTIVE | Noted: 2017-12-27

## 2022-12-20 PROBLEM — I44.2 INTERMITTENT COMPLETE HEART BLOCK (HCC): Chronic | Status: ACTIVE | Noted: 2022-01-01

## 2022-12-20 LAB
ANION GAP SERPL CALCULATED.3IONS-SCNC: 11 MMOL/L (ref 3–16)
BASOPHILS ABSOLUTE: 0 K/UL (ref 0–0.2)
BASOPHILS RELATIVE PERCENT: 0.6 %
BUN BLDV-MCNC: 17 MG/DL (ref 7–20)
CALCIUM SERPL-MCNC: 8.9 MG/DL (ref 8.3–10.6)
CHLORIDE BLD-SCNC: 102 MMOL/L (ref 99–110)
CO2: 27 MMOL/L (ref 21–32)
CREAT SERPL-MCNC: 1.2 MG/DL (ref 0.6–1.2)
EKG ATRIAL RATE: 78 BPM
EKG DIAGNOSIS: NORMAL
EKG P AXIS: 9 DEGREES
EKG P-R INTERVAL: 272 MS
EKG Q-T INTERVAL: 474 MS
EKG QRS DURATION: 128 MS
EKG QTC CALCULATION (BAZETT): 540 MS
EKG R AXIS: -50 DEGREES
EKG T AXIS: 67 DEGREES
EKG VENTRICULAR RATE: 78 BPM
EOSINOPHILS ABSOLUTE: 0.2 K/UL (ref 0–0.6)
EOSINOPHILS RELATIVE PERCENT: 2.9 %
GFR SERPL CREATININE-BSD FRML MDRD: 47 ML/MIN/{1.73_M2}
GLUCOSE BLD-MCNC: 112 MG/DL (ref 70–99)
GLUCOSE BLD-MCNC: 121 MG/DL (ref 70–99)
GLUCOSE BLD-MCNC: 152 MG/DL (ref 70–99)
GLUCOSE BLD-MCNC: 169 MG/DL (ref 70–99)
GLUCOSE BLD-MCNC: 170 MG/DL (ref 70–99)
HCT VFR BLD CALC: 40.7 % (ref 36–48)
HEMOGLOBIN: 13.9 G/DL (ref 12–16)
INR BLD: 1.25 (ref 0.87–1.14)
LYMPHOCYTES ABSOLUTE: 2.2 K/UL (ref 1–5.1)
LYMPHOCYTES RELATIVE PERCENT: 29.6 %
MCH RBC QN AUTO: 29.8 PG (ref 26–34)
MCHC RBC AUTO-ENTMCNC: 34.2 G/DL (ref 31–36)
MCV RBC AUTO: 87 FL (ref 80–100)
MONOCYTES ABSOLUTE: 0.6 K/UL (ref 0–1.3)
MONOCYTES RELATIVE PERCENT: 7.8 %
NEUTROPHILS ABSOLUTE: 4.4 K/UL (ref 1.7–7.7)
NEUTROPHILS RELATIVE PERCENT: 59.1 %
PDW BLD-RTO: 14.6 % (ref 12.4–15.4)
PERFORMED ON: ABNORMAL
PLATELET # BLD: 156 K/UL (ref 135–450)
PMV BLD AUTO: 8.3 FL (ref 5–10.5)
POTASSIUM SERPL-SCNC: 3.7 MMOL/L (ref 3.5–5.1)
PROTHROMBIN TIME: 15.6 SEC (ref 11.7–14.5)
RBC # BLD: 4.67 M/UL (ref 4–5.2)
SODIUM BLD-SCNC: 140 MMOL/L (ref 136–145)
WBC # BLD: 7.4 K/UL (ref 4–11)

## 2022-12-20 PROCEDURE — 97535 SELF CARE MNGMENT TRAINING: CPT

## 2022-12-20 PROCEDURE — 83036 HEMOGLOBIN GLYCOSYLATED A1C: CPT

## 2022-12-20 PROCEDURE — 97530 THERAPEUTIC ACTIVITIES: CPT

## 2022-12-20 PROCEDURE — APPNB60 APP NON BILLABLE TIME 46-60 MINS: Performed by: NURSE PRACTITIONER

## 2022-12-20 PROCEDURE — 70551 MRI BRAIN STEM W/O DYE: CPT

## 2022-12-20 PROCEDURE — 6370000000 HC RX 637 (ALT 250 FOR IP): Performed by: INTERNAL MEDICINE

## 2022-12-20 PROCEDURE — 92610 EVALUATE SWALLOWING FUNCTION: CPT

## 2022-12-20 PROCEDURE — 4A03X5D MEASUREMENT OF ARTERIAL FLOW, INTRACRANIAL, EXTERNAL APPROACH: ICD-10-PCS | Performed by: INTERNAL MEDICINE

## 2022-12-20 PROCEDURE — 92523 SPEECH SOUND LANG COMPREHEN: CPT

## 2022-12-20 PROCEDURE — 92526 ORAL FUNCTION THERAPY: CPT

## 2022-12-20 PROCEDURE — 85610 PROTHROMBIN TIME: CPT

## 2022-12-20 PROCEDURE — 6360000004 HC RX CONTRAST MEDICATION: Performed by: NURSE PRACTITIONER

## 2022-12-20 PROCEDURE — 97162 PT EVAL MOD COMPLEX 30 MIN: CPT

## 2022-12-20 PROCEDURE — 1200000000 HC SEMI PRIVATE

## 2022-12-20 PROCEDURE — 97166 OT EVAL MOD COMPLEX 45 MIN: CPT

## 2022-12-20 PROCEDURE — 6370000000 HC RX 637 (ALT 250 FOR IP): Performed by: NURSE PRACTITIONER

## 2022-12-20 PROCEDURE — 93010 ELECTROCARDIOGRAM REPORT: CPT | Performed by: INTERNAL MEDICINE

## 2022-12-20 PROCEDURE — 2580000003 HC RX 258: Performed by: INTERNAL MEDICINE

## 2022-12-20 PROCEDURE — 6360000002 HC RX W HCPCS: Performed by: NURSE PRACTITIONER

## 2022-12-20 PROCEDURE — 85025 COMPLETE CBC W/AUTO DIFF WBC: CPT

## 2022-12-20 PROCEDURE — 70496 CT ANGIOGRAPHY HEAD: CPT

## 2022-12-20 PROCEDURE — 70544 MR ANGIOGRAPHY HEAD W/O DYE: CPT

## 2022-12-20 PROCEDURE — 70450 CT HEAD/BRAIN W/O DYE: CPT

## 2022-12-20 PROCEDURE — 2060000000 HC ICU INTERMEDIATE R&B

## 2022-12-20 PROCEDURE — 36415 COLL VENOUS BLD VENIPUNCTURE: CPT

## 2022-12-20 PROCEDURE — 97116 GAIT TRAINING THERAPY: CPT

## 2022-12-20 PROCEDURE — 95819 EEG AWAKE AND ASLEEP: CPT

## 2022-12-20 PROCEDURE — 80048 BASIC METABOLIC PNL TOTAL CA: CPT

## 2022-12-20 RX ORDER — SODIUM CHLORIDE 9 MG/ML
INJECTION, SOLUTION INTRAVENOUS PRN
Status: DISCONTINUED | OUTPATIENT
Start: 2022-12-20 | End: 2022-12-27 | Stop reason: HOSPADM

## 2022-12-20 RX ORDER — LEVOTHYROXINE SODIUM 0.05 MG/1
50 TABLET ORAL DAILY
Status: DISCONTINUED | OUTPATIENT
Start: 2022-12-20 | End: 2022-12-27 | Stop reason: HOSPADM

## 2022-12-20 RX ORDER — DIPHENHYDRAMINE HCL 25 MG
25 TABLET ORAL NIGHTLY PRN
Status: DISCONTINUED | OUTPATIENT
Start: 2022-12-20 | End: 2022-12-27 | Stop reason: HOSPADM

## 2022-12-20 RX ORDER — SODIUM CHLORIDE 9 MG/ML
50 INJECTION, SOLUTION INTRAVENOUS ONCE
Status: DISCONTINUED | OUTPATIENT
Start: 2022-12-20 | End: 2022-12-20

## 2022-12-20 RX ORDER — SODIUM CHLORIDE 0.9 % (FLUSH) 0.9 %
5-40 SYRINGE (ML) INJECTION PRN
Status: DISCONTINUED | OUTPATIENT
Start: 2022-12-20 | End: 2022-12-27 | Stop reason: HOSPADM

## 2022-12-20 RX ORDER — CARVEDILOL 3.12 MG/1
3.12 TABLET ORAL 2 TIMES DAILY WITH MEALS
Status: DISCONTINUED | OUTPATIENT
Start: 2022-12-20 | End: 2022-12-25

## 2022-12-20 RX ORDER — ACETAMINOPHEN 325 MG/1
650 TABLET ORAL EVERY 6 HOURS PRN
Status: DISCONTINUED | OUTPATIENT
Start: 2022-12-20 | End: 2022-12-27 | Stop reason: HOSPADM

## 2022-12-20 RX ORDER — DEXAMETHASONE SODIUM PHOSPHATE 4 MG/ML
4 INJECTION, SOLUTION INTRA-ARTICULAR; INTRALESIONAL; INTRAMUSCULAR; INTRAVENOUS; SOFT TISSUE EVERY 6 HOURS
Status: DISCONTINUED | OUTPATIENT
Start: 2022-12-20 | End: 2022-12-26

## 2022-12-20 RX ORDER — SODIUM CHLORIDE 0.9 % (FLUSH) 0.9 %
5-40 SYRINGE (ML) INJECTION EVERY 12 HOURS SCHEDULED
Status: DISCONTINUED | OUTPATIENT
Start: 2022-12-20 | End: 2022-12-27 | Stop reason: HOSPADM

## 2022-12-20 RX ORDER — FUROSEMIDE 40 MG/1
40 TABLET ORAL DAILY
Status: DISCONTINUED | OUTPATIENT
Start: 2022-12-20 | End: 2022-12-25

## 2022-12-20 RX ORDER — INSULIN LISPRO 100 [IU]/ML
0-4 INJECTION, SOLUTION INTRAVENOUS; SUBCUTANEOUS NIGHTLY
Status: DISCONTINUED | OUTPATIENT
Start: 2022-12-20 | End: 2022-12-27 | Stop reason: HOSPADM

## 2022-12-20 RX ORDER — ONDANSETRON 4 MG/1
4 TABLET, ORALLY DISINTEGRATING ORAL EVERY 8 HOURS PRN
Status: DISCONTINUED | OUTPATIENT
Start: 2022-12-20 | End: 2022-12-21

## 2022-12-20 RX ORDER — LEVETIRACETAM 500 MG/1
500 TABLET ORAL 2 TIMES DAILY
Status: DISCONTINUED | OUTPATIENT
Start: 2022-12-20 | End: 2022-12-21

## 2022-12-20 RX ORDER — DEXAMETHASONE SODIUM PHOSPHATE 4 MG/ML
10 INJECTION, SOLUTION INTRA-ARTICULAR; INTRALESIONAL; INTRAMUSCULAR; INTRAVENOUS; SOFT TISSUE ONCE
Status: COMPLETED | OUTPATIENT
Start: 2022-12-20 | End: 2022-12-20

## 2022-12-20 RX ORDER — SODIUM CHLORIDE 9 MG/ML
INJECTION, SOLUTION INTRAVENOUS CONTINUOUS
Status: DISCONTINUED | OUTPATIENT
Start: 2022-12-21 | End: 2022-12-22

## 2022-12-20 RX ORDER — INSULIN LISPRO 100 [IU]/ML
0-8 INJECTION, SOLUTION INTRAVENOUS; SUBCUTANEOUS
Status: DISCONTINUED | OUTPATIENT
Start: 2022-12-20 | End: 2022-12-27 | Stop reason: HOSPADM

## 2022-12-20 RX ORDER — GABAPENTIN 600 MG/1
300 TABLET ORAL 3 TIMES DAILY
Status: DISCONTINUED | OUTPATIENT
Start: 2022-12-20 | End: 2022-12-27 | Stop reason: HOSPADM

## 2022-12-20 RX ORDER — ONDANSETRON 2 MG/ML
4 INJECTION INTRAMUSCULAR; INTRAVENOUS EVERY 6 HOURS PRN
Status: DISCONTINUED | OUTPATIENT
Start: 2022-12-20 | End: 2022-12-21

## 2022-12-20 RX ORDER — POLYETHYLENE GLYCOL 3350 17 G/17G
17 POWDER, FOR SOLUTION ORAL DAILY PRN
Status: DISCONTINUED | OUTPATIENT
Start: 2022-12-20 | End: 2022-12-27 | Stop reason: HOSPADM

## 2022-12-20 RX ORDER — TROSPIUM CHLORIDE 20 MG/1
20 TABLET, FILM COATED ORAL NIGHTLY
Status: DISCONTINUED | OUTPATIENT
Start: 2022-12-20 | End: 2022-12-27 | Stop reason: HOSPADM

## 2022-12-20 RX ORDER — ENOXAPARIN SODIUM 100 MG/ML
40 INJECTION SUBCUTANEOUS DAILY
Status: DISCONTINUED | OUTPATIENT
Start: 2022-12-20 | End: 2022-12-20

## 2022-12-20 RX ORDER — ISOSORBIDE MONONITRATE 60 MG/1
120 TABLET, EXTENDED RELEASE ORAL DAILY
Status: DISCONTINUED | OUTPATIENT
Start: 2022-12-20 | End: 2022-12-27 | Stop reason: HOSPADM

## 2022-12-20 RX ORDER — PANTOPRAZOLE SODIUM 40 MG/1
40 TABLET, DELAYED RELEASE ORAL DAILY
Status: DISCONTINUED | OUTPATIENT
Start: 2022-12-20 | End: 2022-12-27 | Stop reason: HOSPADM

## 2022-12-20 RX ORDER — DEXTROSE MONOHYDRATE 100 MG/ML
INJECTION, SOLUTION INTRAVENOUS CONTINUOUS PRN
Status: DISCONTINUED | OUTPATIENT
Start: 2022-12-20 | End: 2022-12-27 | Stop reason: HOSPADM

## 2022-12-20 RX ORDER — ACETAMINOPHEN 650 MG/1
650 SUPPOSITORY RECTAL EVERY 6 HOURS PRN
Status: DISCONTINUED | OUTPATIENT
Start: 2022-12-20 | End: 2022-12-27 | Stop reason: HOSPADM

## 2022-12-20 RX ADMIN — GABAPENTIN 300 MG: 600 TABLET, FILM COATED ORAL at 15:27

## 2022-12-20 RX ADMIN — CARVEDILOL 3.12 MG: 3.12 TABLET, FILM COATED ORAL at 10:43

## 2022-12-20 RX ADMIN — LEVETIRACETAM 500 MG: 500 TABLET, FILM COATED ORAL at 15:27

## 2022-12-20 RX ADMIN — DEXAMETHASONE SODIUM PHOSPHATE 4 MG: 4 INJECTION, SOLUTION INTRA-ARTICULAR; INTRALESIONAL; INTRAMUSCULAR; INTRAVENOUS; SOFT TISSUE at 17:25

## 2022-12-20 RX ADMIN — SODIUM CHLORIDE, PRESERVATIVE FREE 10 ML: 5 INJECTION INTRAVENOUS at 21:41

## 2022-12-20 RX ADMIN — ISOSORBIDE MONONITRATE 120 MG: 60 TABLET, EXTENDED RELEASE ORAL at 10:43

## 2022-12-20 RX ADMIN — DEXAMETHASONE SODIUM PHOSPHATE 10 MG: 4 INJECTION, SOLUTION INTRAMUSCULAR; INTRAVENOUS at 10:45

## 2022-12-20 RX ADMIN — GABAPENTIN 300 MG: 600 TABLET, FILM COATED ORAL at 10:43

## 2022-12-20 RX ADMIN — CARVEDILOL 3.12 MG: 3.12 TABLET, FILM COATED ORAL at 18:03

## 2022-12-20 RX ADMIN — GABAPENTIN 300 MG: 600 TABLET, FILM COATED ORAL at 21:41

## 2022-12-20 RX ADMIN — TROSPIUM CHLORIDE 20 MG: 20 TABLET, FILM COATED ORAL at 21:41

## 2022-12-20 RX ADMIN — SODIUM CHLORIDE, PRESERVATIVE FREE 10 ML: 5 INJECTION INTRAVENOUS at 10:49

## 2022-12-20 RX ADMIN — SODIUM CHLORIDE, PRESERVATIVE FREE 10 ML: 5 INJECTION INTRAVENOUS at 03:36

## 2022-12-20 RX ADMIN — LEVETIRACETAM 500 MG: 500 TABLET, FILM COATED ORAL at 21:41

## 2022-12-20 RX ADMIN — IOPAMIDOL 75 ML: 755 INJECTION, SOLUTION INTRAVENOUS at 18:53

## 2022-12-20 RX ADMIN — DEXAMETHASONE SODIUM PHOSPHATE 4 MG: 4 INJECTION, SOLUTION INTRA-ARTICULAR; INTRALESIONAL; INTRAMUSCULAR; INTRAVENOUS; SOFT TISSUE at 21:41

## 2022-12-20 ASSESSMENT — PAIN SCALES - GENERAL
PAINLEVEL_OUTOF10: 0

## 2022-12-20 NOTE — PROGRESS NOTES
Patient alert and oriented x4 with intermittent confusion at times. Patient very sleepy but eyes easily open to speech. VSS on RA. NIH 1 for aphasia and difficulty finding words. X1 CGA to bathroom. Tolerating ambulation fairly well. Patient to MRI and EEG today. Interdry placed between skin folds. Patient worked with therapy and sat up in the chair. Voiding adequately and able to have a BM this shift. Incontinent at times. Tolerating PO intake. Denies pain.

## 2022-12-20 NOTE — ED NOTES
Patient becoming more aphasic at this time, trying to get out of bed. When asking where patient wanted to go patient states she has to pee, placed purewick, changed gown and brief, patient now more comfortable in bed. Warm blanket placed.       Luz Don RN  12/20/22 [id number]

## 2022-12-20 NOTE — PROGRESS NOTES
Speech Language Pathology    Received evaluation order. Attempted to see patient, however currently off unit at MRI. Will re-attempt at later time, schedule permitting. Diana Garcia, Lynn Patel, [phone]  Pg.  # [id number]

## 2022-12-20 NOTE — PROGRESS NOTES
Occupational Therapy  Facility/Department: [facility] 5T ORTHO/NEURO  Occupational Therapy Initial Assessment and Treatment Note    Name: Darylene Genet  : 1945  MRN: [id number]  Date of Service: 2022    Discharge Recommendations:   Darylene Genet scored a 15/24 on the AM-PAC ADL Inpatient form. Current research shows that an AM-PAC score of 17 or less is typically not associated with a discharge to the patient's home setting. Based on the patient's AM-PAC score and their current ADL deficits, it is recommended that the patient have 5-7 sessions per week of Occupational Therapy at d/c to increase the patient's independence. At this time, this patient demonstrates complex nursing, medical, and rehabilitative needs, and would benefit from intensive rehabilitation services upon discharge from the Inpatient setting. This patient demonstrates the ability to participate in and benefit from an intensive therapy program with a coordinated interdisciplinary team approach to foster frequent, structured, and documented communication among disciplines, who will work together to establish, prioritize, and achieve treatment goals. Please see assessment section for further patient specific details. If patient discharges prior to next session this note will serve as a discharge summary. Please see below for the latest assessment towards goals. OT Equipment Recommendations  Equipment Needed: No       Patient Diagnosis(es): There were no encounter diagnoses. Past Medical History:  has a past medical history of A-fib ([address] Allergic rhinitis, Arthritis, CAD (coronary artery disease), Cardiac pacemaker, CHF (congestive heart failure) ([address] Cholelithiasis, Diabetes mellitus ([address] ESRD (end stage renal disease) ([address] Gastroparesis, Hepatic steatosis, Hyperlipidemia, Hypertension, Peripheral neuropathy, Sleep apnea, Syncope, Thyroid disease, and Vitamin B12 deficiency.   Past Surgical History:  has a past surgical history that includes Coronary artery bypass graft (2005); Foot surgery; colectomy (1998); Colonoscopy; Upper gastrointestinal endoscopy; Tonsillectomy and adenoidectomy; Tubal ligation; other surgical history (4/17/2012); other surgical history (Right, 3/20/13); Coronary angioplasty; Cholecystectomy; Cardiac catheterization (12/27/2017); Diagnostic Cardiac Cath Lab Procedure; Toe amputation (Left, 3/29/2021); eye surgery; Endoscopy, colon, diagnostic; and hernia repair. Treatment Diagnosis: impaired ADLs and functional transfers      Assessment   Performance deficits / Impairments: Decreased functional mobility ; Decreased ADL status; Decreased endurance  Assessment: Patient demonstrate decline from functional baseline, currently requiring min assist for toilet transfer and CGA for LB dressing/bathroom mobility. Limited assessment of UE function/strength - would benefit from continued assessment. Primary limitations include aphasia, decreased activity tolerance, decreased balance and decreased strength. Patient lives alone at baseline and does not have 24hr assistance, would benefit from continued IP OT services after d/c in order to facilitate successful transition home. Treatment Diagnosis: impaired ADLs and functional transfers  Prognosis: Good  REQUIRES OT FOLLOW-UP: Yes  Activity Tolerance  Activity Tolerance: Patient Tolerated treatment well        Plan   Occupational Therapy Plan  Times Per Week: 5-7  Current Treatment Recommendations: Strengthening, Functional mobility training, Balance training, Endurance training, Neuromuscular re-education, Safety education & training, Patient/Caregiver education & training, Equipment evaluation, education, & procurement, Self-Care / ADL, Home management training     Restrictions  Position Activity Restriction  Other position/activity restrictions: up as tolerated    Subjective   General  Additional Pertinent Hx: 77 yo female present to ED 12/19 p/w aphasia.  CT head (+) Acute on chronic subdural hematoma overlying the left frontal and parietal lobes. PMHx: afib, CAD, CHF, DM, HTN     Social/Functional History  Social/Functional History  Lives With: Alone  Type of Home: House  Home Layout: One level  Home Access: Ramped entrance  Bathroom Shower/Tub: Walk-in shower  Bathroom Toilet: Standard  Bathroom Equipment: Built-in shower seat, Grab bars in shower, Hand-held shower, Grab bars around toilet  Home Equipment: Cane, Electric scooter, Walker, rolling  Has the patient had two or more falls in the past year or any fall with injury in the past year?: No  ADL Assistance: Independent  Homemaking Assistance: Independent  Homemaking Responsibilities: Yes  Ambulation Assistance: Independent (with cane? Difficult to clarify d/t aphasia)  Transfer Assistance: Independent  Active : Yes (friend drives primarily)       Objective   Heart Rate: 77  Heart Rate Source: Monitor  BP: 128/79  BP Location: Left upper arm  BP Method: Automatic  Patient Position: Semi fowlers  MAP (Calculated): 95  Resp: 18  SpO2: 95 %  O2 Device: None (Room air)             Safety Devices  Type of Devices: All fall risk precautions in place;Call light within reach; Chair alarm in place; Left in chair  Balance  Standing:  (SBA)  Gait  Overall Level of Assistance: Contact-guard assistance (to/from bathroom with use of RW)  Toilet Transfers  Toilet - Technique: Ambulating  Equipment Used: Standard toilet  Toilet Transfer: Minimal assistance  Toilet Transfers Comments: increased assistance required for lifting from low surface compared to other transfers, v/c for safe hand placement     ADL  Grooming: Setup  Grooming Skilled Clinical Factors: comb hair while seated  UE Dressing: Setup;Verbal cueing  UE Dressing Skilled Clinical Factors: to don gown, v/c to appropriately locate hole on the L side  LE Dressing: Contact guard assistance  LE Dressing Skilled Clinical Factors: to don underwear and pants, adjust sock. Steadying assist needed to pull up over hips  Toileting:  (denied need)        Bed mobility  Supine to Sit: Stand by assistance (HOB elevated)  Transfers  Stand Step Transfers: Contact guard assistance (completed without AD, reaching out for arms of furniture for increased support)  Sit to stand: Contact guard assistance  Stand to sit: Contact guard assistance  Transfer Comments: v/c for safe hand placement with use of RW  Vision  Vision: Impaired  Vision Exceptions: Wears glasses for distance  Cognition  Overall Cognitive Status: Exceptions  Arousal/Alertness: Appropriate responses to stimuli  Following Commands:  Follows all commands without difficulty  Attention Span: Attends with cues to redirect  Safety Judgement: Decreased awareness of need for assistance  Insights: Decreased awareness of deficits  Initiation: Does not require cues  Sequencing: Does not require cues  Cognition Comment: aphasia, with difficulty word finding and intermittent verbal perseveration  Orientation  Orientation Level: Oriented X4                  Education Given To: Patient  Education Provided: Role of Therapy;Plan of Care;ADL Adaptive Strategies;Precautions;Transfer Training  Education Method: Verbal  Barriers to Learning:  (aphasia)  Education Outcome: Verbalized understanding;Continued education needed  RUE AROM (degrees)  RUE General AROM: attempted gross ROM assessment, patient ceasing attempt, but unable to further clarify why (i.e., pain? fatigue?) d/t aphasia despite increased time          AM-PAC Score        AM-PAC Inpatient Daily Activity Raw Score: 15 (12/20/22 0932)  AM-PAC Inpatient ADL T-Scale Score : 34.69 (12/20/22 0932)  ADL Inpatient CMS 0-100% Score: 56.46 (12/20/22 0932)  ADL Inpatient CMS G-Code Modifier : CK (12/20/22 0932)      Goals  Short Term Goals  Time Frame for Short Term Goals: by d/c  Short Term Goal 1: Patient will complete LB dressing with SPVN  Short Term Goal 2: Patient will complete grooming tasks in stance at sink with SPVN  Short Term Goal 3: Patient will complete functional transfers, including toilet transfer, with SPVN  Short Term Goal 4: Patient will complete bathroom mobility with SPVN       Therapy Time   Individual Concurrent Group Co-treatment   Time In 0800         Time Out 0840         Minutes [id number] KIN Martinez, OTR/L #[id number]

## 2022-12-20 NOTE — PROGRESS NOTES
Patient's sister Shonna Rain called this RN asking for updates. All questions and concerns addressed at this time. Sister's contact information updated and in the chart for reference.

## 2022-12-20 NOTE — CARE COORDINATION
ADDENDUM:   SW spoke to Marilyn Moore, she wants to wait until after procedure tomorrow to determine DCP they dont want Pt going to a SNF their first choice is [facility] and their second choice [facility] be [facility] due to the location. She currently has covid and her daughter will be the only one able to visit and they live close to [facility]. MEGHAN will follow up w/Radha once DC direction is more clear post-op. Electronically signed by NATE Bird, DANYEL on 12/20/2022 at 5:08 PM    MEGHAN LVM for Pt's daughter/HCPOA Marilyn Moore regarding ARU vs SNF. Pt will need placement.     Electronically signed by NAET Bird LSW on 12/20/2022 at [address] [place] [address]  [phone]

## 2022-12-20 NOTE — PROCEDURES
Gal Putnam is a 78 y.o. female patient. No diagnosis found. Past Medical History:   Diagnosis Date    A-fib [facility])     Allergic rhinitis     Arthritis     CAD (coronary artery disease)     Cardiac pacemaker     CHF (congestive heart failure) ([facility]     Cholelithiasis 07/03/2015    Diabetes mellitus ([facility]     Accuchecks daily once a day at home in A.M. Encounter for imaging to screen for metal prior to MRI 12/20/2022    MRI Conditional Medtronic [place] XT DR model#W1DR01  Leads: RA [id number] implanted 2/17/22. Normal lmode. 1.5T or 3.0T. Pt must be A/OX4 per [employer] guidelines. [employer] rep and RN must be present. Follow all other [employer] guidelines. Pt currently follows     ESRD (end stage renal disease) ([facility]     Gastroparesis     Hepatic steatosis 07/03/2015    Hyperlipidemia     Hypertension     Peripheral neuropathy     Sleep apnea     Has used the CPAP x 25 yrs now per pt. Syncope     Thyroid disease     Vitamin B12 deficiency      Blood pressure 119/79, pulse 76, temperature 97.4 °F (36.3 °C), temperature source Axillary, resp. rate 18, height 5' 3\" (1.6 m), weight 199 lb 4.7 oz (90.4 kg), SpO2 95 %, not currently breastfeeding. EEG awake and asleep    Date/Time: 12/20/2022 12:36 PM  Performed by: Se Alex MD  Authorized by: DARIA Dubose - CALEB     CLINICAL HISTORY:  Gal Putnam is a 78 y.o. y/o female with PMH significant for atrial fibrillation, CHF, DM, HTN, HLD, BERTRAND, neuropathy, syncope, and B12 deficiency. She presented to [facility] ED 12/19 with isolated, acute-onset of mild-moderate aphasia which began 12/18. CT of the head showed a left-convexity acute on chronic SDH. [facility] Neurosurgery was consulted & recommended transfer for closer monitoring but no need for anticoagulation reversal.  This morning, there was some concern that she wasn't awakening as easily. A head CT was done, which was stable.   Clinical concern is for complex partial or simple partial seizures or subclinical status epilepticus. FINDINGS: This is a routine 16 channel referential and bipolar video EEG. There is a background rhythm in the theta range, without a posterior dominant rhythm. Normal sleep patterns ar noted. Photic stimulation is performed without driving or abnormality. Hyperventilation is not performed. No epileptiform abnormalities are noted. No electrographic seizures are recorded. There is no asymmetry. IMPRESSION:  This is a normal EEG in the awake, drowsy, and asleep states. No focal or epileptiform abnormalities.       Melecio Becerra MD  12/20/2022

## 2022-12-20 NOTE — PLAN OF CARE
Problem: Discharge Planning  Goal: Discharge to home or other facility with appropriate resources  12/20/2022 0959 by Hermes Siu RN  Outcome: Progressing     Problem: Pain  Goal: Verbalizes/displays adequate comfort level or baseline comfort level  12/20/2022 0959 by Hermes Siu RN  Outcome: Progressing    Problem: Safety - Adult  Goal: Free from fall injury  12/20/2022 0959 by Hermes Siu RN  Outcome: Progressing     Problem: ABCDS Injury Assessment  Goal: Absence of physical injury  12/20/2022 0959 by Hermes Siu RN  Outcome: Progressing     Problem: Neurosensory - Adult  Goal: Achieves stable or improved neurological status  Outcome: Progressing  Goal: Achieves maximal functionality and self care  Outcome: Progressing

## 2022-12-20 NOTE — CONSULTS
NEUROSURGERY CONSULT NOTE    Eun Bell  [id number]   1945   12/20/2022    Requesting physician: Percy Avery MD    Reason for consultation: asdh with phasia    History of present illness: Given her aphasia, history was limited. History obtained via chart review. Patient is a 78 y.o. female w/ PMH of HTN, DM II, CAD and hypothyroid. She presented to the ER with complaint of difficulty with getting the words out. It started yesterday and continues without improvement so she decided to come to the ED. She denies any focal neurological deficits such as slurred speech, facial droop or weakness in any of the extremities  She just has difficulty finding and getting words out although she is able to speak with short one word answers. CT of the head shows left-convexity  acute on chronic subdural hematoma with midline shift. Therefore she was transferred from [facility] ED for further evaluation. ROS: MELLISA    Allergies   Allergen Reactions    Niacin And Related Anaphylaxis    Ranexa [Ranolazine] Hallucinations    Adhesive Tape      Causes rash and blisters. Can use paper tape or opsite without any problems. Other      QVacin IV antibiotic had after foot surgery placed on this for infection. Developed high fever, chills and uncontrollable shaking. Statins Depletion Therapy      Deep muscle pain    Vancomycin      Does not recall reaction        Past Medical History:   Diagnosis Date    A-fib ([facility]     Allergic rhinitis     Arthritis     CAD (coronary artery disease)     Cardiac pacemaker     CHF (congestive heart failure) ([facility]     Cholelithiasis 07/03/2015    Diabetes mellitus ([facility]     Accuchecks daily once a day at home in A.M. Encounter for imaging to screen for metal prior to MRI 12/20/2022    MRI Conditional Medtronic [place] XT  model#[id number]  Leads: RA [id number] RV [id number] implanted 2/17/22. Normal lmode. 1.5T or 3.0T. Pt must be A/OX4 per [employer] guidelines.  [employer] rep and RN must be present. Follow all other [employer] guidelines. Pt currently follows     ESRD (end stage renal disease) ([facility]     Gastroparesis     Hepatic steatosis 2015    Hyperlipidemia     Hypertension     Peripheral neuropathy     Sleep apnea     Has used the CPAP x 25 yrs now per pt.     Syncope     Thyroid disease     Vitamin B12 deficiency         Past Surgical History:   Procedure Laterality Date    CARDIAC CATHETERIZATION  2017    SVG to OM2 100% ostium    CHOLECYSTECTOMY      COLECTOMY  1998    diverticulitis     COLONOSCOPY      CORONARY ANGIOPLASTY      CORONARY ARTERY BYPASS GRAFT  2005    x 3    DIAGNOSTIC CARDIAC CATH LAB PROCEDURE      ENDOSCOPY, COLON, DIAGNOSTIC      EYE SURGERY      Bilateral cataracts removed with lens implants    FOOT SURGERY      Multiple foot surgeries bilateral    HERNIA REPAIR      OTHER SURGICAL HISTORY  2012    achilles tendon lengthening,arthroplasty,matatarsalhead resection    OTHER SURGICAL HISTORY Right 3/20/13    FUSION OF RIGHT GREAT TOE JOINT WITH WIRE FIXATION, DEBRIEDMENT OF WOUND RIGHT GREAT TOE    TOE AMPUTATION Left 3/29/2021    PARTIAL LEFT FOOT AMPUTATION performed by Dudley Chambers DPM at [address]      Laparoscopic    UPPER GASTROINTESTINAL ENDOSCOPY         Social History     Occupational History    Not on file   Tobacco Use    Smoking status: Former     Packs/day: 1.00     Years: 30.00     Pack years: 30.00     Types: Cigarettes     Quit date: 1998     Years since quittin.5    Smokeless tobacco: Never   Vaping Use    Vaping Use: Never used   Substance and Sexual Activity    Alcohol use: No    Drug use: No    Sexual activity: Not Currently        Family History   Problem Relation Age of Onset    Stroke Mother     Coronary Art Dis Father     Coronary Art Dis Sister     Coronary Art Dis Brother         No outpatient medications have been marked as taking for the 22 encounter [facility] Encounter).         Current Facility-Administered Medications   Medication Dose Route Frequency Provider Last Rate Last Admin    sodium chloride flush 0.9 % injection 5-40 mL  5-40 mL IntraVENous 2 times per day Joel Horne MD   10 mL at 12/20/22 1049    sodium chloride flush 0.9 % injection 5-40 mL  5-40 mL IntraVENous PRN Joel Horne MD   10 mL at 12/20/22 0336    0.9 % sodium chloride infusion   IntraVENous PRN Joel Horne MD        ondansetron (ZOFRAN-ODT) disintegrating tablet 4 mg  4 mg Oral Q8H PRN Joel Horne MD        Or    ondansetron (ZOFRAN) injection 4 mg  4 mg IntraVENous Q6H PRN Joel Horne MD        polyethylene glycol (GLYCOLAX) packet 17 g  17 g Oral Daily PRN Joel Horne MD        acetaminophen (TYLENOL) tablet 650 mg  650 mg Oral Q6H PRN Joel Horne MD        Or    acetaminophen (TYLENOL) suppository 650 mg  650 mg Rectal Q6H PRN Joel Horne MD        isosorbide mononitrate (IMDUR) extended release tablet 120 mg  120 mg Oral Daily Joel Horne MD   120 mg at 12/20/22 1043    gabapentin (NEURONTIN) tablet 300 mg  300 mg Oral TID Joel Horne MD   300 mg at 12/20/22 1043    diphenhydrAMINE (BENADRYL) tablet 25 mg  25 mg Oral Nightly PRN Joel Horne MD        carvedilol (COREG) tablet 3.125 mg  3.125 mg Oral BID WC Joel Horne MD   3.125 mg at 12/20/22 1043    pantoprazole (PROTONIX) tablet 40 mg  40 mg Oral Daily Joel Horne MD        trospium (SANCTURA) tablet 20 mg  20 mg Oral Nightly Joel Horne MD        levothyroxine (SYNTHROID) tablet 50 mcg  50 mcg Oral Daily Joel Horne MD        [Held by provider] furosemide (LASIX) tablet 40 mg  40 mg Oral Daily Joel Horne MD        glucose chewable tablet 16 g  4 tablet Oral PRN Joel Horne MD        dextrose bolus 10% 125 mL  125 mL IntraVENous PRN Joel Horne MD        Or    dextrose bolus 10% 250 mL  250 mL IntraVENous PRN Joel Horne MD        glucagon (rDNA) injection 1 mg  1 mg SubCUTAneous PRN Gabriel Muro MD        dextrose 10 % infusion   IntraVENous Continuous PRN Gabriel Muro MD        insulin lispro (1 Unit Dial) (HUMALOG/ADMELOG) pen 0-8 Units  0-8 Units SubCUTAneous TID WC Gabriel Muro MD        insulin lispro (1 Unit Dial) (HUMALOG/ADMELOG) pen 0-4 Units  0-4 Units SubCUTAneous Nightly Gabriel Muro MD        dexamethasone (DECADRON) injection 4 mg  4 mg IntraVENous Q6H DARIA Velez - CNP            Objective:  /79   Pulse 76   Temp 97.4 °F (36.3 °C) (Axillary)   Resp 18   Ht 5' 3\" (1.6 m)   Wt 199 lb 4.7 oz (90.4 kg)   SpO2 95%   BMI 35.30 kg/m²     Physical Exam:   Patient seen and examined  GCS:  4 - Opens eyes on own  5 - Alert and oriented  6 - Follows simple motor commands  General: Well developed. Alert and cooperative in no acute distress. HENT: atraumatic, neck supple  Eyes: Optic discs: Not tested  Pulmonary: unlabored respiratory effort  Cardiovascular:  Warm well perfused. No peripheral edema  Gastrointestinal: abdomen soft, NT, ND    Neurological:  Mental Status: Awake, alert, aphasic  Attention: Intact  Language: dense aphasia   Sensation: Intact to all extremities to light touch  Coordination: Intact    Cranial Nerves:  II: Visual acuity not tested, denies new visual changes / diplopia  III, IV, VI: PERRL, 3 mm bilaterally, EOMI, no nystagmus noted  V: Facial sensation intact bilaterally to touch  VII: Face symmetric  VIII: Hearing intact bilaterally to spoken voice  IX: Palate movement equal bilaterally  XI: Shoulder shrug equal bilaterally  XII: Tongue midline    Musculoskeletal:   Gait: Not tested   Assist devices: None   Tone: normal  Motor strength:    Right  Left    Right  Left    Deltoid  5 5  Hip Flex  5 5   Biceps  5 5  Knee Extensors  5 5   Triceps  5 5  Knee Flexors  5 5   Wrist Ext  5 5  Ankle Dorsiflex. 5 5   Wrist Flex  5 5  Ankle Plantarflex.   5 5   Handgrip  5 5  Ext Xavier Longus  5 5   Thumb Ext  5 5 Radiological Findings:  CT HEAD WO CONTRAST    Result Date: 12/20/2022  1. Acute on chronic left subdural hematoma with approximately 5.6 mm left to right shift on coronal imaging, stable from 12/19/2022 at 1953 2. Left cerebral hemisphere cortical sulci effacement is unchanged 3. Moderate to severe periventricular microangiopathic ischemic changes, chronic small vessel disease 4. No new bleed or intraparenchymal hemorrhage. CT HEAD WO CONTRAST    Result Date: 12/19/2022  Acute on chronic subdural hematoma overlying the left frontal and parietal lobes with approximately 3 mm of left-to-right midline shift. Critical results were called by Dr. Marcela Giraldo to Alberta Bautista on 12/19/2022 at 20:38. XR CHEST PORTABLE    Result Date: 12/19/2022  No radiographic evidence of acute cardiopulmonary disease.         Labs:  Recent Labs     12/20/22  0639   WBC 7.4   HGB 13.9   HCT 40.7          Recent Labs     12/20/22  0639      K 3.7      CO2 27   BUN 17   CREATININE 1.2   GLUCOSE 112*   CALCIUM 8.9       Recent Labs     12/20/22  0902   PROTIME 15.6*   INR 1.25*       Patient Active Problem List    Diagnosis Date Noted    Acute renal failure (ARF) ([address] 16/01/5115    Metabolic encephalopathy 43/34/9443    Hepatic steatosis 07/03/2015    Cholelithiasis 07/03/2015    Acute subdural hematoma 12/20/2022    S/P placement of cardiac pacemaker 02/18/2022    CKD (chronic kidney disease) stage 3, GFR 30-59 ml/min ([facility]) 02/17/2022    Intermittent complete heart block ([address] 02/16/2022    Syncope and collapse     Osteomyelitis of right foot (HCC)     Nausea     Acute hematogenous osteomyelitis of left foot (HCC)     Atrial fibrillation ([address]     Diabetic foot infection ([address] 03/27/2021    History of tobacco abuse 10/23/2020    Acute hypoxemic respiratory failure ([address] 02/24/2020    Angina, class IV ([address]     Atrial fibrillation with rapid ventricular response ([address] 02/23/2020    History of coronary artery bypass graft 02/23/2020    Acute respiratory failure with hypoxia ([facility] 02/23/2020    Chest pain 06/15/2018    LBBB (left bundle branch block)     Unstable angina ([facility] 12/27/2017    Vomiting 12/27/2017    Morbid obesity with BMI of 40.0-44.9, adult ([facility] 12/27/2017    NSTEMI (non-ST elevated myocardial infarction) ([facility] 12/27/2017    Ischemic chest pain ([email or web address] 07/06/2017    Small bowel obstruction (HCC)     Diabetes mellitus ([email or web address]     Hypothyroid     Allergic rhinitis     Sleep apnea     Hyperlipidemia     Hypertension     CAD (coronary artery disease)        Assessment:  Patient is a 78 y.o. female w/ acute on chronic SDH    Plan: May require trephine crani for SD evac  Neurologic exams frequency: Q4H    Follow up head CT stable. Maintain SBP <160; If PRN med insufficient, then may start Nicardipine infusion   Keep Plt >100k & INR <1.4   Hold all full dose anticoagulation & antiplatelet for 2 weeks   Decadron 4 mg Q6H (Chronic)  SCDs for DVT prophylaxis  Seizure prophylaxis: Keppra 500mg BID x7 days  GI Prophylaxis: Protonix, SSI, POCT  Speech consulted for swallow eval, appreciate recs  PT/OT consulted, appreciate recs  Consult neurology  EEG to R/O status  Thank you for consult. Will follow inpatient. Please call with any questions or decline in neurological status    DISPO: Remain inpatient from neurosurgery standpoint. Patient was seen and examined with Dr. Franky Box who agrees with above assessment and plan. Electronically signed by: DARIA Espinoza CNP, APRN-CNP, 12/20/2022 1:59 PM  [phone]   Time independently spent by Nurse Practitioner reviewing chart and prior testing, obtaining history from patient, examining patient, ordering appropriate medications / diagnostics, reviewing results of diagnostics, counseling and educating patient / family, communicating plan with other providers and documenting clinical information in the EMR was approximately 60 minutes.

## 2022-12-20 NOTE — PROGRESS NOTES
Patient admitted to [facility] from [place] ED. A&Ox4. Patient can answer all orientation questions correctly but mixes up words at times. Patient had difficulty telling me her name and  but could tell me where she was, why, year, president, and month with no problem. RA, sat 98%. Lungs clear. Redness noted to breast and abdominal folds. Patient refusing skin care at this time stating that she just wants to get some sleep right now. Right AC PIV intact and flushed. Neurological assessment as documented in doc flow sheets. NIH score of 1 due to aphasia. No needs at this time. Patient resting comfortably in bed. Call light in reach. Bed alarm on. Will continue to monitor.    Electronically signed by Keiry Knight RN on 2022 at 5:06 AM

## 2022-12-20 NOTE — ED NOTES
Ems here for patient at this time to take to [facility].      Daryle Fee, RN  12/20/22 8681

## 2022-12-20 NOTE — PROGRESS NOTES
Attempted to call patient's daughter Angus Cantu to complete MRI screening form. Reached voicemail. Callback number left.

## 2022-12-20 NOTE — CONSULTS
Neurology / Neurocritical Care Consult Note    Graham Akins MD is requesting this consult. Reason for Consult: SDH  Admission Chief Complaint: aphasia    History of Present Illness     Brielle Guerra is a 78 y.o. y/o female with PMH significant for atrial fibrillation, CHF, DM, HTN, HLD, BERTRAND, neuropathy, syncope, and B12 deficiency who I am asked to see for aphasia. Given her aphasia, history was limited. History obtained via chart review. She presented to [facility] ED 12/19 with isolated, acute-onset of mild-moderate aphasia which began 12/18. CT of the head showed a left-convexity acute on chronic SDH. [facility] Neurosurgery was consulted & recommended transfer for closer monitoring but no need for anticoagulation reversal.    Keyshawn Benz tells me today her symptoms are improved (mild). Based on my exam, I suspect she has some degree of receptive aphasia in addition to her expressive aphasia. This morning, there was some concern that she wasn't awakening as easily. A head CT was done, which was stable. REVIEW OF SYSTEMS:   MELLISA given aphasia    Past Medical, Surgical, Family, and Social History   PAST MEDICAL HISTORY:  Past Medical History:   Diagnosis Date    A-fib ([address]     Allergic rhinitis     Arthritis     CAD (coronary artery disease)     Cardiac pacemaker     CHF (congestive heart failure) ([address]     Cholelithiasis 07/03/2015    Diabetes mellitus ([address]     Accuchecks daily once a day at home in A.M.     ESRD (end stage renal disease) ([address]     Gastroparesis     Hepatic steatosis 07/03/2015    Hyperlipidemia     Hypertension     Peripheral neuropathy     Sleep apnea     Has used the CPAP x 25 yrs now per pt.     Syncope     Thyroid disease     Vitamin B12 deficiency      SURGICAL HISTORY:  Past Surgical History:   Procedure Laterality Date    CARDIAC CATHETERIZATION  12/27/2017    SVG to OM2 100% ostium    CHOLECYSTECTOMY      COLECTOMY  1998    diverticulitis     COLONOSCOPY      CORONARY ANGIOPLASTY      CORONARY ARTERY BYPASS GRAFT  2005    x 3    DIAGNOSTIC CARDIAC CATH LAB PROCEDURE      ENDOSCOPY, COLON, DIAGNOSTIC      EYE SURGERY      Bilateral cataracts removed with lens implants    FOOT SURGERY      Multiple foot surgeries bilateral    HERNIA REPAIR      OTHER SURGICAL HISTORY  2012    achilles tendon lengthening,arthroplasty,matatarsalhead resection    OTHER SURGICAL HISTORY Right 3/20/13    FUSION OF RIGHT GREAT TOE JOINT WITH WIRE FIXATION, DEBRIEDMENT OF WOUND RIGHT GREAT TOE    TOE AMPUTATION Left 3/29/2021    PARTIAL LEFT FOOT AMPUTATION performed by Dl Stallworth DPM at [address]      Laparoscopic    UPPER GASTROINTESTINAL ENDOSCOPY       FAMILY HISTORY & SOCIAL HISTORY:  Family history non-contributory  Family History   Problem Relation Age of Onset    Stroke Mother     Coronary Art Dis Father     Coronary Art Dis Sister     Coronary Art Dis Brother      Social History     Tobacco Use    Smoking status: Former     Packs/day: 1.00     Years: 30.00     Pack years: 30.00     Types: Cigarettes     Quit date: 1998     Years since quittin.5    Smokeless tobacco: Never   Vaping Use    Vaping Use: Never used   Substance Use Topics    Alcohol use: No    Drug use: No     Allergies & Outpatient Medications   ALLERGIES:  Allergies   Allergen Reactions    Niacin And Related Anaphylaxis    Ranexa [Ranolazine] Hallucinations    Adhesive Tape      Causes rash and blisters. Can use paper tape or opsite without any problems. Other      QVacin IV antibiotic had after foot surgery placed on this for infection. Developed high fever, chills and uncontrollable shaking.     Statins Depletion Therapy      Deep muscle pain    Vancomycin      Does not recall reaction      HOME MEDICATIONS:  Current Discharge Medication List        CONTINUE these medications which have NOT CHANGED    Details   apixaban (ELIQUIS) 5 MG TABS tablet Take 1 tablet by mouth 2 times daily  Qty: 180 tablet, Refills: 1      carvedilol (COREG) 3.125 MG tablet Take 1 tablet by mouth 2 times daily (with meals)  Qty: 180 tablet, Refills: 1      nitroGLYCERIN (NITROSTAT) 0.4 MG SL tablet up to max of 3 total doses. If no relief after 1 dose, call 911. Qty: 25 tablet, Refills: 1      furosemide (LASIX) 40 MG tablet TAKE ONE TABLET BY MOUTH DAILY MAY TAKE ADDITIONAL ONE-HALF TABLET AS NEEDED FOR WEIGHT GAIN OF 3 LBS IN A DAY  Qty: 90 tablet, Refills: 5      isosorbide mononitrate (IMDUR) 120 MG extended release tablet TAKE ONE TABLET BY MOUTH DAILY  Qty: 90 tablet, Refills: 3      TRULICITY 5.95 HZ/1.6OC SOPN Inject 1 pen into the skin daily      mupirocin (BACTROBAN) 2 % ointment Apply topically twice daily for another 3 days  Qty: 1 g, Refills: 0      levothyroxine (SYNTHROID) 50 MCG tablet Take 1 tablet by mouth Daily  Qty: 30 tablet, Refills: 3      desonide (DESOWEN) 0.05 % cream Apply 0.05 % topically 2 times daily Apply topically 2 times daily. pantoprazole (PROTONIX) 40 MG tablet TAKE ONE TABLET BY MOUTH DAILY      aspirin 81 MG EC tablet Take 1 tablet by mouth daily  Qty: 30 tablet, Refills: 3      tolterodine (DETROL LA) 2 MG ER capsule Take 4 mg by mouth daily       fluticasone (FLONASE) 50 MCG/ACT nasal spray 1 spray by Nasal route daily. Omega-3 Fatty Acids (FISH OIL) 1000 MG CAPS Take 2 capsules by mouth daily. Qty: 180 capsule, Refills: 0      GLIPIZIDE Take 5 mg by mouth 2 times daily (with meals) Taking 10 mg AM & 5 mg in PM      gabapentin (NEURONTIN) 600 MG tablet Take 600 mg by mouth. May take up to 4x per day if needed for neuropathy pain. diphenhydrAMINE (BENADRYL) 25 MG tablet Take 25 mg by mouth nightly as needed.            Physical Exam   PHYSICAL EXAM:  Vitals:    12/20/22 0331   BP: 109/73   Pulse: 77   Resp: 18   Temp: 97 °F (36.1 °C)   TempSrc: Oral   SpO2: 98%   Weight: 199 lb 4.7 oz (90.4 kg)   Height: 5' 3\" (1.6 m)       General: Eyes open to voice, no distress, well-nourished  Neurologic  Mental status: Eyes open to voice  orientation to person, place, situation, month; disoriented to year 1922   Attention intact as able to attend well to the exam     Language mild-moderate aphasia   Comprehension intact; follows simple commands and attempts 2 step command crossing midline but does not complete command    Cranial nerves:   CN2: Visual fields grossly full   Fundoscopic Exam unable to visualize fundi   CN 3,4,6: Pupils equal and reactive to light, extraocular muscles intact by tracking  CN5: MELLISA given aphasia  CN7: Face symmetric without dysarthria  CN8: Hearing symmetric to spoken voice    Motor Exam: moving all four limbs symmetrically and fully   Sensory: light touch intact and symmetric in all 4 extremities. Cerebellar/coordination: finger nose finger normal without ataxia  Tone: normal in all 4 extremities  Gait: deferred for safety    OTHER SYSTEMS:  Cardiovascular: Warm, appears well perfused   Respiratory: Easy, non-labored respiratory pattern   Abdominal: Abdomen is without distention   Extremities: Upper and lower extremities are atraumatic in appearance without deformity. No swelling or erythema. Psychiatric: Cooperative with exam    Diagnostic Testing Results   IMAGES:  Images personally reviewed and agree w/ radiology interpretation. Head CT w/o Contrast:  1. Acute on chronic left subdural hematoma with approximately 5.6 mm left to right shift on coronal imaging, stable from 12/19/2022 at 1953   2. Left cerebral hemisphere cortical sulci effacement is unchanged   3. Moderate to severe periventricular microangiopathic ischemic changes, chronic small vessel disease   4. No new bleed or intraparenchymal hemorrhage. LABS:  All results below personally reviewed. Pertinent positives & negatives are addressed in Impression & Recommendations below.      LABS   Metabolic Panel Recent Labs     12/19/22  1900 12/20/22  0639

## 2022-12-20 NOTE — PROGRESS NOTES
Speech Language Pathology  Facility/Department: [facility] 5T ORTHO/NEURO  Initial Speech/Language/Cognitive Assessment  & Bedside Swallow Evaluation/Tx    NAME: Girish Soares  : 1945   MRN: [id number]  ADMISSION DATE: 2022  ADMITTING DIAGNOSIS: has Hyperlipidemia; Hypertension; CAD (coronary artery disease); Diabetes mellitus ([address] Hypothyroid; Allergic rhinitis; Sleep apnea; Metabolic encephalopathy; Hepatic steatosis; Cholelithiasis; Acute renal failure (ARF) ([address] Small bowel obstruction ([address] Ischemic chest pain ([address] Unstable angina ([address] Vomiting; Morbid obesity with BMI of 40.0-44.9, [facility]); NSTEMI (non-ST elevated myocardial infarction) ([address] Chest pain; LBBB (left bundle branch block); Atrial fibrillation with rapid ventricular response ([address] History of coronary artery bypass graft; Acute respiratory failure with hypoxia ([address] Acute hypoxemic respiratory failure ([address] Angina, class IV ([address] History of tobacco abuse; Diabetic foot infection ([address] Acute hematogenous osteomyelitis of left foot ([address] Atrial fibrillation ([address] Osteomyelitis of right foot ([address] Nausea; Intermittent complete heart block ([address] Syncope and collapse; CKD (chronic kidney disease) stage 3, GFR 30-59 ml/min ([facility]); S/P placement of cardiac pacemaker; and Acute subdural hematoma on their problem list.  DATE ONSET: 2022    Date of Eval: 2022   Evaluating Therapist: MARTHA Mitchell    Recent CT Head: (2022)  1. Acute on chronic left subdural hematoma with approximately 5.6 mm left to right shift on coronal imaging, stable from 2022 at 1953   2. Left cerebral hemisphere cortical sulci effacement is unchanged   3. Moderate to severe periventricular microangiopathic ischemic changes, chronic small vessel disease   4. No new bleed or intraparenchymal hemorrhage. Recent MRI Brain: (2022)  1.  Stable size of subacute left cerebral convexity subdural hematoma (2 cm thick) with stable mass effect and mild left to right midline shift measuring 4 mm   2. Mild chronic small vessel ischemia and mild generalized atrophy   3. No evidence for intracranial mass or vascular lesion     Recent CXR: (12/19/2022)  No radiographic evidence of acute cardiopulmonary disease. Primary Complaint: aphasia    Pain:  Pain Assessment  Pain Assessment: None - Denies Pain  Pain Level: 0    Vision/ Hearing  Vision  Vision Exceptions: Wears glasses for reading  Hearing  Hearing: Within functional limits  Hearing Exceptions: Hard of hearing/hearing concerns    Speech/Language Assessment:  Diagnosis: Patient presents with moderate expressive/receptive aphasia, with the following impairements: 60% accuracy yes/no questions, 80% accuracy confrontation naming, 80% accuracy automatics, 60% accuracy repetition, 100% accuracy object recognition, 40% accuracy instruction following (single step > two step), 60% accuracy reading instruction, impaired verbal fluency, and impaired orthography. Did not assess cognition 2/2 language deficits. Recommend ongoing speech therapy to further assess/address. Dysphagia Assessment  Mild oral dysphagia 2/2 to reduced dentition. With patient seated upright on room air, assessed tolerance thins via straw and soft solid; pt with positive oral acceptance, slowed but adequate mastication, positive swallow movement, good oral clearance, no overt s/s aspiration. Per pt preference, recommend Dysphagia Soft & Bite-Sized Solids / Thin Liquids. Recommendations:  Recommendations  Requires SLP Intervention: Yes  Patient Education: Educated pt to purpose of visit, role of SLP, recommendations. Patient Education Response: Verbalizes understanding  Duration of Treatment: 1-2 wks or LOS    Plan:   Speech Therapy Prognosis  Prognosis: Good  Prognosis Considerations: Medical Diagnosis; Age  Individuals consulted  Consulted and agree with results and recommendations: Patient;NP  Safety Devices  Safety Devices in place: Yes  Type of devices: All fall risk precautions in place    Goals:  Short Term Goals  Time Frame for Short Term Goals: 1-2 wks or LOS  Speech/Language/Cognition  Goal 1: Patient will answer yes/no questions with 70% accuracy. Goal 2: Patient will complete confrontation naming task with 100% accuracy. Goal 3: Patient will follow single step directions with 100% accuracy. Goal 4: Patient will participate in further assessment of cognitive-linguistics as appropriate. Dysphagia  Goal 1: Patient will tolerate least restrictive diet with adequate mastication and without overt signs of aspiration or associated decline in respiratory status. Goal 2: Patient/caregiver will demonstrate understanding of swallowing concerns/recommendations. 12/20: Educated pt to purpose of visit, s/s of aspiration, concern if aspiration occurs, rationale for diet recommendation/strategies to reduce risk for aspiration and instruction to notify staff if any signs emerge. Pt stated comprehension, however suspect needs reinforcement. Cont goal    Patient/family involved in developing goals and treatment plan: Patient    Subjective:   Previous level of function and limitations: independent     Social/Functional History  Lives With: Alone  Type of Home: House  Active : Yes  Vision  Vision Exceptions: Wears glasses for reading  Hearing  Hearing: Within functional limits  Hearing Exceptions: Hard of hearing/hearing concerns     Objective:  Motor Speech  Dysarthric Characteristics: None  Overall Impairment Severity: None    Auditory Comprehension  Comprehension: Exceptions  Basic Questions: Mild  Complex Questions: Severe  One Step Commands: WFL  Two Step Commands:  Moderate  Conversation: Moderate    Reading Comprehension  Reading Status: [facility] to [facility]  Sentence Impairment Severity: Moderate    Expression  Primary Mode of Expression: Verbal    Verbal Expression  Verbal Expression: Exceptions to functional limits  Repetition: Moderate  Automatic Speech: Mild  Confrontation: WFL  Conversation: Moderate    Written Expression  Dominant Hand: Right  Written Expression: Exceptions to [facility]  Legibility Impairment Severity: Moderate    Prognosis:  Speech Therapy Prognosis  Prognosis: Good  Prognosis Considerations: Medical Diagnosis; Age  Individuals consulted  Consulted and agree with results and recommendations: Patient;NP    Education:  Patient Education: Educated pt to purpose of visit, role of SLP, recommendations. Patient Education Response: Verbalizes understanding  Safety Devices in place: Yes  Type of devices: All fall risk precautions in place    Therapy Time:  8809-0534 (50 minutes)    Plan  Diet Recommendations: Dysphagia Soft & Bite-Sized / Thin LIquids  Ongoing speech therapy to address deficits  Discharge Plan:  TBD  Discussed with RN  Needs within reach. Electronically Signed by:  Scherrie Babinski, M.A., Kimberly Bridges   Speech-Language Pathologist  Pager #[phone]    This document will serve as a discharge summary if pt discharges before next treatment.

## 2022-12-20 NOTE — PROGRESS NOTES
RN screened patient's swallowing by administering the [facility] Protocol. The patient consumed 3 ounces of water by straw in sequential swallows without signs/symptoms of aspiration.

## 2022-12-20 NOTE — PLAN OF CARE
Problem: SLP Adult - Impaired Communication  Goal: By Discharge: Demonstrates communication skills at highest level of function for planned discharge setting. See evaluation for individualized goals. Outcome: Progressing     Problem: SLP Adult - Impaired Swallowing  Goal: By Discharge: Advance to least restrictive diet without signs or symptoms of aspiration for planned discharge setting. See evaluation for individualized goals.   Outcome: Progressing

## 2022-12-20 NOTE — PROGRESS NOTES
Physician Progress Note      PATIENT:               Paola Woodruff  CSN #:                  [id number]  :                       1945  ADMIT DATE:       2022 3:24 AM  DISCH DATE:  RESPONDING  PROVIDER #:        Tabatha Henao MD          QUERY TEXT:    Pt admitted with SDH. Pt noted to have midline shift and mild edema on head   CT. If clinically significant, please document in progress notes and discharge   summary if you are evaluating/treating any of the following: The medical record reflects the following:  Risk Factors: 78year old female with SDH  Clinical Indicators: Per  CT head- Acute on chronic left subdural   hematoma on coronal image , measuring 17 mm in thickness with   approximately 5.6 mm right-to-left shift, stable. Slightly more anteriorly at   the level of frontal horns and third ventricle on image 27 measuring 17 mm,   stable. .. Diffuse effacement of cortical sulci in the left cerebral   hemisphere, mild edema is unchanged. Treatment: Imaging, neuro consult, q4h neuro checks, EEG  Options provided:  -- Cerebral edema and Brain compression  -- Cerebral edema  -- Brain compression  -- Other - I will add my own diagnosis  -- Disagree - Not applicable / Not valid  -- Disagree - Clinically unable to determine / Unknown  -- Refer to Clinical Documentation Reviewer    PROVIDER RESPONSE TEXT:    Provider disagreed with this query. not clinically significant    Query created by: Ovidio Skaggs on 2022 11:02 AM      Electronically signed by:   Tabatha Henao MD 2022 11:25 AM

## 2022-12-20 NOTE — H&P
Hospital Medicine History & Physical      PCP: DARIA Rouse CNP    Date of Admission: 12/20/2022    Date of Service: Pt seen/examined on 12/20/22 and Admitted to Inpatient with expected LOS greater than two midnights due to medical therapy. Chief Complaint:  Aphasia       History Of Present Illness:      Brielle Guerra is 78 y.o. female with history of HTN, DM II, CAD and hypothyroid  She now presents to the ER with complaint of difficulty to get the words out. It started yesterday and continues without improvement  Therefore she decided to come to the ED  She denies similar symptoms in the past  She denies any focal neurological deficits such as slurred speech, facial droop or weakness in any of the extremities  She just has difficulty finding and getting words out although she is able to speak  CT of the head shows acute on chronic subdural hematoma with midline shift  Therefore she was transferred from [facility] ED for further evaluation      Past Medical History:          Diagnosis Date    A-fib [facility])     Allergic rhinitis     Arthritis     CAD (coronary artery disease)     Cardiac pacemaker     CHF (congestive heart failure) ([address]     Cholelithiasis 07/03/2015    Diabetes mellitus ([address]     Accuchecks daily once a day at home in A.M.     ESRD (end stage renal disease) [facility]     Gastroparesis     Hepatic steatosis 07/03/2015    Hyperlipidemia     Hypertension     Peripheral neuropathy     Sleep apnea     Has used the CPAP x 25 yrs now per pt.     Syncope     Thyroid disease     Vitamin B12 deficiency        Past Surgical History:          Procedure Laterality Date    CARDIAC CATHETERIZATION  12/27/2017    SVG to OM2 100% ostium    CHOLECYSTECTOMY      COLECTOMY  1998    diverticulitis     COLONOSCOPY      CORONARY ANGIOPLASTY      CORONARY ARTERY BYPASS GRAFT  2005    x 3    DIAGNOSTIC CARDIAC CATH LAB PROCEDURE      ENDOSCOPY, COLON, DIAGNOSTIC      EYE SURGERY      Bilateral cataracts removed with lens implants    FOOT SURGERY      Multiple foot surgeries bilateral    HERNIA REPAIR      OTHER SURGICAL HISTORY  4/17/2012    achilles tendon lengthening,arthroplasty,matatarsalhead resection    OTHER SURGICAL HISTORY Right 3/20/13    FUSION OF RIGHT GREAT TOE JOINT WITH WIRE FIXATION, DEBRIEDMENT OF WOUND RIGHT GREAT TOE    TOE AMPUTATION Left 3/29/2021    PARTIAL LEFT FOOT AMPUTATION performed by Robyn Ocampo DPM at [address]      Laparoscopic    UPPER GASTROINTESTINAL ENDOSCOPY         Medications Prior to Admission:      Prior to Admission medications    Medication Sig Start Date End Date Taking? Authorizing Provider   apixaban (ELIQUIS) 5 MG TABS tablet Take 1 tablet by mouth 2 times daily 11/8/22   Mathew Hernández MD   carvedilol (COREG) 3.125 MG tablet Take 1 tablet by mouth 2 times daily (with meals) 11/8/22   Mathew Hernández MD   nitroGLYCERIN (NITROSTAT) 0.4 MG SL tablet up to max of 3 total doses. If no relief after 1 dose, call 911. 11/8/22   Mathew Hernández MD   furosemide (LASIX) 40 MG tablet TAKE ONE TABLET BY MOUTH DAILY MAY TAKE ADDITIONAL ONE-HALF TABLET AS NEEDED FOR WEIGHT GAIN OF 3 LBS IN A DAY 11/4/22   Edith Guerra MD   isosorbide mononitrate (IMDUR) 120 MG extended release tablet TAKE ONE TABLET BY MOUTH DAILY 8/29/22   Edith Guerra MD   TRULICITY 5.62 ZD/5.5MZ SOPN Inject 1 pen into the skin daily 3/16/22   Historical Provider, MD   mupirocin (BACTROBAN) 2 % ointment Apply topically twice daily for another 3 days  Patient not taking: Reported on 3/28/2022 2/18/22   Rajan Wylie MD   levothyroxine (SYNTHROID) 50 MCG tablet Take 1 tablet by mouth Daily 2/18/22   Rajan Wylie MD   desonide (DESOWEN) 0.05 % cream Apply 0.05 % topically 2 times daily Apply topically 2 times daily.     Historical Provider, MD   pantoprazole (PROTONIX) 40 MG tablet TAKE ONE TABLET BY MOUTH DAILY 9/3/19   Historical Provider, MD   aspirin 81 MG EC tablet Take 1 tablet by mouth daily 12/29/17   Shanda Kim MD   tolterodine (DETROL LA) 2 MG ER capsule Take 4 mg by mouth daily     Historical Provider, MD   fluticasone (FLONASE) 50 MCG/ACT nasal spray 1 spray by Nasal route daily. Historical Provider, MD   Omega-3 Fatty Acids (FISH OIL) 1000 MG CAPS Take 2 capsules by mouth daily. 4/3/15   Ashkan Wren MD   GLIPIZIDE Take 5 mg by mouth 2 times daily (with meals) Taking 10 mg AM & 5 mg in PM    Historical Provider, MD   gabapentin (NEURONTIN) 600 MG tablet Take 600 mg by mouth. May take up to 4x per day if needed for neuropathy pain. Historical Provider, MD   diphenhydrAMINE (BENADRYL) 25 MG tablet Take 25 mg by mouth nightly as needed. Historical Provider, MD       Allergies:  Niacin and related, Ranexa [ranolazine], Adhesive tape, Other, Statins depletion therapy, and Vancomycin    Social History:      The patient currently lives at home    TOBACCO:   reports that she quit smoking about 24 years ago. She has a 30.00 pack-year smoking history. She has never used smokeless tobacco.  ETOH:   reports no history of alcohol use. E-cigarette/Vaping       Questions Responses    E-cigarette/Vaping Use Never User    Start Date     Passive Exposure     Quit Date     Counseling Given     Comments               Family History:      Reviewed and negative in regards to presenting illness/complaint. Problem Relation Age of Onset    Stroke Mother     Coronary Art Dis Father     Coronary Art Dis Sister     Coronary Art Dis Brother        REVIEW OF SYSTEMS COMPLETED:   Pertinent positives as noted in the HPI. All other systems reviewed and negative. PHYSICAL EXAM PERFORMED:    /73   Pulse 77   Temp 97 °F (36.1 °C) (Oral)   Resp 18   Ht 5' 3\" (1.6 m)   Wt 199 lb 4.7 oz (90.4 kg)   SpO2 98%   BMI 35.30 kg/m²     General appearance:  No apparent distress, appears stated age and cooperative.   HEENT:  Normal cephalic, atraumatic without obvious deformity. Pupils equal, round, and reactive to light. Extra ocular muscles intact. Conjunctivae/corneas clear. Neck: Supple, with full range of motion. No jugular venous distention. Trachea midline. Respiratory:  Normal respiratory effort. Clear to auscultation, bilaterally without Rales/Wheezes/Rhonchi. Cardiovascular:  Regular rate and rhythm with normal S1/S2 without murmurs, rubs or gallops. Abdomen: Soft, non-tender, non-distended with normal bowel sounds. Musculoskeletal:  No clubbing, cyanosis or edema bilaterally. Full range of motion without deformity. Skin: Skin color, texture, turgor normal.  No rashes or lesions. Neurologic:  Neurovascularly intact without any focal sensory/motor deficits. Cranial nerves: II-XII intact, grossly non-focal.  Psychiatric:  Alert and oriented, thought content appropriate, normal insight  Capillary Refill: Brisk,3 seconds, normal  Peripheral Pulses: +2 palpable, equal bilaterally       Labs:     Recent Labs     12/19/22  1820   WBC 7.1   HGB 14.6   HCT 44.4        Recent Labs     12/19/22  1900      K 4.1      CO2 22   BUN 15   CREATININE 1.2   CALCIUM 9.9     Recent Labs     12/19/22  1900   AST 17   ALT 13   BILITOT 0.8   ALKPHOS 125     No results for input(s): INR in the last 72 hours. Recent Labs     12/19/22  1900   TROPONINI <0.01       Urinalysis:      Lab Results   Component Value Date/Time    NITRU Negative 11/28/2022 01:40 PM    WBCUA 0-2 05/25/2022 03:40 PM    BACTERIA Rare 05/25/2022 03:40 PM    RBCUA 0-2 05/25/2022 03:40 PM    BLOODU Negative 11/28/2022 01:40 PM    SPECGRAV <=1.005 11/28/2022 01:40 PM    GLUCOSEU Negative 11/28/2022 01:40 PM       Radiology:         Consults:    IP CONSULT TO NEUROSURGERY    ASSESSMENT:    Acute subdural hematoma  12/19.  CT -acute on chronic subdural hematoma overlying the left frontal and parietal lobes  There is approximately 3 mm of left-to-right midline shift  Presented with chief complaint of aphasia  Hypertension, controlled  Diabetes mellitus type 2  Hypothyroid  CAD, stable  History of CABG  Atrial fibrillation  Anticoagulated on Eliquis  Intermittent complete heart block  S/P placement of cardiac pacemaker  Morbid obesity      PLAN:    Admit to the ICU  Neurochecks every 2 hours  Repeat CT head without contrast stat  Consult neurosurgery  Discontinue any anticoagulation including Eliquis, aspirin, Plavix and heparin for DVT prophylax  Sliding scale insulin as ordered to keep glucose less than 180  Continue her usual cardiac and BP meds including Coreg and Imdur  PO pantoprazole for DVT prophylaxis and history of GERD    DVT Prophylaxis: SCD  Diet: Diet NPO  Code Status: Full Code    PT/OT Eval Status: PT/OT consult is ordered    Dispo -admit as inpatient       Romulo Ambrose MD    Thank you DARIA Anderson - CALEB for the opportunity to be involved in this patient's care. If you have any questions or concerns please feel free to contact me at [phone].

## 2022-12-20 NOTE — PROGRESS NOTES
Physical Therapy  Facility/Department: [facility]  Physical Therapy Initial Assessment    Name: Meghan Rodriguez  : 1945  MRN: [id number]  Date of Service: 2022    Discharge Recommendations:  Meghan Rodriguez scored a 17/24 on the AM-PAC short mobility form. Current research shows that an AM-PAC score of 17 or less is typically not associated with a discharge to the patient's home setting. Based on the patient's AM-PAC score and their current functional mobility deficits, it is recommended that the patient have 5-7 sessions per week of Physical Therapy at d/c to increase the patient's independence. At this time, this patient demonstrates complex nursing, medical, and rehabilitative needs, and would benefit from intensive rehabilitation services upon discharge from the Inpatient setting. This patient demonstrates the ability to participate in and benefit from an intensive therapy program with a coordinated interdisciplinary team approach to foster frequent, structured, and documented communication among disciplines, who will work together to establish, prioritize, and achieve treatment goals. Please see assessment section for further patient specific details. If patient discharges prior to next session this note will serve as a discharge summary. Please see below for the latest assessment towards goals. PT Equipment Recommendations  Other: defer to next level of care      Patient Diagnosis(es): There were no encounter diagnoses. Past Medical History:  has a past medical history of A-fib ([address] Allergic rhinitis, Arthritis, CAD (coronary artery disease), Cardiac pacemaker, CHF (congestive heart failure) ([address] Cholelithiasis, Diabetes mellitus ([address] ESRD (end stage renal disease) ([address] Gastroparesis, Hepatic steatosis, Hyperlipidemia, Hypertension, Peripheral neuropathy, Sleep apnea, Syncope, Thyroid disease, and Vitamin B12 deficiency.   Past Surgical History:  has a past surgical history that includes Coronary artery bypass graft (2005); Foot surgery; colectomy (1998); Colonoscopy; Upper gastrointestinal endoscopy; Tonsillectomy and adenoidectomy; Tubal ligation; other surgical history (4/17/2012); other surgical history (Right, 3/20/13); Coronary angioplasty; Cholecystectomy; Cardiac catheterization (12/27/2017); Diagnostic Cardiac Cath Lab Procedure; Toe amputation (Left, 3/29/2021); eye surgery; Endoscopy, colon, diagnostic; and hernia repair. Assessment   Body Structures, Functions, Activity Limitations Requiring Skilled Therapeutic Intervention: Decreased functional mobility ; Decreased endurance;Decreased balance;Decreased cognition  Assessment: Pt presents to [address] [place] [address] with SDH and above impairments. Demonstrates aphasia with difficulty word finding- details of prior level of mobility therefore difficult to gather. Pt reports she lives at home alone and reports use of electric scooter and can. Pt currently requires CGA for transfers and ambulation. Pt would benefit from continued skilled PT in order to maximize functional mobility and independence. Treatment Diagnosis: decreased functional mobility 2/2 to SDH  Decision Making: Medium Complexity  Barriers to Learning: none  Requires PT Follow-Up: Yes  Activity Tolerance  Activity Tolerance: Patient tolerated evaluation without incident;Patient limited by fatigue;Patient limited by endurance;Treatment limited secondary to decreased cognition     Plan   Safety Devices  Type of Devices: All fall risk precautions in place, Call light within reach, Chair alarm in place, Left in chair     Restrictions  Position Activity Restriction  Other position/activity restrictions: up as tolerated     Subjective   General  Chart Reviewed: Yes  Patient assessed for rehabilitation services?: Yes  Additional Pertinent Hx: 77 yo female present to ED 12/19 p/w aphasia.  CT head (+) Acute on chronic subdural hematoma overlying the left frontal and parietal lobes. PMHx: afib, CAD, CHF, DM, HTN  Response To Previous Treatment: Not applicable  Family / Caregiver Present: No  Diagnosis: SDH  General Comment  Comments: Pt supine in bed sleeping upon approach- lethargic upon waking with decreased verbalizations- increased alertness with increased time/ mobility. Agreeable to working with PT/OT  Subjective  Subjective: No pain mentioned. Unable to provide \"yes/no\" answer in response to questions about pain         Social/Functional History  Social/Functional History  Lives With: Alone  Type of Home: House  Home Layout: One level  Home Access: Ramped entrance  Bathroom Shower/Tub: Walk-in shower  Bathroom Toilet: Standard  Bathroom Equipment: Built-in shower seat, Grab bars in shower, Hand-held shower, Grab bars around toilet  Home Equipment: Stormy Lentner, Electric scooter, Walker, rolling  Has the patient had two or more falls in the past year or any fall with injury in the past year?: No  ADL Assistance: Independent  Homemaking Assistance: Independent  Homemaking Responsibilities: Yes  Ambulation Assistance: Independent (with cane? Difficult to clarify d/t aphasia)  Transfer Assistance: Independent  Active : Yes (friend drives primarily)  Additional Comments: Pt reports she uses her electric scooter in her home and also the cane in her home- difficult to assess prior level of mobility/ independence due to aphasia. When asked if there is anyone who could come stay with her she reports \"I could cook on my own\". Vision/Hearing  Vision  Vision: Impaired  Vision Exceptions: Wears glasses for distance    Cognition   Cognition  Overall Cognitive Status: Exceptions  Arousal/Alertness: Appropriate responses to stimuli  Following Commands:  Follows all commands without difficulty  Attention Span: Attends with cues to redirect  Safety Judgement: Decreased awareness of need for assistance  Insights: Decreased awareness of deficits  Initiation: Does not require cues  Sequencing: Does not require cues  Cognition Comment: aphasia, with difficulty word finding and intermittent verbal perseveration     Objective   Heart Rate: 77  Heart Rate Source: Monitor  BP: 128/79  BP Location: Left upper arm  BP Method: Automatic  Patient Position: Semi fowlers  MAP (Calculated): 95  Resp: 18  SpO2: 95 %  O2 Device: None (Room air)              AROM RLE (degrees)  RLE AROM: WFL  AROM LLE (degrees)  LLE AROM : WFL  Strength RLE  Strength RLE: WFL  Comment: not formally tested, globally 3+ or higher based on functional mobility observed in session  Strength LLE  Strength LLE: WFL  Comment: not formally tested, globally 3+ or higher based on functional mobility observed in session        Balance  Standing:  (SBA)  Gait  Overall Level of Assistance: Contact-guard assistance (to/from bathroom with use of RW)  Bed mobility  Supine to Sit: Stand by assistance (HOB elevated)  Transfers  Sit to Stand: Contact guard assistance;Minimal Assistance (CGA at recliner/EOB with/without device, min at commode with RW)  Stand to Sit: Contact guard assistance (CGA at commode/recliner/EOB with/without RW)  Bed to Chair: Contact guard assistance (EOB>recliner without device)  Ambulation  Surface: Level tile  Device: Rolling Walker  Assistance: Contact guard assistance  Quality of Gait: forward flexed posture, decreased B step height/length, no overt LOB  Gait Deviations: Slow Alexandra  Distance: 15' x 2  Comments: limited by faitgumaninder.  VC for upright posture     Balance  Sitting - Static: Fair;+ (SBA seated EOB)  Sitting - Dynamic: Fair;+ (SBA seated EOB including while completing donning of socks/pants with OT)  Standing - Static: Fair (CGA without device)  Standing - Dynamic: Fair;- (CGA amb with RW)      AM-PAC Score  AM-PAC Inpatient Mobility Raw Score : 17 (12/20/22 1001)  AM-PAC Inpatient T-Scale Score : 42.13 (12/20/22 1001)  Mobility Inpatient CMS 0-100% Score: 50.57 (12/20/22 1001)  Mobility Inpatient CMS G-Code Modifier : SOPHY (12/20/22 1001)     Goals  Short Term Goals  Time Frame for Short Term Goals: prior to discharge  Short Term Goal 1: Pt will complete bed mobility independently  Short Term Goal 2: Pt will compelte sit<>stand with LRAD and supervision  Short Term Goal 3: Pt will complete 48' ambualtion with supervision and LRAD  Patient Goals   Patient Goals : none stated       Education  Patient Education  Education Given To: Patient  Education Provided: Role of Therapy;Plan of Care;Transfer Training  Education Provided Comments: use of call light      Therapy Time   Individual Concurrent Group Co-treatment   Time In 0800         Time Out 0840         Minutes 40         Timed Code Treatment Minutes: [facility] PT, [facility]

## 2022-12-20 NOTE — PROGRESS NOTES
Patient's granddaughter called this RN for updates. All questions and concerns addressed at this time. Granddaughter expressed concerns for patient's discharge planning. This RN explained we work closely with social work and would coordinate with them closer to patient's discharge.

## 2022-12-20 NOTE — PROGRESS NOTES
Attempted to perform neurological/NIH assessment on patient. Patient difficult to arouse seeing that she just recently fell asleep. Patient wakes up and only answers minimal questions before returning to sleep. NP to place transfer orders for ICU. Once bed assigned patient will be moved to ICU.   Electronically signed by Rosa Gallegos RN on 12/20/2022 at 6:45 AM

## 2022-12-20 NOTE — PLAN OF CARE
Notified by patient's RN Era Cook that patient Is newly incontinent and more severely aphasic. She was able to converse this morning and now she is mute. Immediately upon receiving this information, I arrived to her bedside to where she was on the commode. She is alert and keenly responsive. She does not answer any questions or follow commands. She haphazardly tries to get her gown off of her and appears confused. She will not participate in formal strength exam but appears to move her left arm with greater ease than her right. Code stroke initiated. Primary concern is SDH expansion though LVO/new stroke is not excluded especially in light of holding Eliquis/asa. If CT/CTA are unrevealing, will plan to initiate cEEG to exclude NCSE. May consider moving to ICU pending review of CT/CTA. Will re-engage neurosurgery as needed. Will update oncoming neurology/neurocritical care NP. Update given to nursing staff and resident responder to Code.      Wily Arcos NP  Neurology & Neurocritical care

## 2022-12-20 NOTE — PROGRESS NOTES
Hospitalist Progress Note      PCP: DARIA Munoz - CNP    Date of Admission: 12/20/2022        Subjective:   Still has difficulty word finding,otherwise has no new complaints      Medications:  Reviewed    Infusion Medications    sodium chloride      dextrose      [START ON 12/21/2022] sodium chloride       Scheduled Medications    sodium chloride flush  5-40 mL IntraVENous 2 times per day    isosorbide mononitrate  120 mg Oral Daily    gabapentin  300 mg Oral TID    carvedilol  3.125 mg Oral BID WC    pantoprazole  40 mg Oral Daily    trospium  20 mg Oral Nightly    levothyroxine  50 mcg Oral Daily    [Held by provider] furosemide  40 mg Oral Daily    insulin lispro  0-8 Units SubCUTAneous TID WC    insulin lispro  0-4 Units SubCUTAneous Nightly    dexamethasone  4 mg IntraVENous Q6H    levETIRAcetam  500 mg Oral BID    [START ON 12/21/2022] ceFAZolin (ANCEF) IVPB  2,000 mg IntraVENous On Call to OR     PRN Meds: sodium chloride flush, sodium chloride, ondansetron **OR** ondansetron, polyethylene glycol, acetaminophen **OR** acetaminophen, diphenhydrAMINE, glucose, dextrose bolus **OR** dextrose bolus, glucagon (rDNA), dextrose      Intake/Output Summary (Last 24 hours) at 12/20/2022 1754  Last data filed at 12/20/2022 1049  Gross per 24 hour   Intake 170 ml   Output --   Net 170 ml       Physical Exam Performed:    BP (P) 124/77   Pulse (P) 89   Temp 97.5 °F (36.4 °C) (Oral)   Resp 18   Ht 5' 3\" (1.6 m)   Wt 199 lb 4.7 oz (90.4 kg)   SpO2 95%   BMI 35.30 kg/m²     General appearance: No apparent distress, appears stated age and cooperative. HEENT: Pupils equal, round, and reactive to light. Conjunctivae/corneas clear. Neck: Supple, with full range of motion. No jugular venous distention. Trachea midline. Respiratory:  Normal respiratory effort. Clear to auscultation, bilaterally without Rales/Wheezes/Rhonchi.   Cardiovascular: Regular rate and rhythm with normal S1/S2 without murmurs, rubs or gallops. Abdomen: Soft, non-tender, non-distended with normal bowel sounds. Musculoskeletal: No clubbing, cyanosis or edema bilaterally. Full range of motion without deformity. Skin: Skin color, texture, turgor normal.  No rashes or lesions. Neurologic:  Neurovascularly intact without any focal sensory/motor deficits. Cranial nerves: II-XII intact, grossly non-focal.  Psychiatric: Alert and oriented, thought content appropriate, normal insight  Capillary Refill: Brisk, 3 seconds, normal   Peripheral Pulses: +2 palpable, equal bilaterally       Labs:   Recent Labs     12/19/22  1820 12/20/22  0639   WBC 7.1 7.4   HGB 14.6 13.9   HCT 44.4 40.7    156     Recent Labs     12/19/22  1900 12/20/22  0639    140   K 4.1 3.7    102   CO2 22 27   BUN 15 17   CREATININE 1.2 1.2   CALCIUM 9.9 8.9     Recent Labs     12/19/22  1900   AST 17   ALT 13   BILITOT 0.8   ALKPHOS 125     Recent Labs     12/20/22  0902   INR 1.25*     Recent Labs     12/19/22  1900   TROPONINI <0.01       Urinalysis:      Lab Results   Component Value Date/Time    NITRU Negative 11/28/2022 01:40 PM    WBCUA 0-2 05/25/2022 03:40 PM    BACTERIA Rare 05/25/2022 03:40 PM    RBCUA 0-2 05/25/2022 03:40 PM    BLOODU Negative 11/28/2022 01:40 PM    SPECGRAV <=1.005 11/28/2022 01:40 PM    GLUCOSEU Negative 11/28/2022 01:40 PM       Radiology:  MRI BRAIN WO CONTRAST   Final Result      1. Stable size of subacute left cerebral convexity subdural hematoma (2 cm thick) with stable mass effect and mild left to right midline shift measuring 4 mm   2. Mild chronic small vessel ischemia and mild generalized atrophy   3. No evidence for intracranial mass or vascular lesion               MRA HEAD WO CONTRAST   Final Result      1. MRA limited by motion artifact   2. No evidence for dural arteriovenous fistula         CT HEAD WO CONTRAST   Final Result   1.  Acute on chronic left subdural hematoma with approximately 5.6 mm left to right shift on coronal imaging, stable from 12/19/2022 at 1953   2. Left cerebral hemisphere cortical sulci effacement is unchanged   3. Moderate to severe periventricular microangiopathic ischemic changes, chronic small vessel disease   4. No new bleed or intraparenchymal hemorrhage. IP CONSULT TO NEUROSURGERY  IP CONSULT TO NEUROCRITICAL CARE    Assessment/Plan:      -Acute on chronic subdural hematoma  -Essential HTN-controlled-on coreg  -CAD s/p CABG 2005,EF 55-60%-antiplatelets held,on coreg  -Hx Complete heart block s/p PPM  -Paroxsymal a fib-stable-eliquis held  -Obesity BMI 35  -Hypothyroidism-on  synthroid  -DM type 2-gliplizide,trulicity on hold admission    Plan    Scheduled for OR tomorrow to drain SDH  Pt has moderate cardiac risk for surgery, may proceed to surgery without any further work up. . most recent echo 2/2022,showed EF 55-60%   Holding all antipaletelets and anticoagulants   C/w Keppra and decadron'   C/w neurochecks   C/w chronic meds    DVT Prophylaxis: scd  Diet: ADULT DIET;  Dysphagia - Soft and Bite Sized  Diet NPO Exceptions are: Sips of Water with Meds  Code Status: Full Code  PT/OT Eval Status:               Maurice Snider MD

## 2022-12-20 NOTE — ED NOTES
Patient sitting at the edge of the bed, removed all clothes and leads to cardiac monitor, patient unable to state what she wanted or where she wanted to go. Patient still aphasic at this time. Patient is able to state she is in the hospital, name and  and month. Redirected patient into bed, new gown and brief placed. Now laying comfortably in bed.      Kyle Aguilera RN  [phone]       Kyle Aguilera RN  22 [phone]

## 2022-12-20 NOTE — PROGRESS NOTES
Reattempted to call patient's daughter Ana Carnes. Updates given on patient. MRI screening form completed. Daughter had limited information on patient's pacemaker. Also unsure about the presence of any insulin infusions/patches. This RN noted the presence of some infusion device on patient's right arm. Patient unable to tell RN what it was. MRI notified of this finding.

## 2022-12-20 NOTE — ED NOTES
2044 call placed to [facility] to begin new pt transfer to [facility] for neurology      Gale Crook  12/19/22 2048 2104 consult completed with call back from [facility] Dr. Antonella Collins on the line speaking with Dr. Brooke De Leon  12/19/22 2107

## 2022-12-21 ENCOUNTER — APPOINTMENT (OUTPATIENT)
Dept: CT IMAGING | Age: 77
DRG: 026 | End: 2022-12-21
Attending: INTERNAL MEDICINE
Payer: MEDICARE

## 2022-12-21 ENCOUNTER — ANESTHESIA (OUTPATIENT)
Dept: OPERATING ROOM | Age: 77
End: 2022-12-21
Payer: MEDICARE

## 2022-12-21 ENCOUNTER — ANESTHESIA EVENT (OUTPATIENT)
Dept: OPERATING ROOM | Age: 77
End: 2022-12-21
Payer: MEDICARE

## 2022-12-21 LAB
ANION GAP SERPL CALCULATED.3IONS-SCNC: 12 MMOL/L (ref 3–16)
APTT: 28.9 SEC (ref 23–34.3)
BASOPHILS ABSOLUTE: 0 K/UL (ref 0–0.2)
BASOPHILS RELATIVE PERCENT: 0.2 %
BUN BLDV-MCNC: 22 MG/DL (ref 7–20)
CALCIUM SERPL-MCNC: 9.1 MG/DL (ref 8.3–10.6)
CHLORIDE BLD-SCNC: 105 MMOL/L (ref 99–110)
CO2: 24 MMOL/L (ref 21–32)
CREAT SERPL-MCNC: 1.2 MG/DL (ref 0.6–1.2)
EOSINOPHILS ABSOLUTE: 0 K/UL (ref 0–0.6)
EOSINOPHILS RELATIVE PERCENT: 0.1 %
ESTIMATED AVERAGE GLUCOSE: 125.5 MG/DL
GFR SERPL CREATININE-BSD FRML MDRD: 47 ML/MIN/{1.73_M2}
GLUCOSE BLD-MCNC: 184 MG/DL (ref 70–99)
GLUCOSE BLD-MCNC: 187 MG/DL (ref 70–99)
GLUCOSE BLD-MCNC: 195 MG/DL (ref 70–99)
GLUCOSE BLD-MCNC: 202 MG/DL (ref 70–99)
GLUCOSE BLD-MCNC: 243 MG/DL (ref 70–99)
HBA1C MFR BLD: 6 %
HCT VFR BLD CALC: 41.7 % (ref 36–48)
HEMOGLOBIN: 14.4 G/DL (ref 12–16)
INR BLD: 1.23 (ref 0.87–1.14)
LYMPHOCYTES ABSOLUTE: 1.1 K/UL (ref 1–5.1)
LYMPHOCYTES RELATIVE PERCENT: 11.3 %
MCH RBC QN AUTO: 30 PG (ref 26–34)
MCHC RBC AUTO-ENTMCNC: 34.7 G/DL (ref 31–36)
MCV RBC AUTO: 86.7 FL (ref 80–100)
MONOCYTES ABSOLUTE: 0.2 K/UL (ref 0–1.3)
MONOCYTES RELATIVE PERCENT: 2 %
NEUTROPHILS ABSOLUTE: 8.4 K/UL (ref 1.7–7.7)
NEUTROPHILS RELATIVE PERCENT: 86.4 %
PDW BLD-RTO: 14.3 % (ref 12.4–15.4)
PERFORMED ON: ABNORMAL
PLATELET # BLD: 162 K/UL (ref 135–450)
PMV BLD AUTO: 8.6 FL (ref 5–10.5)
POTASSIUM SERPL-SCNC: 4 MMOL/L (ref 3.5–5.1)
PROTHROMBIN TIME: 15.4 SEC (ref 11.7–14.5)
RBC # BLD: 4.81 M/UL (ref 4–5.2)
SODIUM BLD-SCNC: 141 MMOL/L (ref 136–145)
WBC # BLD: 9.7 K/UL (ref 4–11)

## 2022-12-21 PROCEDURE — 6360000002 HC RX W HCPCS: Performed by: ANESTHESIOLOGY

## 2022-12-21 PROCEDURE — 36415 COLL VENOUS BLD VENIPUNCTURE: CPT

## 2022-12-21 PROCEDURE — 3600000004 HC SURGERY LEVEL 4 BASE: Performed by: NEUROLOGICAL SURGERY

## 2022-12-21 PROCEDURE — 2580000003 HC RX 258

## 2022-12-21 PROCEDURE — 3600000014 HC SURGERY LEVEL 4 ADDTL 15MIN: Performed by: NEUROLOGICAL SURGERY

## 2022-12-21 PROCEDURE — 2580000003 HC RX 258: Performed by: NURSE PRACTITIONER

## 2022-12-21 PROCEDURE — 85610 PROTHROMBIN TIME: CPT

## 2022-12-21 PROCEDURE — 6360000002 HC RX W HCPCS: Performed by: NURSE ANESTHETIST, CERTIFIED REGISTERED

## 2022-12-21 PROCEDURE — 80048 BASIC METABOLIC PNL TOTAL CA: CPT

## 2022-12-21 PROCEDURE — 2060000000 HC ICU INTERMEDIATE R&B

## 2022-12-21 PROCEDURE — 6370000000 HC RX 637 (ALT 250 FOR IP): Performed by: PHYSICIAN ASSISTANT

## 2022-12-21 PROCEDURE — 009400Z DRAINAGE OF INTRACRANIAL SUBDURAL SPACE WITH DRAINAGE DEVICE, OPEN APPROACH: ICD-10-PCS | Performed by: NEUROLOGICAL SURGERY

## 2022-12-21 PROCEDURE — 2500000003 HC RX 250 WO HCPCS: Performed by: NURSE ANESTHETIST, CERTIFIED REGISTERED

## 2022-12-21 PROCEDURE — 6370000000 HC RX 637 (ALT 250 FOR IP): Performed by: INTERNAL MEDICINE

## 2022-12-21 PROCEDURE — 6360000002 HC RX W HCPCS: Performed by: NURSE PRACTITIONER

## 2022-12-21 PROCEDURE — 6370000000 HC RX 637 (ALT 250 FOR IP): Performed by: NEUROLOGICAL SURGERY

## 2022-12-21 PROCEDURE — 3700000000 HC ANESTHESIA ATTENDED CARE: Performed by: NEUROLOGICAL SURGERY

## 2022-12-21 PROCEDURE — 2500000003 HC RX 250 WO HCPCS: Performed by: NEUROLOGICAL SURGERY

## 2022-12-21 PROCEDURE — 2580000003 HC RX 258: Performed by: INTERNAL MEDICINE

## 2022-12-21 PROCEDURE — 6360000002 HC RX W HCPCS: Performed by: PHYSICIAN ASSISTANT

## 2022-12-21 PROCEDURE — 3700000001 HC ADD 15 MINUTES (ANESTHESIA): Performed by: NEUROLOGICAL SURGERY

## 2022-12-21 PROCEDURE — 2720000010 HC SURG SUPPLY STERILE: Performed by: NEUROLOGICAL SURGERY

## 2022-12-21 PROCEDURE — 6360000002 HC RX W HCPCS

## 2022-12-21 PROCEDURE — 2709999900 HC NON-CHARGEABLE SUPPLY: Performed by: NEUROLOGICAL SURGERY

## 2022-12-21 PROCEDURE — 70450 CT HEAD/BRAIN W/O DYE: CPT

## 2022-12-21 PROCEDURE — 85730 THROMBOPLASTIN TIME PARTIAL: CPT

## 2022-12-21 PROCEDURE — C1729 CATH, DRAINAGE: HCPCS | Performed by: NEUROLOGICAL SURGERY

## 2022-12-21 PROCEDURE — 6370000000 HC RX 637 (ALT 250 FOR IP): Performed by: HOSPITALIST

## 2022-12-21 PROCEDURE — 85025 COMPLETE CBC W/AUTO DIFF WBC: CPT

## 2022-12-21 PROCEDURE — 7100000001 HC PACU RECOVERY - ADDTL 15 MIN: Performed by: NEUROLOGICAL SURGERY

## 2022-12-21 PROCEDURE — 7100000000 HC PACU RECOVERY - FIRST 15 MIN: Performed by: NEUROLOGICAL SURGERY

## 2022-12-21 PROCEDURE — 99232 SBSQ HOSP IP/OBS MODERATE 35: CPT | Performed by: NURSE PRACTITIONER

## 2022-12-21 PROCEDURE — 2580000003 HC RX 258: Performed by: PHYSICIAN ASSISTANT

## 2022-12-21 PROCEDURE — C1713 ANCHOR/SCREW BN/BN,TIS/BN: HCPCS | Performed by: NEUROLOGICAL SURGERY

## 2022-12-21 DEVICE — SCREW, AXS, SELF-DRILLING
Type: IMPLANTABLE DEVICE | Site: CRANIAL | Status: FUNCTIONAL
Brand: UNIVERSAL NEURO 3

## 2022-12-21 DEVICE — STRAIGHT PLATE, 16MM BAR
Type: IMPLANTABLE DEVICE | Site: CRANIAL | Status: FUNCTIONAL
Brand: UNIVERSAL NEURO 3

## 2022-12-21 RX ORDER — HEPARIN SODIUM 5000 [USP'U]/ML
5000 INJECTION, SOLUTION INTRAVENOUS; SUBCUTANEOUS EVERY 8 HOURS
Status: DISCONTINUED | OUTPATIENT
Start: 2022-12-22 | End: 2022-12-27 | Stop reason: HOSPADM

## 2022-12-21 RX ORDER — SODIUM CHLORIDE 0.9 % (FLUSH) 0.9 %
5-40 SYRINGE (ML) INJECTION EVERY 12 HOURS SCHEDULED
Status: DISCONTINUED | OUTPATIENT
Start: 2022-12-21 | End: 2022-12-27 | Stop reason: HOSPADM

## 2022-12-21 RX ORDER — SODIUM CHLORIDE 9 MG/ML
INJECTION, SOLUTION INTRAVENOUS PRN
Status: DISCONTINUED | OUTPATIENT
Start: 2022-12-21 | End: 2022-12-21 | Stop reason: HOSPADM

## 2022-12-21 RX ORDER — PROCHLORPERAZINE MALEATE 10 MG
10 TABLET ORAL EVERY 6 HOURS PRN
Status: DISCONTINUED | OUTPATIENT
Start: 2022-12-21 | End: 2022-12-27 | Stop reason: HOSPADM

## 2022-12-21 RX ORDER — PROPOFOL 10 MG/ML
INJECTION, EMULSION INTRAVENOUS PRN
Status: DISCONTINUED | OUTPATIENT
Start: 2022-12-21 | End: 2022-12-21 | Stop reason: SDUPTHER

## 2022-12-21 RX ORDER — SODIUM CHLORIDE 0.9 % (FLUSH) 0.9 %
5-40 SYRINGE (ML) INJECTION PRN
Status: DISCONTINUED | OUTPATIENT
Start: 2022-12-21 | End: 2022-12-21 | Stop reason: HOSPADM

## 2022-12-21 RX ORDER — MORPHINE SULFATE 2 MG/ML
4 INJECTION, SOLUTION INTRAMUSCULAR; INTRAVENOUS
Status: DISCONTINUED | OUTPATIENT
Start: 2022-12-21 | End: 2022-12-27

## 2022-12-21 RX ORDER — DIPHENHYDRAMINE HYDROCHLORIDE 50 MG/ML
12.5 INJECTION INTRAMUSCULAR; INTRAVENOUS
Status: DISCONTINUED | OUTPATIENT
Start: 2022-12-21 | End: 2022-12-21 | Stop reason: HOSPADM

## 2022-12-21 RX ORDER — PROCHLORPERAZINE EDISYLATE 5 MG/ML
10 INJECTION INTRAMUSCULAR; INTRAVENOUS EVERY 6 HOURS PRN
Status: DISCONTINUED | OUTPATIENT
Start: 2022-12-21 | End: 2022-12-27 | Stop reason: HOSPADM

## 2022-12-21 RX ORDER — PROCHLORPERAZINE EDISYLATE 5 MG/ML
5 INJECTION INTRAMUSCULAR; INTRAVENOUS
Status: DISCONTINUED | OUTPATIENT
Start: 2022-12-21 | End: 2022-12-21 | Stop reason: HOSPADM

## 2022-12-21 RX ORDER — ONDANSETRON 4 MG/1
4 TABLET, ORALLY DISINTEGRATING ORAL EVERY 8 HOURS PRN
Status: DISCONTINUED | OUTPATIENT
Start: 2022-12-21 | End: 2022-12-21

## 2022-12-21 RX ORDER — SODIUM CHLORIDE 0.9 % (FLUSH) 0.9 %
5-40 SYRINGE (ML) INJECTION PRN
Status: DISCONTINUED | OUTPATIENT
Start: 2022-12-21 | End: 2022-12-27 | Stop reason: HOSPADM

## 2022-12-21 RX ORDER — ONDANSETRON 2 MG/ML
INJECTION INTRAMUSCULAR; INTRAVENOUS PRN
Status: DISCONTINUED | OUTPATIENT
Start: 2022-12-21 | End: 2022-12-21 | Stop reason: SDUPTHER

## 2022-12-21 RX ORDER — FENTANYL CITRATE 50 UG/ML
INJECTION, SOLUTION INTRAMUSCULAR; INTRAVENOUS PRN
Status: DISCONTINUED | OUTPATIENT
Start: 2022-12-21 | End: 2022-12-21 | Stop reason: SDUPTHER

## 2022-12-21 RX ORDER — ONDANSETRON 2 MG/ML
4 INJECTION INTRAMUSCULAR; INTRAVENOUS
Status: DISCONTINUED | OUTPATIENT
Start: 2022-12-21 | End: 2022-12-21 | Stop reason: HOSPADM

## 2022-12-21 RX ORDER — NALOXONE HYDROCHLORIDE 0.4 MG/ML
0.2 INJECTION, SOLUTION INTRAMUSCULAR; INTRAVENOUS; SUBCUTANEOUS PRN
Status: DISCONTINUED | OUTPATIENT
Start: 2022-12-21 | End: 2022-12-27 | Stop reason: HOSPADM

## 2022-12-21 RX ORDER — OXYCODONE HYDROCHLORIDE 5 MG/1
5 TABLET ORAL EVERY 4 HOURS PRN
Status: DISCONTINUED | OUTPATIENT
Start: 2022-12-21 | End: 2022-12-27 | Stop reason: HOSPADM

## 2022-12-21 RX ORDER — ROCURONIUM BROMIDE 10 MG/ML
INJECTION, SOLUTION INTRAVENOUS PRN
Status: DISCONTINUED | OUTPATIENT
Start: 2022-12-21 | End: 2022-12-21 | Stop reason: SDUPTHER

## 2022-12-21 RX ORDER — BUPIVACAINE HYDROCHLORIDE AND EPINEPHRINE 5; 5 MG/ML; UG/ML
INJECTION, SOLUTION EPIDURAL; INTRACAUDAL; PERINEURAL PRN
Status: DISCONTINUED | OUTPATIENT
Start: 2022-12-21 | End: 2022-12-21 | Stop reason: HOSPADM

## 2022-12-21 RX ORDER — HYDRALAZINE HYDROCHLORIDE 20 MG/ML
10 INJECTION INTRAMUSCULAR; INTRAVENOUS
Status: DISCONTINUED | OUTPATIENT
Start: 2022-12-21 | End: 2022-12-21 | Stop reason: HOSPADM

## 2022-12-21 RX ORDER — METHOCARBAMOL 750 MG/1
750 TABLET, FILM COATED ORAL EVERY 8 HOURS PRN
Status: DISCONTINUED | OUTPATIENT
Start: 2022-12-21 | End: 2022-12-27 | Stop reason: HOSPADM

## 2022-12-21 RX ORDER — SODIUM CHLORIDE 9 MG/ML
INJECTION, SOLUTION INTRAVENOUS PRN
Status: DISCONTINUED | OUTPATIENT
Start: 2022-12-21 | End: 2022-12-27 | Stop reason: HOSPADM

## 2022-12-21 RX ORDER — MORPHINE SULFATE 2 MG/ML
2 INJECTION, SOLUTION INTRAMUSCULAR; INTRAVENOUS
Status: DISCONTINUED | OUTPATIENT
Start: 2022-12-21 | End: 2022-12-27

## 2022-12-21 RX ORDER — ONDANSETRON 2 MG/ML
4 INJECTION INTRAMUSCULAR; INTRAVENOUS EVERY 6 HOURS PRN
Status: DISCONTINUED | OUTPATIENT
Start: 2022-12-21 | End: 2022-12-21

## 2022-12-21 RX ORDER — LIDOCAINE HYDROCHLORIDE 20 MG/ML
INJECTION, SOLUTION INTRAVENOUS PRN
Status: DISCONTINUED | OUTPATIENT
Start: 2022-12-21 | End: 2022-12-21 | Stop reason: SDUPTHER

## 2022-12-21 RX ORDER — SODIUM CHLORIDE 9 MG/ML
INJECTION, SOLUTION INTRAVENOUS CONTINUOUS
Status: DISCONTINUED | OUTPATIENT
Start: 2022-12-21 | End: 2022-12-22

## 2022-12-21 RX ORDER — HEPARIN SODIUM 5000 [USP'U]/.5ML
5000 INJECTION, SOLUTION INTRAVENOUS; SUBCUTANEOUS EVERY 8 HOURS
Status: DISCONTINUED | OUTPATIENT
Start: 2022-12-22 | End: 2022-12-21

## 2022-12-21 RX ORDER — SODIUM CHLORIDE 0.9 % (FLUSH) 0.9 %
5-40 SYRINGE (ML) INJECTION EVERY 12 HOURS SCHEDULED
Status: DISCONTINUED | OUTPATIENT
Start: 2022-12-21 | End: 2022-12-21 | Stop reason: HOSPADM

## 2022-12-21 RX ADMIN — SODIUM CHLORIDE, PRESERVATIVE FREE 10 ML: 5 INJECTION INTRAVENOUS at 09:27

## 2022-12-21 RX ADMIN — HYDROMORPHONE HYDROCHLORIDE 0.5 MG: 1 INJECTION, SOLUTION INTRAMUSCULAR; INTRAVENOUS; SUBCUTANEOUS at 15:53

## 2022-12-21 RX ADMIN — GABAPENTIN 300 MG: 600 TABLET, FILM COATED ORAL at 20:56

## 2022-12-21 RX ADMIN — CEFAZOLIN 2000 MG: 2 INJECTION, POWDER, FOR SOLUTION INTRAMUSCULAR; INTRAVENOUS at 10:17

## 2022-12-21 RX ADMIN — HEPARIN SODIUM 5000 UNITS: 5000 INJECTION INTRAVENOUS; SUBCUTANEOUS at 23:34

## 2022-12-21 RX ADMIN — PHENYLEPHRINE HYDROCHLORIDE 200 MCG: 10 INJECTION, SOLUTION INTRAMUSCULAR; INTRAVENOUS; SUBCUTANEOUS at 14:05

## 2022-12-21 RX ADMIN — INSULIN GLARGINE 8 UNITS: 100 INJECTION, SOLUTION SUBCUTANEOUS at 21:57

## 2022-12-21 RX ADMIN — METHOCARBAMOL 1000 MG: 100 INJECTION, SOLUTION INTRAMUSCULAR; INTRAVENOUS at 16:30

## 2022-12-21 RX ADMIN — ONDANSETRON 4 MG: 2 INJECTION INTRAMUSCULAR; INTRAVENOUS at 13:36

## 2022-12-21 RX ADMIN — DEXAMETHASONE SODIUM PHOSPHATE 4 MG: 4 INJECTION, SOLUTION INTRA-ARTICULAR; INTRALESIONAL; INTRAMUSCULAR; INTRAVENOUS; SOFT TISSUE at 10:43

## 2022-12-21 RX ADMIN — ROCURONIUM BROMIDE 50 MG: 10 INJECTION INTRAVENOUS at 13:26

## 2022-12-21 RX ADMIN — TROSPIUM CHLORIDE 20 MG: 20 TABLET, FILM COATED ORAL at 20:56

## 2022-12-21 RX ADMIN — PANTOPRAZOLE SODIUM 40 MG: 40 TABLET, DELAYED RELEASE ORAL at 09:06

## 2022-12-21 RX ADMIN — CARVEDILOL 3.12 MG: 3.12 TABLET, FILM COATED ORAL at 09:14

## 2022-12-21 RX ADMIN — OXYCODONE 5 MG: 5 TABLET ORAL at 22:08

## 2022-12-21 RX ADMIN — ISOSORBIDE MONONITRATE 120 MG: 60 TABLET, EXTENDED RELEASE ORAL at 09:06

## 2022-12-21 RX ADMIN — FENTANYL CITRATE 50 MCG: 50 INJECTION, SOLUTION INTRAMUSCULAR; INTRAVENOUS at 13:55

## 2022-12-21 RX ADMIN — PROPOFOL 100 MG: 10 INJECTION, EMULSION INTRAVENOUS at 13:26

## 2022-12-21 RX ADMIN — SUGAMMADEX 200 MG: 100 INJECTION, SOLUTION INTRAVENOUS at 14:15

## 2022-12-21 RX ADMIN — DEXAMETHASONE SODIUM PHOSPHATE 4 MG: 4 INJECTION, SOLUTION INTRA-ARTICULAR; INTRALESIONAL; INTRAMUSCULAR; INTRAVENOUS; SOFT TISSUE at 05:14

## 2022-12-21 RX ADMIN — CEFAZOLIN 2000 MG: 2 INJECTION, POWDER, FOR SOLUTION INTRAMUSCULAR; INTRAVENOUS at 13:24

## 2022-12-21 RX ADMIN — GABAPENTIN 300 MG: 600 TABLET, FILM COATED ORAL at 09:06

## 2022-12-21 RX ADMIN — HYDROMORPHONE HYDROCHLORIDE 0.5 MG: 1 INJECTION, SOLUTION INTRAMUSCULAR; INTRAVENOUS; SUBCUTANEOUS at 15:33

## 2022-12-21 RX ADMIN — SODIUM CHLORIDE: 9 INJECTION, SOLUTION INTRAVENOUS at 00:39

## 2022-12-21 RX ADMIN — LEVETIRACETAM 1000 MG: 100 INJECTION INTRAVENOUS at 09:24

## 2022-12-21 RX ADMIN — SODIUM CHLORIDE: 9 INJECTION, SOLUTION INTRAVENOUS at 18:39

## 2022-12-21 RX ADMIN — LIDOCAINE HYDROCHLORIDE 50 MG: 20 INJECTION, SOLUTION INTRAVENOUS at 13:26

## 2022-12-21 RX ADMIN — FENTANYL CITRATE 50 MCG: 50 INJECTION, SOLUTION INTRAMUSCULAR; INTRAVENOUS at 13:29

## 2022-12-21 ASSESSMENT — PAIN SCALES - GENERAL
PAINLEVEL_OUTOF10: 5
PAINLEVEL_OUTOF10: 2
PAINLEVEL_OUTOF10: 7
PAINLEVEL_OUTOF10: 7

## 2022-12-21 ASSESSMENT — LIFESTYLE VARIABLES: SMOKING_STATUS: 1

## 2022-12-21 ASSESSMENT — PAIN DESCRIPTION - LOCATION
LOCATION: HEAD

## 2022-12-21 ASSESSMENT — PAIN DESCRIPTION - PAIN TYPE
TYPE: ACUTE PAIN;NEUROPATHIC PAIN
TYPE: ACUTE PAIN;SURGICAL PAIN

## 2022-12-21 ASSESSMENT — PAIN DESCRIPTION - DESCRIPTORS
DESCRIPTORS: SORE
DESCRIPTORS: SORE
DESCRIPTORS: ACHING

## 2022-12-21 ASSESSMENT — PAIN DESCRIPTION - ORIENTATION
ORIENTATION: UPPER;LEFT
ORIENTATION: UPPER;LEFT

## 2022-12-21 ASSESSMENT — PAIN - FUNCTIONAL ASSESSMENT: PAIN_FUNCTIONAL_ASSESSMENT: PREVENTS OR INTERFERES SOME ACTIVE ACTIVITIES AND ADLS

## 2022-12-21 NOTE — PROGRESS NOTES
This RN concerned about patient's neuro status. Patient was alert and oriented and able to talk and answer all questions earlier in shift. Upon recent assessment patient rarely speaking at all. Patient was getting up to bathroom without difficulty earlier in the shift. Upon reassessment patient weaker and having trouble getting out of bed. Patient had an episode of incontinence as well. Neuro NP notified via [employer] at [address] and came to bedside where patient was using the bathroom. Neuro NP informed this RN to call a code stroke at 1843. Patient assisted back to bed and taken down for stat head CT per verbal orders.

## 2022-12-21 NOTE — PROGRESS NOTES
PACU Transfer to Floor Note    Procedure(s):  LEFT TREPHINE CRANIOTOMY FOR SUBDURAL HEMATOMA EVACUATION    Current Allergies: Niacin and related, Ranexa [ranolazine], Adhesive tape, Other, Statins depletion therapy, and Vancomycin    Pt meets criteria as per Vicente Score and ASPAN Standards to transfer to next phase of care. Recent Labs     12/21/22  1114 12/21/22  1653   POCGLU 184* 202*       Vitals:    12/21/22 1715   BP: 136/71   Pulse: 74   Resp: 11   Temp: 97.4 °F (36.3 °C)   SpO2: 98%        SpO2: 98 %    O2 Flow Rate (L/min): 2 L/min      Intake/Output Summary (Last 24 hours) at 12/21/2022 1723  Last data filed at 12/21/2022 1715  Gross per 24 hour   Intake 310 ml   Output 35 ml   Net 275 ml       Pain assessment:  present - adequately treated    Pain Level:  (pt resting with eyes closed)    Patient was assessed for alterations to skin integrity. There were not alterations observed.     Handoff report given via phone call to Thalia Members        12/21/2022 5:23 PM

## 2022-12-21 NOTE — PROGRESS NOTES
Pt with medium amount of bloody oozing from surgical site. Dr Carmen Garcia notified. He is scrubbed at this time. Neuro NP office called. CHRISTIANA Cobb came to bedside in PACU 12 to assess. Dressing & biopatch changed. No new orders. Nurse for 0552 called with update.

## 2022-12-21 NOTE — SIGNIFICANT EVENT
Point of Care Note:  Internal Medicine  Roosevelt Chiu    7:15 PM  12/20/2022    Stroke Alert:  1419 stroke alert called. Neuro critical NP had already been called, assessed the patient, and ordered imaging, directing the RN to call a code stroke overhead. Patient seen and assessed at bedside, totally aphasic expressive and receptive type. Not able to state name, or name objects, which is an acute change from this morning. Not able to follow simple commands, like \"thumbs up\" causes her to move her fingers. Appears comfortable, but confused and concerned. Responds to pain in all upper and lower extremities. Moves all extremities spontaneously. Blood glucose in the 160s. Accompanied the patient to CT scan, which the patient tolerated. Will follow up imaging, along with neurocritical NPs.     Sunshine Fong DO  PGY1, Internal Medicine  12/20/22  7:20 PM

## 2022-12-21 NOTE — PROGRESS NOTES
Pt returned to room from pacu via bed, deneid any pain at time, dressing intact to right scalp, drain in place and patent. No changes from last neuro exam, pt has expressive aphagia with delayed responses, some receptive aphagia. Orders released and diet upgraded, will place an order for dinner. Pt denied any further needs at time, will continue to monitor for pt to void post surgery.  Call device within reach

## 2022-12-21 NOTE — PROGRESS NOTES
PATIENT SENT TO OR WITH UPPER DENTURES IN PLACE.  PLACED IN DENTURE CUP WITH PATIENT LABEL AND SENT TO PACU WITH PATIENT AND CHART

## 2022-12-21 NOTE — ANESTHESIA PRE PROCEDURE
Department of Anesthesiology  Preprocedure Note       Name:  Anton Hammond   Age:  78 y.o.  :  1945                                          MRN:  [id number]         Date:  2022      Surgeon:  Dea Raymundo DPM    Procedure:  PARTIAL LEFT FOOT AMPUTATION    HPI:  70 y.o. female who is seen for evaluation of cellulitis and ulcer on the left foot. Patient states the toe rubbed on her shoes and created a wound. Was treating herself and thought it was doing well until recently the redness and black tip of the toe developed. States redness has improved since being in hospital.     EKG:  Sinus  Rhythm; Nl axis; nonspecific ST depression + T-abnormality  -Nondiagnostic  -Possible  Anterior/lateral  ischemia. ECHO:   Normal left ventricular systolic function with ejection fraction of 55-60%. No regional wall motion abnormalites are seen. Moderate concentric left ventricular hypertrophy. Grade I diastolic dysfunction with normal filling pressure. Mild mitral regurgitation. Medications prior to admission:    desonide (DESOWEN) 0.05 % cream Apply 0.05 % topically 2 times daily Apply topically 2 times daily. isosorbide mononitrate (IMDUR) 120 MG ER TAKE ONE TABLET BY MOUTH DAILY   carvedilol (COREG) 3.125 MG tablet TAKE ONE TABLET BY MOUTH TWICE A DAY WITH MEALS   ranolazine (RANEXA) 500 MG extended release tablet TAKE ONE TABLET BY MOUTH TWICE A DAY   amiodarone (CORDARONE) 200 MG tablet Take 1 tablet by mouth daily   ELIQUIS 5 MG TABS tablet TAKE ONE TABLET BY MOUTH TWICE A DAY   furosemide (LASIX) 40 MG tablet TAKE ONE TABLET BY MOUTH DAILY MAY TAKE AN ADDITIONAL 1/2 TABLET AS NEEDED FOR WEIGHT GAIN OF 3 LBS IN A DAY   nitroGLYCERIN (NITROSTAT) 0.4 MG SL tablet DISSOLVE 1 TAB UNDER TONGUE FOR CHEST PAIN - IF PAIN REMAINS AFTER 5 MIN, CALL 911 AND REPEAT DOSE.  MAX 3 TABS IN 15 MINUTES   pantoprazole (PROTONIX) 40 MG tablet TAKE ONE TABLET BY MOUTH DAILY   aspirin 81 MG EC tablet Take 1 tablet by mouth daily  Patient not taking: Reported on 10/23/2020   tolterodine (DETROL LA) 2 MG ER capsule Take 4 mg by mouth daily    fluticasone (FLONASE) 50 MCG/ACT nasal spray 1 spray by Nasal route daily. Red Yeast Rice 600 MG TABS Take 2 tablets by mouth daily. Omega-3 Fatty Acids (FISH OIL) 1000 MG CAPS Take 2 capsules by mouth daily. levothyroxine (SYNTHROID) 50 MCG tablet Take 50 mcg by mouth Daily. GLIPIZIDE Take 5 mg by mouth 2 times daily (with meals) Taking 10 mg AM & 5 mg in PM   gabapentin (NEURONTIN) 600 MG tablet Take 600 mg by mouth. May take up to 4x per day if needed for neuropathy pain. diphenhydrAMINE (BENADRYL) 25 MG tablet Take 25 mg by mouth nightly as needed. Cholecalciferol (VITAMIN D3) 1000 UNIT CAPS Take 1 capsule by mouth daily.        Current medications:     insulin lispro (HUMALOG) injection vial 0-6 Units  0-6 Units Subcutaneous TID WC    insulin lispro (HUMALOG) injection vial 0-3 Units  0-3 Units Subcutaneous Nightly    glucose (GLUTOSE) 40 % oral gel 15 g  15 g Oral PRN    dextrose 50 % IV solution  12.5 g Intravenous PRN    glucagon (rDNA) injection 1 mg  1 mg Intramuscular PRN    dextrose 5 % solution  100 mL/hr Intravenous PRN    hydrocortisone 1 % ointment   Topical BID    [Held by provider] apixaban (ELIQUIS) tablet 5 mg  5 mg Oral BID    amiodarone (CORDARONE) tablet 200 mg  200 mg Oral Daily    aspirin EC tablet 81 mg  81 mg Oral Daily    carvedilol (COREG) tablet 3.125 mg  3.125 mg Oral BID     isosorbide mononitrate (IMDUR)   120 mg Oral Daily    pantoprazole (PROTONIX) tablet 40 mg  40 mg Oral Daily    ranolazine (RANEXA) extended release  500 mg Oral BID    [Held by provider] enoxaparin (LOVENOX) injection 40 mg  40 mg Subcutaneous Daily    promethazine (PHENERGAN) tablet 12.5 mg  12.5 mg Oral Q6H PRN       ondansetron (ZOFRAN) injection 4 mg  4 mg Intravenous Q6H PRN    polyethylene glycol (GLYCOLAX) packet 17 g  17 g Oral Daily PRN    acetaminophen (TYLENOL) tablet 650 mg  650 mg Oral Q6H PRN       acetaminophen (TYLENOL) suppository 650 mg  650 mg Rectal Q6H PRN    potassium chloride 10 mEq/100 mL IVPB (Peripheral Line)  10 mEq Intravenous PRN    magnesium sulfate 2000 mg in 50 mL IVPB premix  2,000 mg Intravenous PRN    piperacillin-tazobactam (ZOSYN)  3,375 mg Intravenous Q8H    linezolid (ZYVOX) IVPB 600 mg  600 mg Intravenous Q12H     Allergies:     Niacin And Related Anaphylaxis    Adhesive Tape      Causes rash and blisters. Can use paper tape or opsite without any problems.  Other      QVacin IV antibiotic had after foot surgery placed on this for infection. Developed high fever, chills and uncontrollable shaking.     Statins Depletion Therapy      Deep muscle pain    Vancomycin      Does not recall reaction      Problem List:     Hyperlipidemia    Hypertension    CAD (coronary artery disease)    Diabetes mellitus (HCC)    Thyroid disease    Allergic rhinitis    Sleep apnea    Metabolic encephalopathy    Hepatic steatosis    Cholelithiasis    Acute renal failure (ARF) (HCC)    Small bowel obstruction (HCC)    Ischemic chest pain (HCC)    Unstable angina (HCC)    Vomiting    Obesity    NSTEMI (non-ST elevated myocardial infarction) (HCC)    Chest pain    LBBB (left bundle branch block)    Atrial fibrillation with rapid ventricular response (HCC)    Aortocoronary bypass status    Acute respiratory failure with hypoxia (HCC)    Acute hypoxemic respiratory failure (HCC)    Angina, class IV (HCC)    History of tobacco abuse    Diabetic foot infection (HCC)    Acute hematogenous osteomyelitis of left foot (HCC)    Atrial fibrillation (HCC)     Past Medical History:     Allergic rhinitis     Arthritis     CAD (coronary artery disease)     Cholelithiasis 7/3/2015    Diabetes mellitus ([facility] [address]     Hepatic steatosis 7/3/2015    Hyperlipidemia     Hypertension     Peripheral neuropathy     Sleep apnea     Has used the CPAP x 25 yrs now per pt.     Thyroid disease     Vitamin B12 deficiency      Past Surgical History:     CARDIAC CATHETERIZATION  2017    SVG to OM2 100% ostium    CHOLECYSTECTOMY      COLECTOMY  1998    diverticulitis     COLONOSCOPY      CORONARY ANGIOPLASTY      CORONARY ARTERY BYPASS GRAFT  2005    x 3    DIAGNOSTIC CARDIAC CATH LAB PROCEDURE      FOOT SURGERY      Multiple foot surgeries bilateral    OTHER SURGICAL HISTORY  2012    achilles tendon lengthening,arthroplasty,matatarsalhead resection    OTHER SURGICAL HISTORY Right 3/20/13    FUSION OF RIGHT GREAT TOE JOINT WITH WIRE FIXATION, DEBRIEDMENT OF WOUND RIGHT GREAT TOE    TONSILLECTOMY AND ADENOIDECTOMY      TUBAL LIGATION      Laparoscopic    UPPER GASTROINTESTINAL ENDOSCOPY       Social History:     Smoking status: Former Smoker     Packs/day: 1.00     Years: 30.00     Pack years: 30.00     Quit date: 1998     Years since quittin.8    Smokeless tobacco: Never Used   Substance Use Topics    Alcohol use: No     Vital Signs (Current):   BP: 138/71 Pulse: 63   Resp: 18 SpO2: 98   Temp: 98.2 °F (36.8 °C)   Height: 5' 3\" (1.6 m)  (21) Weight: 228 lb 8 oz (103.6 kg)  (21)   BMI: 40.6            21 1456 21 1851 21 0228 21 0659   BP: 116/70 112/61 120/64 126/84   Pulse: 67 72 63 64   Resp: 18 18 18 18   Temp: 97 °F (36.1 °C) 97.2 °F (36.2 °C) 97.5 °F (36.4 °C) 97 °F (36.1 °C)   TempSrc: Oral Oral Oral Oral   SpO2: 97% 96% 96% 94%            BP Readings from Last 3 Encounters:   22 130/77   22 138/81   22 118/70     NPO Status: >8 hrs                        BMI:   Wt Readings from Last 3 Encounters:   22 199 lb 4.7 oz (90.4 kg)   22 223 lb (101.2 kg)   22 220 lb (99.8 kg)     There is no height or weight on file to calculate BMI.    CBC:    WBC 7.8 2021    HGB 12.9 2021    HCT 39.3 2021     2021     CMP:     03/27/2021    K 3.8 03/27/2021     03/27/2021    CO2 30 03/27/2021    BUN 15 03/27/2021    CREATININE 1.5 03/27/2021    GFRAA 41 03/27/2021    GLUCOSE 138 03/27/2021    PROT 7.4 03/27/2021    CALCIUM 9.3 03/27/2021    BILITOT 0.4 03/27/2021    ALKPHOS 103 03/27/2021    AST 15 03/27/2021    ALT 11 03/27/2021     POC Tests:     03/29/21  1053   POCGLU 142*     Coags:    PROTIME 12.6 02/24/2020    INR 1.09 02/24/2020     COVID-19 Screening (If Applicable): COVID19 Not Detected 03/29/2021     Anesthesia Evaluation  Patient summary reviewed and Nursing notes reviewed no history of anesthetic complications:   Airway: Mallampati: II  TM distance: >3 FB   Neck ROM: limited  Comment:  Thick neck girth  Mouth opening: < 3 FB   Dental:    (+) edentulous      Pulmonary: breath sounds clear to auscultation  (+) sleep apnea: on CPAP,  current smoker (quit 1998)    (-) wheezes                           Cardiovascular:  Exercise tolerance: poor (<4 METS),   (+) hypertension:, angina: with exertion, pacemaker: pacemaker, past MI: > 6 months, CAD: obstructive, CABG/stent:, dysrhythmias: atrial fibrillation, CHF:, hyperlipidemia    (-) murmur    NYHA Classification: II  ECG reviewed  Rhythm: regular  Rate: normal  Echocardiogram reviewed         Beta Blocker:  Not on Beta Blocker      ROS comment: Results for orders placed or performed during the hospital encounter of 02/16/22  -Echo Complete:        Result                      Value             Ref Range           Left Ventricular Eject*     58                                    LVEF MODALITY               ECHO                                  Neuro/Psych:   (+) neuromuscular disease:,    (-) seizures, TIA and CVA            ROS comment: +DM neuropathy    Chronic/acute left SDH   Some expressive aphasia  GI/Hepatic/Renal:   (+) liver disease:, renal disease: CRI,           Endo/Other:    (+) DiabetesType II DM, poorly controlled, , : arthritis: OA., . Abdominal:   (+) obese,           Vascular: Other Findings:             Anesthesia Plan      general     ASA 4     (MAC/TIVA-> GA/LMA if neeed)  Induction: intravenous. MIPS: Prophylactic antiemetics administered. Anesthetic plan and risks discussed with patient. Plan discussed with CRNA.     Attending anesthesiologist reviewed and agrees with Preprocedure content          Florencio Clayton DO

## 2022-12-21 NOTE — ANESTHESIA POSTPROCEDURE EVALUATION
Department of Anesthesiology  Postprocedure Note    Patient: Anayeli Malave  MRN: [id number]  YOB: 1945  Date of evaluation: 12/21/2022      Procedure Summary     Date: 12/21/22 Room / Location: [address] [place] [address] / [address]    Anesthesia Start: 1319 Anesthesia Stop: 1728    Procedure: LEFT TREPHINE CRANIOTOMY FOR SUBDURAL HEMATOMA EVACUATION (Left) Diagnosis:       Subdural hematoma      (Subdural hematoma [S06. 5XAA])    Surgeons: Satish Ontiveros MD Responsible Provider: Ming Arcos MD    Anesthesia Type: general ASA Status: 4          Anesthesia Type: No value filed.     Vicente Phase I: Vicente Score: 8    Vicente Phase II:        Anesthesia Post Evaluation    Patient location during evaluation: ICU  Patient participation: complete - patient participated  Level of consciousness: awake and alert  Airway patency: patent  Nausea & Vomiting: no nausea and no vomiting  Complications: no  Cardiovascular status: hemodynamically stable  Respiratory status: acceptable  Hydration status: euvolemic  Multimodal analgesia pain management approach

## 2022-12-21 NOTE — PLAN OF CARE
Problem: Discharge Planning  Goal: Discharge to home or other facility with appropriate resources  Outcome: Not Progressing  Flowsheets (Taken 12/21/2022 1352)  Discharge to home or other facility with appropriate resources:   Identify barriers to discharge with patient and caregiver   Arrange for needed discharge resources and transportation as appropriate   Identify discharge learning needs (meds, wound care, etc)   Arrange for interpreters to assist at discharge as needed     Problem: Pain  Goal: Verbalizes/displays adequate comfort level or baseline comfort level  Outcome: Not Progressing  Flowsheets (Taken 12/21/2022 1352)  Verbalizes/displays adequate comfort level or baseline comfort level: Encourage patient to monitor pain and request assistance     Problem: Discharge Planning  Goal: Discharge to home or other facility with appropriate resources  Outcome: Not Progressing  Flowsheets (Taken 12/21/2022 1352)  Discharge to home or other facility with appropriate resources:   Identify barriers to discharge with patient and caregiver   Arrange for needed discharge resources and transportation as appropriate   Identify discharge learning needs (meds, wound care, etc)   Arrange for interpreters to assist at discharge as needed     Problem: Pain  Goal: Verbalizes/displays adequate comfort level or baseline comfort level  Outcome: Not Progressing  Flowsheets (Taken 12/21/2022 1352)  Verbalizes/displays adequate comfort level or baseline comfort level: Encourage patient to monitor pain and request assistance     Problem: Discharge Planning  Goal: Discharge to home or other facility with appropriate resources  Outcome: Not Progressing  Flowsheets (Taken 12/21/2022 1352)  Discharge to home or other facility with appropriate resources:   Identify barriers to discharge with patient and caregiver   Arrange for needed discharge resources and transportation as appropriate   Identify discharge learning needs (meds, wound care, etc)   Arrange for interpreters to assist at discharge as needed     Problem: Pain  Goal: Verbalizes/displays adequate comfort level or baseline comfort level  Outcome: Not Progressing  Flowsheets (Taken 12/21/2022 1352)  Verbalizes/displays adequate comfort level or baseline comfort level: Encourage patient to monitor pain and request assistance     Problem: Discharge Planning  Goal: Discharge to home or other facility with appropriate resources  Outcome: Not Progressing  Flowsheets (Taken 12/21/2022 1352)  Discharge to home or other facility with appropriate resources:   Identify barriers to discharge with patient and caregiver   Arrange for needed discharge resources and transportation as appropriate   Identify discharge learning needs (meds, wound care, etc)   Arrange for interpreters to assist at discharge as needed     Problem: Pain  Goal: Verbalizes/displays adequate comfort level or baseline comfort level  Outcome: Not Progressing  Flowsheets (Taken 12/21/2022 1352)  Verbalizes/displays adequate comfort level or baseline comfort level: Encourage patient to monitor pain and request assistance     Problem: Discharge Planning  Goal: Discharge to home or other facility with appropriate resources  Outcome: Not Progressing  Flowsheets (Taken 12/21/2022 1352)  Discharge to home or other facility with appropriate resources:   Identify barriers to discharge with patient and caregiver   Arrange for needed discharge resources and transportation as appropriate   Identify discharge learning needs (meds, wound care, etc)   Arrange for interpreters to assist at discharge as needed     Problem: Pain  Goal: Verbalizes/displays adequate comfort level or baseline comfort level  Outcome: Not Progressing  Flowsheets (Taken 12/21/2022 1352)  Verbalizes/displays adequate comfort level or baseline comfort level: Encourage patient to monitor pain and request assistance     Problem: Discharge Planning  Goal: Discharge to home or other facility with appropriate resources  Outcome: Not Progressing  Flowsheets (Taken 12/21/2022 1352)  Discharge to home or other facility with appropriate resources:   Identify barriers to discharge with patient and caregiver   Arrange for needed discharge resources and transportation as appropriate   Identify discharge learning needs (meds, wound care, etc)   Arrange for interpreters to assist at discharge as needed     Problem: Pain  Goal: Verbalizes/displays adequate comfort level or baseline comfort level  Outcome: Not Progressing  Flowsheets (Taken 12/21/2022 1352)  Verbalizes/displays adequate comfort level or baseline comfort level: Encourage patient to monitor pain and request assistance

## 2022-12-21 NOTE — PROGRESS NOTES
NEUROLOGY / NEUROCRITICAL CARE PROGRESS NOTE       Patient Name: Rick Sánchez YOB: 1945   Sex: Female Age: 78 yrs     CC / Reason for Consult: SDH    Interval Hx / Changes over last 24 hours:   Evening of 12/20, had code stroke called, patient had difficulty getting to the bathroom, was not answering questions and wouldn't follow commands. CT head was stable and CTA head and neck was unremarkable. This morning, she continues to be alert, says hello and will answer yes and no appropriately. She will follow commands, appears to have a bit of neglect of her R side and slight weakness in her LLE but can follow all commands asked. INR 1.23    Planning for OR today for evacuation of SDH.      ROS: limited by aphasia, but no complaints at this time    HISTORY   Admission HPI:   Rick Sánchez is a 78 y.o. y/o female with PMH significant for atrial fibrillation, CHF, DM, HTN, HLD, BERTRAND, neuropathy, syncope, and B12 deficiency who I am asked to see for aphasia. Given her aphasia, history was limited. History obtained via chart review. She presented to [facility] ED 12/19 with isolated, acute-onset of mild-moderate aphasia which began 12/18. CT of the head showed a left-convexity acute on chronic SDH. [facility] Neurosurgery was consulted & recommended transfer for closer monitoring but no need for anticoagulation reversal.     Reynaldo Yaya tells me today her symptoms are improved (mild). Based on my exam, I suspect she has some degree of receptive aphasia in addition to her expressive aphasia. This morning, there was some concern that she wasn't awakening as easily. A head CT was done, which was stable.       PMH Past Medical History:   Diagnosis Date    A-fib [facility])     Allergic rhinitis     Arthritis     CAD (coronary artery disease)     Cardiac pacemaker     CHF (congestive heart failure) ([facility] [address]     Cholelithiasis 07/03/2015    Diabetes mellitus ([facility] [address]     Accuchecks daily once a day at home in A.M. Encounter for imaging to screen for metal prior to MRI 12/20/2022    MRI Conditional Medtronic Dolores XT DR model#W1DR01  Leads: RA [id number] RV [id number] implanted 2/17/22. Normal lmode. 1.5T or 3.0T. Pt must be A/OX4 per [employer] guidelines. [employer] rep and RN must be present. Follow all other [employer] guidelines. Pt currently follows     ESRD (end stage renal disease) ([facility] [address]     Gastroparesis     Hepatic steatosis 07/03/2015    Hyperlipidemia     Hypertension     Peripheral neuropathy     Sleep apnea     Has used the CPAP x 25 yrs now per pt. Syncope     Thyroid disease     Vitamin B12 deficiency       Allergies Allergies   Allergen Reactions    Niacin And Related Anaphylaxis    Ranexa [Ranolazine] Hallucinations    Adhesive Tape      Causes rash and blisters. Can use paper tape or opsite without any problems. Other      QVacin IV antibiotic had after foot surgery placed on this for infection. Developed high fever, chills and uncontrollable shaking.     Statins Depletion Therapy      Deep muscle pain    Vancomycin      Does not recall reaction       Diet Diet NPO Exceptions are: Sips of Water with Meds   Isolation No active isolations     CURRENT SCHEDULED MEDICATIONS   Inpatient Medications     levETIRAcetam, 1,000 mg, IntraVENous, Q12H    sodium chloride flush, 5-40 mL, IntraVENous, 2 times per day    isosorbide mononitrate, 120 mg, Oral, Daily    gabapentin, 300 mg, Oral, TID    carvedilol, 3.125 mg, Oral, BID WC    pantoprazole, 40 mg, Oral, Daily    trospium, 20 mg, Oral, Nightly    levothyroxine, 50 mcg, Oral, Daily    [Held by provider] furosemide, 40 mg, Oral, Daily    insulin lispro, 0-8 Units, SubCUTAneous, TID WC    insulin lispro, 0-4 Units, SubCUTAneous, Nightly    [COMPLETED] dexamethasone, 10 mg, IntraVENous, Once **FOLLOWED BY** dexamethasone, 4 mg, IntraVENous, Q6H    ceFAZolin (ANCEF) IVPB, 2,000 mg, IntraVENous, On Call to OR   Infusions    sodium chloride dextrose      sodium chloride 100 mL/hr at 12/21/22 0039      Antibiotics   Recent Abx Admin        No antibiotic orders with administrations found. LABS   Metabolic Panel Recent Labs     12/19/22  1900 12/20/22  0639 12/21/22  0542    140 141   K 4.1 3.7 4.0    102 105   CO2 22 27 24   BUN 15 17 22*   CREATININE 1.2 1.2 1.2   GLUCOSE 110* 112* 187*   CALCIUM 9.9 8.9 9.1   LABALBU 3.9  --   --    ALKPHOS 125  --   --    ALT 13  --   --    AST 17  --   --       CBC / Coags Recent Labs     12/19/22  1820 12/20/22  0639 12/20/22  0902 12/21/22  0542   WBC 7.1 7.4  --  9.7   RBC 5.04 4.67  --  4.81   HGB 14.6 13.9  --  14.4   HCT 44.4 40.7  --  41.7    156  --  162   INR  --   --  1.25* 1.23*      Other No results for input(s): LABA1C, LDLCALC, TRIG, TSH, [id number], FOLATE, LABSALI, COVID19 in the last 72 hours. No results for input(s): PHENYTOIN, KEPPRA, LACOSA, LAMO, VALPROATE, LACTSEPSIS, LACTA in the last 72 hours. DIAGNOSTICS   IMAGES:  Images personally reviewed and agree w/ radiology interpretation. Head CT w/o Contrast:  Impression       1. Unchanged subacute left cerebral convexity subdural hematoma with unchanged 5 mm rightward midline shift. CTA of Head / Neck w/ Contrast:  Impression       1. No aneurysms, vascular occlusions, or intracranial stenoses identified. 2.  No significant stenosis in the extracranial vertebral or carotid arteries. MRI Brain w/o Contrast:     Impression       1. Stable size of subacute left cerebral convexity subdural hematoma (2 cm thick) with stable mass effect and mild left to right midline shift measuring 4 mm   2. Mild chronic small vessel ischemia and mild generalized atrophy   3. No evidence for intracranial mass or vascular lesion       MRA Head WO Contrast:  Impression       1. MRA limited by motion artifact   2.  No evidence for dural arteriovenous fistula     Routine EEG:  IMPRESSION:  This is a normal EEG in the awake, drowsy, and asleep states. No focal or epileptiform abnormalities.     PHYSICAL EXAMINATION     PHYSICAL EXAM:  Vitals:    12/20/22 1900 12/20/22 2229 12/21/22 0225 12/21/22 0745   BP: 119/73 (!) 104/58 126/89 111/74   Pulse: 94 94 95 91   Resp: 18 18 18 17   Temp: 98.1 °F (36.7 °C) 97.9 °F (36.6 °C) 97.7 °F (36.5 °C) 97.6 °F (36.4 °C)   TempSrc: Oral Axillary Axillary Axillary   SpO2: 97% 94% 95% 95%   Weight:       Height:             General: Alert, no distress, well-nourished  Neurologic  Mental status:   orientation to person, able to nod appropriately when given options for place and time   Attention intact as able to attend well to the exam     Language answers yes and no to simple questions appropriately, will try to speak a bit more at times but having difficulty getting words out   Comprehension intact; follows simple commands    Cranial nerves:   CN2: Visual fields full w/o extinction on confrontational testing   Fundoscopic Exam: Unable to visualize fundi  CN 3,4,6: Pupils equal and reactive to light, extraocular muscles intact  CN5: Facial sensation symmetric (nods yes to feeling light touch)  CN7: Face symmetric  CN8: Hearing symmetric to spoken voice  CN9: Palate elevated symmetrically  CN11: Traps full strength on shoulder shrug  CN12: Tongue midline with protrusion    Motor Exam: appears to have some mild neglect of her R side, but can readily follow commands throughout   R  L    Deltoid 5  5   Biceps 5 5   Triceps 5 5   Wrist extension  5 5   Interossei 5 5      R  L    Hip flexion  4  5   Hip extension  4 5   Knee flexion  5 5   Knee extension  5 5   Ankle dorsiflexion  5 5   Ankle plantar flexion  5 5       Sensory: light touch intact and symmetric in all 4 extremities (nods head yes to feeling light touch)  Cerebellar/coordination: finger nose finger normal without ataxia  Tone: normal in all 4 extremities  Gait: held for patient safety    OTHER SYSTEMS:  Cardiovascular: Warm, appears well perfused   Respiratory: Easy, non-labored respiratory pattern   Abdominal: Abdomen is without distention   Extremities: Upper and lower extremities are atraumatic in appearance without deformity. No swelling or erythema. ASSESSMENT & RECOMMENDATIONS   Assessment:  Ms. Shawna Cancino is a 77 y/o woman with CHF, afib (on Eliquis), HTN, HLD, B12 deficiency, CAD (on asa) who presents with acute, mild aphasia found to have mixed density SDH, stable on imaging from 12/19. Her aphasia acutely worsened on the evening of 12/20 to the point she could not follow commands or answer questions, but has improved (though not to where she was prior to that event) since then. Given her CT head was stable I'm most concerned for a possible seizure overnight vs worsening of her aphasia purely due to the location of her subdural. She does nod to remembering the event. Plan:  - Surgical plan per Neurosurgery  - Increase Keppra to 1000 mg BID given event yesterday  - Q4 hour neuro checks  - Depending on patients symptoms and clinical course, may need cvEEG, will continue to monitor  - Routine EEG completed afternoon of 12/20, uremarkable  - MRI and MRA reviewed, unremarkable other than stable SDH  - Seizure precautions  - Please call Neurology with any exam changes        Meryle Herrlich, APRN - CNP   Neurology & Neurocritical Care   12/21/2022 8:48 AM    Floor / PCU Patients:  Neurology Line: [phone]  Perfect[facility]erve: [facility] Neurology    I spent 26 minutes in the care of this patient. Over 50% of that time was in face-to-face counseling regarding disease process, diagnostic testing, preventative measures, and answering patient and family questions.

## 2022-12-21 NOTE — PROGRESS NOTES
Speech Language Pathology    Attempted to see patient for therapy. Reviewed chart. Noted status change. D/w RN; will hold as patient resting and not participatory, with surgical procedure planned today. Will follow-up at later time/date as appropriate. Diana Brandt [place], [phone]  Pg.  # [phone]

## 2022-12-21 NOTE — PROGRESS NOTES
Physical Therapy  Discharge    Pt with decline in status last evening. Code stroke called. RN reports pt some better this AM, but still with increased weakness and decreased speech. Spoke with Gunnar Piedra NP, who advised no therapy today due to surgery scheduled for early afternoon. Will D/C from PT due to surgery. Please reorder when appropriate.      [place], [place] [address][id number]

## 2022-12-21 NOTE — PROGRESS NOTES
NEUROSURGERY PROGRESS NOTE    Patient Name: Darylene Genet YOB: 1945   Sex: Female Age: 78 yrs     Medical Record Number: [id number] [place] Number: [de-identified]   Room Number: [facility] Day: Hospital Day: 2     Subjective: Pt had code stroke called because she was not following commands and would not speak. CTA was unremarkable. She is alert answers yes and no approp. She follows commands with some neglect of R side and Left leg weakness which is new form yesterday    Objective:    VITAL SIGNS   /77   Pulse 95   Temp 97.6 °F (36.4 °C) (Axillary)   Resp 16   Ht 5' 3\" (1.6 m)   Wt 199 lb 4.7 oz (90.4 kg)   SpO2 94%   BMI 35.30 kg/m²    Height Height: 5' 3\" (160 cm)   Weight Weight: 199 lb 4.7 oz (90.4 kg)        Allergies Allergies   Allergen Reactions    Niacin And Related Anaphylaxis    Ranexa [Ranolazine] Hallucinations    Adhesive Tape      Causes rash and blisters. Can use paper tape or opsite without any problems. Other      QVacin IV antibiotic had after foot surgery placed on this for infection. Developed high fever, chills and uncontrollable shaking.     Statins Depletion Therapy      Deep muscle pain    Vancomycin      Does not recall reaction       NPO Status Diet NPO Exceptions are: Sips of Water with Meds   Isolation No active isolations     LABS   Basic Metabolic Profile Recent Labs     12/19/22  1900 12/20/22  0639 12/21/22  0542    140 141    102 105   CO2 22 27 24   BUN 15 17 22*   CREATININE 1.2 1.2 1.2   GLUCOSE 110* 112* 187*      Complete Blood Count Recent Labs     12/19/22  1820 12/20/22  0639 12/21/22  0542   WBC 7.1 7.4 9.7   RBC 5.04 4.67 4.81      Coagulation Studies Recent Labs     12/20/22  0902 12/21/22  0542   INR 1.25* 1.23*        MEDICATIONS   Inpatient Medications levETIRAcetam, 1,000 mg, IntraVENous, Q12H    insulin glargine, 8 Units, SubCUTAneous, Nightly    sodium chloride flush, 5-40 mL, IntraVENous, 2 times per day    isosorbide mononitrate, 120 mg, Oral, Daily    gabapentin, 300 mg, Oral, TID    carvedilol, 3.125 mg, Oral, BID WC    pantoprazole, 40 mg, Oral, Daily    trospium, 20 mg, Oral, Nightly    levothyroxine, 50 mcg, Oral, Daily    [Held by provider] furosemide, 40 mg, Oral, Daily    insulin lispro, 0-8 Units, SubCUTAneous, TID WC    insulin lispro, 0-4 Units, SubCUTAneous, Nightly    [COMPLETED] dexamethasone, 10 mg, IntraVENous, Once **FOLLOWED BY** dexamethasone, 4 mg, IntraVENous, Q6H   Infusions    sodium chloride      dextrose      sodium chloride 100 mL/hr at 12/21/22 1319      Antibiotics   Recent Abx Admin                     ceFAZolin (ANCEF) 2,000 mg in sodium chloride 0.9 % 50 mL IVPB (mini-bag) (mg) 2,000 mg New Bag 12/21/22 1324    ceFAZolin (ANCEF) 2,000 mg in sodium chloride 0.9 % 50 mL IVPB (mini-bag) (mg) 2,000 mg New Bag 12/21/22 1017                     Neurologic Exam:  Mental status: alert oriented to person and when offered   Language answers yes and no to simple questions appropriately, will try to speak a bit more at times but having difficulty getting words out              Comprehension intact; follows simple commands    Cranial Nerves:  II: Visual acuity not tested, denies new visual changes / diplopia  III, IV, VI: PERRL, 3 mm bilaterally, EOMI, no nystagmus noted  V: Facial sensation intact bilaterally to touch  VII: Face symmetric  VIII: Hearing intact bilaterally to spoken voice  IX: Palate movement equal bilaterally  XI: Shoulder shrug equal bilaterally  XII: Tongue midline      Musculoskeletal:   Gait: Not tested   Tone: normal  Sensory: intact to all extremities  Motor strength:    Right  Left    Right  Left    Deltoid  5 5  Hip Flex  4 5   Biceps  5 5  Knee Extensors  4 5   Triceps  5 5  Knee Flexors  5 5   Wrist Ext  5 5 Ankle Dorsiflex. 5 5   Wrist Flex  5 5  Ankle Plantarflex. 5 5   Handgrip  5 5  Ext Xavier Longus  5 5   Thumb Ext  5 5         Assessment  Patient is a 78 y.o. female w/ acute on chronic SDH. Trephine L crani for sd evac today. Spoke to daughter, Noni Dickerson, at length about procedure and possible complications. She gave phone consent for her to have the surgery. Plan:  Neurologic exam frequency:q4  Mobility:hold on PT/OT till tomorrow  Steroids: continue  Seizure Prophylaxis:keppra being managed by neurology  DVT Prophylaxis: SCDs   GI Prophylaxis:protonix  NPO  Dispo Planning:will have trephine crani for sd evac    Patient was seen with Dr. Joana Garcia who agrees with above assessment and plan. Electronically signed by: DARIA Carrasco CNP, 12/21/2022 2:00 PM   Neurosurgery Nurse Practitioner  [phone]   I spent 45 minutes in the care of this patient. Over 50% of that time was in face-to-face counseling regarding disease process, diagnostic testing, preventative measures, and answering patient and family questions.

## 2022-12-21 NOTE — OP NOTE
Operative Report    PATIENT NAME: Rahul Murillo OF BIRTH: 1945  MEDICAL RECORD# [id number]  SURGERY DATE: 12/21/2022  SURGEON:  Lluvia Villatoro MD, PhD  ASSISTANT:  Alexandria Alonzo PA-C., served as assistant on this surgery due to the complex nature of the surgery and the lack of a resident or fellow assistant. She provided assistance with critical tissue retraction at parts of the surgery, wound closure and assistance with protecting critical neuro elements. DICTATED BY: Lluvia Villatoro MD, PhD    PREOPERATIVE DIAGNOSIS(ES):  1. Left Acute/Chronic Subdural Hematoma    POSTOPERATIVE DIAGNOSIS(ES):  1. Left Acute/Chronic Subdural Hematoma    PROCEDURE(S) PERFORMED:  1. Left frontal trephine craniotomy for evacuation of acute/chronic subdural hemorrhage    SURGEON: Ana Lerner M.D. ANESTHESIA: General    IMPLANTS:  Synthes cranial plating system    INDICATIONS FOR SURGERY:  Salome Hawthorne is a 78 y.o. who was recently diagnosed with acute/chronic subdural hemorrhage. The patient was experiencing right side weakness and dense aphasia. I recommended drainage of the collection due to his symptoms and significant mass effect resulting in midline shift. The patient was aware of the potential benefits and the possible risks including (but not limited to): infection, bleeding, seizures, wound healing problems, stroke, coma or even death. They wished to proceed with operative intervention. DETAILS OF PROCEDURE: The patient was brought to the operating room, placed on the table in supine position and underwent endotracheal intubation and general anesthesia. The head was placed straight up in a Jose horseshoe headrest. The right frontal region was shaved, prepped and draped in the usual sterile fashion. We made an linear skin incision along the superior temporal line.  The pericranium was incised with the Bovie and a small bur hole was made, the dental dissector was used to free the underlying dura. A B1 with the footplate attachment was used to turn a trephine craniotomy and this was removed in a single piece. The 7 flat MICHAEL catheter was tunneled in the subgaleal plane. The underlying dura was coagulated and opened in a cruciate fashion, releasing dark hemorrhagic stained fluid under pressure. A red rubber catheter was passed into the subdural space and it was irrigated copiously until the fluid returned clear. The red catheter was then removed and the MICHAEL place into the subdural space. The dura was cover with gelfoam and the craniotomy flap was replaced with a Synthes plates and screws. The wound was irrigated copiously with Bacitracin. The galea was closed with 2-0 Vicryl sutures. The skin was closed with a running 4-0 Monocryl and dressed with dermabond. The subdural drain was attached to the Bili drainage bag. The patient awoke in the operating room and was extubated. The patient was brought to the recovery room in good condition with stable vital signs. ESTIMATED BLOOD LOSS: 50 mL. BLOOD TRANSFUSIONS: None    DRAINS: 7 Flat MICHAEL in the subdural space    In conformance with HCFA regulations, I affirm that I was present for the entire operation.     Alicia Gonzalez M.D.

## 2022-12-21 NOTE — PLAN OF CARE
Brief Neurology Plan of Care:    Saw patient after she returned from PACU. She is alert, following commands readily with equal strength in all four extremities. She is able to answer orientation questions correctly, can identify objects, and is able to read the staff's badges as they come in the room. She did form some complete sentences, seems as if her aphasia has significantly improved. Dressing in place. Please call Neurology with any exam changes. No need for cvEEG at this time. Will continue to follow.     DARIA Mejias-CNP  Neurology & Neurocritical Care   Neurology Line: [phone]  [facility]: [facility] Neurology & Neuro Critical Care NPs

## 2022-12-21 NOTE — PROGRESS NOTES
Occupational Therapy  Chun Patel     Will hold this date per RN, pt with status change (code stroke called 1843 on 12/20) patient resting and not participatory, with surgical procedure planned today. Continue with POC.   Neil Sarabia, [address]

## 2022-12-21 NOTE — PROGRESS NOTES
Report called from PACU, pt returning to floor, incision redressed by neuro NP, continue to monitor for excessive bleeding. Robaxin given in PACU.  Will continue to monitor

## 2022-12-21 NOTE — PROGRESS NOTES
Patient admitted to PACU # 12 from OR at 1440 post [address] - Left per Rudy Benavides MD.  Attached to PACU monitoring system and report received from anesthesia provider. Patient was reported to be hemodynamically stable during procedure. Patient drowsy on admission and denied pain. Pt has drain top of head at surgical site. Surgical site is coverd with gauze and medipore tape.

## 2022-12-21 NOTE — PROGRESS NOTES
Hospitalist Progress Note      PCP: DARIA Rouse - CNP    Date of Admission: 12/20/2022        Subjective:     No new complaints. Still has somewhat finding difficulty. Scheduled for surgery today    Medications:  Reviewed    Infusion Medications    sodium chloride      dextrose      sodium chloride 100 mL/hr at 12/21/22 0039     Scheduled Medications    levETIRAcetam  1,000 mg IntraVENous Q12H    sodium chloride flush  5-40 mL IntraVENous 2 times per day    isosorbide mononitrate  120 mg Oral Daily    gabapentin  300 mg Oral TID    carvedilol  3.125 mg Oral BID WC    pantoprazole  40 mg Oral Daily    trospium  20 mg Oral Nightly    levothyroxine  50 mcg Oral Daily    [Held by provider] furosemide  40 mg Oral Daily    insulin lispro  0-8 Units SubCUTAneous TID WC    insulin lispro  0-4 Units SubCUTAneous Nightly    dexamethasone  4 mg IntraVENous Q6H    ceFAZolin (ANCEF) IVPB  2,000 mg IntraVENous On Call to OR     PRN Meds: sodium chloride flush, sodium chloride, ondansetron **OR** ondansetron, polyethylene glycol, acetaminophen **OR** acetaminophen, diphenhydrAMINE, glucose, dextrose bolus **OR** dextrose bolus, glucagon (rDNA), dextrose      Intake/Output Summary (Last 24 hours) at 12/21/2022 0913  Last data filed at 12/20/2022 2141  Gross per 24 hour   Intake 140 ml   Output --   Net 140 ml         Physical Exam Performed:    /74   Pulse 91   Temp 97.6 °F (36.4 °C) (Axillary)   Resp 17   Ht 5' 3\" (1.6 m)   Wt 199 lb 4.7 oz (90.4 kg)   SpO2 95%   BMI 35.30 kg/m²     General appearance: No apparent distress, appears stated age and cooperative. HEENT: Pupils equal, round, and reactive to light. Conjunctivae/corneas clear. Neck: Supple, with full range of motion. No jugular venous distention. Trachea midline. Respiratory:  Normal respiratory effort. Clear to auscultation, bilaterally without Rales/Wheezes/Rhonchi.   Cardiovascular: Regular rate and rhythm with normal S1/S2 without murmurs, rubs or gallops. Abdomen: Soft, non-tender, non-distended with normal bowel sounds. Musculoskeletal: No clubbing, cyanosis or edema bilaterally. Full range of motion without deformity. Skin: Skin color, texture, turgor normal.  No rashes or lesions. Neurologic:  Neurovascularly intact without any focal sensory/motor deficits. Cranial nerves: II-XII intact, grossly non-focal.  Psychiatric: Alert and oriented, thought content appropriate, normal insight  Capillary Refill: Brisk, 3 seconds, normal   Peripheral Pulses: +2 palpable, equal bilaterally       Labs:   Recent Labs     12/19/22  1820 12/20/22  0639 12/21/22  0542   WBC 7.1 7.4 9.7   HGB 14.6 13.9 14.4   HCT 44.4 40.7 41.7    156 162       Recent Labs     12/19/22  1900 12/20/22  0639 12/21/22  0542    140 141   K 4.1 3.7 4.0    102 105   CO2 22 27 24   BUN 15 17 22*   CREATININE 1.2 1.2 1.2   CALCIUM 9.9 8.9 9.1       Recent Labs     12/19/22  1900   AST 17   ALT 13   BILITOT 0.8   ALKPHOS 125       Recent Labs     12/20/22  0902 12/21/22  0542   INR 1.25* 1.23*       Recent Labs     12/19/22  1900   TROPONINI <0.01         Urinalysis:      Lab Results   Component Value Date/Time    NITRU Negative 11/28/2022 01:40 PM    WBCUA 0-2 05/25/2022 03:40 PM    BACTERIA Rare 05/25/2022 03:40 PM    RBCUA 0-2 05/25/2022 03:40 PM    BLOODU Negative 11/28/2022 01:40 PM    SPECGRAV <=1.005 11/28/2022 01:40 PM    GLUCOSEU Negative 11/28/2022 01:40 PM       Radiology:  CTA HEAD NECK W CONTRAST   Final Result      1. No aneurysms, vascular occlusions, or intracranial stenoses identified. 2.  No significant stenosis in the extracranial vertebral or carotid arteries. CT HEAD WO CONTRAST   Final Result      1. Unchanged subacute left cerebral convexity subdural hematoma with unchanged 5 mm rightward midline shift. MRI BRAIN WO CONTRAST   Final Result      1.  Stable size of subacute left cerebral convexity subdural hematoma (2 cm thick) with stable mass effect and mild left to right midline shift measuring 4 mm   2. Mild chronic small vessel ischemia and mild generalized atrophy   3. No evidence for intracranial mass or vascular lesion               MRA HEAD WO CONTRAST   Final Result      1. MRA limited by motion artifact   2. No evidence for dural arteriovenous fistula         CT HEAD WO CONTRAST   Final Result   1. Acute on chronic left subdural hematoma with approximately 5.6 mm left to right shift on coronal imaging, stable from 12/19/2022 at 1953   2. Left cerebral hemisphere cortical sulci effacement is unchanged   3. Moderate to severe periventricular microangiopathic ischemic changes, chronic small vessel disease   4. No new bleed or intraparenchymal hemorrhage. IP CONSULT TO NEUROSURGERY  IP CONSULT TO NEUROCRITICAL CARE    Assessment/Plan:      -Acute on chronic subdural hematoma  -Essential HTN-controlled-on coreg  -CAD s/p CABG 2005,EF 55-60%-antiplatelets held,on coreg  -Hx Complete heart block s/p PPM  -Paroxsymal a fib-stable-eliquis held  -Obesity BMI 35  -Hypothyroidism-on  synthroid  -DM type 2-gliplizide,trulicity on hold admission    Plan    Scheduled for [facility] evacuation today  Pt has moderate cardiac risk for surgery, may proceed to surgery without any further work up. . most recent echo 2/2022,showed EF 55-60%   Holding all antipaletelets and anticoagulants   C/w Keppra and decadron'   C/w neurochecks   C/w chronic meds    DVT Prophylaxis: scd  Diet: Diet NPO Exceptions are: Sips of Water with Meds  Code Status: Full Code  PT/OT Eval Status:               Oneida Valdez MD

## 2022-12-22 PROBLEM — I20.9 ANGINA PECTORIS, UNSPECIFIED (HCC): Status: ACTIVE | Noted: 2018-06-15

## 2022-12-22 PROBLEM — R94.31 ABNORMAL ECG: Status: ACTIVE | Noted: 2022-12-22

## 2022-12-22 LAB
ANION GAP SERPL CALCULATED.3IONS-SCNC: 12 MMOL/L (ref 3–16)
APTT: 23.6 SEC (ref 23–34.3)
BASOPHILS ABSOLUTE: 0 K/UL (ref 0–0.2)
BASOPHILS RELATIVE PERCENT: 0 %
BUN BLDV-MCNC: 26 MG/DL (ref 7–20)
CALCIUM SERPL-MCNC: 8.4 MG/DL (ref 8.3–10.6)
CHLORIDE BLD-SCNC: 106 MMOL/L (ref 99–110)
CO2: 22 MMOL/L (ref 21–32)
CREAT SERPL-MCNC: 1.1 MG/DL (ref 0.6–1.2)
EKG ATRIAL RATE: 94 BPM
EKG DIAGNOSIS: NORMAL
EKG P-R INTERVAL: 216 MS
EKG Q-T INTERVAL: 404 MS
EKG QRS DURATION: 144 MS
EKG QTC CALCULATION (BAZETT): 505 MS
EKG R AXIS: 13 DEGREES
EKG T AXIS: 168 DEGREES
EKG VENTRICULAR RATE: 94 BPM
EOSINOPHILS ABSOLUTE: 0 K/UL (ref 0–0.6)
EOSINOPHILS RELATIVE PERCENT: 0 %
GFR SERPL CREATININE-BSD FRML MDRD: 52 ML/MIN/{1.73_M2}
GLUCOSE BLD-MCNC: 162 MG/DL (ref 70–99)
GLUCOSE BLD-MCNC: 166 MG/DL (ref 70–99)
GLUCOSE BLD-MCNC: 186 MG/DL (ref 70–99)
GLUCOSE BLD-MCNC: 201 MG/DL (ref 70–99)
GLUCOSE BLD-MCNC: 207 MG/DL (ref 70–99)
HCT VFR BLD CALC: 37.9 % (ref 36–48)
HEMOGLOBIN: 12.5 G/DL (ref 12–16)
INR BLD: 1.26 (ref 0.87–1.14)
LYMPHOCYTES ABSOLUTE: 0.9 K/UL (ref 1–5.1)
LYMPHOCYTES RELATIVE PERCENT: 7.9 %
MCH RBC QN AUTO: 29.5 PG (ref 26–34)
MCHC RBC AUTO-ENTMCNC: 33 G/DL (ref 31–36)
MCV RBC AUTO: 89.3 FL (ref 80–100)
MONOCYTES ABSOLUTE: 0.4 K/UL (ref 0–1.3)
MONOCYTES RELATIVE PERCENT: 3.1 %
NEUTROPHILS ABSOLUTE: 10.2 K/UL (ref 1.7–7.7)
NEUTROPHILS RELATIVE PERCENT: 89 %
PDW BLD-RTO: 14.7 % (ref 12.4–15.4)
PERFORMED ON: ABNORMAL
PLATELET # BLD: 161 K/UL (ref 135–450)
PMV BLD AUTO: 8.8 FL (ref 5–10.5)
POTASSIUM SERPL-SCNC: 4.2 MMOL/L (ref 3.5–5.1)
PROTHROMBIN TIME: 15.8 SEC (ref 11.7–14.5)
RBC # BLD: 4.24 M/UL (ref 4–5.2)
SODIUM BLD-SCNC: 140 MMOL/L (ref 136–145)
TROPONIN: <0.01 NG/ML
WBC # BLD: 11.5 K/UL (ref 4–11)

## 2022-12-22 PROCEDURE — 6360000002 HC RX W HCPCS: Performed by: PHYSICIAN ASSISTANT

## 2022-12-22 PROCEDURE — 85025 COMPLETE CBC W/AUTO DIFF WBC: CPT

## 2022-12-22 PROCEDURE — 6370000000 HC RX 637 (ALT 250 FOR IP): Performed by: NURSE PRACTITIONER

## 2022-12-22 PROCEDURE — 2580000003 HC RX 258: Performed by: INTERNAL MEDICINE

## 2022-12-22 PROCEDURE — 94150 VITAL CAPACITY TEST: CPT

## 2022-12-22 PROCEDURE — 2060000000 HC ICU INTERMEDIATE R&B

## 2022-12-22 PROCEDURE — 97535 SELF CARE MNGMENT TRAINING: CPT

## 2022-12-22 PROCEDURE — 97530 THERAPEUTIC ACTIVITIES: CPT

## 2022-12-22 PROCEDURE — 36415 COLL VENOUS BLD VENIPUNCTURE: CPT

## 2022-12-22 PROCEDURE — 99024 POSTOP FOLLOW-UP VISIT: CPT | Performed by: NURSE PRACTITIONER

## 2022-12-22 PROCEDURE — 6360000002 HC RX W HCPCS: Performed by: NURSE PRACTITIONER

## 2022-12-22 PROCEDURE — 92507 TX SP LANG VOICE COMM INDIV: CPT

## 2022-12-22 PROCEDURE — 85730 THROMBOPLASTIN TIME PARTIAL: CPT

## 2022-12-22 PROCEDURE — 85610 PROTHROMBIN TIME: CPT

## 2022-12-22 PROCEDURE — 94640 AIRWAY INHALATION TREATMENT: CPT

## 2022-12-22 PROCEDURE — 84484 ASSAY OF TROPONIN QUANT: CPT

## 2022-12-22 PROCEDURE — 2580000003 HC RX 258

## 2022-12-22 PROCEDURE — 97162 PT EVAL MOD COMPLEX 30 MIN: CPT

## 2022-12-22 PROCEDURE — 93005 ELECTROCARDIOGRAM TRACING: CPT | Performed by: NURSE PRACTITIONER

## 2022-12-22 PROCEDURE — 6360000002 HC RX W HCPCS

## 2022-12-22 PROCEDURE — APPNB30 APP NON BILLABLE TIME 0-30 MINS: Performed by: NURSE PRACTITIONER

## 2022-12-22 PROCEDURE — 93010 ELECTROCARDIOGRAM REPORT: CPT | Performed by: INTERNAL MEDICINE

## 2022-12-22 PROCEDURE — 94761 N-INVAS EAR/PLS OXIMETRY MLT: CPT

## 2022-12-22 PROCEDURE — 97168 OT RE-EVAL EST PLAN CARE: CPT

## 2022-12-22 PROCEDURE — 97116 GAIT TRAINING THERAPY: CPT

## 2022-12-22 PROCEDURE — 80048 BASIC METABOLIC PNL TOTAL CA: CPT

## 2022-12-22 PROCEDURE — 6370000000 HC RX 637 (ALT 250 FOR IP): Performed by: INTERNAL MEDICINE

## 2022-12-22 PROCEDURE — 92526 ORAL FUNCTION THERAPY: CPT

## 2022-12-22 PROCEDURE — 2580000003 HC RX 258: Performed by: PHYSICIAN ASSISTANT

## 2022-12-22 RX ORDER — MORPHINE SULFATE 2 MG/ML
2 INJECTION, SOLUTION INTRAMUSCULAR; INTRAVENOUS ONCE
Status: COMPLETED | OUTPATIENT
Start: 2022-12-22 | End: 2022-12-22

## 2022-12-22 RX ORDER — NITROGLYCERIN 0.4 MG/1
0.4 TABLET SUBLINGUAL EVERY 5 MIN PRN
Status: DISCONTINUED | OUTPATIENT
Start: 2022-12-22 | End: 2022-12-27 | Stop reason: HOSPADM

## 2022-12-22 RX ADMIN — INSULIN GLARGINE 8 UNITS: 100 INJECTION, SOLUTION SUBCUTANEOUS at 21:08

## 2022-12-22 RX ADMIN — GABAPENTIN 300 MG: 600 TABLET, FILM COATED ORAL at 21:00

## 2022-12-22 RX ADMIN — DEXAMETHASONE SODIUM PHOSPHATE 4 MG: 4 INJECTION, SOLUTION INTRA-ARTICULAR; INTRALESIONAL; INTRAMUSCULAR; INTRAVENOUS; SOFT TISSUE at 23:21

## 2022-12-22 RX ADMIN — NITROGLYCERIN 0.4 MG: 0.4 TABLET, ORALLY DISINTEGRATING SUBLINGUAL at 23:00

## 2022-12-22 RX ADMIN — GABAPENTIN 300 MG: 600 TABLET, FILM COATED ORAL at 09:22

## 2022-12-22 RX ADMIN — HEPARIN SODIUM 5000 UNITS: 5000 INJECTION INTRAVENOUS; SUBCUTANEOUS at 23:21

## 2022-12-22 RX ADMIN — HEPARIN SODIUM 5000 UNITS: 5000 INJECTION INTRAVENOUS; SUBCUTANEOUS at 17:19

## 2022-12-22 RX ADMIN — LEVOTHYROXINE SODIUM 50 MCG: 50 TABLET ORAL at 06:18

## 2022-12-22 RX ADMIN — DEXAMETHASONE SODIUM PHOSPHATE 4 MG: 4 INJECTION, SOLUTION INTRA-ARTICULAR; INTRALESIONAL; INTRAMUSCULAR; INTRAVENOUS; SOFT TISSUE at 06:18

## 2022-12-22 RX ADMIN — PANTOPRAZOLE SODIUM 40 MG: 40 TABLET, DELAYED RELEASE ORAL at 09:22

## 2022-12-22 RX ADMIN — DEXAMETHASONE SODIUM PHOSPHATE 4 MG: 4 INJECTION, SOLUTION INTRA-ARTICULAR; INTRALESIONAL; INTRAMUSCULAR; INTRAVENOUS; SOFT TISSUE at 17:30

## 2022-12-22 RX ADMIN — SODIUM CHLORIDE: 9 INJECTION, SOLUTION INTRAVENOUS at 09:40

## 2022-12-22 RX ADMIN — NITROGLYCERIN 0.4 MG: 0.4 TABLET, ORALLY DISINTEGRATING SUBLINGUAL at 10:16

## 2022-12-22 RX ADMIN — TROSPIUM CHLORIDE 20 MG: 20 TABLET, FILM COATED ORAL at 21:00

## 2022-12-22 RX ADMIN — HEPARIN SODIUM 5000 UNITS: 5000 INJECTION INTRAVENOUS; SUBCUTANEOUS at 09:24

## 2022-12-22 RX ADMIN — LEVETIRACETAM 1000 MG: 100 INJECTION INTRAVENOUS at 21:13

## 2022-12-22 RX ADMIN — GABAPENTIN 300 MG: 600 TABLET, FILM COATED ORAL at 15:07

## 2022-12-22 RX ADMIN — SODIUM CHLORIDE, PRESERVATIVE FREE 10 ML: 5 INJECTION INTRAVENOUS at 09:24

## 2022-12-22 RX ADMIN — ISOSORBIDE MONONITRATE 120 MG: 60 TABLET, EXTENDED RELEASE ORAL at 09:22

## 2022-12-22 RX ADMIN — DEXAMETHASONE SODIUM PHOSPHATE 4 MG: 4 INJECTION, SOLUTION INTRA-ARTICULAR; INTRALESIONAL; INTRAMUSCULAR; INTRAVENOUS; SOFT TISSUE at 00:18

## 2022-12-22 RX ADMIN — CARVEDILOL 3.12 MG: 3.12 TABLET, FILM COATED ORAL at 17:30

## 2022-12-22 RX ADMIN — DEXAMETHASONE SODIUM PHOSPHATE 4 MG: 4 INJECTION, SOLUTION INTRA-ARTICULAR; INTRALESIONAL; INTRAMUSCULAR; INTRAVENOUS; SOFT TISSUE at 12:17

## 2022-12-22 RX ADMIN — LEVETIRACETAM 1000 MG: 100 INJECTION INTRAVENOUS at 00:18

## 2022-12-22 RX ADMIN — CARVEDILOL 3.12 MG: 3.12 TABLET, FILM COATED ORAL at 09:23

## 2022-12-22 RX ADMIN — LEVETIRACETAM 1000 MG: 100 INJECTION INTRAVENOUS at 09:44

## 2022-12-22 RX ADMIN — MORPHINE SULFATE 2 MG: 2 INJECTION, SOLUTION INTRAMUSCULAR; INTRAVENOUS at 10:17

## 2022-12-22 RX ADMIN — SODIUM CHLORIDE: 9 INJECTION, SOLUTION INTRAVENOUS at 06:19

## 2022-12-22 ASSESSMENT — PAIN DESCRIPTION - ORIENTATION: ORIENTATION: LEFT;UPPER

## 2022-12-22 ASSESSMENT — PAIN DESCRIPTION - FREQUENCY: FREQUENCY: CONTINUOUS

## 2022-12-22 ASSESSMENT — PAIN DESCRIPTION - DESCRIPTORS: DESCRIPTORS: ACHING;HEAVINESS

## 2022-12-22 ASSESSMENT — PAIN SCALES - GENERAL
PAINLEVEL_OUTOF10: 0
PAINLEVEL_OUTOF10: 0
PAINLEVEL_OUTOF10: 9

## 2022-12-22 ASSESSMENT — PAIN DESCRIPTION - ONSET: ONSET: SUDDEN

## 2022-12-22 ASSESSMENT — PAIN DESCRIPTION - LOCATION
LOCATION: CHEST
LOCATION: CHEST

## 2022-12-22 ASSESSMENT — PAIN - FUNCTIONAL ASSESSMENT: PAIN_FUNCTIONAL_ASSESSMENT: PREVENTS OR INTERFERES SOME ACTIVE ACTIVITIES AND ADLS

## 2022-12-22 ASSESSMENT — PAIN DESCRIPTION - PAIN TYPE: TYPE: ACUTE PAIN

## 2022-12-22 NOTE — PLAN OF CARE
Problem: Discharge Planning  Goal: Discharge to home or other facility with appropriate resources  12/21/2022 2343 by Hector Espino RN  Outcome: Progressing  12/21/2022 1352 by Chaya Bustos RN  Outcome: Not Progressing  Flowsheets (Taken 12/21/2022 1352)  Discharge to home or other facility with appropriate resources:   Identify barriers to discharge with patient and caregiver   Arrange for needed discharge resources and transportation as appropriate   Identify discharge learning needs (meds, wound care, etc)   Arrange for interpreters to assist at discharge as needed     Problem: Pain  Goal: Verbalizes/displays adequate comfort level or baseline comfort level  12/21/2022 2343 by Hector Espino RN  Outcome: Progressing  12/21/2022 1352 by Chaya Bustos RN  Outcome: Not Progressing  Flowsheets (Taken 12/21/2022 1352)  Verbalizes/displays adequate comfort level or baseline comfort level: Encourage patient to monitor pain and request assistance     Problem: Safety - Adult  Goal: Free from fall injury  12/21/2022 2343 by Hector Espino RN  Outcome: Progressing  12/21/2022 1352 by Chaya Bustos RN  Flowsheets (Taken 12/21/2022 1352)  Free From Fall Injury: Instruct family/caregiver on patient safety     Problem: ABCDS Injury Assessment  Goal: Absence of physical injury  Outcome: Progressing     Problem: Neurosensory - Adult  Goal: Achieves stable or improved neurological status  Outcome: Progressing  Goal: Achieves maximal functionality and self care  Outcome: Progressing     Problem: Chronic Conditions and Co-morbidities  Goal: Patient's chronic conditions and co-morbidity symptoms are monitored and maintained or improved  Outcome: Progressing     Problem: Discharge Planning  Goal: Discharge to home or other facility with appropriate resources  12/21/2022 2343 by Hector Espino RN  Outcome: Progressing  12/21/2022 1352 by Chaya Bustos RN  Outcome: Not Progressing  Flowsheets (Taken 12/21/2022 [phone])  Discharge to home or other facility with appropriate resources:   Identify barriers to discharge with patient and caregiver   Arrange for needed discharge resources and transportation as appropriate   Identify discharge learning needs (meds, wound care, etc)   Arrange for interpreters to assist at discharge as needed     Problem: Pain  Goal: Verbalizes/displays adequate comfort level or baseline comfort level  12/21/2022 2343 by Carl Elizondo RN  Outcome: Progressing  12/21/2022 1352 by Devante Rand RN  Outcome: Not Progressing  Flowsheets (Taken 12/21/2022 1352)  Verbalizes/displays adequate comfort level or baseline comfort level: Encourage patient to monitor pain and request assistance

## 2022-12-22 NOTE — PROGRESS NOTES
Hospitalist Progress Note      PCP: Jess Starks, APRN - CNP    Date of Admission: 12/20/2022        Subjective:       Patient feels better today after brain surgery. No headaches, dizziness or lightheadedness. Her speech seems to be improving.     Medications:  Reviewed    Infusion Medications    sodium chloride      sodium chloride 125 mL/hr at 12/22/22 2627    sodium chloride      dextrose      sodium chloride 100 mL/hr at 12/21/22 1319     Scheduled Medications    levETIRAcetam  1,000 mg IntraVENous Q12H    insulin glargine  8 Units SubCUTAneous Nightly    sodium chloride flush  5-40 mL IntraVENous 2 times per day    heparin (porcine)  5,000 Units SubCUTAneous q8h    sodium chloride flush  5-40 mL IntraVENous 2 times per day    isosorbide mononitrate  120 mg Oral Daily    gabapentin  300 mg Oral TID    carvedilol  3.125 mg Oral BID WC    pantoprazole  40 mg Oral Daily    trospium  20 mg Oral Nightly    levothyroxine  50 mcg Oral Daily    [Held by provider] furosemide  40 mg Oral Daily    insulin lispro  0-8 Units SubCUTAneous TID WC    insulin lispro  0-4 Units SubCUTAneous Nightly    dexamethasone  4 mg IntraVENous Q6H     PRN Meds: sodium chloride flush, sodium chloride, naloxone, morphine **OR** morphine, oxyCODONE, methocarbamol **OR** methocarbamol IVPB, prochlorperazine **OR** prochlorperazine, sodium chloride flush, sodium chloride, polyethylene glycol, acetaminophen **OR** acetaminophen, diphenhydrAMINE, glucose, dextrose bolus **OR** dextrose bolus, glucagon (rDNA), dextrose      Intake/Output Summary (Last 24 hours) at 12/22/2022 0757  Last data filed at 12/22/2022 0354  Gross per 24 hour   Intake 300 ml   Output 135 ml   Net 165 ml         Physical Exam Performed:    /75   Pulse 75   Temp 98.4 °F (36.9 °C) (Axillary)   Resp 18   Ht 5' 3\" (1.6 m)   Wt 199 lb 2.6 oz (90.3 kg)   SpO2 96%   BMI 35.28 kg/m²     General appearance: No apparent distress, appears stated age and cooperative. HEENT: Pupils equal, round, and reactive to light. Conjunctivae/corneas clear. Neck: Supple, with full range of motion. No jugular venous distention. Trachea midline. Respiratory:  Normal respiratory effort. Clear to auscultation, bilaterally without Rales/Wheezes/Rhonchi. Cardiovascular: Regular rate and rhythm with normal S1/S2 without murmurs, rubs or gallops. Abdomen: Soft, non-tender, non-distended with normal bowel sounds. Musculoskeletal: No clubbing, cyanosis or edema bilaterally. Full range of motion without deformity. Skin: Skin color, texture, turgor normal.  No rashes or lesions. Neurologic:  Neurovascularly intact without any focal sensory/motor deficits. Cranial nerves: II-XII intact, grossly non-focal.  Psychiatric: Alert and oriented, thought content appropriate, normal insight  Capillary Refill: Brisk, 3 seconds, normal   Peripheral Pulses: +2 palpable, equal bilaterally       Labs:   Recent Labs     12/20/22  0639 12/21/22  0542 12/22/22  0642   WBC 7.4 9.7 11.5*   HGB 13.9 14.4 12.5   HCT 40.7 41.7 37.9    162 161       Recent Labs     12/20/22  0639 12/21/22  0542 12/22/22  0642    141 140   K 3.7 4.0 4.2    105 106   CO2 27 24 22   BUN 17 22* 26*   CREATININE 1.2 1.2 1.1   CALCIUM 8.9 9.1 8.4       Recent Labs     12/19/22  1900   AST 17   ALT 13   BILITOT 0.8   ALKPHOS 125       Recent Labs     12/20/22  0902 12/21/22  0542 12/22/22  0643   INR 1.25* 1.23* 1.26*       Recent Labs     12/19/22  1900   TROPONINI <0.01         Urinalysis:      Lab Results   Component Value Date/Time    NITRU Negative 11/28/2022 01:40 PM    WBCUA 0-2 05/25/2022 03:40 PM    BACTERIA Rare 05/25/2022 03:40 PM    RBCUA 0-2 05/25/2022 03:40 PM    BLOODU Negative 11/28/2022 01:40 PM    SPECGRAV <=1.005 11/28/2022 01:40 PM    GLUCOSEU Negative 11/28/2022 01:40 PM       Radiology:  CT Head wo Contrast   Final Result      1.   Status post evacuation of left cerebral convexity subdural hematoma without evidence of complication. Very mild residual chronic left cerebral convexity subdural hematoma with nearly resolved 1 mm rightward midline shift. CTA HEAD NECK W CONTRAST   Final Result      1. No aneurysms, vascular occlusions, or intracranial stenoses identified. 2.  No significant stenosis in the extracranial vertebral or carotid arteries. CT HEAD WO CONTRAST   Final Result      1. Unchanged subacute left cerebral convexity subdural hematoma with unchanged 5 mm rightward midline shift. MRI BRAIN WO CONTRAST   Final Result      1. Stable size of subacute left cerebral convexity subdural hematoma (2 cm thick) with stable mass effect and mild left to right midline shift measuring 4 mm   2. Mild chronic small vessel ischemia and mild generalized atrophy   3. No evidence for intracranial mass or vascular lesion               MRA HEAD WO CONTRAST   Final Result      1. MRA limited by motion artifact   2. No evidence for dural arteriovenous fistula         CT HEAD WO CONTRAST   Final Result   1. Acute on chronic left subdural hematoma with approximately 5.6 mm left to right shift on coronal imaging, stable from 12/19/2022 at 1953   2. Left cerebral hemisphere cortical sulci effacement is unchanged   3. Moderate to severe periventricular microangiopathic ischemic changes, chronic small vessel disease   4. No new bleed or intraparenchymal hemorrhage.           IP CONSULT TO NEUROSURGERY  IP CONSULT TO NEUROCRITICAL CARE    Assessment/Plan:      -Acute on chronic subdural hematoma s/p Left frontal trephine craniotomy for evacuation 12/21  -Essential HTN-controlled-on coreg  -CAD s/p CABG 2005,EF 55-60%-antiplatelets held,on coreg  -Hx Complete heart block s/p PPM  -Paroxsymal a fib-stable-eliquis held  -Obesity BMI 35  -Hypothyroidism-on  synthroid  -DM type 2-gliplizide,trulicity on hold since admission    Plan  Continue postop care per neurosurgery   Holding all antipaletelets and anticoagulants   C/w Keppra and decadron neurosurgery   C/w neurochecks  Continue basal bolus insulin    DVT Prophylaxis: scd  Diet: ADULT DIET; Regular; 4 carb choices (60 gm/meal); Low Fat/Low Chol/High Fiber/MARIPOSA  Code Status: Full Code  PT/OT Eval Status:       Discharge planning. .  PT and OT eval's pending, discharge once cleared by neurosurgery        Oneida Valdez MD Hydroxychloroquine Counseling:  I discussed with the patient that a baseline ophthalmologic exam is needed at the start of therapy and every year thereafter while on therapy. A CBC may also be warranted for monitoring.  The side effects of this medication were discussed with the patient, including but not limited to agranulocytosis, aplastic anemia, seizures, rashes, retinopathy, and liver toxicity. Patient instructed to call the office should any adverse effect occur.  The patient verbalized understanding of the proper use and possible adverse effects of Plaquenil.  All the patient's questions and concerns were addressed.

## 2022-12-22 NOTE — PROGRESS NOTES
NEUROSURGERY POST-OP PROGRESS NOTE    Patient Name: Parish Monaco YOB: 1945   Sex: Female Age: 78 yrs     Medical Record Number: [id number] Acct Number: [de-identified]   Room Number: 6798/0051-59 Hospital Day: Hospital Day: 3     Interval History:  Post-operative Day# 1 s/p Procedure(s) (LRB):  LEFT TREPHINE CRANIOTOMY FOR SUBDURAL HEMATOMA EVACUATION (Left)    Subjective: Pt awake and alert. Answers questions appropriately. Talking in full sentences. Right side is strong. Pt did c/o of mid sternal chest pain. She got EKG, 1 sl nitroglycerin and morphine 2 mg IV and it was gone. I did consult cardiology because she has a heart hx to be sure. Objective:    VITAL SIGNS   /85   Pulse 74   Temp 98 °F (36.7 °C) (Oral)   Resp 16   Ht 5' 3\" (1.6 m)   Wt 199 lb 2.6 oz (90.3 kg)   SpO2 100%   BMI 35.28 kg/m²    Height Height: 5' 3\" (160 cm)   Weight Weight: 199 lb 2.6 oz (90.3 kg)        Allergies Allergies   Allergen Reactions    Niacin And Related Anaphylaxis    Ranexa [Ranolazine] Hallucinations    Adhesive Tape      Causes rash and blisters. Can use paper tape or opsite without any problems. Other      QVacin IV antibiotic had after foot surgery placed on this for infection. Developed high fever, chills and uncontrollable shaking. Statins Depletion Therapy      Deep muscle pain    Vancomycin      Does not recall reaction       NPO Status ADULT DIET; Regular; 4 carb choices (60 gm/meal);  Low Fat/Low Chol/High Fiber/MARIPOSA   Isolation No active isolations     LABS   Basic Metabolic Profile Recent Labs     12/20/22  0639 12/21/22  0542 12/22/22  0642    141 140    105 106   CO2 27 24 22   BUN 17 22* 26*   CREATININE 1.2 1.2 1.1   GLUCOSE 112* 187* 166*      Complete Blood Count Recent Labs     12/20/22  1102 12/21/22  0542 12/22/22  0642   WBC 7.4 9.7 11.5*   RBC 4.67 4.81 4.24      Coagulation Studies Recent Labs     12/20/22  0902 12/21/22  0542 12/22/22  0643   INR 1.25* 1.23* 1.26*        MEDICATIONS   Inpatient Medications     levETIRAcetam, 1,000 mg, IntraVENous, Q12H    insulin glargine, 8 Units, SubCUTAneous, Nightly    sodium chloride flush, 5-40 mL, IntraVENous, 2 times per day    heparin (porcine), 5,000 Units, SubCUTAneous, q8h    sodium chloride flush, 5-40 mL, IntraVENous, 2 times per day    isosorbide mononitrate, 120 mg, Oral, Daily    gabapentin, 300 mg, Oral, TID    carvedilol, 3.125 mg, Oral, BID WC    pantoprazole, 40 mg, Oral, Daily    trospium, 20 mg, Oral, Nightly    levothyroxine, 50 mcg, Oral, Daily    [Held by provider] furosemide, 40 mg, Oral, Daily    insulin lispro, 0-8 Units, SubCUTAneous, TID WC    insulin lispro, 0-4 Units, SubCUTAneous, Nightly    [COMPLETED] dexamethasone, 10 mg, IntraVENous, Once **FOLLOWED BY** dexamethasone, 4 mg, IntraVENous, Q6H   Infusions    sodium chloride 50 mL/hr at 12/22/22 0940    sodium chloride      dextrose        Antibiotics   Recent Abx Admin                     ceFAZolin (ANCEF) 2,000 mg in sodium chloride 0.9 % 50 mL IVPB (mini-bag) (mg) 2,000 mg New Bag 12/21/22 1324                     Neurologic Exam:  Mental status: awake and alert and knows her name and she is in [facility] and it is December. She is bit slow to respond but able to get the words out.     Cranial Nerves:  II: Visual acuity not tested, visual fields full, denies new visual changes / diplopia  III, IV, VI: PERRL, 3 mm bilaterally, EOMI, no nystagmus noted  V: Facial sensation intact bilaterally to touch  VII: Face symmetric  VIII: Hearing intact bilaterally to spoken voice  IX: Palate movement equal bilaterally  XI: Shoulder shrug equal bilaterally  XII: Tongue midline      Musculoskeletal:   Gait: Not tested   Tone: normal  Sensory: intact to all extremities  Motor strength: Right  Left    Right  Left    Deltoid  5 5  Hip Flex  5 5   Biceps  5 5  Knee Extensors  5 5   Triceps  5 5  Knee Flexors  5 5   Wrist Ext  5 5  Ankle Dorsiflex. 5 5   Wrist Flex  5 5  Ankle Plantarflex. 5 5   Handgrip  5 5  Ext Xavier Longus  5 5   Thumb Ext  5 5         Incision: intact, clean and dry  Drain:35/50    Respiratory:  Unlabored respiratory pattern    Abdomen:   Soft, ND       Cardiovascular:  Warm, well perfused    Assessment   Patient is a 77 yo F s/p Procedure(s) (LRB):  LEFT TREPHINE CRANIOTOMY FOR SUBDURAL HEMATOMA EVACUATION (Left) per Dr. Orville Ontiveros . Plan:  Neurologic exam frequency:q4  Mobility:PT/OT eval  Steroids: taper, SSI  Seizure Prophylaxis:keppra as ordered  Diet:ADAT  Drain:continue  Riley:remove  DVT Prophylaxis: SCDs and heparin sq  GI Prophylaxis:protonix  Bowel Regimen: senna and glycolax  Pain control:fran   Incisional Care:open to air   Ct scan stable  Consult cardiology  Dispo inpt. Patient was seen with Dr. Brayden Bhakta who agrees with above assessment and plan. Electronically signed by:  DARIA Frederick CNP, 12/22/2022 11:56 AM   Neurosurgery Nurse Practitioner  [phone]

## 2022-12-22 NOTE — PROGRESS NOTES
[facility]   After review, this patient is felt to be:       []  Appropriate for Acute Inpatient Rehab    []  Appropriate for Acute Inpatient Rehab Pending Insurance Authorization    []  Not appropriate for Acute Inpatient Rehab    [x]  Referral received and ARU reviewing patient; Evaluation ongoing. Pt will require precert. Dr. Liberty Cody to review 12/23. Will notify DCP with further updates.  Thank you for the referral.    Jasmyn Luna OTR/L  Clinical Liaison [facility]  [phone] (N)[phone]   Electronically signed by Jasmyn Luna on 12/22/2022 at 4:16 PM

## 2022-12-22 NOTE — PROGRESS NOTES
Speech Language Pathology  Facility/Department: [facility] 5T ORTHO/NEURO  Speech/Swallow Daily Treatment Note    NAME: Eliana Whitlock  : 1945  MRN: [id number]    Patient Diagnosis(es): has Hyperlipidemia; Hypertension; CAD (coronary artery disease); Diabetes mellitus ([address] Hypothyroid; Allergic rhinitis; Sleep apnea; Metabolic encephalopathy; Hepatic steatosis; Cholelithiasis; Acute renal failure (ARF) ([address] Small bowel obstruction ([address] Ischemic chest pain ([address] Unstable angina ([address] Vomiting; Morbid obesity with BMI of 40.0-44.9, [facility]); NSTEMI (non-ST elevated myocardial infarction) ([address] Chest pain; LBBB (left bundle branch block); Atrial fibrillation with rapid ventricular response ([address] History of coronary artery bypass graft; Acute respiratory failure with hypoxia ([address] Acute hypoxemic respiratory failure ([address] Angina, class IV ([address] History of tobacco abuse; Diabetic foot infection ([address] Acute hematogenous osteomyelitis of left foot ([address] Atrial fibrillation ([address] Osteomyelitis of right foot ([address] Nausea; Intermittent complete heart block ([address] Syncope and collapse; CKD (chronic kidney disease) stage 3, GFR 30-59 ml/min ([facility]); S/P placement of cardiac pacemaker; and Acute subdural hematoma on their problem list.  Onset Date: 2022    Chart reviewed. Pain: none indicated by any means    Initial evals (2022)  Speech/Language Assessment:  Diagnosis: Patient presents with moderate expressive/receptive aphasia, with the following impairements: 60% accuracy yes/no questions, 80% accuracy confrontation naming, 80% accuracy automatics, 60% accuracy repetition, 100% accuracy object recognition, 40% accuracy instruction following (single step > two step), 60% accuracy reading instruction, impaired verbal fluency, and impaired orthography. Did not assess cognition 2/2 language deficits. Recommend ongoing speech therapy to further assess/address.      Dysphagia Assessment  Mild oral dysphagia 2/2 to reduced dentition. With patient seated upright on room air, assessed tolerance thins via straw and soft solid; pt with positive oral acceptance, slowed but adequate mastication, positive swallow movement, good oral clearance, no overt s/s aspiration. Per pt preference, recommend Dysphagia Soft & Bite-Sized Solids / Thin Liquids. Medical diagnosis: Subdural hematoma [S06. 5XAA]  Acute subdural hematoma [S06. 5XAA]  Treatment diagnosis: dysphagia    Speech/Language Treatment:  Pt seen to address the following goals:  Short Term Goals  Time Frame for Short Term Goals: 1-2 wks or LOS  Goal 1: Patient will answer yes/no questions with 70% accuracy. 12/22: Improved to 90% accuracy. Goal met, d/c goal  Goal 2: Patient will complete confrontation naming task with 100% accuracy. 12/22: 100% accuracy, no cues. Goal met, d/c goal  Goal 3: Patient will follow single step directions with 100% accuracy. 12/22: 100% accuracy for both spoken and written single and two step directions. Goal met, d/c goal  Goal 4: Patient will participate in further assessment of cognitive-linguistics as appropriate. 12/22: Language skills much improved this date vs initial assessment prior to crani. Only intermittent receptive language issues noted, however appears primarily d/t pt's hearing. Reading and writing skills improved as well. Pt reports feeling back to baseline. Did not target cognition, may benefit from further assessment. Cont goal    Dysphagia Treatment  Goal 1: Patient will tolerate least restrictive diet with adequate mastication and without overt signs of aspiration or associated decline in respiratory status. 12/22: Per chart pt currently on regular diet. Pt denies difficulty; discussed previous recommendation for soft bite-sized in alignment with her prior preference. This date, patient stated wish to remain on regular texture diet.  Declined solid PO trials, however accepted thins via straw; positive swallow movement, good oral clearance, no overt signs of aspiration or penetration. Cont goal  Goal 2: Patient/caregiver will demonstrate understanding of swallowing concerns/recommendations. 12/20: Educated pt to purpose of visit, s/s of aspiration, concern if aspiration occurs, rationale for diet recommendation/strategies to reduce risk for aspiration and instruction to notify staff if any signs emerge. Pt stated comprehension, however suspect needs reinforcement. Cont goal  12/22: Reviewed diet consistencies, and safety strategies/positioning, aspiration s/s. Pt stated good comprehension. Goal met    Education: see goal above    Plan:  Diet Recommendations: Regular / Thin LIquids  Continue speech/language therapy to address above goals, 3-5 x/week x LOS  DC recommendations: TBD    D/W nursing, Mary  Needs met prior to leaving room, call button in reach. Treatment time: 25 minutes    Diana Knight Runkelen, [id number]  Pg.  # [phone]    If patient is discharged prior to next treatment, this note will serve as the discharge summary

## 2022-12-22 NOTE — CONSULTS
[address]         Reason for Consultation/Chief Complaint: \"chest pain \"       History of Present Illness:  Frank Stallworth is a 78 y.o. patient who presented to the hospital with complaints of chest pain today. Pt presented to [facility] with acute slurring of words and was found to have an acute on chronic SDH and so was transferred here for definitive evacuation/NS therapy. .  Pt had CABG in 2005 and is communicating well on my visit. She no longer has CP and ECG suggests atrial flutter with V pacing that resolved with SR and one to one AV conduction with ventricular pacing. Troponin was negative x 2. Past Medical History:   has a past medical history of A-fib ([facility] Allergic rhinitis, Arthritis, CAD (coronary artery disease), Cardiac pacemaker, CHF (congestive heart failure) ([address] Cholelithiasis, Diabetes mellitus ([facility] Encounter for imaging to screen for metal prior to MRI, ESRD (end stage renal disease) ([facility] Gastroparesis, Hepatic steatosis, Hyperlipidemia, Hypertension, Peripheral neuropathy, Sleep apnea, Syncope, Thyroid disease, and Vitamin B12 deficiency. Surgical History:   has a past surgical history that includes Coronary artery bypass graft (2005); Foot surgery; colectomy (1998); Colonoscopy; Upper gastrointestinal endoscopy; Tonsillectomy and adenoidectomy; Tubal ligation; other surgical history (4/17/2012); other surgical history (Right, 3/20/13); Coronary angioplasty; Cholecystectomy; Cardiac catheterization (12/27/2017); Diagnostic Cardiac Cath Lab Procedure; Toe amputation (Left, 3/29/2021); eye surgery; Endoscopy, colon, diagnostic; hernia repair; and craniotomy (Left, 12/21/2022). Social History:   reports that she quit smoking about 24 years ago. She has a 30.00 pack-year smoking history. She has never used smokeless tobacco. She reports that she does not drink alcohol and does not use drugs.      Family History:  No evidence for sudden cardiac death or premature CAD    Home Medications:  Were reviewed and are listed in nursing record. and/or listed below  Prior to Admission medications    Medication Sig Start Date End Date Taking? Authorizing Provider   apixaban (ELIQUIS) 5 MG TABS tablet Take 1 tablet by mouth 2 times daily 11/8/22   Aldo Carrera MD   carvedilol (COREG) 3.125 MG tablet Take 1 tablet by mouth 2 times daily (with meals) 11/8/22   Aldo Carrera MD   nitroGLYCERIN (NITROSTAT) 0.4 MG SL tablet up to max of 3 total doses. If no relief after 1 dose, call 911. 11/8/22   Aldo Carrera MD   furosemide (LASIX) 40 MG tablet TAKE ONE TABLET BY MOUTH DAILY MAY TAKE ADDITIONAL ONE-HALF TABLET AS NEEDED FOR WEIGHT GAIN OF 3 LBS IN A DAY 11/4/22   Brinda Gitelman, MD   isosorbide mononitrate (IMDUR) 120 MG extended release tablet TAKE ONE TABLET BY MOUTH DAILY 8/29/22   Brinda Gitelman, MD   TRULICITY 2.16 PX/1.0EG SOPN Inject 1 pen into the skin daily 3/16/22   Historical Provider, MD   mupirocin (BACTROBAN) 2 % ointment Apply topically twice daily for another 3 days  Patient not taking: Reported on 3/28/2022 2/18/22   Todd Xavier MD   levothyroxine (SYNTHROID) 50 MCG tablet Take 1 tablet by mouth Daily 2/18/22   Todd Xavier MD   desonide (DESOWEN) 0.05 % cream Apply 0.05 % topically 2 times daily Apply topically 2 times daily. Historical Provider, MD   pantoprazole (PROTONIX) 40 MG tablet TAKE ONE TABLET BY MOUTH DAILY 9/3/19   Historical Provider, MD   aspirin 81 MG EC tablet Take 1 tablet by mouth daily 12/29/17   Dafne Douglas MD   tolterodine (DETROL LA) 2 MG ER capsule Take 4 mg by mouth daily     Historical Provider, MD   fluticasone (FLONASE) 50 MCG/ACT nasal spray 1 spray by Nasal route daily. Historical Provider, MD   Omega-3 Fatty Acids (FISH OIL) 1000 MG CAPS Take 2 capsules by mouth daily.  4/3/15   Brinda Gitelman, MD   GLIPIZIDE Take 5 mg by mouth 2 times daily (with meals) Taking 10 mg AM & 5 mg in PM    Historical Provider, MD   gabapentin (NEURONTIN) 600 MG tablet Take 600 mg by mouth. May take up to 4x per day if needed for neuropathy pain. Historical Provider, MD   diphenhydrAMINE (BENADRYL) 25 MG tablet Take 25 mg by mouth nightly as needed. Historical Provider, MD        Hospital Medications:   levETIRAcetam  1,000 mg IntraVENous Q12H    insulin glargine  8 Units SubCUTAneous Nightly    sodium chloride flush  5-40 mL IntraVENous 2 times per day    heparin (porcine)  5,000 Units SubCUTAneous q8h    sodium chloride flush  5-40 mL IntraVENous 2 times per day    isosorbide mononitrate  120 mg Oral Daily    gabapentin  300 mg Oral TID    carvedilol  3.125 mg Oral BID WC    pantoprazole  40 mg Oral Daily    trospium  20 mg Oral Nightly    levothyroxine  50 mcg Oral Daily    [Held by provider] furosemide  40 mg Oral Daily    insulin lispro  0-8 Units SubCUTAneous TID WC    insulin lispro  0-4 Units SubCUTAneous Nightly    dexamethasone  4 mg IntraVENous Q6H     nitroGLYCERIN, sodium chloride flush, sodium chloride, naloxone, morphine **OR** morphine, oxyCODONE, methocarbamol **OR** methocarbamol IVPB, prochlorperazine **OR** prochlorperazine, sodium chloride flush, sodium chloride, polyethylene glycol, acetaminophen **OR** acetaminophen, diphenhydrAMINE, glucose, dextrose bolus **OR** dextrose bolus, glucagon (rDNA), dextrose   sodium chloride 50 mL/hr at 12/22/22 0940    sodium chloride      dextrose         Allergies:  Niacin and related, Ranexa [ranolazine], Adhesive tape, Other, Statins depletion therapy, and Vancomycin     Review of Systems:     A 14 ROS obtained and negative except as mentioned in HPI. Constitutional: there has been no unanticipated weight loss. Eyes: No visual changes or diplopia. No scleral icterus. ENT: No Headaches, hearing loss or vertigo. No mouth sores or sore throat. Cardiovascular: No loss of consciousness.  No hemoptysis, pleuritic pain, or phlebitis. Respiratory: No cough or wheezing, no sputum production. No hematemesis. Gastrointestinal: No abdominal pain, appetite loss, blood in stools. No change in bowel or bladder habits. Genitourinary: No dysuria, or hematuria. Musculoskeletal:  No gait disturbance, weakness or joint complaints. Integumentary: No rash or pruritis. Neurological: No headache, diplopia,numbness or tingling. No change in gait, balance, coordination, mood, affect, memory, mentation, behavior. Psychiatric: No anxiety,  Endocrine: No malaise,  Hematologic/Lymphatic: No abnormal bruising  Allergic/Immunologic: No nasal congestion      Physical Examination:    Vitals:    12/22/22 1730   BP: (!) 145/90   Pulse: 86   Resp:    Temp:    SpO2:     Weight: 199 lb 2.6 oz (90.3 kg)         General Appearance:  Alert, cooperative, no distress, appears stated age   Head:  Normocephalic, without obvious abnormality, atraumatic   Eyes:  PERRL   Nose: Nares normal,   Neck: Supple, JVP normal    Lungs:   Clear to auscultation bilaterally, respirations unlabored   Chest Wall:  No tenderness or deformity   Heart:  Regular rate and rhythm, normal S1, S2 normal, no murmur, I/VI HSM  No rub.  No S3 / gallop   Abdomen:   Soft, non-tender, +bowel sounds   Extremities: no cyanosis, no clubbing ,  trace edema   Pulses: Symmetric 1 extremities   Skin: no gross lesions or rashes   Pysch: Normal mood and affect   Neurologic: CN II - XII grossly intact        Labs  CBC:   Lab Results   Component Value Date/Time    WBC 11.5 12/22/2022 06:42 AM    RBC 4.24 12/22/2022 06:42 AM    HGB 12.5 12/22/2022 06:42 AM    HCT 37.9 12/22/2022 06:42 AM    MCV 89.3 12/22/2022 06:42 AM    RDW 14.7 12/22/2022 06:42 AM     12/22/2022 06:42 AM     CMP:    Lab Results   Component Value Date/Time     12/22/2022 06:42 AM    K 4.2 12/22/2022 06:42 AM    K 4.1 12/19/2022 07:00 PM     12/22/2022 06:42 AM    CO2 22 12/22/2022 06:42 AM    BUN 26 12/22/2022 06:42 AM the clinical symptoms and presentation of Chun Patel. I recommend that the patient undergo treatment with resumption of carvedilol and lasix. Given recent neurosurgery would not want to cause HA with nitrates although pt was receiving  imdur 120 mg daily. If no NS contraindication this can be restarted tomorrow. Thank you for allowing to us to participate in the care or Chun Patel. The note was completed using EMR. Every effort was made to ensure accuracy; however, inadvertent computerized transcription errors may be present.        Rachel Pickering MD [facility]

## 2022-12-22 NOTE — PROGRESS NOTES
Occupational Therapy  Facility/Department: [facility] 5T ORTHO/NEURO  Occupational Therapy Re- Assessment/Treatment    Name: Shruthi Bazzi  : 1945  MRN: [id number]  Date of Service: 2022    Discharge Recommendations:  Shruthi Bazzi scored a 17/24 on the AM-PAC ADL Inpatient form. Current research shows that an AM-PAC score of 17 or less is typically not associated with a discharge to the patient's home setting. Based on the patient's AM-PAC score and their current ADL deficits, it is recommended that the patient have 5-7 sessions per week of Occupational Therapy at d/c to increase the patient's independence. At this time, this patient demonstrates complex nursing, medical, and rehabilitative needs, and would benefit from intensive rehabilitation services upon discharge from the Inpatient setting. This patient demonstrates the ability to participate in and benefit from an intensive therapy program with a coordinated interdisciplinary team approach to foster frequent, structured, and documented communication among disciplines, who will work together to establish, prioritize, and achieve treatment goals. Please see assessment section for further patient specific details. If patient discharges prior to next session this note will serve as a discharge summary. Please see below for the latest assessment towards goals. OT Equipment Recommendations  Equipment Needed: No       Patient Diagnosis(es): The primary encounter diagnosis was Acute subdural hematoma. A diagnosis of Syncope and collapse was also pertinent to this visit.   Past Medical History:  has a past medical history of A-fib ([address] Allergic rhinitis, Arthritis, CAD (coronary artery disease), Cardiac pacemaker, CHF (congestive heart failure) ([address] Cholelithiasis, Diabetes mellitus ([address] Encounter for imaging to screen for metal prior to MRI, ESRD (end stage renal disease) ([address] Gastroparesis, Hepatic steatosis, Hyperlipidemia, Hypertension, Peripheral neuropathy, Sleep apnea, Syncope, Thyroid disease, and Vitamin B12 deficiency. Past Surgical History:  has a past surgical history that includes Coronary artery bypass graft (2005); Foot surgery; colectomy (1998); Colonoscopy; Upper gastrointestinal endoscopy; Tonsillectomy and adenoidectomy; Tubal ligation; other surgical history (4/17/2012); other surgical history (Right, 3/20/13); Coronary angioplasty; Cholecystectomy; Cardiac catheterization (12/27/2017); Diagnostic Cardiac Cath Lab Procedure; Toe amputation (Left, 3/29/2021); eye surgery; Endoscopy, colon, diagnostic; hernia repair; and craniotomy (Left, 12/21/2022). Treatment Diagnosis: impaired ADLs and functional transfers      Assessment   Performance deficits / Impairments: Decreased functional mobility ; Decreased ADL status; Decreased endurance;Decreased balance  Assessment: Pt lives alone at baseline and is typically independent with ADLs, uses power scooter vs rollator for mobility. Pt re-evaluated s/p crani for evacuation of SDH. Pt required min A for transfers and mobility with wlaker this date. ADLs were limited due to onset of chest pain and pt required return to bed. Pt demo decreased activity tolerance, decreased balance and generalized weakness. Pt has aphasia but speech is improving since sx.  Pt would benefit from intensive OT treatment upon d/c to maximize her safety and independence  Treatment Diagnosis: impaired ADLs and functional transfers  Prognosis: Good  REQUIRES OT FOLLOW-UP: Yes  Activity Tolerance  Activity Tolerance: Treatment limited secondary to medical complications (free text)        Plan   Occupational Therapy Plan  Times Per Week: 5-7  Current Treatment Recommendations: Strengthening, Functional mobility training, Balance training, Endurance training, Neuromuscular re-education, Safety education & training, Patient/Caregiver education & training, Equipment evaluation, education, & procurement, Self-Care / ADL, Home management training     Restrictions  Position Activity Restriction  Other position/activity restrictions: up as tolerated    Subjective   General  Chart Reviewed: Yes  Patient assessed for rehabilitation services?: Yes  Additional Pertinent Hx: 77 yo female present to ED 12/19 p/w aphasia. CT head (+) Acute on chronic subdural hematoma overlying the left frontal and parietal lobes. 12/21 s/p LEFT TREPHINE CRANIOTOMY FOR SUBDURAL HEMATOMA EVACUATION PMHx: afib, CAD, CHF, DM, HTN  Family / Caregiver Present: No  Diagnosis: SDH  Subjective  Subjective: Pt in bed upon OT entry. Agreeable to therapy. Able to speak sentences, has some word finding difficulty. \"They pulled a fast one on me. Told me it was Roxanne\"  Pain: denied       Social/Functional History  Social/Functional History  Lives With: Alone  Type of Home: House  Home Layout: One level  Home Access: Ramped entrance  Bathroom Shower/Tub: Walk-in shower  Bathroom Toilet: Handicap height  Bathroom Equipment: Built-in shower seat, Grab bars in shower, Hand-held shower, Grab bars around toilet  Home Equipment: Cane, Electric scooter, Walker, rolling  Has the patient had two or more falls in the past year or any fall with injury in the past year?: No  ADL Assistance: Independent  Homemaking Assistance: Independent  Homemaking Responsibilities: Yes  Ambulation Assistance: Independent (Uses a electric scooter in the house and rollator in the community.)  Transfer Assistance: Independent  Active : No  Patient's  Info: friend or granddaughter transport  Leisure & Hobbies: watch TV, read  Additional Comments: able to ambulate grocery store distance with rollator.  She typically leaves the home every few days       Objective        Observation/Palpation  Observation: drain from surgical site       Toilet Transfers  Toilet - Technique: Ambulating  Equipment Used: Standard toilet (grab bar)  Toilet Transfer: Minimal assistance    UE Function  AROM: Within functional limits  Strength: Within functional limits  Coordination: Within functional limits  Sensation: Impaired (neuropathy L UE/LE (baseline 20 years))      ADL  Toileting: Contact guard assistance       Activity Tolerance  Activity Tolerance: Treatment limited secondary to medical complications    Bed mobility  Supine to Sit: Stand by assistance (HOB elevated)  Sit to Supine: Contact guard assistance    Transfers  Stand Step Transfers: Minimal assistance (bed to chair using RW)  Sit to stand: Minimal assistance (bed, chair)  Stand to sit: Minimal assistance (bed, chair)  Transfer Comments: Functional mobility to/from bathroom using RW with min A. Pt seated in chair after ambulation and reported onset of chest pain, visibly uncomfortable. RN and charge RN called into room- /100, then /108, HR 90's, O2 99%. Pt assisted back to bed, therapy session ended with RN x2 attending to pt       Cognition  Overall Cognitive Status: Exceptions  Arousal/Alertness: Delayed responses to stimuli  Safety Judgement: Decreased awareness of need for assistance  Insights: Decreased awareness of deficits  Cognition Comment: word finding difficulty, delayed responses  Orientation  Overall Orientation Status: Within Functional Limits  Orientation Level: Oriented X4            Safety Devices  Type of Devices: Nurse notified;Call light within reach; Bed alarm in place; Left in bed (RN x2 present)      AM-PAC Score    AM-PAC Inpatient Daily Activity Raw Score: 17 (12/22/22 1548)  AM-PAC Inpatient ADL T-Scale Score : 37.26 (12/22/22 1548)  ADL Inpatient CMS 0-100% Score: 50.11 (12/22/22 1548)  ADL Inpatient CMS G-Code Modifier : CK (12/22/22 1548)      Goals  Short Term Goals  Time Frame for Short Term Goals: by d/c  Short Term Goal 1: Patient will complete LB dressing with sba  Short Term Goal 2: Patient will complete grooming tasks in stance at sink with SBA  Short Term Goal 3: Patient will complete functional transfers, including toilet transfer, with SBA  Short Term Goal 4: Patient will complete bathroom mobility with SBA       Therapy Time   Individual Concurrent Group Co-treatment   Time In 0930         Time Out 1009         Minutes 39         Timed Code Treatment Minutes: 24 Minutes +15 min lópez Gardiner, OT

## 2022-12-22 NOTE — CARE COORDINATION
CM following: CM attended pt's room, pt was asleep. CM left VM for pt's daughter Shirley Warren to have follow up discussion on discharge planning. CM notes that PT/OT recs are for ARU and previous conversation with pt's daughter indicated [facility] ARU is family's first choice. CM has reached out to attending physician concerning a PM&R consult. CM will continue to follow for discharge planning.   Electronically signed by NATE Saenz on 12/22/2022 at 4:04 PM  [phone]

## 2022-12-22 NOTE — PROGRESS NOTES
NEUROLOGY / NEUROCRITICAL CARE PROGRESS NOTE       Patient Name: Ara Stearns YOB: 1945   Sex: Female Age: 78 yrs     CC / Reason for Consult: SDH    Interval Hx / Changes over last 24 hours:     POD #1 for evacuation of her left subdural hematoma with Dr. Stan Sherman      ROS: limited by aphasia, but no complaints at this time    HISTORY   Admission HPI:   Ara Stearns is a 78 y.o. y/o female with PMH significant for atrial fibrillation, CHF, DM, HTN, HLD, BERTRAND, neuropathy, syncope, and B12 deficiency who I am asked to see for aphasia. Given her aphasia, history was limited. History obtained via chart review. She presented to [facility] ED 12/19 with isolated, acute-onset of mild-moderate aphasia which began 12/18. CT of the head showed a left-convexity acute on chronic SDH. [facility] Neurosurgery was consulted & recommended transfer for closer monitoring but no need for anticoagulation reversal.     Shey Cormier tells me today her symptoms are improved (mild). Based on my exam, I suspect she has some degree of receptive aphasia in addition to her expressive aphasia. This morning, there was some concern that she wasn't awakening as easily. A head CT was done, which was stable. 1/20/22  Evening of 12/20, had code stroke called, patient had difficulty getting to the bathroom, was not answering questions and wouldn't follow commands. CT head was stable and CTA head and neck was unremarkable. PMH Past Medical History:   Diagnosis Date    A-fib [facility])     Allergic rhinitis     Arthritis     CAD (coronary artery disease)     Cardiac pacemaker     CHF (congestive heart failure) [address] [address]     Cholelithiasis 07/03/2015    Diabetes mellitus ([facility] [address]     Accuchecks daily once a day at home in A.M. Encounter for imaging to screen for metal prior to MRI 12/20/2022    MRI Conditional Medtronic Dolores XT  model#[id number]  Leads: RA [id number] implanted 2/17/22. Normal lmode. 1.5T or 3.0T.  Pt must be A/OX4 per [employer] guidelines. Med[employer] rep and RN must be present. Follow all other [employer] guidelines. Pt currently follows     ESRD (end stage renal disease) ([facility] [address]     Gastroparesis     Hepatic steatosis 07/03/2015    Hyperlipidemia     Hypertension     Peripheral neuropathy     Sleep apnea     Has used the CPAP x 25 yrs now per pt. Syncope     Thyroid disease     Vitamin B12 deficiency       Allergies Allergies   Allergen Reactions    Niacin And Related Anaphylaxis    Ranexa [Ranolazine] Hallucinations    Adhesive Tape      Causes rash and blisters. Can use paper tape or opsite without any problems. Other      QVacin IV antibiotic had after foot surgery placed on this for infection. Developed high fever, chills and uncontrollable shaking. Statins Depletion Therapy      Deep muscle pain    Vancomycin      Does not recall reaction       Diet ADULT DIET; Regular; 4 carb choices (60 gm/meal);  Low Fat/Low Chol/High Fiber/MARIPOSA   Isolation No active isolations     CURRENT SCHEDULED MEDICATIONS   Inpatient Medications     levETIRAcetam, 1,000 mg, IntraVENous, Q12H    insulin glargine, 8 Units, SubCUTAneous, Nightly    sodium chloride flush, 5-40 mL, IntraVENous, 2 times per day    heparin (porcine), 5,000 Units, SubCUTAneous, q8h    sodium chloride flush, 5-40 mL, IntraVENous, 2 times per day    isosorbide mononitrate, 120 mg, Oral, Daily    gabapentin, 300 mg, Oral, TID    carvedilol, 3.125 mg, Oral, BID WC    pantoprazole, 40 mg, Oral, Daily    trospium, 20 mg, Oral, Nightly    levothyroxine, 50 mcg, Oral, Daily    [Held by provider] furosemide, 40 mg, Oral, Daily    insulin lispro, 0-8 Units, SubCUTAneous, TID WC    insulin lispro, 0-4 Units, SubCUTAneous, Nightly    [COMPLETED] dexamethasone, 10 mg, IntraVENous, Once **FOLLOWED BY** dexamethasone, 4 mg, IntraVENous, Q6H   Infusions    sodium chloride 50 mL/hr at 12/22/22 0940    sodium chloride 125 mL/hr at 12/22/22 0619    sodium chloride      dextrose      sodium chloride 100 mL/hr at 12/21/22 1319      Antibiotics   Recent Abx Admin                     ceFAZolin (ANCEF) 2,000 mg in sodium chloride 0.9 % 50 mL IVPB (mini-bag) (mg) 2,000 mg New Bag 12/21/22 1324                      LABS   Metabolic Panel Recent Labs     12/19/22  1900 12/20/22  0639 12/21/22  0542 12/22/22  0642    140 141 140   K 4.1 3.7 4.0 4.2    102 105 106   CO2 22 27 24 22   BUN 15 17 22* 26*   CREATININE 1.2 1.2 1.2 1.1   GLUCOSE 110* 112* 187* 166*   CALCIUM 9.9 8.9 9.1 8.4   LABALBU 3.9  --   --   --    ALKPHOS 125  --   --   --    ALT 13  --   --   --    AST 17  --   --   --         CBC / Coags Recent Labs     12/20/22  0639 12/20/22  0902 12/21/22  0542 12/22/22  0642 12/22/22  0643   WBC 7.4  --  9.7 11.5*  --    RBC 4.67  --  4.81 4.24  --    HGB 13.9  --  14.4 12.5  --    HCT 40.7  --  41.7 37.9  --      --  162 161  --    INR  --  1.25* 1.23*  --  1.26*        Other Recent Labs     12/20/22  0639   LABA1C 6.0     No results for input(s): PHENYTOIN, KEPPRA, LACOSA, LAMO, VALPROATE, LACTSEPSIS, LACTA in the last 72 hours. DIAGNOSTICS   IMAGES:  Images personally reviewed and agree w/ radiology interpretation. CT Head wo contrast:  Status post evacuation of left cerebral convexity subdural hematoma without evidence of complication. Very mild residual chronic left cerebral convexity subdural hematoma with nearly resolved 1 mm rightward midline shift. Previous Imaging:    Head CT w/o Contrast:  Impression       1. Unchanged subacute left cerebral convexity subdural hematoma with unchanged 5 mm rightward midline shift. CTA of Head / Neck w/ Contrast:  Impression       1. No aneurysms, vascular occlusions, or intracranial stenoses identified. 2.  No significant stenosis in the extracranial vertebral or carotid arteries. MRI Brain w/o Contrast:     Impression       1.  Stable size of subacute left cerebral convexity subdural hematoma (2 cm thick) with stable mass effect and mild left to right midline shift measuring 4 mm   2. Mild chronic small vessel ischemia and mild generalized atrophy   3. No evidence for intracranial mass or vascular lesion       MRA Head WO Contrast:  Impression       1. MRA limited by motion artifact   2. No evidence for dural arteriovenous fistula     Routine EEG:  IMPRESSION:  This is a normal EEG in the awake, drowsy, and asleep states. No focal or epileptiform abnormalities. PHYSICAL EXAMINATION     PHYSICAL EXAM:  Vitals:    12/22/22 0902 12/22/22 0922 12/22/22 0923 12/22/22 1016   BP:  (!) 147/93  (!) 163/97   Pulse: 72  72 87   Resp: 16      Temp:       TempSrc:       SpO2: 95%      Weight:       Height:             General: Alert, no distress, well-nourished  Neurologic  Mental status:     Alert. Slow to verbally respond but she can tell me she's in the hospital and that it's December. Some paraphasic errors    Cranial nerves:   CN2: Visual fields full w/o extinction on confrontational testing   CN 3,4,6: Pupils equal and reactive to light, extraocular muscles intact  CN5: Facial sensation symmetric (nods yes to feeling light touch)  CN7: Face symmetric  CN8: Hearing symmetric to spoken voice  CN9: Palate elevated symmetrically  CN11: Traps full strength on shoulder shrug  CN12: Tongue midline with protrusion    Motor Exam:    R  L    Deltoid 5  5   Biceps 5 5   Triceps 5 5   Wrist extension  5 5   Interossei 5 5      R  L    Hip flexion  4  5   Hip extension  4 5   Knee flexion  5 5   Knee extension  5 5   Ankle dorsiflexion  5 5   Ankle plantar flexion  5 5           OTHER SYSTEMS:  Cardiovascular: Warm, appears well perfused   Respiratory: Easy, non-labored respiratory pattern   Abdominal: Abdomen is without distention   Extremities: Upper and lower extremities are atraumatic in appearance without deformity. No swelling or erythema. HEENT: MICHAEL Drain to top of skull in situ.  Some serosanguinous draining on pillow    ASSESSMENT & RECOMMENDATIONS   Assessment:  Ms. Shawna Cancino is a 79 y/o woman with CHF, afib (on Eliquis), HTN, HLD, B12 deficiency, CAD (on asa) who presents with acute, mild aphasia found to have mixed density SDH, stable on imaging from 12/19. Her aphasia acutely worsened on the evening of 12/20 to the point she could not follow commands or answer questions. She underwent left frontal trephine crani for evacuation of acute/chronic SDH on 12/21. She is significantly better after her SDH evacuation. Plan:  -POD #1 eft frontal trephine crani for evacuation of acute/chronic SDH  -Continue Keppra to 1000 mg BID =  - Q4 hour neuro checks  - Seizure precautions  - Please call Neurology with any exam changes   - We will sign off. If further clinical questions develop, please don't hesitate to contact us.         [address] [facility], [facility] - CNP   Neurology & Neurocritical Care   12/22/2022 10:24 AM    Floor / PCU Patients:  Neurology Line: [phone]  [employer]: [facility] Neurology

## 2022-12-22 NOTE — PROGRESS NOTES
Physical Therapy  [facility]  Physical Therapy Initial Assessment    Name: Ynes Viveros  : 1945  MRN: [id number]  Date of Service: 2022    Discharge Recommendations: Ynes Viveros scored a 16/24 on the AM-PAC short mobility form. Current research shows that an AM-PAC score of 17 or less is typically not associated with a discharge to the patient's home setting. Based on the patient's AM-PAC score and their current functional mobility deficits, it is recommended that the patient have 5-7 sessions per week of Physical Therapy at d/c to increase the patient's independence. At this time, this patient demonstrates complex nursing, medical, and rehabilitative needs, and would benefit from intensive rehabilitation services upon discharge from the Inpatient setting. This patient demonstrates the ability to participate in and benefit from an intensive therapy program with a coordinated interdisciplinary team approach to foster frequent, structured, and documented communication among disciplines, who will work together to establish, prioritize, and achieve treatment goals. Please see assessment section for further patient specific details. If patient discharges prior to next session this note will serve as a discharge summary. Please see below for the latest assessment towards goals. PT Equipment Recommendations  Other: defer to next level of care      Patient Diagnosis(es): The primary encounter diagnosis was Acute subdural hematoma. A diagnosis of Syncope and collapse was also pertinent to this visit.   Past Medical History:  has a past medical history of A-fib ([address] Allergic rhinitis, Arthritis, CAD (coronary artery disease), Cardiac pacemaker, CHF (congestive heart failure) ([address] Cholelithiasis, Diabetes mellitus ([address] Encounter for imaging to screen for metal prior to MRI, ESRD (end stage renal disease) ([address] Gastroparesis, Hepatic steatosis, Hyperlipidemia, Hypertension, Peripheral neuropathy, Sleep apnea, Syncope, Thyroid disease, and Vitamin B12 deficiency. Past Surgical History:  has a past surgical history that includes Coronary artery bypass graft (2005); Foot surgery; colectomy (1998); Colonoscopy; Upper gastrointestinal endoscopy; Tonsillectomy and adenoidectomy; Tubal ligation; other surgical history (4/17/2012); other surgical history (Right, 3/20/13); Coronary angioplasty; Cholecystectomy; Cardiac catheterization (12/27/2017); Diagnostic Cardiac Cath Lab Procedure; Toe amputation (Left, 3/29/2021); eye surgery; Endoscopy, colon, diagnostic; hernia repair; and craniotomy (Left, 12/21/2022). Assessment   Body Structures, Functions, Activity Limitations Requiring Skilled Therapeutic Intervention: Decreased functional mobility ; Decreased endurance;Decreased balance;Decreased cognition  Assessment: Pt [facility]. with SDH and above impairements. Pt seen POD1 of crani. Pt demos SBA for all bed mobility and Min A for transfers and ambulation. Once to the chair patient started to complain of chest pain and throat discomfort. Pt's nurse was called into the room and patient put back to bed. Pt's pain in the chest increased and she was having grimancing and burning pain. PT is still rec the patient have continued skilled PT in order to improve her functional mobility, ambulation endurance and functional strength. Will continue to follow during her length of stay.   Treatment Diagnosis: decreased functional mobility 2/2 to SDH  Therapy Prognosis: Good  Decision Making: Medium Complexity  Barriers to Learning: none  Requires PT Follow-Up: Yes  Activity Tolerance  Activity Tolerance: Treatment limited secondary to medical complications     Plan   Physcial Therapy Plan  General Plan: 5-7 times per week  Current Treatment Recommendations: Strengthening, Balance training, Gait training, Stair training, Functional mobility training, Transfer training, Endurance training  Safety Devices  Type of Devices: All fall risk precautions in place, Call light within reach, Left in bed, Bed alarm in place, Nurse notified (RN and Nurse in the room)     Restrictions  Position Activity Restriction  Other position/activity restrictions: up as tolerated     Subjective   General  Chart Reviewed: Yes  Patient assessed for rehabilitation services?: Yes  Additional Pertinent Hx: 77 yo female present to ED 12/19 p/w aphasia. CT head (+) Acute on chronic subdural hematoma overlying the left frontal and parietal lobes. Pt had a code stroke called 12/20 and underwent a procedure: 1. Left frontal trephine craniotomy for evacuation of acute/chronic subdural hemorrhage on 12/21. PMHx: afib, CAD, CHF, DM, HTN  Response To Previous Treatment: Not applicable  Family / Caregiver Present: No  Diagnosis: SDH  General Comment  Comments: . Subjective  Subjective: Pt found supine in bed, agreeable to therapy. Social/Functional History  Social/Functional History  Lives With: Alone  Type of Home: House  Home Layout: One level  Home Access: Ramped entrance  Bathroom Shower/Tub: Walk-in shower  Bathroom Toilet: Handicap height  Bathroom Equipment: Built-in shower seat, Grab bars in shower, Hand-held shower, Grab bars around toilet  Home Equipment: Cane, Electric scooter, Walker, rolling  Has the patient had two or more falls in the past year or any fall with injury in the past year?: No  ADL Assistance: Independent  Homemaking Assistance: Independent  Homemaking Responsibilities: Yes  Ambulation Assistance: Independent (Uses a electric scooter in the house and rollator in the community.)  Transfer Assistance: Independent  Active : No  Patient's  Info: friend or granddaughter transport  Leisure & Hobbies: watch TV, read  Additional Comments: able to ambulate grocery store distance with rollator.  She typically leaves the home every few days  Vision/Hearing       Cognition Orientation  Orientation Level: Oriented X4     Objective   Heart Rate: 74  Heart Rate Source: Monitor  BP: 135/85  BP Location: Left upper arm  BP Method: Automatic  Patient Position: Semi fowlers  MAP (Calculated): 102  Resp: 16  SpO2: 100 %  O2 Device: Nasal cannula        Gross Assessment  AROM: Generally decreased, functional  Strength: Generally decreased, functional                    Bed mobility  Supine to Sit: Stand by assistance (HOB elevated)  Sit to Supine: Stand by assistance  Transfers  Sit to Stand: Minimal Assistance (with RW)  Stand to Sit: Minimal Assistance  Bed to Chair: Contact guard assistance (with RW)  Ambulation  Surface: Level tile  Device: Rolling Walker  Assistance: Minimal assistance  Quality of Gait: forward flexed posture, decreased B step height/length, no overt LOB  Gait Deviations: Slow Alexandra  Distance: 5ftx2     Balance  Sitting - Static: Fair;+  Sitting - Dynamic: Fair;+  Standing - Static: Fair  Standing - Dynamic: Fair;-           OutComes Score                                                  AM-PAC Score  AM-PAC Inpatient Mobility Raw Score : 16 (12/22/22 1454)  AM-PAC Inpatient T-Scale Score : 40.78 (12/22/22 1454)  Mobility Inpatient CMS 0-100% Score: 54.16 (12/22/22 1454)  Mobility Inpatient CMS G-Code Modifier : CK (12/22/22 1454)          Tinneti Score       Goals  Short Term Goals  Time Frame for Short Term Goals: prior to discharge  Short Term Goal 1: Pt will complete bed mobility independently  Short Term Goal 2: Pt will compelte sit<>stand with LRAD and supervision  Short Term Goal 3: Pt will complete 48' ambualtion with supervision and LRAD  Patient Goals   Patient Goals : none stated       Education  Patient Education  Education Given To: Patient  Education Provided: Role of Therapy;Plan of Care;Transfer Training  Education Provided Comments: .   Education Method: Demonstration  Barriers to Learning: None (Slight Aphasia)  Education Outcome: Verbalized understanding      Therapy Time   Individual Concurrent Group Co-treatment   Time In 0930         Time Out 1008         Minutes 38          Timed Code Treatment Minutes:  23    Total Treatment Minutes:  [address], [address]

## 2022-12-23 LAB
ANION GAP SERPL CALCULATED.3IONS-SCNC: 11 MMOL/L (ref 3–16)
BASOPHILS ABSOLUTE: 0 K/UL (ref 0–0.2)
BASOPHILS RELATIVE PERCENT: 0.2 %
BUN BLDV-MCNC: 27 MG/DL (ref 7–20)
CALCIUM SERPL-MCNC: 8.7 MG/DL (ref 8.3–10.6)
CHLORIDE BLD-SCNC: 103 MMOL/L (ref 99–110)
CO2: 21 MMOL/L (ref 21–32)
CREAT SERPL-MCNC: 1.1 MG/DL (ref 0.6–1.2)
EOSINOPHILS ABSOLUTE: 0 K/UL (ref 0–0.6)
EOSINOPHILS RELATIVE PERCENT: 0 %
GFR SERPL CREATININE-BSD FRML MDRD: 52 ML/MIN/{1.73_M2}
GLUCOSE BLD-MCNC: 166 MG/DL (ref 70–99)
GLUCOSE BLD-MCNC: 183 MG/DL (ref 70–99)
GLUCOSE BLD-MCNC: 187 MG/DL (ref 70–99)
GLUCOSE BLD-MCNC: 190 MG/DL (ref 70–99)
GLUCOSE BLD-MCNC: 202 MG/DL (ref 70–99)
HCT VFR BLD CALC: 40.1 % (ref 36–48)
HEMOGLOBIN: 13.5 G/DL (ref 12–16)
LYMPHOCYTES ABSOLUTE: 0.9 K/UL (ref 1–5.1)
LYMPHOCYTES RELATIVE PERCENT: 9.8 %
MCH RBC QN AUTO: 30 PG (ref 26–34)
MCHC RBC AUTO-ENTMCNC: 33.7 G/DL (ref 31–36)
MCV RBC AUTO: 89.1 FL (ref 80–100)
MONOCYTES ABSOLUTE: 0.4 K/UL (ref 0–1.3)
MONOCYTES RELATIVE PERCENT: 4.1 %
NEUTROPHILS ABSOLUTE: 7.9 K/UL (ref 1.7–7.7)
NEUTROPHILS RELATIVE PERCENT: 85.9 %
PDW BLD-RTO: 14.7 % (ref 12.4–15.4)
PERFORMED ON: ABNORMAL
PLATELET # BLD: 165 K/UL (ref 135–450)
PMV BLD AUTO: 8.9 FL (ref 5–10.5)
POTASSIUM SERPL-SCNC: 4.7 MMOL/L (ref 3.5–5.1)
RBC # BLD: 4.5 M/UL (ref 4–5.2)
SODIUM BLD-SCNC: 135 MMOL/L (ref 136–145)
WBC # BLD: 9.3 K/UL (ref 4–11)

## 2022-12-23 PROCEDURE — 97116 GAIT TRAINING THERAPY: CPT

## 2022-12-23 PROCEDURE — APPNB30 APP NON BILLABLE TIME 0-30 MINS: Performed by: NURSE PRACTITIONER

## 2022-12-23 PROCEDURE — 97129 THER IVNTJ 1ST 15 MIN: CPT

## 2022-12-23 PROCEDURE — 2580000003 HC RX 258: Performed by: PHYSICIAN ASSISTANT

## 2022-12-23 PROCEDURE — 99024 POSTOP FOLLOW-UP VISIT: CPT | Performed by: NURSE PRACTITIONER

## 2022-12-23 PROCEDURE — 36415 COLL VENOUS BLD VENIPUNCTURE: CPT

## 2022-12-23 PROCEDURE — 2580000003 HC RX 258

## 2022-12-23 PROCEDURE — 80048 BASIC METABOLIC PNL TOTAL CA: CPT

## 2022-12-23 PROCEDURE — 6360000002 HC RX W HCPCS

## 2022-12-23 PROCEDURE — 85025 COMPLETE CBC W/AUTO DIFF WBC: CPT

## 2022-12-23 PROCEDURE — 2060000000 HC ICU INTERMEDIATE R&B

## 2022-12-23 PROCEDURE — 6360000002 HC RX W HCPCS: Performed by: PHYSICIAN ASSISTANT

## 2022-12-23 PROCEDURE — 6370000000 HC RX 637 (ALT 250 FOR IP): Performed by: INTERNAL MEDICINE

## 2022-12-23 PROCEDURE — 2580000003 HC RX 258: Performed by: INTERNAL MEDICINE

## 2022-12-23 PROCEDURE — 97535 SELF CARE MNGMENT TRAINING: CPT

## 2022-12-23 PROCEDURE — 97130 THER IVNTJ EA ADDL 15 MIN: CPT

## 2022-12-23 PROCEDURE — 97110 THERAPEUTIC EXERCISES: CPT

## 2022-12-23 PROCEDURE — 6370000000 HC RX 637 (ALT 250 FOR IP): Performed by: NURSE PRACTITIONER

## 2022-12-23 PROCEDURE — 6360000002 HC RX W HCPCS: Performed by: NURSE PRACTITIONER

## 2022-12-23 RX ORDER — HYDRALAZINE HYDROCHLORIDE 20 MG/ML
5 INJECTION INTRAMUSCULAR; INTRAVENOUS EVERY 4 HOURS PRN
Status: DISCONTINUED | OUTPATIENT
Start: 2022-12-23 | End: 2022-12-27 | Stop reason: HOSPADM

## 2022-12-23 RX ADMIN — SODIUM CHLORIDE: 9 INJECTION, SOLUTION INTRAVENOUS at 10:01

## 2022-12-23 RX ADMIN — HEPARIN SODIUM 5000 UNITS: 5000 INJECTION INTRAVENOUS; SUBCUTANEOUS at 22:56

## 2022-12-23 RX ADMIN — CARVEDILOL 3.12 MG: 3.12 TABLET, FILM COATED ORAL at 17:37

## 2022-12-23 RX ADMIN — GABAPENTIN 300 MG: 600 TABLET, FILM COATED ORAL at 14:25

## 2022-12-23 RX ADMIN — LEVETIRACETAM 1000 MG: 100 INJECTION INTRAVENOUS at 21:19

## 2022-12-23 RX ADMIN — NITROGLYCERIN 0.4 MG: 0.4 TABLET, ORALLY DISINTEGRATING SUBLINGUAL at 12:44

## 2022-12-23 RX ADMIN — GABAPENTIN 300 MG: 600 TABLET, FILM COATED ORAL at 21:10

## 2022-12-23 RX ADMIN — DEXAMETHASONE SODIUM PHOSPHATE 4 MG: 4 INJECTION, SOLUTION INTRA-ARTICULAR; INTRALESIONAL; INTRAMUSCULAR; INTRAVENOUS; SOFT TISSUE at 12:34

## 2022-12-23 RX ADMIN — DEXAMETHASONE SODIUM PHOSPHATE 4 MG: 4 INJECTION, SOLUTION INTRA-ARTICULAR; INTRALESIONAL; INTRAMUSCULAR; INTRAVENOUS; SOFT TISSUE at 05:33

## 2022-12-23 RX ADMIN — SODIUM CHLORIDE, PRESERVATIVE FREE 10 ML: 5 INJECTION INTRAVENOUS at 09:30

## 2022-12-23 RX ADMIN — DEXAMETHASONE SODIUM PHOSPHATE 4 MG: 4 INJECTION, SOLUTION INTRA-ARTICULAR; INTRALESIONAL; INTRAMUSCULAR; INTRAVENOUS; SOFT TISSUE at 22:56

## 2022-12-23 RX ADMIN — GABAPENTIN 300 MG: 600 TABLET, FILM COATED ORAL at 09:31

## 2022-12-23 RX ADMIN — INSULIN GLARGINE 8 UNITS: 100 INJECTION, SOLUTION SUBCUTANEOUS at 21:12

## 2022-12-23 RX ADMIN — DEXAMETHASONE SODIUM PHOSPHATE 4 MG: 4 INJECTION, SOLUTION INTRA-ARTICULAR; INTRALESIONAL; INTRAMUSCULAR; INTRAVENOUS; SOFT TISSUE at 17:37

## 2022-12-23 RX ADMIN — LEVOTHYROXINE SODIUM 50 MCG: 50 TABLET ORAL at 05:33

## 2022-12-23 RX ADMIN — TROSPIUM CHLORIDE 20 MG: 20 TABLET, FILM COATED ORAL at 21:10

## 2022-12-23 RX ADMIN — PANTOPRAZOLE SODIUM 40 MG: 40 TABLET, DELAYED RELEASE ORAL at 09:31

## 2022-12-23 RX ADMIN — NITROGLYCERIN 0.4 MG: 0.4 TABLET, ORALLY DISINTEGRATING SUBLINGUAL at 09:47

## 2022-12-23 RX ADMIN — HEPARIN SODIUM 5000 UNITS: 5000 INJECTION INTRAVENOUS; SUBCUTANEOUS at 17:37

## 2022-12-23 RX ADMIN — CARVEDILOL 3.12 MG: 3.12 TABLET, FILM COATED ORAL at 09:31

## 2022-12-23 RX ADMIN — ISOSORBIDE MONONITRATE 120 MG: 60 TABLET, EXTENDED RELEASE ORAL at 09:31

## 2022-12-23 RX ADMIN — LEVETIRACETAM 1000 MG: 100 INJECTION INTRAVENOUS at 10:04

## 2022-12-23 RX ADMIN — HEPARIN SODIUM 5000 UNITS: 5000 INJECTION INTRAVENOUS; SUBCUTANEOUS at 09:33

## 2022-12-23 ASSESSMENT — PAIN SCALES - GENERAL
PAINLEVEL_OUTOF10: 0

## 2022-12-23 NOTE — CONSULTS
Consult Note  Physical Medicine and Rehabilitation    Patient: Cyn Maurer  [id number]  Date: 12/23/2022      Chief Complaint: aphasia, SDH    History of Present Illness/Hospital Course:    12/20 Lyric Ross is 78 y.o. female with history of HTN, DM II, CAD and hypothyroid  She now presents to the ER with complaint of difficulty to get the words out. It started yesterday and continues without improvement  Therefore she decided to come to the ED  She denies similar symptoms in the past  She denies any focal neurological deficits such as slurred speech, facial droop or weakness in any of the extremities  She just has difficulty finding and getting words out although she is able to speak  CT of the head shows acute on chronic subdural hematoma with midline shift  Therefore she was transferred from [place] ED for further evaluation\"    Pt was found to have \"Left Acute/Chronic Subdural Hematoma\" and underwent \"Left frontal trephine craniotomy for evacuation of acute/chronic subdural hemorrhage\" by neurosurgery under general anesthesia. Pt had \"Synthes cranial plating system\" implant and \"7 Flat MICHAEL in the subdural space\" drain placed as well. Pt is now being evaluated for inpatient rehab. Prior Level of Function:  Independent for mobility, ADLs, and IADLs    Current Level of Function:  PT 16/24  OT 17/24    Pertinent Social History:  Support:   Home set-up:   Pt currently lives at home    Past Medical History:   Diagnosis Date    A-fib [facility])     Allergic rhinitis     Arthritis     CAD (coronary artery disease)     Cardiac pacemaker     CHF (congestive heart failure) ([facility]     Cholelithiasis 07/03/2015    Diabetes mellitus ([facility]     Accuchecks daily once a day at home in A.M. Encounter for imaging to screen for metal prior to MRI 12/20/2022    MRI Conditional Medtronic Dolores XT DR model#W1DR01  Leads: RA [id number] implanted 2/17/22. Normal lmode. 1.5T or 3.0T.  Pt must be A/OX4 per [employer] guidelines. Med[employer] rep and RN must be present. Follow all other [employer] guidelines. Pt currently follows     ESRD (end stage renal disease) ([facility]     Gastroparesis     Hepatic steatosis 07/03/2015    Hyperlipidemia     Hypertension     Peripheral neuropathy     Sleep apnea     Has used the CPAP x 25 yrs now per pt. Syncope     Thyroid disease     Vitamin B12 deficiency        Past Surgical History:   Procedure Laterality Date    CARDIAC CATHETERIZATION  12/27/2017    SVG to OM2 100% ostium    CHOLECYSTECTOMY      COLECTOMY  1998    diverticulitis     COLONOSCOPY      CORONARY ANGIOPLASTY      CORONARY ARTERY BYPASS GRAFT  2005    x 3    CRANIOTOMY Left 12/21/2022    LEFT TREPHINE CRANIOTOMY FOR SUBDURAL HEMATOMA EVACUATION performed by Melania Jacobson. Luis Fernando Bryant MD at [address] [facility] CATH LAB PROCEDURE      ENDOSCOPY, COLON, DIAGNOSTIC      EYE SURGERY      Bilateral cataracts removed with lens implants    FOOT SURGERY      Multiple foot surgeries bilateral    HERNIA REPAIR      OTHER SURGICAL HISTORY  4/17/2012    achilles tendon lengthening,arthroplasty,matatarsalhead resection    OTHER SURGICAL HISTORY Right 3/20/13    FUSION OF RIGHT GREAT TOE JOINT WITH WIRE FIXATION, DEBRIEDMENT OF WOUND RIGHT GREAT TOE    TOE AMPUTATION Left 3/29/2021    PARTIAL LEFT FOOT AMPUTATION performed by Wero Drake DPM at [address]      Laparoscopic    UPPER GASTROINTESTINAL ENDOSCOPY         Family History   Problem Relation Age of Onset    Stroke Mother     Coronary Art Dis Father     Coronary Art Dis Sister     Coronary Art Dis Brother        Social History     Socioeconomic History    Marital status:       Spouse name: None    Number of children: None    Years of education: None    Highest education level: None   Tobacco Use    Smoking status: Former     Packs/day: 1.00     Years: 30.00     Pack years: 30.00     Types: Cigarettes     Quit date: 1998     Years since quittin.5    Smokeless tobacco: Never   Vaping Use    Vaping Use: Never used   Substance and Sexual Activity    Alcohol use: No    Drug use: No    Sexual activity: Not Currently           REVIEW OF SYSTEMS:   CONSTITUTIONAL: negative for fevers, chills, diaphoresis, appetite change, night sweats, unexpected weight change, fatigue  EYES: negative for blurred vision, eye discharge, visual disturbance and icterus  HEENT: negative for hearing loss, tinnitus, ear drainage, sinus pressure, nasal congestion, epistaxis and snoring  RESPIRATORY: Negative for hemoptysis, cough, sputum production  CARDIOVASCULAR: negative for chest pain, palpitations, exertional chest pressure/discomfort, syncope, edema  GASTROINTESTINAL: negative for nausea, vomiting, diarrhea, blood in stool, abdominal pain, constipation  GENITOURINARY: negative for frequency, dysuria, urinary incontinence, decreased urine volume, and hematuria  HEMATOLOGIC/LYMPHATIC: negative for easy bruising, bleeding and lymphadenopathy  ALLERGIC/IMMUNOLOGIC: negative for recurrent infections, angioedema, anaphylaxis and drug reactions  ENDOCRINE: negative for weight changes and diabetic symptoms including polyuria, polydipsia and polyphagia  MUSCULOSKELETAL: negative for pain, joint swelling, decreased range of motion  NEUROLOGICAL: negative for headaches, slurred speech, unilateral weakness  PSYCHIATRIC/BEHAVIORAL: negative for hallucinations, behavioral problems, confusion and agitation.      Physical Examination:  Vitals: Patient Vitals for the past 24 hrs:   BP Temp Temp src Pulse Resp SpO2 Weight   22 0931 (!) 147/84 -- -- 70 -- -- --   22 0647 (!) 148/81 98 °F (36.7 °C) Oral 63 18 96 % --   22 0505 -- -- -- -- -- -- 200 lb (90.7 kg)   22 0315 137/80 97.9 °F (36.6 °C) Oral 65 16 95 % --   22 2315 131/84 98.4 °F (36.9 °C) Oral 70 16 94 % --   22 1845 117/70 97.6 °F (36.4 °C) Oral 82 16 96 % -- 12/22/22 1730 (!) 145/90 -- -- 86 -- -- --   12/22/22 1500 132/78 97.6 °F (36.4 °C) Oral 74 16 96 % --   12/22/22 1115 135/85 98 °F (36.7 °C) Oral 74 16 100 % --     Const: Alert. WDWN. No distress  Eyes: Conjunctiva noninjected, no icterus noted; pupils equal, round, and reactive to light. HENT: Atraumatic, normocephalic; Oral mucosa moist  Neck: Trachea midline, neck supple. No thyromegaly noted. CV: Regular rate and rhythm, no murmur rub or gallop noted  Resp: Lungs clear to auscultation bilaterally, no rales wheezes or ronchi, no retractions. Respirations unlabored. GI: Soft, nontender, nondistended. Normal bowel sounds. No palpable masses. Skin: Normal temperature and turgor. No rashes or breakdown noted. Ext: No significant edema appreciated. No varicosities. MSK: No joint tenderness, erythema, warmth noted. AROM intact. Neuro: Alert, oriented, appropriate. No cranial nerve deficits appreciated. Sensation intact to light touch. Motor examination reveals normal strength in all four limbs diffusely. No abnormalities with finger/nose or heel/shin noted. Reflexes normal and symmetric.   Psych: Stable mood, normal judgement, normal affect     Lab Results   Component Value Date    WBC 11.5 (H) 12/22/2022    HGB 12.5 12/22/2022    HCT 37.9 12/22/2022    MCV 89.3 12/22/2022     12/22/2022     Lab Results   Component Value Date    INR 1.26 (H) 12/22/2022    INR 1.23 (H) 12/21/2022    INR 1.25 (H) 12/20/2022    PROTIME 15.8 (H) 12/22/2022    PROTIME 15.4 (H) 12/21/2022    PROTIME 15.6 (H) 12/20/2022     Lab Results   Component Value Date    CREATININE 1.1 12/22/2022    BUN 26 (H) 12/22/2022     12/22/2022    K 4.2 12/22/2022     12/22/2022    CO2 22 12/22/2022     Lab Results   Component Value Date    ALT 13 12/19/2022    AST 17 12/19/2022    ALKPHOS 125 12/19/2022    BILITOT 0.8 12/19/2022       Most recent echocardiogram revealed:  2/16/22  \"Normal left ventricular systolic function with ejection fraction of 55-60%. No regional wall motion abnormalites are seen. Left ventricular diastolic filling pressure is elevated. Compared to previous study from 2- no changes noted in left   ventricular function. Moderate mitral stenosis. Mild mitral regurgitation. \"    Most recent EKG revealed:  12/20/22  \"Atrial-sensed ventricular-paced rhythm with prolonged AV conduction\"  Prolonged NH interval [216ms]  Prolonged QTc [505ms]  Widened QRS [144ms], RBBB    Most recent imaging studies revealed:  12/21/22 CT head wo contrast  \"Status post evacuation of left cerebral convexity subdural hematoma without evidence of complication. Very mild residual chronic left cerebral convexity subdural hematoma with nearly resolved 1 mm rightward midline shift. \"  12/20/22 MRI brain  \"1. Stable size of subacute left cerebral convexity subdural hematoma (2 cm thick) with stable mass effect and mild left to right midline shift measuring 4 mm  2. Mild chronic small vessel ischemia and mild generalized atrophy  3. No evidence for intracranial mass or vascular lesion\"  12/19/22 CXR  \"No radiographic evidence of acute cardiopulmonary disease. \"      CT Head wo Contrast   Final Result      1. Status post evacuation of left cerebral convexity subdural hematoma without evidence of complication. Very mild residual chronic left cerebral convexity subdural hematoma with nearly resolved 1 mm rightward midline shift. CTA HEAD NECK W CONTRAST   Final Result      1. No aneurysms, vascular occlusions, or intracranial stenoses identified. 2.  No significant stenosis in the extracranial vertebral or carotid arteries. CT HEAD WO CONTRAST   Final Result      1. Unchanged subacute left cerebral convexity subdural hematoma with unchanged 5 mm rightward midline shift. MRI BRAIN WO CONTRAST   Final Result      1.  Stable size of subacute left cerebral convexity subdural hematoma (2 cm thick) with stable mass effect and mild left to right midline shift measuring 4 mm   2. Mild chronic small vessel ischemia and mild generalized atrophy   3. No evidence for intracranial mass or vascular lesion               MRA HEAD WO CONTRAST   Final Result      1. MRA limited by motion artifact   2. No evidence for dural arteriovenous fistula         CT HEAD WO CONTRAST   Final Result   1. Acute on chronic left subdural hematoma with approximately 5.6 mm left to right shift on coronal imaging, stable from 12/19/2022 at 1953   2. Left cerebral hemisphere cortical sulci effacement is unchanged   3. Moderate to severe periventricular microangiopathic ischemic changes, chronic small vessel disease   4. No new bleed or intraparenchymal hemorrhage. Assessment:  \"-Acute on chronic subdural hematoma s/p Left frontal trephine craniotomy for evacuation 12/21  -Essential HTN-controlled-on coreg  -CAD s/p CABG 2005,EF 55-60%-antiplatelets held,on coreg  -Hx Complete heart block s/p PPM  -Paroxsymal a fib-stable-eliquis held  -Obesity BMI 35  -Hypothyroidism-on  synthroid  -DM type 2-gliplizide,trulicity on hold since admission     Plan  Continue postop care per neurosurgery   Holding all antipaletelets and anticoagulants   C/w Keppra and decadron neurosurgery   C/w neurochecks  Continue basal bolus insulin\"    Impairments- Decreased functional mobility, Decreased ADLs    Recommendations:  Acute inpatient rehab, will likely benefit as there is good rehab potential.    Start precert     Patient with new functional deficits and ongoing medical complexity. Demonstrates ability to tolerate 3 hours therapy/day. Arjun Small is a good candidate for acute inpatient rehab when medically appropriate. Thank you for this consult. Please contact me with any questions or concerns. Amado Menjivar MD  PGY1    Staffed and discussed with attending Dr. Galilea Taylor. This patient has been seen, examined, and discussed with the resident.  I was part of the key critical services provided to the patient. I agree with the residents documentation. This note may have been altered to reflect my own examination findings, impression, and recommendations.          LEATHA PalacioP.H  PM&R  12/23/2022  10:22 AM

## 2022-12-23 NOTE — PROGRESS NOTES
Physical Therapy  Facility/Department: [facility]  Physical Therapy Daily Treatment    Name: Chun Patel  : 1945  MRN: [id number]  Date of Service: 2022    Discharge Recommendations: Chun Patel scored a 16/24 on the AM-PAC short mobility form. Current research shows that an AM-PAC score of 17 or less is typically not associated with a discharge to the patient's home setting. Based on the patient's AM-PAC score and their current functional mobility deficits, it is recommended that the patient have 5-7 sessions per week of Physical Therapy at d/c to increase the patient's independence. At this time, this patient demonstrates complex nursing, medical, and rehabilitative needs, and would benefit from intensive rehabilitation services upon discharge from the Inpatient setting. This patient demonstrates the ability to participate in and benefit from an intensive therapy program with a coordinated interdisciplinary team approach to foster frequent, structured, and documented communication among disciplines, who will work together to establish, prioritize, and achieve treatment goals. Please see assessment section for further patient specific details. If patient discharges prior to next session this note will serve as a discharge summary. Please see below for the latest assessment towards goals. PT Equipment Recommendations  Other: defer to next level of care      Patient Diagnosis(es): The primary encounter diagnosis was Acute subdural hematoma. A diagnosis of Syncope and collapse was also pertinent to this visit.   Past Medical History:  has a past medical history of A-fib ([address] Allergic rhinitis, Arthritis, CAD (coronary artery disease), Cardiac pacemaker, CHF (congestive heart failure) ([address] Cholelithiasis, Diabetes mellitus ([address] Encounter for imaging to screen for metal prior to MRI, ESRD (end stage renal disease) ([address] Gastroparesis, Hepatic steatosis, Hyperlipidemia, Hypertension, Peripheral neuropathy, Sleep apnea, Syncope, Thyroid disease, and Vitamin B12 deficiency. Past Surgical History:  has a past surgical history that includes Coronary artery bypass graft (2005); Foot surgery; colectomy (1998); Colonoscopy; Upper gastrointestinal endoscopy; Tonsillectomy and adenoidectomy; Tubal ligation; other surgical history (4/17/2012); other surgical history (Right, 3/20/13); Coronary angioplasty; Cholecystectomy; Cardiac catheterization (12/27/2017); Diagnostic Cardiac Cath Lab Procedure; Toe amputation (Left, 3/29/2021); eye surgery; Endoscopy, colon, diagnostic; hernia repair; and craniotomy (Left, 12/21/2022). Assessment   Body Structures, Functions, Activity Limitations Requiring Skilled Therapeutic Intervention: Decreased functional mobility ; Decreased endurance;Decreased balance;Decreased cognition  Assessment: Pt continues to make progress with therapy and has decreased chest burning and pain today during the session. Pt continues to need assistance for all transfers and ambulation with a rollign walker. Pt able to increase her endurance with ambulation during the session and had no co of dizziness or light headedness. Pt would benefit from continued IP therapy in order to improve her endurance, functional mobility and independence before return home alone. Will continue to follow.   Treatment Diagnosis: decreased functional mobility 2/2 to SDH  Therapy Prognosis: Good  Decision Making: Medium Complexity  Barriers to Learning: Cognition  Requires PT Follow-Up: Yes  Activity Tolerance  Activity Tolerance: Patient tolerated treatment well     Plan   Physcial Therapy Plan  General Plan: 5-7 times per week  Current Treatment Recommendations: Strengthening, Balance training, Gait training, Stair training, Functional mobility training, Transfer training, Endurance training  Safety Devices  Type of Devices: Nurse notified, Call light within reach, All fall risk precautions in place, Left in chair, Chair alarm in place (Nurse michaela notifed of chair alarm)     Restrictions  Position Activity Restriction  Other position/activity restrictions: up as tolerated     Subjective   Subjective  Subjective: Pt found supine in bed, agreeable to therapy. States no chest pain as of right now         Social/Functional History  Social/Functional History  Lives With: Alone  Type of Home: House  Home Layout: One level  Home Access: Ramped entrance  Bathroom Shower/Tub: Walk-in shower  Bathroom Toilet: Handicap height  Bathroom Equipment: Built-in shower seat, Grab bars in shower, Hand-held shower, Grab bars around toilet  Home Equipment: Cane, Electric scooter, Walker, rolling  Has the patient had two or more falls in the past year or any fall with injury in the past year?: No  ADL Assistance: [address] [place] [address] Avenue: Independent  Homemaking Responsibilities: Yes  Ambulation Assistance: Independent (Uses a electric scooter in the house and rollator in the community.)  Transfer Assistance: Independent  Active : No  Patient's  Info: friend or granddaughter transport  Leisure & Hobbies: watch TV, read  Additional Comments: able to ambulate grocery store distance with rollator.  She typically leaves the home every few days  Vision/Hearing       Cognition         Objective   Heart Rate: 64  Heart Rate Source: Monitor  BP: 134/80  BP Location: Left upper arm  BP Method: Automatic  Patient Position: Sitting  MAP (Calculated): 98  Resp: 16  SpO2: 96 %  O2 Device: None (Room air)                             Bed mobility  Supine to Sit: Contact guard assistance (HHA and HOB raised)  Scooting: Stand by assistance  Transfers  Sit to Stand: Minimal Assistance (With RW)  Bed to Chair: Contact guard assistance (with RW)  Ambulation  Surface: Level tile  Device: Rolling Walker  Assistance: Minimal assistance  Quality of Gait: forward flexed posture, decreased B step height/length, no overt LOB  Gait Deviations: Slow Alexandra  Distance: 15ftx2+25ft  Comments: Slight memory deficits requiring multiple VC for upright position     Balance  Sitting - Static: Fair;+  Sitting - Dynamic: Fair;+  Standing - Static: Fair  Standing - Dynamic: Fair;-  Exercise Treatment: Seated Marches, LAQ, Heel Toe Raises, 2x10, 10x sit<>stand        OutComes Score                                                  AM-PAC Score  AM-PAC Inpatient Mobility Raw Score : 16 (12/23/22 1200)  AM-PAC Inpatient T-Scale Score : 40.78 (12/23/22 1200)  Mobility Inpatient CMS 0-100% Score: 54.16 (12/23/22 1200)  Mobility Inpatient CMS G-Code Modifier : CK (12/23/22 1200)          Tinneti Score       Goals  Short Term Goals  Time Frame for Short Term Goals: prior to discharge  Short Term Goal 1: Pt will complete bed mobility independently  Short Term Goal 2: Pt will compelte sit<>stand with LRAD and supervision  Short Term Goal 3: Pt will complete 48' ambualtion with supervision and LRAD  Patient Goals   Patient Goals : none stated       Education  Patient Education  Education Given To: Patient  Education Provided: Role of Therapy;Plan of Care;Transfer Training  Education Method: Demonstration  Barriers to Learning: None (slight aphasia and memory deficits)  Education Outcome: Verbalized understanding      Therapy Time   Individual Concurrent Group Co-treatment   Time In 1035         Time Out 1113         Minutes 38          Timed Code Treatment Minutes:  38    Total Treatment Minutes:  [address], PT

## 2022-12-23 NOTE — PROGRESS NOTES
Pt. Alert and oriented to X4. VSS exception for /110 at 10 am and also Pt. complained of mid sternal chest pain at 10:15 am. Neurosurgery was informed and managed with nitroglycerin and morphine as per order. Pt's BP was stable throughout the rest of the shift. Voiding adequately, fall precaution in place, call light within reach. Will continue to monitor.

## 2022-12-23 NOTE — PROGRESS NOTES
Pt. Alert and oriented to X4. VSS exception for /94 at 12:44 pm and also Pt. complained of mid sternal chest pain after ambulating to bathroom, managed with PRN nitroglycerin as per order. Pt's BP was stable throughout the rest of the shift. Wound was cleaned with soap and water and it looks clean, dry and intact. Voiding adequately, fall precaution in place, call light within reach. Will continue to monitor.

## 2022-12-23 NOTE — PROGRESS NOTES
Occupational Therapy  Facility/Department: [facility] 5T ORTHO/NEURO  Daily Treatment    Name: Gerrianne Simmonds  : 1945  MRN: [id number]  Date of Service: 2022    Discharge Recommendations:  Gerrianne Simmonds scored a 17/24 on the AM-PAC ADL Inpatient form. Current research shows that an AM-PAC score of 17 or less is typically not associated with a discharge to the patient's home setting. Based on the patient's AM-PAC score and their current ADL deficits, it is recommended that the patient have 5-7 sessions per week of Occupational Therapy at d/c to increase the patient's independence. At this time, this patient demonstrates complex nursing, medical, and rehabilitative needs, and would benefit from intensive rehabilitation services upon discharge from the Inpatient setting. This patient demonstrates the ability to participate in and benefit from an intensive therapy program with a coordinated interdisciplinary team approach to foster frequent, structured, and documented communication among disciplines, who will work together to establish, prioritize, and achieve treatment goals. Please see assessment section for further patient specific details. If patient discharges prior to next session this note will serve as a discharge summary. Please see below for the latest assessment towards goals. OT Equipment Recommendations  Equipment Needed: No  Other: defer       Patient Diagnosis(es): The primary encounter diagnosis was Acute subdural hematoma. A diagnosis of Syncope and collapse was also pertinent to this visit.   Past Medical History:  has a past medical history of A-fib ([address] Allergic rhinitis, Arthritis, CAD (coronary artery disease), Cardiac pacemaker, CHF (congestive heart failure) ([address] Cholelithiasis, Diabetes mellitus ([address] Encounter for imaging to screen for metal prior to MRI, ESRD (end stage renal disease) ([address] Gastroparesis, Hepatic steatosis, Hyperlipidemia, Hypertension, Peripheral neuropathy, Sleep apnea, Syncope, Thyroid disease, and Vitamin B12 deficiency. Past Surgical History:  has a past surgical history that includes Coronary artery bypass graft (2005); Foot surgery; colectomy (1998); Colonoscopy; Upper gastrointestinal endoscopy; Tonsillectomy and adenoidectomy; Tubal ligation; other surgical history (4/17/2012); other surgical history (Right, 3/20/13); Coronary angioplasty; Cholecystectomy; Cardiac catheterization (12/27/2017); Diagnostic Cardiac Cath Lab Procedure; Toe amputation (Left, 3/29/2021); eye surgery; Endoscopy, colon, diagnostic; hernia repair; and craniotomy (Left, 12/21/2022). Treatment Diagnosis: impaired ADLs and functional transfers    Assessment   Performance deficits / Impairments: Decreased functional mobility ; Decreased ADL status; Decreased endurance;Decreased balance;Decreased high-level IADLs;Decreased posture  After treatment, pt s/p crani for SDH is found to be presenting with the above mentioned deficits. Pt would benefit from continued skilled occupational therapy to address these deficits, increasing safety and independence with ADL and functional mobility. Pt is making progress with communication and safety awareness, but is still needing CGA for standing level ADL and t/fs, and up to mod A for LB ADL. Pt normally lives alone and would benefit from a short intensive rehab stay. Will continue to assess for discharge needs.      Treatment Diagnosis: impaired ADLs and functional transfers  Prognosis: Good  REQUIRES OT FOLLOW-UP: Yes  Activity Tolerance  Activity Tolerance: Patient Tolerated treatment well        Plan   Occupational Therapy Plan  Times Per Week: 5-7  Current Treatment Recommendations: Strengthening, Functional mobility training, Balance training, Endurance training, Neuromuscular re-education, Safety education & training, Patient/Caregiver education & training, Equipment evaluation, education, & procurement, Self-Care / ADL, Home management training     Restrictions  Position Activity Restriction  Other position/activity restrictions: up as tolerated    Subjective   General  Chart Reviewed: Yes  Patient assessed for rehabilitation services?: Yes  Additional Pertinent Hx: 77 yo female present to ED 12/19 p/w aphasia. CT head (+) Acute on chronic subdural hematoma overlying the left frontal and parietal lobes. 12/21 s/p LEFT TREPHINE CRANIOTOMY FOR SUBDURAL HEMATOMA EVACUATION PMHx: afib, CAD, CHF, DM, HTN  Family / Caregiver Present: No  Referring Practitioner: MD Kamilla  Diagnosis: SDH  Subjective  Subjective: Pt in bed upon OT entry. Agreeable to therapy. \"I am doing much better than yesterday. I am able to talk again. \" Pt denies pain. Social/Functional History  Social/Functional History  Lives With: Alone  Type of Home: House  Home Layout: One level  Home Access: Ramped entrance  Bathroom Shower/Tub: Walk-in shower  Bathroom Toilet: Handicap height  Bathroom Equipment: Built-in shower seat, Grab bars in shower, Hand-held shower, Grab bars around toilet  Home Equipment: Cane, Electric scooter, Walker, rolling  Has the patient had two or more falls in the past year or any fall with injury in the past year?: No  ADL Assistance: Independent  Homemaking Assistance: Independent  Homemaking Responsibilities: Yes  Ambulation Assistance: Independent (Uses a electric scooter in the house and rollator in the community.)  Transfer Assistance: Independent  Active : No  Patient's  Info: friend or granddaughter transport  Leisure & Hobbies: watch TV, read  Additional Comments: able to ambulate grocery store distance with rollator. She typically leaves the home every few days       Objective   BP at end of session: 164/85, 72bpm.               Safety Devices  Type of Devices: Nurse notified; All fall risk precautions in place; Left in bed;Bed alarm in place;Call light within reach;Gait belt       Toilet Transfers  Toilet - Technique: Ambulating (with RW)  Equipment Used: Standard toilet (with L)  Toilet Transfer: Contact guard assistance       ADL  Grooming: Stand by assistance; Moderate assistance (to wash face and use mouthwash standing, to comb hair standing 2/2 safety at incision)  UE Bathing: Stand by assistance (ventral sponge bathing standing at sink)  LE Bathing:  (anish-area standing with RW and BLEs seated, sponge bathing at sink)  LE Dressing: Contact guard assistance (to don/doff brief seated and standing with RW)  Toileting: Contact guard assistance (hygiene and clothing management standing)       Activity Tolerance  Activity Tolerance: Patient tolerated treatment well    Bed mobility  Supine to Sit: Stand by assistance  Sit to Supine: Stand by assistance  Scooting: Stand by assistance  Bed Mobility Comments: HOB raised    Transfers  Sit to stand: Contact guard assistance  Stand to sit: Contact guard assistance  Transfer Comments: VCs for safe use of RW and safe t/f technique, improving as session progressed       Cognition  Overall Cognitive Status: Exceptions  Arousal/Alertness: Appropriate responses to stimuli  Following Commands: Follows one step commands consistently  Attention Span: Attends with cues to redirect  Memory: Appears intact  Safety Judgement: Decreased awareness of need for assistance;Decreased awareness of need for safety  Insights: Decreased awareness of deficits  Initiation: Does not require cues  Sequencing: Does not require cues  Orientation  Overall Orientation Status: Within Functional Limits  Orientation Level: Oriented X4 (off on date by 1 day, pt reoriented)                                      Functional mobility: Pt walked to/from bathroom with gait belt, RW, and SBA.     Education: Role of OT, safe t/f training, safe use of DME, awareness of deficits, discharge planning, ADL as therapeutic exercise, importance of OOB, energy conservation techniques       AM-PAC Score        AM-PAC Inpatient Daily Activity Raw Score: 17 (12/23/22 1517)  AM-PAC Inpatient ADL T-Scale Score : 37.26 (12/23/22 1517)  ADL Inpatient CMS 0-100% Score: 50.11 (12/23/22 1517)  ADL Inpatient CMS G-Code Modifier : CK (12/23/22 1517)    Goals  Short Term Goals  Time Frame for Short Term Goals: by d/c  Short Term Goal 1: Patient will complete LB dressing with SBA  Short Term Goal 2: Patient will complete grooming tasks in stance at sink with SBA. GOAL MET 12/23/22. Short Term Goal 3: Patient will complete functional transfers, including toilet transfer, with SBA  Short Term Goal 4: Patient will complete bathroom mobility with SBA  Patient Goals   Patient goals : \"Be independent. \"       Therapy Time   Individual Concurrent Group Co-treatment   Time In 3313         Time Out 1510         Minutes 48         Timed Code Treatment Minutes: 50 Minutes       If patient is discharged prior to next treatment session, this note will serve as the discharge summary.   John Roche, OTR/L #[id number]

## 2022-12-23 NOTE — CARE COORDINATION
CM following: CM met with pt at bedside. CM discussed discharge plans. Pt aware that [facility] has started insurance precert and pt in agreement with this plan. Pt also shared that she has been to [facility] in the past and that if not approved for [facility] her second choice would be [facility]. Pt reports that she lives alone in [address] and that her daughter Luz Roach lives near [facility]. CM inquired if pt would like CM to contact daughter, Luz Roach. Pt reports she will update daughter on current discharge plan.   Electronically signed by NATE Garza on 12/23/2022 at 2:55 PM  [phone]

## 2022-12-23 NOTE — PROGRESS NOTES
[facility]   Cardiology  Note   Pt of Dr Ashley Andrea MD, Jayda Gonzalez RN, FNP APRN CVNP  Date: 12/23/2022  Admit Date: 12/20/2022       CC: aphasia, SDH    Admitted with: acute subdural hematoma    PMH:  HTN, DM II, CAD and hypothyroid remote tobacco use     Following cardiology today for: cp  short run aflutter     Admitted with  \"Left Acute/Chronic Subdural Hematoma\" and underwent  POD# 3 \"Left frontal trephine craniotomy for evacuation of acute/chronic subdural hemorrhage\" by neurosurgery     Interval Hx /  Subjective: Today, she is resting in bed   EKG suggests atrial flutter with V pacing that resolved with SR and one to one AV conduction with ventricular pacing. Troponin was negative x  c/o occas atypical cp pain  Consider ischemic workup when immediate issues resolve     Patient seen and examined. Clinical notes reviewed.  Telemetry reviewed / Pertinent labs, diagnostic, device, and imaging results reviewed as a part of this visit  I spent a total of 35 minutes and greater than 50% of the time was spent counseling with patient  coordinating care regarding her diagnosis, treatments and plan of care    Scheduled Meds:   levETIRAcetam  1,000 mg IntraVENous Q12H    insulin glargine  8 Units SubCUTAneous Nightly    sodium chloride flush  5-40 mL IntraVENous 2 times per day    heparin (porcine)  5,000 Units SubCUTAneous q8h    sodium chloride flush  5-40 mL IntraVENous 2 times per day    isosorbide mononitrate  120 mg Oral Daily    gabapentin  300 mg Oral TID    carvedilol  3.125 mg Oral BID WC    pantoprazole  40 mg Oral Daily    trospium  20 mg Oral Nightly    levothyroxine  50 mcg Oral Daily    [Held by provider] furosemide  40 mg Oral Daily    insulin lispro  0-8 Units SubCUTAneous TID WC    insulin lispro  0-4 Units SubCUTAneous Nightly    dexamethasone  4 mg IntraVENous Q6H     Vitals:    12/23/22 1115   BP: 134/80   Pulse: 64   Resp: 16   Temp: 98 °F (36.7 °C)   SpO2: 96%      In: 850 [P.O.:850]  Out: 80    Wt Readings from Last 3 Encounters:   12/23/22 200 lb (90.7 kg)   12/19/22 223 lb (101.2 kg)   06/23/22 220 lb (99.8 kg)       Intake/Output Summary (Last 24 hours) at 12/23/2022 1222  Last data filed at 12/23/2022 0931  Gross per 24 hour   Intake 650 ml   Output 80 ml   Net 570 ml       Telemetry: Personally Reviewed    Constitutional: Cooperative and in no apparent distress, and appears well nourished  Skin: Warm and pink; no pallor, cyanosis, clubbing, or bruising   HEENT: Symmetric and normocephalic. Cardiovascular: Regular rate and rhythm. S1/S2 present without murmurs, no rubs or gallops. Lungs clear to auscultation bilaterally, no wheezing, crackles, or rhonchi  Gastrointestinal: Abdomen soft and round. Bowel sounds normoactive without tenderness or masses. Musculoskeletal: Bilateral upper and lower extremity strength 5/5 with full ROM  Neurologic/Psych: Awake and orientated to person, place and time.  Calm affect, appropriate mood      Patient Active Problem List    Diagnosis Date Noted    Acute renal failure (ARF) ([address] 59/78/0011    Metabolic encephalopathy 67/95/6611    Hepatic steatosis 07/03/2015    Cholelithiasis 07/03/2015    Abnormal ECG 12/22/2022    Acute subdural hematoma 12/20/2022    S/P placement of cardiac pacemaker 02/18/2022    CKD (chronic kidney disease) stage 3, GFR 30-59 ml/min ([facility]) 02/17/2022    Intermittent complete heart block ([address] 02/16/2022    Syncope and collapse     Osteomyelitis of right foot (HCC)     Nausea     Acute hematogenous osteomyelitis of left foot (HCC)     Atrial arrhythmia     Diabetic foot infection ([address] 03/27/2021    History of tobacco abuse 10/23/2020    Acute hypoxemic respiratory failure ([address] 02/24/2020    Angina, class IV ([address]     Atrial fibrillation with rapid ventricular response ([address] 02/23/2020    History of coronary artery bypass graft 02/23/2020    Acute respiratory failure with hypoxia ([address] 02/23/2020    Angina pectoris, unspecified ([facility] 06/15/2018    LBBB (left bundle branch block)     Unstable angina ([facility] 12/27/2017    Vomiting 12/27/2017    Morbid obesity with BMI of 40.0-44.9, adult ([facility] 12/27/2017    NSTEMI (non-ST elevated myocardial infarction) ([facility] 12/27/2017    Ischemic chest pain ([facility] 07/06/2017    Small bowel obstruction (HCC)     Diabetes mellitus ([facility]     Hypothyroid     Allergic rhinitis     Sleep apnea     Hyperlipidemia     Hypertension     CAD (coronary artery disease)       Assessment     Aflutter with cp  with hx afib  / now v paced with PAF   EKG suggests atrial flutter with V pacing that resolved with SR and one to one AV conduction with ventricular pacing. Troponin was negative x  c/o occas atypical cp pain    Consider ischemic workup when immediate issues resolve     Presented with chief complaint of aphasia  \"Left Acute/Chronic Subdural Hematoma\" and underwent    POD# 3 \"Left frontal trephine craniotomy for evacuation of acute/chronic subdural hemorrhage\" by neurosurgery   12/19. CT -acute on chronic subdural hematoma overlying the left frontal and parietal lobes  There is approximately 3 mm of left-to-right midline shift  Presented with chief complaint of aphasia    Intermittent complete heart block  s/p placement of cardiac pacemaker    PAF  [id number] Score for Atrial Fibrillation Stroke Risk 6  On Eliquis at home     DMT2  Managed per IM     Hx CAD    Hypothyroidism   TSH wnl     Morbid obesity  Body mass index is 35.43 kg/m².   Discussed diet activity      Cardiac meds coreg imdur      Plan   consulted for aflutter with atypical cp, of note, has hx afib admitted with with SDH  with craniotomy  / now v paced with PAF  occas atypical cp  / consider ischemic workup when immediate issues resolve   On Eliquis at home for afib / on hold / restart when ok with neuro    In future may be Watchman candidate / consider to [facility] clinic when immediate issues resolved     Thank you for allowing to us to participate in the care of Diamond Montejo.   Pola HUFF-CALEB-CVNP    [address]

## 2022-12-23 NOTE — PLAN OF CARE
Problem: Discharge Planning  Goal: Discharge to home or other facility with appropriate resources  12/22/2022 2143 by Anshul Chicas RN  Outcome: Progressing  12/22/2022 1934 by Rosendo Al RN  Outcome: Progressing     Problem: Pain  Goal: Verbalizes/displays adequate comfort level or baseline comfort level  12/22/2022 2143 by Anshul Chicas RN  Outcome: Progressing  12/22/2022 1934 by Rosendo Al RN  Outcome: Progressing     Problem: Safety - Adult  Goal: Free from fall injury  12/22/2022 2143 by Anshul Chicas RN  Outcome: Progressing  12/22/2022 1934 by Rosendo Al RN  Outcome: Progressing     Problem: ABCDS Injury Assessment  Goal: Absence of physical injury  12/22/2022 2143 by Anshul Chicas RN  Outcome: Progressing  12/22/2022 1934 by Rosendo Al RN  Outcome: Progressing     Problem: Neurosensory - Adult  Goal: Achieves stable or improved neurological status  12/22/2022 2143 by Anshul Chicas RN  Outcome: Progressing  12/22/2022 1934 by Rosendo Al RN  Outcome: Progressing  Goal: Achieves maximal functionality and self care  12/22/2022 2143 by Anshul Chicas RN  Outcome: Progressing  12/22/2022 1934 by Rosendo Al RN  Outcome: Progressing     Problem: Chronic Conditions and Co-morbidities  Goal: Patient's chronic conditions and co-morbidity symptoms are monitored and maintained or improved  12/22/2022 2143 by Anshul Chicas RN  Outcome: Progressing  12/22/2022 1934 by Rosendo Al RN  Outcome: Progressing

## 2022-12-23 NOTE — DISCHARGE INSTR - COC
Continuity of Care Form    Patient Name: Alee Crawford   :  1945  MRN:  [id number]    Admit date:  2022  Discharge date:  ***    Code Status Order: Full Code   Advance Directives:     Admitting Physician:  Chavez Gresham MD  PCP: DARIA Francis CNP    Discharging Nurse: [facility] Unit/Room#: 1770/8402-11  Discharging Unit Phone Number: ***    Emergency Contact:   Extended Emergency Contact Information  Primary Emergency Contact: [address] Phone: [phone]  Mobile Phone: [phone]  Relation: Child  Secondary Emergency Contact: [address] Phone: [phone]  Mobile Phone: [phone]  Relation: Niece/Nephew    Past Surgical History:  Past Surgical History:   Procedure Laterality Date    CARDIAC CATHETERIZATION  2017    SVG to OM2 100% ostium    CHOLECYSTECTOMY      COLECTOMY  1998    diverticulitis     COLONOSCOPY      CORONARY ANGIOPLASTY      CORONARY ARTERY BYPASS GRAFT  2005    x 3    CRANIOTOMY Left 2022    LEFT TREPHINE CRANIOTOMY FOR SUBDURAL HEMATOMA EVACUATION performed by Addi Clemens.  Orville Ontiveros MD at [address] [place] [address] CATH LAB PROCEDURE      ENDOSCOPY, COLON, DIAGNOSTIC      EYE SURGERY      Bilateral cataracts removed with lens implants    FOOT SURGERY      Multiple foot surgeries bilateral    HERNIA REPAIR      OTHER SURGICAL HISTORY  2012    achilles tendon lengthening,arthroplasty,matatarsalhead resection    OTHER SURGICAL HISTORY Right 3/20/13    FUSION OF RIGHT GREAT TOE JOINT WITH WIRE FIXATION, DEBRIEDMENT OF WOUND RIGHT GREAT TOE    TOE AMPUTATION Left 3/29/2021    PARTIAL LEFT FOOT AMPUTATION performed by Dl Stallworth DPM at [address]      Laparoscopic    UPPER GASTROINTESTINAL ENDOSCOPY         Immunization History:   Immunization History   Administered Date(s) Administered    COVID-19, MODERNA BLUE border, Primary or Immunocompromised, (age 12y+), IM, 100 mcg/0.5mL 03/17/2021, 04/14/2021, 01/25/2022    Influenza Virus Vaccine 09/15/2014    Pneumococcal Polysaccharide (Laiihmtay45) 09/25/2013       Active Problems:  Patient Active Problem List   Diagnosis Code    Hyperlipidemia E78.5    Hypertension I10    CAD (coronary artery disease) I25.10    Diabetes mellitus ([facility] E11.9    Hypothyroid E03.9    Allergic rhinitis J30.9    Sleep apnea V59.51    Metabolic encephalopathy N32.77    Hepatic steatosis K76.0    Cholelithiasis K80.20    Acute renal failure (ARF) ([facility]) N17.9    Small bowel obstruction ([facility] K56.609    Ischemic chest pain ([facility]) I20.9    Unstable angina ([facility]) I20.0    Vomiting R11.10    Morbid obesity with BMI of 40.0-44.9, adult ([facility]) E66.01, Z68.41    NSTEMI (non-ST elevated myocardial infarction) ([facility]) I21.4    Angina pectoris, unspecified ([facility]) I20.9    LBBB (left bundle branch block) I44.7    Atrial fibrillation with rapid ventricular response ([facility]) I48.91    History of coronary artery bypass graft Z95.1    Acute respiratory failure with hypoxia ([facility]) J96.01    Acute hypoxemic respiratory failure ([facility]) J96.01    Angina, class IV ([facility]) I20.9    History of tobacco abuse Z87.891    Diabetic foot infection ([facility]) E11.628, L08.9    Acute hematogenous osteomyelitis of left foot ([facility]) M86.072    Atrial arrhythmia I49.8    Osteomyelitis of right foot ([facility]) M86.9    Nausea R11.0    Intermittent complete heart block ([facility]) I44.2    Syncope and collapse R55    CKD (chronic kidney disease) stage 3, GFR 30-59 ml/min ([facility]) N18.30    S/P placement of cardiac pacemaker Z95.0    Acute subdural hematoma S06. 5XAA    Abnormal ECG R94.31       Isolation/Infection:   Isolation            No Isolation          Patient Infection Status       None to display            Nurse Assessment:  Last Vital Signs: BP (!) 147/84   Pulse 70   Temp 98 °F (36.7 °C) (Oral)   Resp 18   Ht 5' 3\" (1.6 m)   Wt 200 lb (90.7 kg)   SpO2 96%   BMI 35.43 kg/m²     Last documented pain score (0-10 scale): Pain Level: 0  Last Weight:   Wt Readings from Last 1 Encounters:   12/23/22 200 lb (90.7 kg)     Mental Status:  {IP PT MENTAL STATUS:20030}    IV Access:  { [facility] IV ACCESS:046735814}    Nursing Mobility/ADLs:  Walking   {P DME GTLB:623070701}  Transfer  {P DME TMYH:090724046}  Bathing  {[facility] DME APEI:132772907}  Dressing  {[facility] DME WYFD:454359435}  Toileting  {P DME EQAL:527022679}  Feeding  {[facility] DME VWKY:648658982}  Med Admin  {[facility] DME COKE:923779828}  Med Delivery   { [facility] MED Delivery:703999056}    Wound Care Documentation and Therapy:  Incision 12/21/22 Face Left;Upper (Active)   Dressing Status Dry; Intact 12/23/22 0730   Dressing/Treatment Dry dressing;ABD pad 12/23/22 0730   Closure Staples 12/23/22 0730   Margins Approximated 12/23/22 0730   Drainage Amount Moderate 12/21/22 1640   Drainage Description Sanguinous 12/21/22 1640   Carrie-incision Assessment Intact 12/21/22 1640   Number of days: 1        Elimination:  Continence: Bowel: {YES / XN:52319}  Bladder: {YES / SN:79805}  Urinary Catheter: {Urinary Catheter:965590943}   Colostomy/Ileostomy/Ileal Conduit: {YES / XT:49342}       Date of Last BM: ***    Intake/Output Summary (Last 24 hours) at 12/23/2022 1049  Last data filed at 12/22/2022 2304  Gross per 24 hour   Intake 450 ml   Output 80 ml   Net 370 ml     I/O last 3 completed shifts:   In: 0 [P.O.:650]  Out: 180 [Urine:50; Drains:130]    Safety Concerns:     508 Actual[facility] Safety Concerns:771186386}    Impairments/Disabilities:      508 ActualMeds Impairments/Disabilities:909685433}    Nutrition Therapy:  Current Nutrition Therapy:   508 Actual[facility]eds Diet List:086900872}    Routes of Feeding: {[facility] DME Other Feedings:624358186}  Liquids: {Slp liquid thickness:27026}  Daily Fluid Restriction: {[facility]P DME Yes amt example:707304227}  Last Modified Barium Swallow with Video (Video Swallowing Test): {Done Not Done XRXF:854392115}    Treatments at the Time of Hospital Discharge:   Respiratory Treatments: ***  Oxygen Therapy:  {Therapy; copd oxygen:34614}  Ventilator:    {[facility] CC Vent PSBE:272811642}    Rehab Therapies: {THERAPEUTIC INTERVENTION:4534150152}  Weight Bearing Status/Restrictions: [address] [facility] CC Weight Bearin}  Other Medical Equipment (for information only, NOT a DME order):  {EQUIPMENT:211910092}  Other Treatments: ***    Patient's personal belongings (please select all that are sent with patient):  {CHP DME Belongings:161557978}    RN SIGNATURE:  {Esignature:734216322}    CASE MANAGEMENT/SOCIAL WORK SECTION    Inpatient Status Date: ***    Readmission Risk Assessment Score:  Readmission Risk              Risk of Unplanned Readmission:  24           Discharging to Facility/ Agency   Name:   Address:  Phone:  Fax:    Dialysis Facility (if applicable)   Name:  Address:  Dialysis Schedule:  Phone:  Fax:    / signature: {Esignature:985741638}    PHYSICIAN SECTION    Prognosis: Fair    Condition at Discharge: Stable    Rehab Potential (if transferring to Rehab): Fair    Recommended Labs or Other Treatments After Discharge:     Decadron 2 mg iv bid x 2 doses   Decadron 1 mg iv bid x 2 doses     call [facility] [phone] for an ppt in 2 weeks with Dr. Rubio Vieyra. Physician Certification: I certify the above information and transfer of Alicia Louise  is necessary for the continuing treatment of the diagnosis listed and that she requires Acute Rehab for less 30 days.      Update Admission H&P: Changes in H&P as follows - see dc summary    PHYSICIAN SIGNATURE:  Electronically signed by Daria Dacosta MD on 22 at 2:40 PM EST

## 2022-12-23 NOTE — PROGRESS NOTES
Hospitalist Progress Note      PCP: Aleksey Lambert, APRN - CNP    Date of Admission: 12/20/2022        Subjective:     Reports intermittent burning chest pain. No shortness of breath. .  No dizziness, lightheadedness, speech is better. No confusion. No fevers, chills or rigors.   No cough      Medications:  Reviewed    Infusion Medications    sodium chloride 50 mL/hr at 12/22/22 0940    sodium chloride 50 mL/hr at 12/23/22 1001    dextrose       Scheduled Medications    levETIRAcetam  1,000 mg IntraVENous Q12H    insulin glargine  8 Units SubCUTAneous Nightly    sodium chloride flush  5-40 mL IntraVENous 2 times per day    heparin (porcine)  5,000 Units SubCUTAneous q8h    sodium chloride flush  5-40 mL IntraVENous 2 times per day    isosorbide mononitrate  120 mg Oral Daily    gabapentin  300 mg Oral TID    carvedilol  3.125 mg Oral BID WC    pantoprazole  40 mg Oral Daily    trospium  20 mg Oral Nightly    levothyroxine  50 mcg Oral Daily    [Held by provider] furosemide  40 mg Oral Daily    insulin lispro  0-8 Units SubCUTAneous TID WC    insulin lispro  0-4 Units SubCUTAneous Nightly    dexamethasone  4 mg IntraVENous Q6H     PRN Meds: hydrALAZINE, nitroGLYCERIN, sodium chloride flush, sodium chloride, naloxone, morphine **OR** morphine, oxyCODONE, methocarbamol **OR** methocarbamol IVPB, prochlorperazine **OR** prochlorperazine, sodium chloride flush, sodium chloride, polyethylene glycol, acetaminophen **OR** acetaminophen, diphenhydrAMINE, glucose, dextrose bolus **OR** dextrose bolus, glucagon (rDNA), dextrose      Intake/Output Summary (Last 24 hours) at 12/23/2022 1338  Last data filed at 12/23/2022 1239  Gross per 24 hour   Intake 850 ml   Output 80 ml   Net 770 ml       Physical Exam Performed:    BP (!) 176/94   Pulse 83   Temp 98 °F (36.7 °C) (Oral)   Resp 16   Ht 5' 3\" (1.6 m)   Wt 200 lb (90.7 kg)   SpO2 96%   BMI 35.43 kg/m²     General appearance: No apparent distress, appears stated age and cooperative. HEENT: Pupils equal, round, and reactive to light. Conjunctivae/corneas clear. Neck: Supple, with full range of motion. No jugular venous distention. Trachea midline. Respiratory:  Normal respiratory effort. Clear to auscultation, bilaterally without Rales/Wheezes/Rhonchi. Cardiovascular: Regular rate and rhythm with normal S1/S2 without murmurs, rubs or gallops. Abdomen: Soft, non-tender, non-distended with normal bowel sounds. Musculoskeletal: No clubbing, cyanosis or edema bilaterally. Full range of motion without deformity. Skin: Skin color, texture, turgor normal.  No rashes or lesions. Neurologic:  Neurovascularly intact without any focal sensory/motor deficits. Cranial nerves: II-XII intact, grossly non-focal.  Psychiatric: Alert and oriented, thought content appropriate, normal insight  Capillary Refill: Brisk, 3 seconds, normal   Peripheral Pulses: +2 palpable, equal bilaterally       Labs:   Recent Labs     12/21/22  0542 12/22/22  0642 12/23/22  1235   WBC 9.7 11.5* 9.3   HGB 14.4 12.5 13.5   HCT 41.7 37.9 40.1    161 165     Recent Labs     12/21/22  0542 12/22/22  0642 12/23/22  1235    140 135*   K 4.0 4.2 4.7    106 103   CO2 24 22 21   BUN 22* 26* 27*   CREATININE 1.2 1.1 1.1   CALCIUM 9.1 8.4 8.7     No results for input(s): AST, ALT, BILIDIR, BILITOT, ALKPHOS in the last 72 hours. Recent Labs     12/21/22  0542 12/22/22  0643   INR 1.23* 1.26*     Recent Labs     12/22/22  1015   TROPONINI <0.01       Urinalysis:      Lab Results   Component Value Date/Time    NITRU Negative 11/28/2022 01:40 PM    WBCUA 0-2 05/25/2022 03:40 PM    BACTERIA Rare 05/25/2022 03:40 PM    RBCUA 0-2 05/25/2022 03:40 PM    BLOODU Negative 11/28/2022 01:40 PM    SPECGRAV <=1.005 11/28/2022 01:40 PM    GLUCOSEU Negative 11/28/2022 01:40 PM       Radiology:  CT Head wo Contrast   Final Result      1.   Status post evacuation of left cerebral convexity subdural hematoma without evidence of complication. Very mild residual chronic left cerebral convexity subdural hematoma with nearly resolved 1 mm rightward midline shift. CTA HEAD NECK W CONTRAST   Final Result      1. No aneurysms, vascular occlusions, or intracranial stenoses identified. 2.  No significant stenosis in the extracranial vertebral or carotid arteries. CT HEAD WO CONTRAST   Final Result      1. Unchanged subacute left cerebral convexity subdural hematoma with unchanged 5 mm rightward midline shift. MRI BRAIN WO CONTRAST   Final Result      1. Stable size of subacute left cerebral convexity subdural hematoma (2 cm thick) with stable mass effect and mild left to right midline shift measuring 4 mm   2. Mild chronic small vessel ischemia and mild generalized atrophy   3. No evidence for intracranial mass or vascular lesion               MRA HEAD WO CONTRAST   Final Result      1. MRA limited by motion artifact   2. No evidence for dural arteriovenous fistula         CT HEAD WO CONTRAST   Final Result   1. Acute on chronic left subdural hematoma with approximately 5.6 mm left to right shift on coronal imaging, stable from 12/19/2022 at 1953   2. Left cerebral hemisphere cortical sulci effacement is unchanged   3. Moderate to severe periventricular microangiopathic ischemic changes, chronic small vessel disease   4. No new bleed or intraparenchymal hemorrhage.           IP CONSULT TO NEUROSURGERY  IP CONSULT TO NEUROCRITICAL CARE  IP CONSULT TO CARDIOLOGY  IP CONSULT TO PHYSICAL MEDICINE REHAB    Assessment/Plan:      -Acute on chronic subdural hematoma s/p Left frontal trephine craniotomy for evacuation 12/21  -Essential HTN-controlled-on coreg  -CAD s/p CABG 2005,EF 55-60%-antiplatelets held,on coreg  -Hx Complete heart block s/p PPM  -Paroxsymal a fib-stable-eliquis held  -Obesity BMI 35  -Hypothyroidism-on  synthroid  -DM type 2-gliplizide,trulicity on hold since admission  -Atypical chest pain--troponins negative    Plan  Continue postop care per neurosurgery   Holding all antipaletelets and anticoagulants   C/w Keppra and decadron neurosurgery   C/w neurochecks  Continue basal bolus insulin  Chest pain management per cardiology    DVT Prophylaxis: scd  Diet: ADULT DIET; Regular; 4 carb choices (60 gm/meal); Low Fat/Low Chol/High Fiber/MARIPOSA  Code Status: Full Code  PT/OT Eval Status:       Discharge planning. Angela Saunders   Discharge planning to ARU--needs pre-CERT        Ling Bates MD

## 2022-12-23 NOTE — PROGRESS NOTES
Update 19/17/82: Pre-cert approved, next review date 1/3/22. Fax [phone]. Will admit with DC order and negative COVID test.    [facility]   After review, this patient is felt to be:       []  Appropriate for Acute Inpatient Rehab    [x]  Appropriate for Acute Inpatient Rehab Pending Insurance Authorization #[id number]    []  Not appropriate for Acute Inpatient Rehab    []  Referral received and ARU reviewing patient; Evaluation ongoing. Precert initiated 96/28/3708 for ARU admission. Pt in agreement per  conversation with pt this AM. Ref#: [id number]  Left voicemail for CM. Thank you for the referral.    Lucina Pyle RN, BSN.   ARU Clinical Liaison  [facility]  Phone: [phone]  Fax: [phone]    Eugene Zepeda M.A., [address] [place] [address] Liaison- [facility]   (P): [phone]  (F): [phone]

## 2022-12-23 NOTE — PROGRESS NOTES
NEUROSURGERY POST-OP PROGRESS NOTE    Patient Name: Gal Putnam YOB: 1945   Sex: Female Age: 78 yrs     Medical Record Number: [id number] Acct Number: [de-identified]   Room Number: 2492/4617-02 Hospital Day: Hospital Day: 4     Interval History:  Post-operative Day# 2 s/p Procedure(s) (LRB):  LEFT TREPHINE CRANIOTOMY FOR SUBDURAL HEMATOMA EVACUATION (Left)    Subjective: Pt talking in full sentences and moving all ext equally. Denies any headache at this time. Objective:    VITAL SIGNS   BP (!) 147/84   Pulse 70   Temp 98 °F (36.7 °C) (Oral)   Resp 18   Ht 5' 3\" (1.6 m)   Wt 200 lb (90.7 kg)   SpO2 96%   BMI 35.43 kg/m²    Height Height: 5' 3\" (160 cm)   Weight Weight: 200 lb (90.7 kg)        Allergies Allergies   Allergen Reactions    Niacin And Related Anaphylaxis    Ranexa [Ranolazine] Hallucinations    Adhesive Tape      Causes rash and blisters. Can use paper tape or opsite without any problems. Other      QVacin IV antibiotic had after foot surgery placed on this for infection. Developed high fever, chills and uncontrollable shaking. Statins Depletion Therapy      Deep muscle pain    Vancomycin      Does not recall reaction       NPO Status ADULT DIET; Regular; 4 carb choices (60 gm/meal);  Low Fat/Low Chol/High Fiber/MARIPOSA   Isolation No active isolations     LABS   Basic Metabolic Profile Recent Labs     12/21/22  0542 12/22/22  0642    140    106   CO2 24 22   BUN 22* 26*   CREATININE 1.2 1.1   GLUCOSE 187* 166*      Complete Blood Count Recent Labs     12/21/22  0542 12/22/22  0642   WBC 9.7 11.5*   RBC 4.81 4.24      Coagulation Studies Recent Labs     12/21/22  0542 12/22/22  0643   INR 1.23* 1.26*        MEDICATIONS   Inpatient Medications     levETIRAcetam, 1,000 mg, IntraVENous, Q12H    insulin glargine, 8 Units, SubCUTAneous, Nightly    sodium chloride flush, 5-40 mL, IntraVENous, 2 times per day    heparin (porcine), 5,000 Units, SubCUTAneous, q8h    sodium chloride flush, 5-40 mL, IntraVENous, 2 times per day    isosorbide mononitrate, 120 mg, Oral, Daily    gabapentin, 300 mg, Oral, TID    carvedilol, 3.125 mg, Oral, BID WC    pantoprazole, 40 mg, Oral, Daily    trospium, 20 mg, Oral, Nightly    levothyroxine, 50 mcg, Oral, Daily    [Held by provider] furosemide, 40 mg, Oral, Daily    insulin lispro, 0-8 Units, SubCUTAneous, TID WC    insulin lispro, 0-4 Units, SubCUTAneous, Nightly    [COMPLETED] dexamethasone, 10 mg, IntraVENous, Once **FOLLOWED BY** dexamethasone, 4 mg, IntraVENous, Q6H   Infusions    sodium chloride 50 mL/hr at 12/22/22 0940    sodium chloride 50 mL/hr at 12/23/22 1001    dextrose        Antibiotics   Recent Abx Admin        No antibiotic orders with administrations found. Neurologic Exam:  Mental status: awake and alert and oriented x4    Cranial Nerves:  II: Visual acuity not tested, visual fields full, denies new visual changes / diplopia  III, IV, VI: PERRL, 3 mm bilaterally, EOMI, no nystagmus noted  V: Facial sensation intact bilaterally to touch  VII: Face symmetric  VIII: Hearing intact bilaterally to spoken voice  IX: Palate movement equal bilaterally  XI: Shoulder shrug equal bilaterally  XII: Tongue midline      Musculoskeletal:   Gait: Not tested   Tone: normal  Sensory: intact to all extremities  Motor strength:    Right  Left    Right  Left    Deltoid  5 5  Hip Flex  5 5   Biceps  5 5  Knee Extensors  5 5   Triceps  5 5  Knee Flexors  5 5   Wrist Ext  5 5  Ankle Dorsiflex. 5 5   Wrist Flex  5 5  Ankle Plantarflex.   5 5   Handgrip  5 5  Ext Xavier Longus  5 5   Thumb Ext  5 5         Incision: intact, clean and dry  Drain: 30/50=80    Respiratory:  Unlabored respiratory pattern    Abdomen:   Soft, ND   Last BM 12/21    Cardiovascular:  Warm, well perfused    Assessment   Patient is a 79 yo F s/p Procedure(s) (LRB):  LEFT TREPHINE CRANIOTOMY FOR SUBDURAL HEMATOMA EVACUATION (Left) per Dr. Andra Cook . Plan:  Neurologic exam frequency:q4  Mobility:PT/OT   Steroids: taper, SSI  Seizure Prophylaxis:keppra as ordered x 2 weeks  Drain:continue, remove tomorrow  DVT Prophylaxis: SCDs and heparin sq  GI Prophylaxis:protonix  Bowel Regimen: senna and glycolax  Pain control:fran   Incisional Care:open to air   Cardiology following  Consulted ARU  Dispo await precert for ARU     Patient was seen with Dr. Fermin Trimble who agrees with above assessment and plan. Electronically signed by:  DARIA Parisi CNP, 12/23/2022 10:32 AM   Neurosurgery Nurse Practitioner  [phone]

## 2022-12-23 NOTE — PLAN OF CARE
Problem: Discharge Planning  Goal: Discharge to home or other facility with appropriate resources  Outcome: Progressing     Problem: Pain  Goal: Verbalizes/displays adequate comfort level or baseline comfort level  Outcome: Progressing     Problem: Safety - Adult  Goal: Free from fall injury  Outcome: Progressing     Problem: ABCDS Injury Assessment  Goal: Absence of physical injury  Outcome: Progressing     Problem: Neurosensory - Adult  Goal: Achieves stable or improved neurological status  Outcome: Progressing  Goal: Achieves maximal functionality and self care  Outcome: Progressing     Problem: Chronic Conditions and Co-morbidities  Goal: Patient's chronic conditions and co-morbidity symptoms are monitored and maintained or improved  Outcome: Progressing

## 2022-12-23 NOTE — PROGRESS NOTES
Pt more oriented this shift, a/ox4. SBA OOB to BR with GB and walker. Incontinent at times-depends in place. IV decadron, IV keppra. Incision to head stapled-drainage noted. Drain in place 50ml op. Vpaced on tele. Mild chest pain noted over shift treated with nitro-no more complaints of pain. Resting comfortably in bed.

## 2022-12-23 NOTE — PROGRESS NOTES
Speech Language Pathology  Facility/Department: [facility] 5T ORTHO/NEURO  Speech/Cognition/Swallow Daily Treatment Note    NAME: Dk Nance  : 1945  MRN: [id number]    Patient Diagnosis(es): has Hyperlipidemia; Hypertension; CAD (coronary artery disease); Diabetes mellitus ([address] Hypothyroid; Allergic rhinitis; Sleep apnea; Metabolic encephalopathy; Hepatic steatosis; Cholelithiasis; Acute renal failure (ARF) ([address] Small bowel obstruction ([address] Ischemic chest pain ([address] Unstable angina ([address] Vomiting; Morbid obesity with BMI of 40.0-44.9, [facility]); NSTEMI (non-ST elevated myocardial infarction) ([address] Angina pectoris, unspecified ([address] LBBB (left bundle branch block); Atrial fibrillation with rapid ventricular response ([address] History of coronary artery bypass graft; Acute respiratory failure with hypoxia ([address] Acute hypoxemic respiratory failure ([address] Angina, class IV ([address] History of tobacco abuse; Diabetic foot infection ([address] Acute hematogenous osteomyelitis of left foot ([address] Atrial arrhythmia; Osteomyelitis of right foot ([address] Nausea; Intermittent complete heart block ([address] Syncope and collapse; CKD (chronic kidney disease) stage 3, GFR 30-59 ml/min ([facility]); S/P placement of cardiac pacemaker; Acute subdural hematoma; and Abnormal ECG on their problem list.  Onset Date: 2022    Chart reviewed. Pain: none indicated by any means    Initial evals (2022)  Speech/Language Assessment:  Diagnosis: Patient presents with moderate expressive/receptive aphasia, with the following impairements: 60% accuracy yes/no questions, 80% accuracy confrontation naming, 80% accuracy automatics, 60% accuracy repetition, 100% accuracy object recognition, 40% accuracy instruction following (single step > two step), 60% accuracy reading instruction, impaired verbal fluency, and impaired orthography. Did not assess cognition 2/2 language deficits.  Recommend ongoing speech therapy to further assess/address. Dysphagia Assessment  Mild oral dysphagia 2/2 to reduced dentition. With patient seated upright on room air, assessed tolerance thins via straw and soft solid; pt with positive oral acceptance, slowed but adequate mastication, positive swallow movement, good oral clearance, no overt s/s aspiration. Per pt preference, recommend Dysphagia Soft & Bite-Sized Solids / Thin Liquids. Medical diagnosis: Subdural hematoma [S06. 5XAA]  Acute subdural hematoma [S06. 5XAA]  Treatment diagnosis: dysphagia    Speech/Language Treatment:  Pt seen to address the following goals:  Short Term Goals  Time Frame for Short Term Goals: 1-2 wks or LOS  Goal 1: Patient will answer yes/no questions with 70% accuracy. 12/22: Improved to 90% accuracy. Goal met, d/c goal  Goal 2: Patient will complete confrontation naming task with 100% accuracy. 12/22: 100% accuracy, no cues. Goal met, d/c goal  Goal 3: Patient will follow single step directions with 100% accuracy. 12/22: 100% accuracy for both spoken and written single and two step directions. Goal met, d/c goal  Goal 4: Patient will participate in further assessment of cognitive-linguistics as appropriate. 12/22: Language skills much improved this date vs initial assessment prior to crani. Only intermittent receptive language issues noted, however appears primarily d/t pt's hearing. Reading and writing skills improved as well. Pt reports feeling back to baseline. Did not target cognition, may benefit from further assessment. Cont goal  12/23: Completed [facility] Mental Status examination; patient scored 23/30, indicative of mild neurocognitive disorder. Specific areas of difficulty included: math calculations, problem solving, and alternating attention. Cont goal    New goals:  Goal 5: Patient will complete basic level math calculations with 90% accuracy given min cues.     Goal 6: Patient will complete tasks involving problem solving with 80% accuracy given min cues. Dysphagia Treatment  Goal 1: Patient will tolerate least restrictive diet with adequate mastication and without overt signs of aspiration or associated decline in respiratory status. 12/22: Per chart pt currently on regular diet. Pt denies difficulty; discussed previous recommendation for soft bite-sized in alignment with her prior preference. This date, patient stated wish to remain on regular texture diet. Declined solid PO trials, however accepted thins via straw; positive swallow movement, good oral clearance, no overt signs of aspiration or penetration. Cont goal  Goal 2: Patient/caregiver will demonstrate understanding of swallowing concerns/recommendations. 12/20: Educated pt to purpose of visit, s/s of aspiration, concern if aspiration occurs, rationale for diet recommendation/strategies to reduce risk for aspiration and instruction to notify staff if any signs emerge. Pt stated comprehension, however suspect needs reinforcement. Cont goal  12/22: Reviewed diet consistencies, and safety strategies/positioning, aspiration s/s. Pt stated good comprehension. Goal met    Education: see goal above    Plan:  Diet Recommendations: Regular / Thin Liquids  Continue speech/language therapy to address above goals, 3-5 x/week x LOS  DC recommendations: TBD    D/W nursing, Mary  Needs met prior to leaving room, call button in reach. Treatment time: 30 minutes    Diana Phillips Runkelen, [id number]  Pg.  # [phone]    If patient is discharged prior to next treatment, this note will serve as the discharge summary

## 2022-12-23 NOTE — PROGRESS NOTES
Physical Therapy Attempt    PT attempted to see patient in the morning, patient just waking up. Refusing in bed and out of bed therapy. Requesting therapy to see at a later time. Will follow up at a later time as schedule permits.      Juanpablo Enrique , PT, DPT #[id number]

## 2022-12-24 LAB
GLUCOSE BLD-MCNC: 172 MG/DL (ref 70–99)
GLUCOSE BLD-MCNC: 192 MG/DL (ref 70–99)
GLUCOSE BLD-MCNC: 201 MG/DL (ref 70–99)
GLUCOSE BLD-MCNC: 320 MG/DL (ref 70–99)
PERFORMED ON: ABNORMAL

## 2022-12-24 PROCEDURE — 97530 THERAPEUTIC ACTIVITIES: CPT

## 2022-12-24 PROCEDURE — 2060000000 HC ICU INTERMEDIATE R&B

## 2022-12-24 PROCEDURE — 2580000003 HC RX 258

## 2022-12-24 PROCEDURE — 2580000003 HC RX 258: Performed by: INTERNAL MEDICINE

## 2022-12-24 PROCEDURE — 6370000000 HC RX 637 (ALT 250 FOR IP): Performed by: INTERNAL MEDICINE

## 2022-12-24 PROCEDURE — 2580000003 HC RX 258: Performed by: PHYSICIAN ASSISTANT

## 2022-12-24 PROCEDURE — 6360000002 HC RX W HCPCS: Performed by: PHYSICIAN ASSISTANT

## 2022-12-24 PROCEDURE — 97116 GAIT TRAINING THERAPY: CPT

## 2022-12-24 PROCEDURE — 6360000002 HC RX W HCPCS: Performed by: NURSE PRACTITIONER

## 2022-12-24 PROCEDURE — 6360000002 HC RX W HCPCS

## 2022-12-24 PROCEDURE — 6370000000 HC RX 637 (ALT 250 FOR IP): Performed by: NURSE PRACTITIONER

## 2022-12-24 RX ADMIN — HEPARIN SODIUM 5000 UNITS: 5000 INJECTION INTRAVENOUS; SUBCUTANEOUS at 15:31

## 2022-12-24 RX ADMIN — HEPARIN SODIUM 5000 UNITS: 5000 INJECTION INTRAVENOUS; SUBCUTANEOUS at 23:21

## 2022-12-24 RX ADMIN — NITROGLYCERIN 0.4 MG: 0.4 TABLET, ORALLY DISINTEGRATING SUBLINGUAL at 22:40

## 2022-12-24 RX ADMIN — ACETAMINOPHEN 650 MG: 325 TABLET ORAL at 23:20

## 2022-12-24 RX ADMIN — DEXAMETHASONE SODIUM PHOSPHATE 4 MG: 4 INJECTION, SOLUTION INTRA-ARTICULAR; INTRALESIONAL; INTRAMUSCULAR; INTRAVENOUS; SOFT TISSUE at 07:12

## 2022-12-24 RX ADMIN — LEVETIRACETAM 1000 MG: 100 INJECTION INTRAVENOUS at 08:48

## 2022-12-24 RX ADMIN — TROSPIUM CHLORIDE 20 MG: 20 TABLET, FILM COATED ORAL at 21:35

## 2022-12-24 RX ADMIN — GABAPENTIN 300 MG: 600 TABLET, FILM COATED ORAL at 08:35

## 2022-12-24 RX ADMIN — DEXAMETHASONE SODIUM PHOSPHATE 4 MG: 4 INJECTION, SOLUTION INTRA-ARTICULAR; INTRALESIONAL; INTRAMUSCULAR; INTRAVENOUS; SOFT TISSUE at 12:12

## 2022-12-24 RX ADMIN — PANTOPRAZOLE SODIUM 40 MG: 40 TABLET, DELAYED RELEASE ORAL at 08:35

## 2022-12-24 RX ADMIN — SODIUM CHLORIDE, PRESERVATIVE FREE 10 ML: 5 INJECTION INTRAVENOUS at 08:35

## 2022-12-24 RX ADMIN — DEXAMETHASONE SODIUM PHOSPHATE 4 MG: 4 INJECTION, SOLUTION INTRA-ARTICULAR; INTRALESIONAL; INTRAMUSCULAR; INTRAVENOUS; SOFT TISSUE at 17:50

## 2022-12-24 RX ADMIN — INSULIN LISPRO 6 UNITS: 100 INJECTION, SOLUTION INTRAVENOUS; SUBCUTANEOUS at 17:50

## 2022-12-24 RX ADMIN — NITROGLYCERIN 0.4 MG: 0.4 TABLET, ORALLY DISINTEGRATING SUBLINGUAL at 09:11

## 2022-12-24 RX ADMIN — SODIUM CHLORIDE, PRESERVATIVE FREE 10 ML: 5 INJECTION INTRAVENOUS at 08:36

## 2022-12-24 RX ADMIN — ISOSORBIDE MONONITRATE 120 MG: 60 TABLET, EXTENDED RELEASE ORAL at 08:35

## 2022-12-24 RX ADMIN — LEVOTHYROXINE SODIUM 50 MCG: 50 TABLET ORAL at 07:11

## 2022-12-24 RX ADMIN — GABAPENTIN 300 MG: 600 TABLET, FILM COATED ORAL at 14:43

## 2022-12-24 RX ADMIN — CARVEDILOL 3.12 MG: 3.12 TABLET, FILM COATED ORAL at 08:35

## 2022-12-24 RX ADMIN — INSULIN GLARGINE 8 UNITS: 100 INJECTION, SOLUTION SUBCUTANEOUS at 21:37

## 2022-12-24 RX ADMIN — SODIUM CHLORIDE, PRESERVATIVE FREE 10 ML: 5 INJECTION INTRAVENOUS at 21:43

## 2022-12-24 RX ADMIN — NITROGLYCERIN 0.4 MG: 0.4 TABLET, ORALLY DISINTEGRATING SUBLINGUAL at 09:06

## 2022-12-24 RX ADMIN — GABAPENTIN 300 MG: 600 TABLET, FILM COATED ORAL at 21:35

## 2022-12-24 RX ADMIN — CARVEDILOL 3.12 MG: 3.12 TABLET, FILM COATED ORAL at 17:50

## 2022-12-24 RX ADMIN — HEPARIN SODIUM 5000 UNITS: 5000 INJECTION INTRAVENOUS; SUBCUTANEOUS at 08:37

## 2022-12-24 RX ADMIN — LEVETIRACETAM 1000 MG: 100 INJECTION INTRAVENOUS at 21:43

## 2022-12-24 RX ADMIN — DEXAMETHASONE SODIUM PHOSPHATE 4 MG: 4 INJECTION, SOLUTION INTRA-ARTICULAR; INTRALESIONAL; INTRAMUSCULAR; INTRAVENOUS; SOFT TISSUE at 23:21

## 2022-12-24 ASSESSMENT — PAIN - FUNCTIONAL ASSESSMENT: PAIN_FUNCTIONAL_ASSESSMENT: ACTIVITIES ARE NOT PREVENTED

## 2022-12-24 ASSESSMENT — PAIN SCALES - GENERAL: PAINLEVEL_OUTOF10: 5

## 2022-12-24 ASSESSMENT — PAIN DESCRIPTION - ORIENTATION: ORIENTATION: LEFT;UPPER

## 2022-12-24 ASSESSMENT — PAIN DESCRIPTION - ONSET: ONSET: GRADUAL

## 2022-12-24 ASSESSMENT — PAIN DESCRIPTION - DESCRIPTORS: DESCRIPTORS: SORE

## 2022-12-24 ASSESSMENT — PAIN DESCRIPTION - LOCATION: LOCATION: HEAD

## 2022-12-24 ASSESSMENT — PAIN DESCRIPTION - FREQUENCY: FREQUENCY: CONTINUOUS

## 2022-12-24 ASSESSMENT — PAIN DESCRIPTION - PAIN TYPE: TYPE: SURGICAL PAIN

## 2022-12-24 NOTE — PROGRESS NOTES
NEUROSURGERY POST-OP PROGRESS NOTE    Patient Name: Girish Soares YOB: 1945   Sex: Female Age: 78 yrs     Medical Record Number: [id number] Acct Number: [de-identified]   Room Number: 5970/0992-35 Hospital Day: Hospital Day: 5     Interval History:  Post-operative Day# 3 s/p Procedure(s) (LRB):  LEFT TREPHINE CRANIOTOMY FOR SUBDURAL HEMATOMA EVACUATION (Left)    Patient resting in bed in NAD    Objective:    VITAL SIGNS   BP (!) 169/93   Pulse 73   Temp 97.5 °F (36.4 °C) (Axillary)   Resp 16   Ht 5' 3\" (1.6 m)   Wt 200 lb (90.7 kg)   SpO2 98%   BMI 35.43 kg/m²    Height Height: 5' 3\" (160 cm)   Weight Weight: 200 lb (90.7 kg)        Allergies Allergies   Allergen Reactions    Niacin And Related Anaphylaxis    Ranexa [Ranolazine] Hallucinations    Adhesive Tape      Causes rash and blisters. Can use paper tape or opsite without any problems. Other      QVacin IV antibiotic had after foot surgery placed on this for infection. Developed high fever, chills and uncontrollable shaking. Statins Depletion Therapy      Deep muscle pain    Vancomycin      Does not recall reaction       NPO Status ADULT DIET; Regular; 4 carb choices (60 gm/meal);  Low Fat/Low Chol/High Fiber/MARIPOSA   Isolation No active isolations     LABS   Basic Metabolic Profile Recent Labs     12/22/22  0642 12/23/22  1235    135*    103   CO2 22 21   BUN 26* 27*   CREATININE 1.1 1.1   GLUCOSE 166* 190*        Complete Blood Count Recent Labs     12/22/22  0642 12/23/22  1235   WBC 11.5* 9.3   RBC 4.24 4.50        Coagulation Studies Recent Labs     12/22/22  0643   INR 1.26*          MEDICATIONS   Inpatient Medications     levETIRAcetam, 1,000 mg, IntraVENous, Q12H    insulin glargine, 8 Units, SubCUTAneous, Nightly    sodium chloride flush, 5-40 mL, IntraVENous, 2 times per day    heparin (porcine), 5,000 Units, SubCUTAneous, q8h    sodium chloride flush, 5-40 mL, IntraVENous, 2 times per day    isosorbide mononitrate, 120 mg, Oral, Daily    gabapentin, 300 mg, Oral, TID    carvedilol, 3.125 mg, Oral, BID WC    pantoprazole, 40 mg, Oral, Daily    trospium, 20 mg, Oral, Nightly    levothyroxine, 50 mcg, Oral, Daily    [Held by provider] furosemide, 40 mg, Oral, Daily    insulin lispro, 0-8 Units, SubCUTAneous, TID WC    insulin lispro, 0-4 Units, SubCUTAneous, Nightly    [COMPLETED] dexamethasone, 10 mg, IntraVENous, Once **FOLLOWED BY** dexamethasone, 4 mg, IntraVENous, Q6H   Infusions    sodium chloride 50 mL/hr at 12/22/22 0940    sodium chloride 50 mL/hr at 12/23/22 1001    dextrose        Antibiotics   Recent Abx Admin        No antibiotic orders with administrations found. Neurologic Exam:  Mental status: awake and alert and oriented x4    Cranial Nerves:  II: Visual acuity not tested, visual fields full, denies new visual changes / diplopia  III, IV, VI: PERRL, 3 mm bilaterally, EOMI, no nystagmus noted  V: Facial sensation intact bilaterally to touch  VII: Face symmetric  VIII: Hearing intact bilaterally to spoken voice  IX: Palate movement equal bilaterally  XI: Shoulder shrug equal bilaterally  XII: Tongue midline      Musculoskeletal:   Gait: Not tested   Tone: normal  Sensory: intact to all extremities  Motor strength:    Right  Left    Right  Left    Deltoid  5 5  Hip Flex  5 5   Biceps  5 5  Knee Extensors  5 5   Triceps  5 5  Knee Flexors  5 5   Wrist Ext  5 5  Ankle Dorsiflex. 5 5   Wrist Flex  5 5  Ankle Plantarflex.   5 5   Handgrip  5 5  Ext Xavier Longus  5 5   Thumb Ext  5 5         Incision: intact, clean and dry  Drain removed     Respiratory:  Unlabored respiratory pattern    Abdomen:   Soft, ND   Last BM 12/21    Cardiovascular:  Warm, well perfused    Assessment   Patient is a 77 yo F s/p Procedure(s) (LRB):  LEFT TREPHINE CRANIOTOMY FOR SUBDURAL HEMATOMA EVACUATION (Left) per Dr. Orville Ontiveros .      Plan:  Neurologic exam frequency:q4  Mobility:PT/OT   Steroids: taper, SSI  Seizure Prophylaxis:keppra as ordered x 2 weeks  Drain removed  DVT Prophylaxis: SCDs and heparin sq  GI Prophylaxis:protonix  Bowel Regimen: senna and glycolax  Pain control:fran   Incisional Care:open to air   Cardiology following  Consulted ARU  Dispo await precert for ARU       Electronically signed by: Mayda Snyder MD, 12/24/2022 12:05 PM

## 2022-12-24 NOTE — PROGRESS NOTES
Hospitalist Progress Note      PCP: Chelita Benitez, APRN - CNP    Date of Admission: 12/20/2022        Subjective:     Continues to have occasional episodes of chest pain but is currently asymptomatic    Medications:  Reviewed    Infusion Medications    sodium chloride 50 mL/hr at 12/22/22 0940    sodium chloride 50 mL/hr at 12/23/22 1001    dextrose       Scheduled Medications    levETIRAcetam  1,000 mg IntraVENous Q12H    insulin glargine  8 Units SubCUTAneous Nightly    sodium chloride flush  5-40 mL IntraVENous 2 times per day    heparin (porcine)  5,000 Units SubCUTAneous q8h    sodium chloride flush  5-40 mL IntraVENous 2 times per day    isosorbide mononitrate  120 mg Oral Daily    gabapentin  300 mg Oral TID    carvedilol  3.125 mg Oral BID WC    pantoprazole  40 mg Oral Daily    trospium  20 mg Oral Nightly    levothyroxine  50 mcg Oral Daily    [Held by provider] furosemide  40 mg Oral Daily    insulin lispro  0-8 Units SubCUTAneous TID WC    insulin lispro  0-4 Units SubCUTAneous Nightly    dexamethasone  4 mg IntraVENous Q6H     PRN Meds: hydrALAZINE, nitroGLYCERIN, sodium chloride flush, sodium chloride, naloxone, morphine **OR** morphine, oxyCODONE, methocarbamol **OR** methocarbamol IVPB, prochlorperazine **OR** prochlorperazine, sodium chloride flush, sodium chloride, polyethylene glycol, acetaminophen **OR** acetaminophen, diphenhydrAMINE, glucose, dextrose bolus **OR** dextrose bolus, glucagon (rDNA), dextrose      Intake/Output Summary (Last 24 hours) at 12/24/2022 1028  Last data filed at 12/24/2022 0909  Gross per 24 hour   Intake 880 ml   Output 40 ml   Net 840 ml         Physical Exam Performed:    BP (!) 185/113   Pulse 72   Temp 97.7 °F (36.5 °C) (Oral)   Resp 16   Ht 5' 3\" (1.6 m)   Wt 200 lb (90.7 kg)   SpO2 95%   BMI 35.43 kg/m²     General appearance: No apparent distress, appears stated age and cooperative. HEENT: Pupils equal, round, and reactive to light. Conjunctivae/corneas clear. Neck: Supple, with full range of motion. No jugular venous distention. Trachea midline. Respiratory:  Normal respiratory effort. Clear to auscultation, bilaterally without Rales/Wheezes/Rhonchi. Cardiovascular: Regular rate and rhythm with normal S1/S2 without murmurs, rubs or gallops. Abdomen: Soft, non-tender, non-distended with normal bowel sounds. Musculoskeletal: No clubbing, cyanosis or edema bilaterally. Full range of motion without deformity. Skin: Skin color, texture, turgor normal.  No rashes or lesions. Neurologic:  Neurovascularly intact without any focal sensory/motor deficits. Cranial nerves: II-XII intact, grossly non-focal.  Psychiatric: Alert and oriented, thought content appropriate, normal insight  Capillary Refill: Brisk, 3 seconds, normal   Peripheral Pulses: +2 palpable, equal bilaterally       Labs:   Recent Labs     12/22/22  0642 12/23/22  1235   WBC 11.5* 9.3   HGB 12.5 13.5   HCT 37.9 40.1    165       Recent Labs     12/22/22  0642 12/23/22  1235    135*   K 4.2 4.7    103   CO2 22 21   BUN 26* 27*   CREATININE 1.1 1.1   CALCIUM 8.4 8.7       No results for input(s): AST, ALT, BILIDIR, BILITOT, ALKPHOS in the last 72 hours. Recent Labs     12/22/22  0643   INR 1.26*       Recent Labs     12/22/22  1015   TROPONINI <0.01         Urinalysis:      Lab Results   Component Value Date/Time    NITRU Negative 11/28/2022 01:40 PM    WBCUA 0-2 05/25/2022 03:40 PM    BACTERIA Rare 05/25/2022 03:40 PM    RBCUA 0-2 05/25/2022 03:40 PM    BLOODU Negative 11/28/2022 01:40 PM    SPECGRAV <=1.005 11/28/2022 01:40 PM    GLUCOSEU Negative 11/28/2022 01:40 PM       Radiology:  CT Head wo Contrast   Final Result      1. Status post evacuation of left cerebral convexity subdural hematoma without evidence of complication. Very mild residual chronic left cerebral convexity subdural hematoma with nearly resolved 1 mm rightward midline shift.       CTA HEAD NECK W CONTRAST   Final Result      1. No aneurysms, vascular occlusions, or intracranial stenoses identified. 2.  No significant stenosis in the extracranial vertebral or carotid arteries. CT HEAD WO CONTRAST   Final Result      1. Unchanged subacute left cerebral convexity subdural hematoma with unchanged 5 mm rightward midline shift. MRI BRAIN WO CONTRAST   Final Result      1. Stable size of subacute left cerebral convexity subdural hematoma (2 cm thick) with stable mass effect and mild left to right midline shift measuring 4 mm   2. Mild chronic small vessel ischemia and mild generalized atrophy   3. No evidence for intracranial mass or vascular lesion               MRA HEAD WO CONTRAST   Final Result      1. MRA limited by motion artifact   2. No evidence for dural arteriovenous fistula         CT HEAD WO CONTRAST   Final Result   1. Acute on chronic left subdural hematoma with approximately 5.6 mm left to right shift on coronal imaging, stable from 12/19/2022 at 1953   2. Left cerebral hemisphere cortical sulci effacement is unchanged   3. Moderate to severe periventricular microangiopathic ischemic changes, chronic small vessel disease   4. No new bleed or intraparenchymal hemorrhage.           IP CONSULT TO NEUROSURGERY  IP CONSULT TO NEUROCRITICAL CARE  IP CONSULT TO CARDIOLOGY  IP CONSULT TO PHYSICAL MEDICINE REHAB    Assessment/Plan:      -Acute on chronic subdural hematoma s/p Left frontal trephine craniotomy for evacuation 12/21  -Essential HTN-controlled-on coreg  -CAD s/p CABG 2005,EF 55-60%-antiplatelets held,on coreg,imdur  -Hx Complete heart block s/p PPM  -Paroxsymal a fib-stable-eliquis held  -Obesity BMI 35  -Hypothyroidism-on  synthroid  -DM type 2-gliplizide,trulicity on hold since admission  -Atypical chest pain--troponins negative-seen by cardiology    Plan  Continue postop care per neurosurgery   Holding all antipaletelets and anticoagulants   C/w Keppra and decadron neurosurgery   C/w neurochecks  Continue basal bolus insulin  Chest pain management per cardiology    DVT Prophylaxis: scd  Diet: ADULT DIET; Regular; 4 carb choices (60 gm/meal); Low Fat/Low Chol/High Fiber/MARIPOSA  Code Status: Full Code  PT/OT Eval Status:       Discharge planning. Fabiola Carrizales   Discharge planning to ARU-pre-CERT pending--patient is medically stable        Aron Bajwa MD

## 2022-12-24 NOTE — PROGRESS NOTES
Pt alert/oriented x4, VSS on RA, denies significant pain or discomfort- expresses scheduled gabapentin manages neuropathy pain adequately. Pt up x1 SBA with GB and walker, voiding adequately via BR- tolerating PO without emesis. Crani incision intact with staples- drng noted from drain insertion site, no boggy spots noted. Fall and safety precautions maintained.

## 2022-12-24 NOTE — PLAN OF CARE
Problem: Discharge Planning  Goal: Discharge to home or other facility with appropriate resources  12/24/2022 0152 by Nemo Prince RN  Outcome: Progressing     Problem: Pain  Goal: Verbalizes/displays adequate comfort level or baseline comfort level  12/24/2022 0152 by Nemo Prince RN  Outcome: Progressing  Flowsheets (Taken 12/24/2022 0152)  Verbalizes/displays adequate comfort level or baseline comfort level:   Encourage patient to monitor pain and request assistance   Assess pain using appropriate pain scale   Administer analgesics based on type and severity of pain and evaluate response     Problem: Safety - Adult  Goal: Free from fall injury  12/24/2022 0152 by Nemo Prince RN  Outcome: Progressing

## 2022-12-24 NOTE — PLAN OF CARE
Discharge Planning  Goal: Discharge to home or other facility with appropriate resources  Outcome: Progressing     Pain  Goal: Verbalizes/displays adequate comfort level or baseline comfort level  Outcome: Progressing     Safety - Adult  Goal: Free from fall injury  Outcome: Progressing

## 2022-12-24 NOTE — PROGRESS NOTES
Physical Therapy  Facility/Department: [place] [address]  Physical Therapy Treatment    Name: Zia Carrasquillo  : 1945  MRN: [id number]  Date of Service: 2022    Discharge Recommendations: Zia Carrasquillo scored a 17/ on the AM-PAC short mobility form. Current research shows that an AM-PAC score of 17 or less is typically not associated with a discharge to the patient's home setting. Based on the patient's AM-PAC score and their current functional mobility deficits, it is recommended that the patient have 5-7 sessions per week of Physical Therapy at d/c to increase the patient's independence. At this time, this patient demonstrates complex nursing, medical, and rehabilitative needs, and would benefit from intensive rehabilitation services upon discharge from the Inpatient setting. This patient demonstrates the ability to participate in and benefit from an intensive therapy program with a coordinated interdisciplinary team approach to foster frequent, structured, and documented communication among disciplines, who will work together to establish, prioritize, and achieve treatment goals. Please see assessment section for further patient specific details. If patient discharges prior to next session this note will serve as a discharge summary. Please see below for the latest assessment towards goals. PT Equipment Recommendations  Other: defer to next level of care      Patient Diagnosis(es): The primary encounter diagnosis was Acute subdural hematoma. A diagnosis of Syncope and collapse was also pertinent to this visit.   Past Medical History:  has a past medical history of A-fib ([address] Allergic rhinitis, Arthritis, CAD (coronary artery disease), Cardiac pacemaker, CHF (congestive heart failure) ([address] Cholelithiasis, Diabetes mellitus ([address] Encounter for imaging to screen for metal prior to MRI, ESRD (end stage renal disease) ([address] Gastroparesis, Hepatic steatosis, Hyperlipidemia, Hypertension, Peripheral neuropathy, Sleep apnea, Syncope, Thyroid disease, and Vitamin B12 deficiency. Past Surgical History:  has a past surgical history that includes Coronary artery bypass graft (2005); Foot surgery; colectomy (1998); Colonoscopy; Upper gastrointestinal endoscopy; Tonsillectomy and adenoidectomy; Tubal ligation; other surgical history (4/17/2012); other surgical history (Right, 3/20/13); Coronary angioplasty; Cholecystectomy; Cardiac catheterization (12/27/2017); Diagnostic Cardiac Cath Lab Procedure; Toe amputation (Left, 3/29/2021); eye surgery; Endoscopy, colon, diagnostic; hernia repair; and craniotomy (Left, 12/21/2022). Assessment   Assessment: No chest pain during session and was able to ambulate with less A today. Continues to make good progress with therapy following a subdural hematoma. Drain in place during session. Recommend ongoing IP PT at dc to maximize functional independence and help pt return to her baseline. Treatment Diagnosis: decreased functional mobility 2/2 to SDH  Therapy Prognosis: Good  Requires PT Follow-Up: Yes  Activity Tolerance  Activity Tolerance: Patient tolerated treatment well     Plan   Physcial Therapy Plan  General Plan: 5-7 times per week  Current Treatment Recommendations: Strengthening, Balance training, Gait training, Stair training, Functional mobility training, Transfer training, Endurance training  Safety Devices  Type of Devices: Call light within reach, Chair alarm in place, Left in chair, Nurse notified     Restrictions  Position Activity Restriction  Other position/activity restrictions: up as tolerated     Subjective   General  Chart Reviewed: Yes  Additional Pertinent Hx: 77 yo female present to ED 12/19 p/w aphasia. CT head (+) Acute on chronic subdural hematoma overlying the left frontal and parietal lobes. Pt had a code stroke called 12/20 and underwent a procedure: 1.  Left frontal trephine craniotomy for evacuation of acute/chronic subdural hemorrhage on 12/21. PMHx: afib, CAD, CHF, DM, HTN  Family / Caregiver Present: No  Diagnosis: SDH  Follows Commands: Within Functional Limits  Subjective  Subjective: Pt found supine in bed, agreeable to therapy. Reports no chest pain. RN reports giving pt meds earlier for chest pain. States she utilizes walker at home as well as the electric scooter. \"I have a bad heart. \"  Has a good friend that can stay with her once leaves rehab. Social/Functional History  Social/Functional History  Lives With: Alone  Type of Home: House  Home Layout: One level  Home Access: Ramped entrance  Bathroom Shower/Tub: Walk-in shower  Bathroom Toilet: Handicap height  Bathroom Equipment: Built-in shower seat, Grab bars in shower, Hand-held shower, Grab bars around toilet  Home Equipment: Cane, Electric scooter, Walker, rolling  Has the patient had two or more falls in the past year or any fall with injury in the past year?: No  ADL Assistance: Independent  Homemaking Assistance: Independent  Homemaking Responsibilities: Yes  Ambulation Assistance: Independent (Uses a electric scooter in the house and rollator in the community.)  Transfer Assistance: Independent  Active : No  Patient's  Info: friend or granddaughter transport  Leisure & Hobbies: watch TV, read  Additional Comments: able to ambulate grocery store distance with rollator.  She typically leaves the home every few days       Cognition -WFL        Objective                  Bed mobility  Supine to Sit: Minimal assistance (HOB elev)  Scooting: Stand by assistance  Transfers  Sit to Stand: Contact guard assistance  Stand to Sit: Contact guard assistance (cues to sit slower & reach hands back - pt tends to sit rapidly)  Ambulation  Surface: Level tile  Device: Rolling Walker  Assistance: Contact guard assistance  Quality of Gait: forward flexed posture, decreased B step height/length, no LOB  Gait Deviations: Slow Alexandra  Distance: 15', 35'  Stairs/Curb  Stairs?: No (has a ramp at home)     Balance  Sitting - Static:  (SBA with walker)  Exercise Treatment: LAQ x 10 indep                                                        AM-PAC Score  AM-PAC Inpatient Mobility Raw Score : 17 (12/24/22 1052)  AM-PAC Inpatient T-Scale Score : 42.13 (12/24/22 1052)  Mobility Inpatient CMS 0-100% Score: 50.57 (12/24/22 1052)  Mobility Inpatient CMS G-Code Modifier : CK (12/24/22 1052)               Goals- none met today  Short Term Goals  Time Frame for Short Term Goals: prior to discharge  Short Term Goal 1: Pt will complete bed mobility independently  Short Term Goal 2: Pt will compelte sit<>stand with LRAD and supervision  Short Term Goal 3: Pt will complete 48' ambualtion with supervision and LRAD  Patient Goals   Patient Goals : none stated       Education  Patient Education  Education Given To: Patient  Education Provided: Role of Therapy;Plan of Care;Transfer Training  Education Provided Comments: reaching back prior to sitting  Education Method: Demonstration  Barriers to Learning: None (aphasia initially but pt reports all better now)  Education Outcome: Verbalized understanding      Therapy Time   Individual Concurrent Group Co-treatment   Time In 1022         Time Out 1046         Minutes 24             Timed Code Treatment Minutes:  24 Minutes    Total Treatment Minutes:  [address]

## 2022-12-25 LAB
GLUCOSE BLD-MCNC: 150 MG/DL (ref 70–99)
GLUCOSE BLD-MCNC: 204 MG/DL (ref 70–99)
GLUCOSE BLD-MCNC: 208 MG/DL (ref 70–99)
GLUCOSE BLD-MCNC: 223 MG/DL (ref 70–99)
PERFORMED ON: ABNORMAL

## 2022-12-25 PROCEDURE — 6360000002 HC RX W HCPCS: Performed by: NURSE PRACTITIONER

## 2022-12-25 PROCEDURE — 6370000000 HC RX 637 (ALT 250 FOR IP): Performed by: INTERNAL MEDICINE

## 2022-12-25 PROCEDURE — 2580000003 HC RX 258

## 2022-12-25 PROCEDURE — 2580000003 HC RX 258: Performed by: INTERNAL MEDICINE

## 2022-12-25 PROCEDURE — 6370000000 HC RX 637 (ALT 250 FOR IP): Performed by: NURSE PRACTITIONER

## 2022-12-25 PROCEDURE — 2580000003 HC RX 258: Performed by: PHYSICIAN ASSISTANT

## 2022-12-25 PROCEDURE — 6360000002 HC RX W HCPCS: Performed by: PHYSICIAN ASSISTANT

## 2022-12-25 PROCEDURE — 2060000000 HC ICU INTERMEDIATE R&B

## 2022-12-25 PROCEDURE — 6360000002 HC RX W HCPCS

## 2022-12-25 RX ORDER — FUROSEMIDE 40 MG/1
40 TABLET ORAL DAILY
Status: DISCONTINUED | OUTPATIENT
Start: 2022-12-25 | End: 2022-12-27 | Stop reason: HOSPADM

## 2022-12-25 RX ORDER — CARVEDILOL 6.25 MG/1
6.25 TABLET ORAL 2 TIMES DAILY WITH MEALS
Status: DISCONTINUED | OUTPATIENT
Start: 2022-12-25 | End: 2022-12-27 | Stop reason: HOSPADM

## 2022-12-25 RX ADMIN — DEXAMETHASONE SODIUM PHOSPHATE 4 MG: 4 INJECTION, SOLUTION INTRA-ARTICULAR; INTRALESIONAL; INTRAMUSCULAR; INTRAVENOUS; SOFT TISSUE at 17:59

## 2022-12-25 RX ADMIN — PANTOPRAZOLE SODIUM 40 MG: 40 TABLET, DELAYED RELEASE ORAL at 09:57

## 2022-12-25 RX ADMIN — LEVOTHYROXINE SODIUM 50 MCG: 50 TABLET ORAL at 06:45

## 2022-12-25 RX ADMIN — GABAPENTIN 300 MG: 600 TABLET, FILM COATED ORAL at 14:43

## 2022-12-25 RX ADMIN — LEVETIRACETAM 1000 MG: 100 INJECTION INTRAVENOUS at 21:23

## 2022-12-25 RX ADMIN — FUROSEMIDE 40 MG: 40 TABLET ORAL at 09:57

## 2022-12-25 RX ADMIN — TROSPIUM CHLORIDE 20 MG: 20 TABLET, FILM COATED ORAL at 21:19

## 2022-12-25 RX ADMIN — HEPARIN SODIUM 5000 UNITS: 5000 INJECTION INTRAVENOUS; SUBCUTANEOUS at 16:52

## 2022-12-25 RX ADMIN — INSULIN LISPRO 2 UNITS: 100 INJECTION, SOLUTION INTRAVENOUS; SUBCUTANEOUS at 16:56

## 2022-12-25 RX ADMIN — INSULIN LISPRO 2 UNITS: 100 INJECTION, SOLUTION INTRAVENOUS; SUBCUTANEOUS at 12:13

## 2022-12-25 RX ADMIN — SODIUM CHLORIDE, PRESERVATIVE FREE 5 ML: 5 INJECTION INTRAVENOUS at 21:29

## 2022-12-25 RX ADMIN — GABAPENTIN 300 MG: 600 TABLET, FILM COATED ORAL at 09:56

## 2022-12-25 RX ADMIN — CARVEDILOL 6.25 MG: 6.25 TABLET, FILM COATED ORAL at 16:53

## 2022-12-25 RX ADMIN — HEPARIN SODIUM 5000 UNITS: 5000 INJECTION INTRAVENOUS; SUBCUTANEOUS at 09:59

## 2022-12-25 RX ADMIN — ISOSORBIDE MONONITRATE 120 MG: 60 TABLET, EXTENDED RELEASE ORAL at 09:57

## 2022-12-25 RX ADMIN — NITROGLYCERIN 0.4 MG: 0.4 TABLET, ORALLY DISINTEGRATING SUBLINGUAL at 06:44

## 2022-12-25 RX ADMIN — SODIUM CHLORIDE, PRESERVATIVE FREE 10 ML: 5 INJECTION INTRAVENOUS at 09:58

## 2022-12-25 RX ADMIN — SODIUM CHLORIDE: 9 INJECTION, SOLUTION INTRAVENOUS at 10:09

## 2022-12-25 RX ADMIN — GABAPENTIN 300 MG: 600 TABLET, FILM COATED ORAL at 21:19

## 2022-12-25 RX ADMIN — LEVETIRACETAM 1000 MG: 100 INJECTION INTRAVENOUS at 10:10

## 2022-12-25 RX ADMIN — DEXAMETHASONE SODIUM PHOSPHATE 4 MG: 4 INJECTION, SOLUTION INTRA-ARTICULAR; INTRALESIONAL; INTRAMUSCULAR; INTRAVENOUS; SOFT TISSUE at 12:09

## 2022-12-25 RX ADMIN — INSULIN GLARGINE 8 UNITS: 100 INJECTION, SOLUTION SUBCUTANEOUS at 21:24

## 2022-12-25 RX ADMIN — DEXAMETHASONE SODIUM PHOSPHATE 4 MG: 4 INJECTION, SOLUTION INTRA-ARTICULAR; INTRALESIONAL; INTRAMUSCULAR; INTRAVENOUS; SOFT TISSUE at 06:45

## 2022-12-25 RX ADMIN — SODIUM CHLORIDE, PRESERVATIVE FREE 10 ML: 5 INJECTION INTRAVENOUS at 09:59

## 2022-12-25 RX ADMIN — CARVEDILOL 6.25 MG: 6.25 TABLET, FILM COATED ORAL at 09:27

## 2022-12-25 NOTE — PROGRESS NOTES
Patient's daughter Zeferino Patel called requesting updates. All questions and concerns addressed at this time. Patient's daughter inquiring about patient's grandchildren to come see patient. This RN reiterated hospital's current visitor policy at this time related to age and current flu season.

## 2022-12-25 NOTE — PROGRESS NOTES
Pt with episode of chest pain s/p ambulation to BR. /99, HR 65. Maintaining SpO2 sats at 97% RA. No changes to tele. Pt also with acute onset of bleeding from R Nare.   PRN nitro administered per order/protocol, dr Martin Briggs notified- advised to monitor BP closely, may not need additional BP meds at this time per MD.

## 2022-12-25 NOTE — PROGRESS NOTES
Pt alert/oriented x4, endorses pain intermittently to crani site s/p drain removal, pt accepting of PRN acetaminophen to alleviate pain, refusing other PRN medications. Crani site JOELLE w staples, no drainage observed, no s/s of bogginess. Pt with one episode of chest pain this shift after ambulating to bathroom, x1 Nitroglycerin administered per order, no change in tele, O2 sats stable, elevated BP but resolved. Pt up to BR multiple times this shift, voiding adequately. Up x1 SBA with GB and walker. Fall and safety precautions maintained.

## 2022-12-25 NOTE — PROGRESS NOTES
Hospitalist Progress Note      PCP: DARIA Nieto CNP    Date of Admission: 12/20/2022    Subjective:     Patient was seen and examined at bedside today, alert and oriented. Pleasant lady. No acute events reported or observed overnight. Patient did not report any new complaints. Denied headache, vision changes. Ambulated to restroom with assistance. Mild elevated blood pressure, started back on Lasix, and increased her Coreg dose.     Medications:  Reviewed    Infusion Medications    sodium chloride 50 mL/hr at 12/22/22 0940    sodium chloride 50 mL/hr at 12/23/22 1001    dextrose       Scheduled Medications    levETIRAcetam  1,000 mg IntraVENous Q12H    insulin glargine  8 Units SubCUTAneous Nightly    sodium chloride flush  5-40 mL IntraVENous 2 times per day    heparin (porcine)  5,000 Units SubCUTAneous q8h    sodium chloride flush  5-40 mL IntraVENous 2 times per day    isosorbide mononitrate  120 mg Oral Daily    gabapentin  300 mg Oral TID    carvedilol  3.125 mg Oral BID WC    pantoprazole  40 mg Oral Daily    trospium  20 mg Oral Nightly    levothyroxine  50 mcg Oral Daily    [Held by provider] furosemide  40 mg Oral Daily    insulin lispro  0-8 Units SubCUTAneous TID WC    insulin lispro  0-4 Units SubCUTAneous Nightly    dexamethasone  4 mg IntraVENous Q6H     PRN Meds: hydrALAZINE, nitroGLYCERIN, sodium chloride flush, sodium chloride, naloxone, morphine **OR** morphine, oxyCODONE, methocarbamol **OR** methocarbamol IVPB, prochlorperazine **OR** prochlorperazine, sodium chloride flush, sodium chloride, polyethylene glycol, acetaminophen **OR** acetaminophen, diphenhydrAMINE, glucose, dextrose bolus **OR** dextrose bolus, glucagon (rDNA), dextrose      Intake/Output Summary (Last 24 hours) at 12/25/2022 0856  Last data filed at 12/24/2022 1900  Gross per 24 hour   Intake 840 ml   Output --   Net 840 ml       Physical Exam Performed:    BP (!) 167/99   Pulse 78   Temp 98.1 °F (36.7 °C) (Oral)   Resp 16   Ht 5' 3\" (1.6 m)   Wt 200 lb (90.7 kg)   SpO2 97%   BMI 35.43 kg/m²     General appearance: No apparent distress, appears stated age and cooperative. HEENT: Pupils equal, round, and reactive to light. Conjunctivae/corneas clear. Neck: Supple, with full range of motion. No jugular venous distention. Trachea midline. Respiratory:  Normal respiratory effort. Clear to auscultation, bilaterally without Rales/Wheezes/Rhonchi. Cardiovascular: Regular rate and rhythm with normal S1/S2 without murmurs, rubs or gallops. Abdomen: Soft, non-tender, non-distended with normal bowel sounds. Musculoskeletal: No clubbing, cyanosis or edema bilaterally. Full range of motion without deformity. Skin: Skin color, texture, turgor normal.  No rashes or lesions. Neurologic:  Neurovascularly intact without any focal sensory/motor deficits. Cranial nerves: II-XII intact, grossly non-focal.  Psychiatric: Alert and oriented, thought content appropriate, normal insight      Labs:   Recent Labs     12/23/22  1235   WBC 9.3   HGB 13.5   HCT 40.1        Recent Labs     12/23/22  1235   *   K 4.7      CO2 21   BUN 27*   CREATININE 1.1   CALCIUM 8.7     No results for input(s): AST, ALT, BILIDIR, BILITOT, ALKPHOS in the last 72 hours. No results for input(s): INR in the last 72 hours. Recent Labs     12/22/22  1015   TROPONINI <0.01       Urinalysis:      Lab Results   Component Value Date/Time    NITRU Negative 11/28/2022 01:40 PM    WBCUA 0-2 05/25/2022 03:40 PM    BACTERIA Rare 05/25/2022 03:40 PM    RBCUA 0-2 05/25/2022 03:40 PM    BLOODU Negative 11/28/2022 01:40 PM    SPECGRAV <=1.005 11/28/2022 01:40 PM    GLUCOSEU Negative 11/28/2022 01:40 PM       Radiology:  CT Head wo Contrast   Final Result      1. Status post evacuation of left cerebral convexity subdural hematoma without evidence of complication.  Very mild residual chronic left cerebral convexity subdural hematoma with nearly resolved 1 mm rightward midline shift. CTA HEAD NECK W CONTRAST   Final Result      1. No aneurysms, vascular occlusions, or intracranial stenoses identified. 2.  No significant stenosis in the extracranial vertebral or carotid arteries. CT HEAD WO CONTRAST   Final Result      1. Unchanged subacute left cerebral convexity subdural hematoma with unchanged 5 mm rightward midline shift. MRI BRAIN WO CONTRAST   Final Result      1. Stable size of subacute left cerebral convexity subdural hematoma (2 cm thick) with stable mass effect and mild left to right midline shift measuring 4 mm   2. Mild chronic small vessel ischemia and mild generalized atrophy   3. No evidence for intracranial mass or vascular lesion               MRA HEAD WO CONTRAST   Final Result      1. MRA limited by motion artifact   2. No evidence for dural arteriovenous fistula         CT HEAD WO CONTRAST   Final Result   1. Acute on chronic left subdural hematoma with approximately 5.6 mm left to right shift on coronal imaging, stable from 12/19/2022 at 1953   2. Left cerebral hemisphere cortical sulci effacement is unchanged   3. Moderate to severe periventricular microangiopathic ischemic changes, chronic small vessel disease   4. No new bleed or intraparenchymal hemorrhage.           IP CONSULT TO NEUROSURGERY  IP CONSULT TO NEUROCRITICAL CARE  IP CONSULT TO CARDIOLOGY  IP CONSULT TO PHYSICAL MEDICINE REHAB    Assessment/Plan:      -Acute on chronic subdural hematoma s/p Left frontal trephine craniotomy for evacuation 12/21  -Essential HTN-controlled-on coreg  -CAD s/p CABG 2005,EF 55-60%-antiplatelets held,on coreg,imdur  -Hx Complete heart block s/p PPM  -Paroxsymal a fib-stable-eliquis held  -Obesity BMI 35  -Hypothyroidism-on  synthroid  -DM type 2-gliplizide,trulicity on hold since admission  -Atypical chest pain--troponins negative-seen by cardiology    Plan  Continue postop care per neurosurgery   Holding all antipaletelets and anticoagulants   C/w Keppra and decadron neurosurgery   C/w neurochecks  Continue basal bolus insulin  Chest pain management per cardiology  -Control blood pressure, restart diuretics with her Lasix 40 mg daily. Increase Coreg to 6.25 twice daily. DVT Prophylaxis: scd  Diet: ADULT DIET; Regular; 4 carb choices (60 gm/meal); Low Fat/Low Chol/High Fiber/MARIPOSA  Code Status: Full Code  PT/OT Eval Status:       Discharge planning. Deedee Gordon   Discharge planning to ARU-pre-CERT pending--patient is medically stable    Ml Steel MD

## 2022-12-25 NOTE — PROGRESS NOTES
NEUROSURGERY POST-OP PROGRESS NOTE    Patient Name: Anton Hammond YOB: 1945   Sex: Female Age: 78 yrs     Medical Record Number: [id number] Acct Number: [de-identified]   Room Number: 5534/5282-27 Hospital Day: Hospital Day: 6     Interval History:  Post-operative Day# 4 s/p Procedure(s) (LRB):  LEFT TREPHINE CRANIOTOMY FOR SUBDURAL HEMATOMA EVACUATION (Left)    Patient complaining of mild headache overnight, otherwise no acute events     Objective:    VITAL SIGNS   BP (!) 156/83   Pulse 63   Temp 97.6 °F (36.4 °C) (Oral)   Resp 16   Ht 5' 3\" (1.6 m)   Wt 200 lb (90.7 kg)   SpO2 96%   BMI 35.43 kg/m²    Height Height: 5' 3\" (160 cm)   Weight Weight: 200 lb (90.7 kg)        Allergies Allergies   Allergen Reactions    Niacin And Related Anaphylaxis    Ranexa [Ranolazine] Hallucinations    Adhesive Tape      Causes rash and blisters. Can use paper tape or opsite without any problems. Other      QVacin IV antibiotic had after foot surgery placed on this for infection. Developed high fever, chills and uncontrollable shaking. Statins Depletion Therapy      Deep muscle pain    Vancomycin      Does not recall reaction       NPO Status ADULT DIET; Regular; 4 carb choices (60 gm/meal); Low Fat/Low Chol/High Fiber/MARIPOSA   Isolation No active isolations     LABS   Basic Metabolic Profile Recent Labs     12/23/22  1235   *      CO2 21   BUN 27*   CREATININE 1.1   GLUCOSE 190*        Complete Blood Count Recent Labs     12/23/22  1235   WBC 9.3   RBC 4.50        Coagulation Studies No results for input(s): PTT, INR in the last 72 hours.     Invalid input(s): PLATELETS, PROA, PT, PTTA       MEDICATIONS   Inpatient Medications     carvedilol, 6.25 mg, Oral, BID WC    furosemide, 40 mg, Oral, Daily    levETIRAcetam, 1,000 mg, IntraVENous, Q12H    insulin glargine, 8 Units, SubCUTAneous, Nightly    sodium chloride flush, 5-40 mL, IntraVENous, 2 times per day    heparin (porcine), 5,000 Units, SubCUTAneous, q8h    sodium chloride flush, 5-40 mL, IntraVENous, 2 times per day    isosorbide mononitrate, 120 mg, Oral, Daily    gabapentin, 300 mg, Oral, TID    pantoprazole, 40 mg, Oral, Daily    trospium, 20 mg, Oral, Nightly    levothyroxine, 50 mcg, Oral, Daily    insulin lispro, 0-8 Units, SubCUTAneous, TID WC    insulin lispro, 0-4 Units, SubCUTAneous, Nightly    [COMPLETED] dexamethasone, 10 mg, IntraVENous, Once **FOLLOWED BY** dexamethasone, 4 mg, IntraVENous, Q6H   Infusions    sodium chloride 10 mL/hr at 12/25/22 1009    sodium chloride 50 mL/hr at 12/23/22 1001    dextrose        Antibiotics   Recent Abx Admin        No antibiotic orders with administrations found. Neurologic Exam:  Mental status: awake and alert and oriented x4    Cranial Nerves:  II: Visual acuity not tested, visual fields full, denies new visual changes / diplopia  III, IV, VI: PERRL, 3 mm bilaterally, EOMI, no nystagmus noted  V: Facial sensation intact bilaterally to touch  VII: Face symmetric  VIII: Hearing intact bilaterally to spoken voice  IX: Palate movement equal bilaterally  XI: Shoulder shrug equal bilaterally  XII: Tongue midline      Musculoskeletal:   Gait: Not tested   Tone: normal  Sensory: intact to all extremities  Motor strength:    Right  Left    Right  Left    Deltoid  5 5  Hip Flex  5 5   Biceps  5 5  Knee Extensors  5 5   Triceps  5 5  Knee Flexors  5 5   Wrist Ext  5 5  Ankle Dorsiflex. 5 5   Wrist Flex  5 5  Ankle Plantarflex.   5 5   Handgrip  5 5  Ext Xavier Longus  5 5   Thumb Ext  5 5         Incision: intact, clean and dry  Drain removed     Respiratory:  Unlabored respiratory pattern    Abdomen:   Soft, ND   Last BM 12/21    Cardiovascular:  Warm, well perfused    Assessment   Patient is a 77 yo F s/p Procedure(s) (LRB):  LEFT TREPHINE CRANIOTOMY FOR SUBDURAL HEMATOMA EVACUATION (Left) per Dr. Betzy Servin .      Plan:  Neurologic exam frequency:q4  Mobility:PT/OT   Steroids: taper, SSI  Seizure Prophylaxis:keppra as ordered x 2 weeks  Drain removed  DVT Prophylaxis: SCDs and heparin sq  GI Prophylaxis:protonix  Bowel Regimen: senna and glycolax  Pain control:fran   Incisional Care:open to air   Cardiology following  Consulted ARU  Dispo await precert for ARU       Electronically signed by: Kun Altamirano MD, 12/25/2022 12:15 PM

## 2022-12-25 NOTE — PLAN OF CARE
Problem: Discharge Planning  Goal: Discharge to home or other facility with appropriate resources  12/25/2022 0739 by William Barnes RN  Outcome: Progressing  12/25/2022 0108 by Mandi Calderon RN  Outcome: Progressing     Problem: Pain  Goal: Verbalizes/displays adequate comfort level or baseline comfort level  12/25/2022 0739 by William Barnes RN  Outcome: Progressing  12/25/2022 0108 by Mandi Calderon RN  Outcome: Progressing     Problem: Safety - Adult  Goal: Free from fall injury  12/25/2022 0739 by William Barnes RN  Outcome: Progressing  12/25/2022 0108 by Mandi Calderon RN  Outcome: Progressing     Problem: ABCDS Injury Assessment  Goal: Absence of physical injury  12/25/2022 0739 by William Barnes RN  Outcome: Progressing  12/25/2022 0108 by Mandi Calderon RN  Outcome: Progressing     Problem: Neurosensory - Adult  Goal: Achieves stable or improved neurological status  Outcome: Progressing  Goal: Achieves maximal functionality and self care  Outcome: Progressing     Problem: Chronic Conditions and Co-morbidities  Goal: Patient's chronic conditions and co-morbidity symptoms are monitored and maintained or improved  Outcome: Progressing     Problem: Skin/Tissue Integrity  Goal: Absence of new skin breakdown  Description: 1. Monitor for areas of redness and/or skin breakdown  2. Assess vascular access sites hourly  3. Every 4-6 hours minimum:  Change oxygen saturation probe site  4. Every 4-6 hours:  If on nasal continuous positive airway pressure, respiratory therapy assess nares and determine need for appliance change or resting period.   Outcome: Progressing

## 2022-12-25 NOTE — PROGRESS NOTES
Patient alert and oriented x4. VS on RA with some elevated BP noted. Patient denies need for pain medication although offered. Staples to left head incision CD&I and JOELLE. Patient denies any chest pain this shift. CGA to bathroom with gait belt and walker. Voiding adequately and frequently after administration of PO lasix. Incontinent at times. Patient tolerating PO intake and denies nausea or vomiting. Denies any further needs at this time.

## 2022-12-26 LAB
GLUCOSE BLD-MCNC: 171 MG/DL (ref 70–99)
GLUCOSE BLD-MCNC: 178 MG/DL (ref 70–99)
GLUCOSE BLD-MCNC: 229 MG/DL (ref 70–99)
GLUCOSE BLD-MCNC: 281 MG/DL (ref 70–99)
PERFORMED ON: ABNORMAL

## 2022-12-26 PROCEDURE — 2580000003 HC RX 258: Performed by: INTERNAL MEDICINE

## 2022-12-26 PROCEDURE — 2580000003 HC RX 258

## 2022-12-26 PROCEDURE — 6360000002 HC RX W HCPCS

## 2022-12-26 PROCEDURE — 6370000000 HC RX 637 (ALT 250 FOR IP): Performed by: INTERNAL MEDICINE

## 2022-12-26 PROCEDURE — 6360000002 HC RX W HCPCS: Performed by: PHYSICIAN ASSISTANT

## 2022-12-26 PROCEDURE — 2580000003 HC RX 258: Performed by: PHYSICIAN ASSISTANT

## 2022-12-26 PROCEDURE — 6360000002 HC RX W HCPCS: Performed by: NURSE PRACTITIONER

## 2022-12-26 PROCEDURE — 2060000000 HC ICU INTERMEDIATE R&B

## 2022-12-26 PROCEDURE — 6360000002 HC RX W HCPCS: Performed by: INTERNAL MEDICINE

## 2022-12-26 RX ORDER — DEXAMETHASONE SODIUM PHOSPHATE 4 MG/ML
4 INJECTION, SOLUTION INTRA-ARTICULAR; INTRALESIONAL; INTRAMUSCULAR; INTRAVENOUS; SOFT TISSUE EVERY 12 HOURS
Status: DISCONTINUED | OUTPATIENT
Start: 2022-12-27 | End: 2022-12-26

## 2022-12-26 RX ORDER — DEXAMETHASONE SODIUM PHOSPHATE 4 MG/ML
2 INJECTION, SOLUTION INTRA-ARTICULAR; INTRALESIONAL; INTRAMUSCULAR; INTRAVENOUS; SOFT TISSUE 2 TIMES DAILY
Status: DISCONTINUED | OUTPATIENT
Start: 2022-12-27 | End: 2022-12-27 | Stop reason: HOSPADM

## 2022-12-26 RX ORDER — DEXAMETHASONE SODIUM PHOSPHATE 4 MG/ML
4 INJECTION, SOLUTION INTRA-ARTICULAR; INTRALESIONAL; INTRAMUSCULAR; INTRAVENOUS; SOFT TISSUE 2 TIMES DAILY
Status: DISPENSED | OUTPATIENT
Start: 2022-12-26 | End: 2022-12-27

## 2022-12-26 RX ORDER — DEXAMETHASONE SODIUM PHOSPHATE 4 MG/ML
1 INJECTION, SOLUTION INTRA-ARTICULAR; INTRALESIONAL; INTRAMUSCULAR; INTRAVENOUS; SOFT TISSUE 2 TIMES DAILY
Status: DISCONTINUED | OUTPATIENT
Start: 2022-12-28 | End: 2022-12-27 | Stop reason: HOSPADM

## 2022-12-26 RX ADMIN — GABAPENTIN 300 MG: 600 TABLET, FILM COATED ORAL at 20:20

## 2022-12-26 RX ADMIN — CARVEDILOL 6.25 MG: 6.25 TABLET, FILM COATED ORAL at 17:39

## 2022-12-26 RX ADMIN — DEXAMETHASONE SODIUM PHOSPHATE 4 MG: 4 INJECTION, SOLUTION INTRA-ARTICULAR; INTRALESIONAL; INTRAMUSCULAR; INTRAVENOUS; SOFT TISSUE at 13:29

## 2022-12-26 RX ADMIN — DEXAMETHASONE SODIUM PHOSPHATE 4 MG: 4 INJECTION, SOLUTION INTRAMUSCULAR; INTRAVENOUS at 20:21

## 2022-12-26 RX ADMIN — LEVETIRACETAM 1000 MG: 100 INJECTION INTRAVENOUS at 20:23

## 2022-12-26 RX ADMIN — INSULIN GLARGINE 8 UNITS: 100 INJECTION, SOLUTION SUBCUTANEOUS at 20:30

## 2022-12-26 RX ADMIN — HEPARIN SODIUM 5000 UNITS: 5000 INJECTION INTRAVENOUS; SUBCUTANEOUS at 23:37

## 2022-12-26 RX ADMIN — TROSPIUM CHLORIDE 20 MG: 20 TABLET, FILM COATED ORAL at 20:21

## 2022-12-26 RX ADMIN — HEPARIN SODIUM 5000 UNITS: 5000 INJECTION INTRAVENOUS; SUBCUTANEOUS at 08:29

## 2022-12-26 RX ADMIN — GABAPENTIN 300 MG: 600 TABLET, FILM COATED ORAL at 14:51

## 2022-12-26 RX ADMIN — SODIUM CHLORIDE, PRESERVATIVE FREE 10 ML: 5 INJECTION INTRAVENOUS at 20:21

## 2022-12-26 RX ADMIN — LEVETIRACETAM 1000 MG: 100 INJECTION INTRAVENOUS at 08:44

## 2022-12-26 RX ADMIN — DEXAMETHASONE SODIUM PHOSPHATE 4 MG: 4 INJECTION, SOLUTION INTRA-ARTICULAR; INTRALESIONAL; INTRAMUSCULAR; INTRAVENOUS; SOFT TISSUE at 07:10

## 2022-12-26 RX ADMIN — GABAPENTIN 300 MG: 600 TABLET, FILM COATED ORAL at 08:29

## 2022-12-26 RX ADMIN — CARVEDILOL 6.25 MG: 6.25 TABLET, FILM COATED ORAL at 08:28

## 2022-12-26 RX ADMIN — HEPARIN SODIUM 5000 UNITS: 5000 INJECTION INTRAVENOUS; SUBCUTANEOUS at 00:10

## 2022-12-26 RX ADMIN — DEXAMETHASONE SODIUM PHOSPHATE 4 MG: 4 INJECTION, SOLUTION INTRA-ARTICULAR; INTRALESIONAL; INTRAMUSCULAR; INTRAVENOUS; SOFT TISSUE at 00:52

## 2022-12-26 RX ADMIN — ISOSORBIDE MONONITRATE 120 MG: 60 TABLET, EXTENDED RELEASE ORAL at 08:29

## 2022-12-26 RX ADMIN — SODIUM CHLORIDE, PRESERVATIVE FREE 10 ML: 5 INJECTION INTRAVENOUS at 08:30

## 2022-12-26 RX ADMIN — INSULIN LISPRO 2 UNITS: 100 INJECTION, SOLUTION INTRAVENOUS; SUBCUTANEOUS at 12:25

## 2022-12-26 RX ADMIN — PANTOPRAZOLE SODIUM 40 MG: 40 TABLET, DELAYED RELEASE ORAL at 08:29

## 2022-12-26 RX ADMIN — FUROSEMIDE 40 MG: 40 TABLET ORAL at 08:29

## 2022-12-26 RX ADMIN — LEVOTHYROXINE SODIUM 50 MCG: 50 TABLET ORAL at 07:10

## 2022-12-26 RX ADMIN — HEPARIN SODIUM 5000 UNITS: 5000 INJECTION INTRAVENOUS; SUBCUTANEOUS at 17:39

## 2022-12-26 NOTE — PROGRESS NOTES
NEUROSURGERY POST-OP PROGRESS NOTE    Patient Name: Diamond Montejo YOB: 1945   Sex: Female Age: 78 yrs     Medical Record Number: [id number] Acct Number: [de-identified]   Room Number: 4439/0319-72 Hospital Day: Hospital Day: 7     Interval History:  Post-operative Day# 4 s/p Procedure(s) (LRB):  LEFT TREPHINE CRANIOTOMY FOR SUBDURAL HEMATOMA EVACUATION (Left)    Doing well ANDRES overnight     Objective:    VITAL SIGNS   BP (!) 165/94   Pulse 65   Temp 98.2 °F (36.8 °C) (Oral)   Resp 14   Ht 5' 3\" (1.6 m)   Wt 200 lb (90.7 kg)   SpO2 95%   BMI 35.43 kg/m²    Height Height: 5' 3\" (160 cm)   Weight Weight: 200 lb (90.7 kg)        Allergies Allergies   Allergen Reactions    Niacin And Related Anaphylaxis    Ranexa [Ranolazine] Hallucinations    Adhesive Tape      Causes rash and blisters. Can use paper tape or opsite without any problems. Other      QVacin IV antibiotic had after foot surgery placed on this for infection. Developed high fever, chills and uncontrollable shaking. Statins Depletion Therapy      Deep muscle pain    Vancomycin      Does not recall reaction       NPO Status ADULT DIET; Regular; 4 carb choices (60 gm/meal); Low Fat/Low Chol/High Fiber/MARIPOSA   Isolation No active isolations     LABS   Basic Metabolic Profile Recent Labs     12/23/22  1235   *      CO2 21   BUN 27*   CREATININE 1.1   GLUCOSE 190*        Complete Blood Count Recent Labs     12/23/22  1235   WBC 9.3   RBC 4.50        Coagulation Studies No results for input(s): PTT, INR in the last 72 hours.     Invalid input(s): PLATELETS, PROA, PT, PTTA       MEDICATIONS   Inpatient Medications     carvedilol, 6.25 mg, Oral, BID WC    furosemide, 40 mg, Oral, Daily    levETIRAcetam, 1,000 mg, IntraVENous, Q12H    insulin glargine, 8 Units, SubCUTAneous, Nightly    sodium chloride flush, 5-40 mL, IntraVENous, 2 times per day    heparin (porcine), 5,000 Units, SubCUTAneous, q8h    sodium chloride flush, 5-40 mL, IntraVENous, 2 times per day    isosorbide mononitrate, 120 mg, Oral, Daily    gabapentin, 300 mg, Oral, TID    pantoprazole, 40 mg, Oral, Daily    trospium, 20 mg, Oral, Nightly    levothyroxine, 50 mcg, Oral, Daily    insulin lispro, 0-8 Units, SubCUTAneous, TID WC    insulin lispro, 0-4 Units, SubCUTAneous, Nightly    [COMPLETED] dexamethasone, 10 mg, IntraVENous, Once **FOLLOWED BY** dexamethasone, 4 mg, IntraVENous, Q6H   Infusions    sodium chloride 10 mL/hr at 12/25/22 1009    sodium chloride 50 mL/hr at 12/23/22 1001    dextrose        Antibiotics   Recent Abx Admin        No antibiotic orders with administrations found. Neurologic Exam:  Mental status: awake and alert and oriented x4    Cranial Nerves:  II: Visual acuity not tested, visual fields full, denies new visual changes / diplopia  III, IV, VI: PERRL, 3 mm bilaterally, EOMI, no nystagmus noted  V: Facial sensation intact bilaterally to touch  VII: Face symmetric  VIII: Hearing intact bilaterally to spoken voice  IX: Palate movement equal bilaterally  XI: Shoulder shrug equal bilaterally  XII: Tongue midline      Musculoskeletal:   Gait: Not tested   Tone: normal  Sensory: intact to all extremities  Motor strength:    Right  Left    Right  Left    Deltoid  5 5  Hip Flex  5 5   Biceps  5 5  Knee Extensors  5 5   Triceps  5 5  Knee Flexors  5 5   Wrist Ext  5 5  Ankle Dorsiflex. 5 5   Wrist Flex  5 5  Ankle Plantarflex.   5 5   Handgrip  5 5  Ext Xavier Longus  5 5   Thumb Ext  5 5         Incision: intact, clean and dry  Drain removed     Respiratory:  Unlabored respiratory pattern    Abdomen:   Soft, ND   Last BM 12/21    Cardiovascular:  Warm, well perfused    Assessment   Patient is a 77 yo F s/p Procedure(s) (LRB):  LEFT TREPHINE CRANIOTOMY FOR SUBDURAL HEMATOMA EVACUATION (Left) per Dr. Nora Granados .      Plan:  Neurologic exam frequency:q4  Mobility:PT/OT   Steroids: taper, SSI  Seizure Prophylaxis:keppra as ordered x 2 weeks  Drain removed  DVT Prophylaxis: SCDs and heparin sq  GI Prophylaxis:protonix  Bowel Regimen: senna and glycolax  Pain control:fran   Incisional Care:open to air   Cardiology following  Consulted ARU  Dispo await precert for ARU       Electronically signed by: Glory Vazquez MD, 12/26/2022 8:19 AM

## 2022-12-26 NOTE — PROGRESS NOTES
Department of Physical Medicine & Rehabilitation  Progress Note    Patient Identification:  Augusto Leblanc  [id number]  : 1945  Admit date: 2022    Chief Complaint: Acute subdural hematoma    Subjective:   No acute events overnight. On q4 neuro checks. Seen by NS this AM. Pt is tolerating PO, no complaints of emesis, denied significant pain. Vitals wnl, SpO2 98 on RA. Waiting for precert      ROS: No f/c, n/v, cp     Objective:  Patient Vitals for the past 24 hrs:   BP Temp Temp src Pulse Resp SpO2   22 1100 114/70 97.3 °F (36.3 °C) Oral 60 16 98 %   22 0700 (!) 165/94 98.2 °F (36.8 °C) Oral 65 14 95 %   22 0330 (!) 152/68 98.1 °F (36.7 °C) Oral 70 14 95 %   22 2340 (!) 148/72 98 °F (36.7 °C) Oral 68 16 97 %   22 1845 127/73 97.7 °F (36.5 °C) Oral 64 16 95 %   22 1653 (!) 164/78 -- -- 60 -- --   22 1445 (!) 160/82 97.7 °F (36.5 °C) Oral 60 16 97 %   22 1130 (!) 156/83 97.6 °F (36.4 °C) Oral 63 16 96 %     Const: Alert. No distress, pleasant. HEENT: Normocephalic, atraumatic. Normal sclera/conjunctiva. MMM. CV: Regular rate and rhythm. Resp: No respiratory distress. Lungs CTAB. Abd: Soft, nontender, nondistended, NABS+   Ext: No edema. Neuro: Alert, oriented, appropriately interactive. Psych: Cooperative, appropriate mood and affect    Laboratory data: Available via EMR.    Last 24 hour lab  Recent Results (from the past 24 hour(s))   POCT Glucose    Collection Time: 22 11:35 AM   Result Value Ref Range    POC Glucose 208 (H) 70 - 99 mg/dl    Performed on ACCU-CHEK    POCT Glucose    Collection Time: 22  4:56 PM   Result Value Ref Range    POC Glucose 204 (H) 70 - 99 mg/dl    Performed on ACCU-CHEK    POCT Glucose    Collection Time: 22  9:28 PM   Result Value Ref Range    POC Glucose 223 (H) 70 - 99 mg/dl    Performed on ACCU-CHEK    POCT Glucose    Collection Time: 22  6:52 AM   Result Value Ref Range    POC Glucose 178 (H) 70 - 99 mg/dl    Performed on ACCU-CHEK        Therapy progress:  PT  Position Activity Restriction  Other position/activity restrictions: up as tolerated  Objective     Sit to Stand: Contact guard assistance  Stand to Sit: Contact guard assistance (cues to sit slower & reach hands back - pt tends to sit rapidly)  Bed to Chair: Contact guard assistance (with RW)  Device: Rolling Walker  Assistance: Contact guard assistance  Distance: 15', 35'  OT  PT Equipment Recommendations  Other: defer to next level of care  Toilet - Technique: Ambulating (with RW)  Equipment Used: Standard toilet (with L)  Toilet Transfers Comments: increased assistance required for lifting from low surface compared to other transfers, v/c for safe hand placement  Assessment        SLP          Body mass index is 35.43 kg/m². Assessment and Plan:  \"-Acute on chronic subdural hematoma s/p Left frontal trephine craniotomy for evacuation 12/21  -Essential HTN-controlled-on coreg  -CAD s/p CABG 2005,EF 55-60%-antiplatelets held,on coreg  -Hx Complete heart block s/p PPM  -Paroxsymal a fib-stable-eliquis held  -Obesity BMI 35  -Hypothyroidism-on  synthroid  -DM type 2-gliplizide,trulicity on hold since admission     Plan  Continue postop care per neurosurgery   Holding all antipaletelets and anticoagulants   C/w Keppra and decadron neurosurgery   C/w neurochecks  Continue basal bolus insulin\"     Impairments- Decreased functional mobility, Decreased ADLs     Recommendations:  Acute inpatient rehab, will likely benefit as there is good rehab potential.     - Pending Pre-cert     Rehab Progress: Improving  Anticipated Dispo: home  Services/DME: TBD  ELOS:     Amado Jacobs MD  PGY1    This pt has been staffed and discussed with attending Dr. Danie Gurrola. This patient has been seen, examined, and discussed with the resident. I was part of the key critical services provided to the patient. I agree with the residents documentation.  This note may have been altered to reflect my own examination findings, impression, and recommendations.      LEATHA Montes De OcaP.H  PM&R  12/26/2022  11:24 AM

## 2022-12-26 NOTE — PLAN OF CARE
Problem: Neurosensory - Adult  Goal: Achieves stable or improved neurological status  Note: Head incision well approximated with staples .  Gait steady , voids  without difficulty , BM , , no pain , tolerated diet , family at bedside

## 2022-12-26 NOTE — PROGRESS NOTES
No acute neurological changes this shift, VSS with exception of persistent elevated BP however not requiring PRN intervention, on RA. Denies significant pain/discomfort or PRN pain medications. Tolerating PO without emesis, voiding adequately via BR. Up x1 SBA w GB and Walker. Crani incision site CDI, scant old drng present. Fall and safety precautions maintained.

## 2022-12-26 NOTE — PROGRESS NOTES
Hospitalist Progress Note      PCP: Jess Starks, DARIA - CNP    Date of Admission: 12/20/2022    Subjective:     Patient is comfortable, patient is sitting in the chair, patient is denying any new complaints, patient does talk about her symptoms of chest discomfort which is chronic which got better with nitro, she is on Imdur 120 mg daily,    Medications:  Reviewed    Infusion Medications    sodium chloride 10 mL/hr at 12/25/22 1009    sodium chloride 50 mL/hr at 12/23/22 1001    dextrose       Scheduled Medications    carvedilol  6.25 mg Oral BID WC    furosemide  40 mg Oral Daily    levETIRAcetam  1,000 mg IntraVENous Q12H    insulin glargine  8 Units SubCUTAneous Nightly    sodium chloride flush  5-40 mL IntraVENous 2 times per day    heparin (porcine)  5,000 Units SubCUTAneous q8h    sodium chloride flush  5-40 mL IntraVENous 2 times per day    isosorbide mononitrate  120 mg Oral Daily    gabapentin  300 mg Oral TID    pantoprazole  40 mg Oral Daily    trospium  20 mg Oral Nightly    levothyroxine  50 mcg Oral Daily    insulin lispro  0-8 Units SubCUTAneous TID WC    insulin lispro  0-4 Units SubCUTAneous Nightly    dexamethasone  4 mg IntraVENous Q6H     PRN Meds: hydrALAZINE, nitroGLYCERIN, sodium chloride flush, sodium chloride, naloxone, morphine **OR** morphine, oxyCODONE, methocarbamol **OR** methocarbamol IVPB, prochlorperazine **OR** prochlorperazine, sodium chloride flush, sodium chloride, polyethylene glycol, acetaminophen **OR** acetaminophen, diphenhydrAMINE, glucose, dextrose bolus **OR** dextrose bolus, glucagon (rDNA), dextrose      Intake/Output Summary (Last 24 hours) at 12/26/2022 1337  Last data filed at 12/25/2022 2135  Gross per 24 hour   Intake 650 ml   Output --   Net 650 ml       Physical Exam Performed:    /70   Pulse 60   Temp 97.3 °F (36.3 °C) (Oral)   Resp 16   Ht 5' 3\" (1.6 m)   Wt 200 lb (90.7 kg)   SpO2 98%   BMI 35.43 kg/m²     General appearance: No apparent distress, appears stated age and cooperative. HEENT: Pupils equal, round, and reactive to light. Conjunctivae/corneas clear. Neck: Supple, with full range of motion. No jugular venous distention. Trachea midline. Respiratory:  Normal respiratory effort. Clear to auscultation, bilaterally without Rales/Wheezes/Rhonchi. Cardiovascular: Regular rate and rhythm with normal S1/S2 without murmurs, rubs or gallops. Abdomen: Soft, non-tender, non-distended with normal bowel sounds. Musculoskeletal: No clubbing, cyanosis or edema bilaterally. Full range of motion without deformity. Skin: Skin color, texture, turgor normal.  No rashes or lesions. Neurologic:  Neurovascularly intact without any focal sensory/motor deficits. Cranial nerves: II-XII intact, grossly non-focal.  Psychiatric: Alert and oriented, thought content appropriate, normal insight      Labs:   No results for input(s): WBC, HGB, HCT, PLT in the last 72 hours. No results for input(s): NA, K, CL, CO2, BUN, CREATININE, CALCIUM, PHOS in the last 72 hours. Invalid input(s): MAGNES    No results for input(s): AST, ALT, BILIDIR, BILITOT, ALKPHOS in the last 72 hours. No results for input(s): INR in the last 72 hours. No results for input(s): Diallo Pander in the last 72 hours. Urinalysis:      Lab Results   Component Value Date/Time    NITRU Negative 11/28/2022 01:40 PM    WBCUA 0-2 05/25/2022 03:40 PM    BACTERIA Rare 05/25/2022 03:40 PM    RBCUA 0-2 05/25/2022 03:40 PM    BLOODU Negative 11/28/2022 01:40 PM    SPECGRAV <=1.005 11/28/2022 01:40 PM    GLUCOSEU Negative 11/28/2022 01:40 PM       Radiology:  CT Head wo Contrast   Final Result      1. Status post evacuation of left cerebral convexity subdural hematoma without evidence of complication. Very mild residual chronic left cerebral convexity subdural hematoma with nearly resolved 1 mm rightward midline shift. CTA HEAD NECK W CONTRAST   Final Result      1.   No aneurysms, vascular occlusions, or intracranial stenoses identified. 2.  No significant stenosis in the extracranial vertebral or carotid arteries. CT HEAD WO CONTRAST   Final Result      1. Unchanged subacute left cerebral convexity subdural hematoma with unchanged 5 mm rightward midline shift. MRI BRAIN WO CONTRAST   Final Result      1. Stable size of subacute left cerebral convexity subdural hematoma (2 cm thick) with stable mass effect and mild left to right midline shift measuring 4 mm   2. Mild chronic small vessel ischemia and mild generalized atrophy   3. No evidence for intracranial mass or vascular lesion               MRA HEAD WO CONTRAST   Final Result      1. MRA limited by motion artifact   2. No evidence for dural arteriovenous fistula         CT HEAD WO CONTRAST   Final Result   1. Acute on chronic left subdural hematoma with approximately 5.6 mm left to right shift on coronal imaging, stable from 12/19/2022 at 1953   2. Left cerebral hemisphere cortical sulci effacement is unchanged   3. Moderate to severe periventricular microangiopathic ischemic changes, chronic small vessel disease   4. No new bleed or intraparenchymal hemorrhage.           IP CONSULT TO NEUROSURGERY  IP CONSULT TO NEUROCRITICAL CARE  IP CONSULT TO CARDIOLOGY  IP CONSULT TO PHYSICAL MEDICINE REHAB    Assessment/Plan:  Steroid-induced hyperglycemia  On dexamethasone 4 mg IV every 6 hours  Discussed with neurosurgery regarding taper hopefully not getting back the dose of steroids blood sugar will be better  On Lantus 8 units at nighttime along with insulin sliding scale    -Acute on chronic subdural hematoma s/p Left frontal trephine craniotomy for evacuation 12/21    -Essential HTN-controlled-on coreg    -CAD s/p CABG 2005,EF 55-60%-antiplatelets held,on coreg,imdur    -Hx Complete heart block s/p PPM    -Paroxsymal a fib-stable-eliquis held for now, resume anticoagulation as per neurosurgery recommendation    -Obesity BMI 35    -Hypothyroidism-on  synthroid    -DM type 2-gliplizide,trulicity on hold since admission along with steroid-induced hyperglycemia    -Atypical chest pain--troponins negative-seen by cardiology      DVT Prophylaxis: scd  Diet: ADULT DIET; Regular; 4 carb choices (60 gm/meal); Low Fat/Low Chol/High Fiber/MARIPOSA  Code Status: Full Code  PT/OT Eval Status:       Discharge planning. Jennifer Bergman   Discharge planning to ARU-pre-CERT pending--patient is medically stable    Jermaine Driscoll MD

## 2022-12-27 ENCOUNTER — HOSPITAL ENCOUNTER (INPATIENT)
Age: 77
LOS: 5 days | Discharge: ANOTHER HOSPITAL | DRG: 056 | End: 2023-01-01
Attending: PHYSICAL MEDICINE & REHABILITATION | Admitting: INTERNAL MEDICINE
Payer: MEDICARE

## 2022-12-27 VITALS
DIASTOLIC BLOOD PRESSURE: 67 MMHG | HEIGHT: 63 IN | OXYGEN SATURATION: 98 % | SYSTOLIC BLOOD PRESSURE: 133 MMHG | TEMPERATURE: 97.3 F | WEIGHT: 199.08 LBS | BODY MASS INDEX: 35.27 KG/M2 | RESPIRATION RATE: 16 BRPM | HEART RATE: 62 BPM

## 2022-12-27 PROBLEM — S06.5XAA SDH (SUBDURAL HEMATOMA): Status: ACTIVE | Noted: 2022-12-27

## 2022-12-27 LAB
GLUCOSE BLD-MCNC: 161 MG/DL (ref 70–99)
GLUCOSE BLD-MCNC: 172 MG/DL (ref 70–99)
GLUCOSE BLD-MCNC: 188 MG/DL (ref 70–99)
GLUCOSE BLD-MCNC: 196 MG/DL (ref 70–99)
PERFORMED ON: ABNORMAL
SARS-COV-2, NAAT: NOT DETECTED

## 2022-12-27 PROCEDURE — 6360000002 HC RX W HCPCS: Performed by: INTERNAL MEDICINE

## 2022-12-27 PROCEDURE — 2580000003 HC RX 258: Performed by: PHYSICAL MEDICINE & REHABILITATION

## 2022-12-27 PROCEDURE — 97530 THERAPEUTIC ACTIVITIES: CPT

## 2022-12-27 PROCEDURE — 97116 GAIT TRAINING THERAPY: CPT

## 2022-12-27 PROCEDURE — 99024 POSTOP FOLLOW-UP VISIT: CPT | Performed by: NURSE PRACTITIONER

## 2022-12-27 PROCEDURE — 6360000002 HC RX W HCPCS: Performed by: PHYSICAL MEDICINE & REHABILITATION

## 2022-12-27 PROCEDURE — 97129 THER IVNTJ 1ST 15 MIN: CPT

## 2022-12-27 PROCEDURE — APPNB30 APP NON BILLABLE TIME 0-30 MINS: Performed by: NURSE PRACTITIONER

## 2022-12-27 PROCEDURE — 6360000002 HC RX W HCPCS: Performed by: PHYSICIAN ASSISTANT

## 2022-12-27 PROCEDURE — 6370000000 HC RX 637 (ALT 250 FOR IP): Performed by: PHYSICAL MEDICINE & REHABILITATION

## 2022-12-27 PROCEDURE — 1280000000 HC REHAB R&B

## 2022-12-27 PROCEDURE — 2580000003 HC RX 258: Performed by: PHYSICIAN ASSISTANT

## 2022-12-27 PROCEDURE — 92526 ORAL FUNCTION THERAPY: CPT

## 2022-12-27 PROCEDURE — 2580000003 HC RX 258

## 2022-12-27 PROCEDURE — 87635 SARS-COV-2 COVID-19 AMP PRB: CPT

## 2022-12-27 PROCEDURE — 6370000000 HC RX 637 (ALT 250 FOR IP): Performed by: INTERNAL MEDICINE

## 2022-12-27 PROCEDURE — 2580000003 HC RX 258: Performed by: INTERNAL MEDICINE

## 2022-12-27 PROCEDURE — 6370000000 HC RX 637 (ALT 250 FOR IP): Performed by: NURSE PRACTITIONER

## 2022-12-27 PROCEDURE — 97130 THER IVNTJ EA ADDL 15 MIN: CPT

## 2022-12-27 PROCEDURE — 6360000002 HC RX W HCPCS

## 2022-12-27 RX ORDER — DIPHENHYDRAMINE HCL 25 MG
25 TABLET ORAL NIGHTLY PRN
Status: CANCELLED | OUTPATIENT
Start: 2022-12-27

## 2022-12-27 RX ORDER — ISOSORBIDE MONONITRATE 60 MG/1
120 TABLET, EXTENDED RELEASE ORAL DAILY
Status: CANCELLED | OUTPATIENT
Start: 2022-12-28

## 2022-12-27 RX ORDER — NALOXONE HYDROCHLORIDE 0.4 MG/ML
0.2 INJECTION, SOLUTION INTRAMUSCULAR; INTRAVENOUS; SUBCUTANEOUS PRN
Status: CANCELLED | OUTPATIENT
Start: 2022-12-27

## 2022-12-27 RX ORDER — POLYETHYLENE GLYCOL 3350 17 G/17G
17 POWDER, FOR SOLUTION ORAL DAILY PRN
Status: CANCELLED | OUTPATIENT
Start: 2022-12-27

## 2022-12-27 RX ORDER — GABAPENTIN 600 MG/1
300 TABLET ORAL 3 TIMES DAILY
Status: CANCELLED | OUTPATIENT
Start: 2022-12-27

## 2022-12-27 RX ORDER — LEVETIRACETAM 500 MG/1
1000 TABLET ORAL 2 TIMES DAILY
Status: CANCELLED | OUTPATIENT
Start: 2022-12-27

## 2022-12-27 RX ORDER — PANTOPRAZOLE SODIUM 40 MG/1
40 TABLET, DELAYED RELEASE ORAL DAILY
Status: CANCELLED | OUTPATIENT
Start: 2022-12-28

## 2022-12-27 RX ORDER — OXYCODONE HYDROCHLORIDE 5 MG/1
5 TABLET ORAL EVERY 4 HOURS PRN
Status: CANCELLED | OUTPATIENT
Start: 2022-12-27

## 2022-12-27 RX ORDER — GABAPENTIN 300 MG/1
300 CAPSULE ORAL 3 TIMES DAILY
Status: DISCONTINUED | OUTPATIENT
Start: 2022-12-27 | End: 2023-01-01 | Stop reason: HOSPADM

## 2022-12-27 RX ORDER — DEXTROSE MONOHYDRATE 100 MG/ML
INJECTION, SOLUTION INTRAVENOUS CONTINUOUS PRN
Status: DISCONTINUED | OUTPATIENT
Start: 2022-12-27 | End: 2023-01-01 | Stop reason: HOSPADM

## 2022-12-27 RX ORDER — ACETAMINOPHEN 325 MG/1
650 TABLET ORAL EVERY 4 HOURS PRN
Status: CANCELLED | OUTPATIENT
Start: 2022-12-27

## 2022-12-27 RX ORDER — OXYCODONE HYDROCHLORIDE 5 MG/1
5 TABLET ORAL EVERY 4 HOURS PRN
Status: DISCONTINUED | OUTPATIENT
Start: 2022-12-27 | End: 2023-01-01 | Stop reason: HOSPADM

## 2022-12-27 RX ORDER — DEXAMETHASONE SODIUM PHOSPHATE 4 MG/ML
1 INJECTION, SOLUTION INTRA-ARTICULAR; INTRALESIONAL; INTRAMUSCULAR; INTRAVENOUS; SOFT TISSUE 2 TIMES DAILY
Status: CANCELLED | OUTPATIENT
Start: 2022-12-28 | End: 2022-12-29

## 2022-12-27 RX ORDER — SODIUM CHLORIDE 0.9 % (FLUSH) 0.9 %
5-40 SYRINGE (ML) INJECTION EVERY 12 HOURS SCHEDULED
Status: DISCONTINUED | OUTPATIENT
Start: 2022-12-27 | End: 2022-12-31

## 2022-12-27 RX ORDER — CARVEDILOL 6.25 MG/1
6.25 TABLET ORAL 2 TIMES DAILY WITH MEALS
Status: CANCELLED | OUTPATIENT
Start: 2022-12-27

## 2022-12-27 RX ORDER — DEXAMETHASONE SODIUM PHOSPHATE 4 MG/ML
2 INJECTION, SOLUTION INTRA-ARTICULAR; INTRALESIONAL; INTRAMUSCULAR; INTRAVENOUS; SOFT TISSUE 2 TIMES DAILY
Status: CANCELLED | OUTPATIENT
Start: 2022-12-27 | End: 2022-12-28

## 2022-12-27 RX ORDER — ACETAMINOPHEN 325 MG/1
650 TABLET ORAL EVERY 4 HOURS PRN
Status: DISCONTINUED | OUTPATIENT
Start: 2022-12-27 | End: 2023-01-01 | Stop reason: HOSPADM

## 2022-12-27 RX ORDER — NITROGLYCERIN 0.4 MG/1
0.4 TABLET SUBLINGUAL EVERY 5 MIN PRN
Status: DISCONTINUED | OUTPATIENT
Start: 2022-12-27 | End: 2023-01-01 | Stop reason: HOSPADM

## 2022-12-27 RX ORDER — SODIUM CHLORIDE 0.9 % (FLUSH) 0.9 %
5-40 SYRINGE (ML) INJECTION PRN
Status: CANCELLED | OUTPATIENT
Start: 2022-12-27

## 2022-12-27 RX ORDER — FUROSEMIDE 40 MG/1
40 TABLET ORAL DAILY
Qty: 60 TABLET | Refills: 3 | Status: ON HOLD | OUTPATIENT
Start: 2022-12-28

## 2022-12-27 RX ORDER — CARVEDILOL 6.25 MG/1
6.25 TABLET ORAL 2 TIMES DAILY WITH MEALS
Status: DISCONTINUED | OUTPATIENT
Start: 2022-12-28 | End: 2023-01-01 | Stop reason: HOSPADM

## 2022-12-27 RX ORDER — INSULIN LISPRO 100 [IU]/ML
0-8 INJECTION, SOLUTION INTRAVENOUS; SUBCUTANEOUS
Status: CANCELLED | OUTPATIENT
Start: 2022-12-27

## 2022-12-27 RX ORDER — GLUCAGON 1 MG/ML
1 KIT INJECTION PRN
Status: DISCONTINUED | OUTPATIENT
Start: 2022-12-27 | End: 2023-01-01 | Stop reason: HOSPADM

## 2022-12-27 RX ORDER — SODIUM CHLORIDE 0.9 % (FLUSH) 0.9 %
5-40 SYRINGE (ML) INJECTION PRN
Status: DISCONTINUED | OUTPATIENT
Start: 2022-12-27 | End: 2023-01-01 | Stop reason: HOSPADM

## 2022-12-27 RX ORDER — NITROGLYCERIN 0.4 MG/1
0.4 TABLET SUBLINGUAL EVERY 5 MIN PRN
Status: CANCELLED | OUTPATIENT
Start: 2022-12-27

## 2022-12-27 RX ORDER — NALOXONE HYDROCHLORIDE 0.4 MG/ML
0.2 INJECTION, SOLUTION INTRAMUSCULAR; INTRAVENOUS; SUBCUTANEOUS PRN
Status: DISCONTINUED | OUTPATIENT
Start: 2022-12-27 | End: 2023-01-01 | Stop reason: HOSPADM

## 2022-12-27 RX ORDER — HYDRALAZINE HYDROCHLORIDE 20 MG/ML
5 INJECTION INTRAMUSCULAR; INTRAVENOUS EVERY 4 HOURS PRN
Status: CANCELLED | OUTPATIENT
Start: 2022-12-27

## 2022-12-27 RX ORDER — FUROSEMIDE 40 MG/1
40 TABLET ORAL DAILY
Status: CANCELLED | OUTPATIENT
Start: 2022-12-28

## 2022-12-27 RX ORDER — CARVEDILOL 6.25 MG/1
6.25 TABLET ORAL 2 TIMES DAILY WITH MEALS
Qty: 60 TABLET | Refills: 3 | Status: ON HOLD | OUTPATIENT
Start: 2022-12-27

## 2022-12-27 RX ORDER — INSULIN LISPRO 100 [IU]/ML
0-8 INJECTION, SOLUTION INTRAVENOUS; SUBCUTANEOUS
Status: DISCONTINUED | OUTPATIENT
Start: 2022-12-28 | End: 2023-01-01 | Stop reason: HOSPADM

## 2022-12-27 RX ORDER — LEVOTHYROXINE SODIUM 0.05 MG/1
50 TABLET ORAL DAILY
Status: CANCELLED | OUTPATIENT
Start: 2022-12-28

## 2022-12-27 RX ORDER — LEVETIRACETAM 500 MG/1
1000 TABLET ORAL 2 TIMES DAILY
Qty: 60 TABLET | Refills: 0 | Status: ON HOLD
Start: 2022-12-27 | End: 2022-12-28

## 2022-12-27 RX ORDER — ISOSORBIDE MONONITRATE 30 MG/1
120 TABLET, EXTENDED RELEASE ORAL DAILY
Status: DISCONTINUED | OUTPATIENT
Start: 2022-12-28 | End: 2023-01-01 | Stop reason: HOSPADM

## 2022-12-27 RX ORDER — DEXAMETHASONE SODIUM PHOSPHATE 4 MG/ML
2 INJECTION, SOLUTION INTRA-ARTICULAR; INTRALESIONAL; INTRAMUSCULAR; INTRAVENOUS; SOFT TISSUE 2 TIMES DAILY
Status: COMPLETED | OUTPATIENT
Start: 2022-12-27 | End: 2022-12-27

## 2022-12-27 RX ORDER — DEXAMETHASONE SODIUM PHOSPHATE 4 MG/ML
1 INJECTION, SOLUTION INTRA-ARTICULAR; INTRALESIONAL; INTRAMUSCULAR; INTRAVENOUS; SOFT TISSUE 2 TIMES DAILY
Status: COMPLETED | OUTPATIENT
Start: 2022-12-28 | End: 2022-12-28

## 2022-12-27 RX ORDER — SODIUM CHLORIDE 0.9 % (FLUSH) 0.9 %
5-40 SYRINGE (ML) INJECTION EVERY 12 HOURS SCHEDULED
Status: CANCELLED | OUTPATIENT
Start: 2022-12-27

## 2022-12-27 RX ORDER — LOPERAMIDE HYDROCHLORIDE 2 MG/1
2 CAPSULE ORAL 4 TIMES DAILY PRN
Status: DISCONTINUED | OUTPATIENT
Start: 2022-12-27 | End: 2022-12-27 | Stop reason: HOSPADM

## 2022-12-27 RX ORDER — INSULIN LISPRO 100 [IU]/ML
0-4 INJECTION, SOLUTION INTRAVENOUS; SUBCUTANEOUS NIGHTLY
Status: DISCONTINUED | OUTPATIENT
Start: 2022-12-27 | End: 2023-01-01 | Stop reason: HOSPADM

## 2022-12-27 RX ORDER — HYDRALAZINE HYDROCHLORIDE 20 MG/ML
5 INJECTION INTRAMUSCULAR; INTRAVENOUS EVERY 4 HOURS PRN
Status: DISCONTINUED | OUTPATIENT
Start: 2022-12-27 | End: 2023-01-01 | Stop reason: HOSPADM

## 2022-12-27 RX ORDER — FUROSEMIDE 40 MG/1
40 TABLET ORAL DAILY
Status: DISCONTINUED | OUTPATIENT
Start: 2022-12-28 | End: 2023-01-01 | Stop reason: HOSPADM

## 2022-12-27 RX ORDER — TROSPIUM CHLORIDE 20 MG/1
20 TABLET, FILM COATED ORAL NIGHTLY
Status: DISCONTINUED | OUTPATIENT
Start: 2022-12-27 | End: 2022-12-29

## 2022-12-27 RX ORDER — PANTOPRAZOLE SODIUM 40 MG/1
40 TABLET, DELAYED RELEASE ORAL DAILY
Status: DISCONTINUED | OUTPATIENT
Start: 2022-12-28 | End: 2022-12-31 | Stop reason: SDUPTHER

## 2022-12-27 RX ORDER — PROCHLORPERAZINE EDISYLATE 5 MG/ML
10 INJECTION INTRAMUSCULAR; INTRAVENOUS EVERY 6 HOURS PRN
Status: DISCONTINUED | OUTPATIENT
Start: 2022-12-27 | End: 2023-01-01 | Stop reason: HOSPADM

## 2022-12-27 RX ORDER — DIPHENHYDRAMINE HCL 25 MG
25 TABLET ORAL NIGHTLY PRN
Status: DISCONTINUED | OUTPATIENT
Start: 2022-12-27 | End: 2023-01-01 | Stop reason: HOSPADM

## 2022-12-27 RX ORDER — POLYETHYLENE GLYCOL 3350 17 G/17G
17 POWDER, FOR SOLUTION ORAL DAILY PRN
Status: DISCONTINUED | OUTPATIENT
Start: 2022-12-27 | End: 2023-01-01 | Stop reason: HOSPADM

## 2022-12-27 RX ORDER — ENOXAPARIN SODIUM 100 MG/ML
40 INJECTION SUBCUTANEOUS DAILY
Status: CANCELLED | OUTPATIENT
Start: 2022-12-27

## 2022-12-27 RX ORDER — PROCHLORPERAZINE MALEATE 10 MG
10 TABLET ORAL EVERY 6 HOURS PRN
Status: DISCONTINUED | OUTPATIENT
Start: 2022-12-27 | End: 2023-01-01 | Stop reason: HOSPADM

## 2022-12-27 RX ORDER — DEXTROSE MONOHYDRATE 100 MG/ML
INJECTION, SOLUTION INTRAVENOUS CONTINUOUS PRN
Status: CANCELLED | OUTPATIENT
Start: 2022-12-27

## 2022-12-27 RX ORDER — ENOXAPARIN SODIUM 100 MG/ML
40 INJECTION SUBCUTANEOUS DAILY
Status: DISCONTINUED | OUTPATIENT
Start: 2022-12-28 | End: 2023-01-01 | Stop reason: HOSPADM

## 2022-12-27 RX ORDER — METHOCARBAMOL 750 MG/1
750 TABLET, FILM COATED ORAL EVERY 8 HOURS PRN
Qty: 15 TABLET | Refills: 0 | Status: ON HOLD
Start: 2022-12-27 | End: 2022-12-28

## 2022-12-27 RX ORDER — METHOCARBAMOL 750 MG/1
750 TABLET, FILM COATED ORAL 4 TIMES DAILY
Status: DISCONTINUED | OUTPATIENT
Start: 2022-12-27 | End: 2022-12-30

## 2022-12-27 RX ORDER — DEXAMETHASONE 0.5 MG/1
0.5 TABLET ORAL EVERY 6 HOURS
Qty: 40 TABLET | Refills: 0 | Status: ON HOLD | OUTPATIENT
Start: 2022-12-27 | End: 2022-12-28

## 2022-12-27 RX ORDER — LEVETIRACETAM 500 MG/1
1000 TABLET ORAL 2 TIMES DAILY
Status: DISCONTINUED | OUTPATIENT
Start: 2022-12-27 | End: 2022-12-31

## 2022-12-27 RX ORDER — GLUCAGON 1 MG/ML
1 KIT INJECTION PRN
Status: CANCELLED | OUTPATIENT
Start: 2022-12-27

## 2022-12-27 RX ORDER — PROCHLORPERAZINE EDISYLATE 5 MG/ML
10 INJECTION INTRAMUSCULAR; INTRAVENOUS EVERY 6 HOURS PRN
Status: CANCELLED | OUTPATIENT
Start: 2022-12-27

## 2022-12-27 RX ORDER — TROSPIUM CHLORIDE 20 MG/1
20 TABLET, FILM COATED ORAL NIGHTLY
Status: CANCELLED | OUTPATIENT
Start: 2022-12-27

## 2022-12-27 RX ORDER — METHOCARBAMOL 750 MG/1
750 TABLET, FILM COATED ORAL 4 TIMES DAILY
Status: CANCELLED | OUTPATIENT
Start: 2022-12-27

## 2022-12-27 RX ORDER — INSULIN LISPRO 100 [IU]/ML
0-4 INJECTION, SOLUTION INTRAVENOUS; SUBCUTANEOUS NIGHTLY
Status: CANCELLED | OUTPATIENT
Start: 2022-12-27

## 2022-12-27 RX ORDER — LEVOTHYROXINE SODIUM 0.05 MG/1
50 TABLET ORAL DAILY
Status: DISCONTINUED | OUTPATIENT
Start: 2022-12-28 | End: 2023-01-01 | Stop reason: HOSPADM

## 2022-12-27 RX ORDER — PROCHLORPERAZINE MALEATE 10 MG
10 TABLET ORAL EVERY 6 HOURS PRN
Status: CANCELLED | OUTPATIENT
Start: 2022-12-27

## 2022-12-27 RX ADMIN — TROSPIUM CHLORIDE 20 MG: 20 TABLET, FILM COATED ORAL at 22:31

## 2022-12-27 RX ADMIN — GABAPENTIN 300 MG: 300 CAPSULE ORAL at 22:31

## 2022-12-27 RX ADMIN — LEVETIRACETAM 1000 MG: 500 TABLET, FILM COATED ORAL at 22:31

## 2022-12-27 RX ADMIN — FUROSEMIDE 40 MG: 40 TABLET ORAL at 08:29

## 2022-12-27 RX ADMIN — HEPARIN SODIUM 5000 UNITS: 5000 INJECTION INTRAVENOUS; SUBCUTANEOUS at 08:30

## 2022-12-27 RX ADMIN — METHOCARBAMOL TABLETS 750 MG: 750 TABLET, COATED ORAL at 22:31

## 2022-12-27 RX ADMIN — GABAPENTIN 300 MG: 600 TABLET, FILM COATED ORAL at 14:12

## 2022-12-27 RX ADMIN — NITROGLYCERIN 0.4 MG: 0.4 TABLET, ORALLY DISINTEGRATING SUBLINGUAL at 08:22

## 2022-12-27 RX ADMIN — PANTOPRAZOLE SODIUM 40 MG: 40 TABLET, DELAYED RELEASE ORAL at 08:29

## 2022-12-27 RX ADMIN — LEVETIRACETAM 1000 MG: 100 INJECTION INTRAVENOUS at 10:03

## 2022-12-27 RX ADMIN — SODIUM CHLORIDE, PRESERVATIVE FREE 10 ML: 5 INJECTION INTRAVENOUS at 08:31

## 2022-12-27 RX ADMIN — INSULIN GLARGINE 8 UNITS: 100 INJECTION, SOLUTION SUBCUTANEOUS at 22:32

## 2022-12-27 RX ADMIN — HEPARIN SODIUM 5000 UNITS: 5000 INJECTION INTRAVENOUS; SUBCUTANEOUS at 16:21

## 2022-12-27 RX ADMIN — DEXAMETHASONE SODIUM PHOSPHATE 2 MG: 4 INJECTION, SOLUTION INTRAMUSCULAR; INTRAVENOUS at 08:32

## 2022-12-27 RX ADMIN — LEVOTHYROXINE SODIUM 50 MCG: 50 TABLET ORAL at 06:40

## 2022-12-27 RX ADMIN — LOPERAMIDE HYDROCHLORIDE 2 MG: 2 CAPSULE ORAL at 14:17

## 2022-12-27 RX ADMIN — CARVEDILOL 6.25 MG: 6.25 TABLET, FILM COATED ORAL at 16:21

## 2022-12-27 RX ADMIN — CARVEDILOL 6.25 MG: 6.25 TABLET, FILM COATED ORAL at 08:29

## 2022-12-27 RX ADMIN — ISOSORBIDE MONONITRATE 120 MG: 60 TABLET, EXTENDED RELEASE ORAL at 08:28

## 2022-12-27 RX ADMIN — DEXAMETHASONE SODIUM PHOSPHATE 2 MG: 4 INJECTION, SOLUTION INTRAMUSCULAR; INTRAVENOUS at 22:31

## 2022-12-27 RX ADMIN — SODIUM CHLORIDE, PRESERVATIVE FREE 10 ML: 5 INJECTION INTRAVENOUS at 22:31

## 2022-12-27 RX ADMIN — GABAPENTIN 300 MG: 600 TABLET, FILM COATED ORAL at 08:26

## 2022-12-27 ASSESSMENT — PAIN SCALES - GENERAL: PAINLEVEL_OUTOF10: 0

## 2022-12-27 NOTE — CARE COORDINATION
Case Management Assessment            Discharge Note                    Date / Time of Note: 12/27/2022 2:49 PM                  Discharge Note Completed by: Christofer Daniel RN    Patient Name: Diamond Montejo   YOB: 1945  Diagnosis: Subdural hematoma [S06. 5XAA]  Acute subdural hematoma [S06. 5XAA]   Date / Time: 12/20/2022  3:24 AM    Current PCP: DARIA Calderon CNP  Clinic patient: No    Hospitalization in the last 30 days: No    Advance Directives:  Code Status: Full Code  [place] DNR form completed and on chart: Not Indicated    Financial:  Payor: Bravo Luna / Plan: [place] / Product Type: *No Product type* /      Pharmacy:    [facility] [id number] - [place] [address]  Phone: [phone] Fax: [phone]      Assistance purchasing medications?: Potential Assistance Purchasing Medications: No  Assistance provided by Case Management: None at this time    Does patient want to participate in local refill/ meds to beds program?:      Meds To Beds General Rules:  1. Can ONLY be done Monday- Friday between 8:30am-5pm  2. Prescription(s) must be in pharmacy by 3pm to be filled same day  3. Copy of patient's insurance/ prescription drug card and patient face sheet must be sent along with the prescription(s)  4. Cost of Rx cannot be added to hospital bill. If financial assistance is needed, please contact unit  or ;  or  CANNOT provide pharmacy voucher for patients co-pays  5.  Patients can then  the prescription on their way out of the hospital at discharge, or pharmacy can deliver to the bedside if staff is available. (payment due at time of pick-up or delivery - cash, check, or card accepted)     Able to afford home medications/ co-pay costs: Yes    ADLS:  Current PT AM-PAC Score: 17 /24  Current OT AM-PAC Score: 17 /24      DISCHARGE Disposition: Inpatient Rehab: [facility] Phone: [phone] Fax: [phone]    LOC at discharge: Not Applicable  [facility] Completed: Yes    Notification completed in HENS/PAS?:  Not Applicable    IMM Completed:   Not Indicated    Transportation:  Transportation PLAN for discharge:  bed/wheelchair    Mode of Transport: Not Applicable  Reason for medical transport: Not Applicable  Name of Transport Company: Not Applicable  Time of Transport: tbd    Transport form completed: Not Indicated    Home Care:  [address] ordered at discharge: Not [address]: Not Applicable  Orders faxed: Yes    Durable Medical Equipment:  DME Provider: n/a  Equipment obtained during hospitalization: defer    Home Oxygen and Respiratory Equipment:  Oxygen needed at discharge?: Not [address]: Not Applicable  Portable tank available for discharge?: Not Indicated    Dialysis:  Dialysis patient: No    Dialysis Center:  Not Applicable    Hospice Services:  Location: Not Applicable  Agency: Not Applicable    Consents signed: Not Indicated    Referrals made at [facility] for outpatient continued care:  Not Applicable    Additional CM Notes:   Patient is discharging to [facility]. Orders will be pulled by ARU liaison. Nurse will call for report when they are ready for pt to transfer to floor. Patient aware and agreeable to plan. Patient will call her family. The Plan for Transition of Care is related to the following treatment goals of Subdural hematoma [S06. 5XAA]  Acute subdural hematoma [S06. 5XAA]    The Patient and/or patient representative Isaac Rubio and her family were provided with a choice of provider and agrees with the discharge plan Yes    Freedom of choice list was provided with basic dialogue that supports the patient's individualized plan of care/goals and shares the quality data associated with the providers.  Yes    Care Transitions patient: No    Twila Hernandez RN  [facility]  Case Management Department  Ph: [phone]  Fax: [phone]

## 2022-12-27 NOTE — DISCHARGE SUMMARY
Dc order placed    Please make sure patient gets steroids per [facility] and sub Q heparin 5000 unitis SC TID      Keppra x 2 weeks    Electronically signed by Yris Lindsey MD on 12/27/2022 at 2:42 PM

## 2022-12-27 NOTE — PROGRESS NOTES
neuropathy, Sleep apnea, Syncope, Thyroid disease, and Vitamin B12 deficiency. Past Surgical History:  has a past surgical history that includes Coronary artery bypass graft (2005); Foot surgery; colectomy (1998); Colonoscopy; Upper gastrointestinal endoscopy; Tonsillectomy and adenoidectomy; Tubal ligation; other surgical history (4/17/2012); other surgical history (Right, 3/20/13); Coronary angioplasty; Cholecystectomy; Cardiac catheterization (12/27/2017); Diagnostic Cardiac Cath Lab Procedure; Toe amputation (Left, 3/29/2021); eye surgery; Endoscopy, colon, diagnostic; hernia repair; and craniotomy (Left, 12/21/2022). Assessment   Assessment: Pt requiring Ax1 for transfers and amb with RW limited by fatigue, chest pain (RN provided meds with quick improvement per pt) and chronic back pain. Pt remains below her baseline and would benefit from further skilled PT to maximize safety and independence with functional mobility. Treatment Diagnosis: decreased functional mobility 2/2 to SDH  Activity Tolerance  Activity Tolerance: Patient tolerated treatment well     Plan   Physcial Therapy Plan  General Plan: 5-7 times per week  Current Treatment Recommendations: Strengthening, Balance training, Gait training, Stair training, Functional mobility training, Transfer training, Endurance training  Safety Devices  Type of Devices: Call light within reach, Chair alarm in place, Left in chair, Nurse notified, Gait belt     Restrictions  Position Activity Restriction  Other position/activity restrictions: up as tolerated     Subjective   General  Chart Reviewed: Yes  Additional Pertinent Hx: 77 yo female present to ED 12/19 p/w aphasia. CT head (+) Acute on chronic subdural hematoma overlying the left frontal and parietal lobes. Pt had a code stroke called 12/20 and underwent a procedure: 1. Left frontal trephine craniotomy for evacuation of acute/chronic subdural hemorrhage on 12/21.  PMHx: afib, CAD, CHF, DM, HTN  Family / Caregiver Present: No  Diagnosis: SDH  Follows Commands: Within Functional Limits  Subjective  Subjective: PT found supine in bed upon arrival and agreeable to therapy. Pt reporting chest pain upon getting up. RN notified and meds given with quick relief reported. Social/Functional History  Social/Functional History  Lives With: Alone  Type of Home: House  Home Layout: One level  Home Access: Ramped entrance  Bathroom Shower/Tub: Walk-in shower  Bathroom Toilet: Handicap height  Bathroom Equipment: Built-in shower seat, Grab bars in shower, Hand-held shower, Grab bars around toilet  Home Equipment: Cane, Electric scooter, Walker, rolling  Has the patient had two or more falls in the past year or any fall with injury in the past year?: No  ADL Assistance: Independent  Homemaking Assistance: Independent  Homemaking Responsibilities: Yes  Ambulation Assistance: Independent (Uses a electric scooter in the house and rollator in the community.)  Transfer Assistance: Independent  Active : No  Patient's  Info: friend or granddaughter transport  Leisure & Hobbies: watch TV, read  Additional Comments: able to ambulate grocery store distance with rollator. She typically leaves the home every few days    Cognition   Orientation  Overall Orientation Status: Within Functional Limits  Orientation Level: Oriented X4  Cognition  Overall Cognitive Status: Exceptions  Arousal/Alertness: Appropriate responses to stimuli  Following Commands:  Follows one step commands consistently  Attention Span: Attends with cues to redirect  Memory: Appears intact  Safety Judgement: Decreased awareness of need for assistance;Decreased awareness of need for safety  Insights: Decreased awareness of deficits  Initiation: Does not require cues  Sequencing: Does not require cues  Cognition Comment: word finding difficulty, delayed responses     Objective   Heart Rate: 73  Heart Rate Source: Monitor  BP: (!) 154/77  BP Location: Right upper arm  BP Method: Automatic  Patient Position: Semi fowlers  MAP (Calculated): 103  Resp: 16  SpO2: 96 %  O2 Device: None (Room air)          Bed mobility  Supine to Sit: Stand by assistance  Sit to Supine: Stand by assistance  Bed Mobility Comments: HOB, used of rail  Transfers  Sit to Stand: Contact guard assistance  Stand to Sit: Contact guard assistance  Ambulation  Surface: Level tile  Device: Rolling Walker  Assistance: Contact guard assistance  Quality of Gait: forward flexed posture, decreased B step height/length, no LOB  Gait Deviations: Slow Alexandra  Distance: 10'+15'+50'  Comments: Pt limited by fatigue        Exercise Treatment: LAQ, seated marches and AP 2x10 BLE        Goals  Short Term Goals  Time Frame for Short Term Goals: prior to discharge  Short Term Goal 1: Pt will complete bed mobility independently  Short Term Goal 2: Pt will compelte sit<>stand with LRAD and supervision  Short Term Goal 3: Pt will complete 48' ambualtion with supervision and LRAD  Patient Goals   Patient Goals : none stated       Education  Patient Education  Education Given To: Patient  Education Provided: Role of Therapy;Plan of Care;Transfer Training  Education Provided Comments: reaching back prior to sitting  Education Method: Demonstration  Education Outcome: Verbalized understanding      Therapy Time   Individual Concurrent Group Co-treatment   Time In 69 [phone] 60         Time Out 0852         Minutes 55           Timed Code Treatment Minutes:  55    Total Treatment Minutes:  55    If the patient is discharged before the next treatment session, this note will serve as the discharge summary.      Tigre Bear, PT, DPT

## 2022-12-27 NOTE — PROGRESS NOTES
12/27/22 1440   Encounter Summary   Encounter Overview/Reason  Initial Encounter   Service Provided For: Patient not available   Referral/Consult From: Rounding   Complexity of Encounter Low   Begin Time 1345   End Time  1350   Total Time Calculated 5 min   Encounter    Type Initial Screen/Assessment   Assessment/Intervention/Outcome   Assessment Unable to assess   Staff Zaina Cotton MA, [address] [facility]

## 2022-12-27 NOTE — PLAN OF CARE
Problem: Pain  Goal: Verbalizes/displays adequate comfort level or baseline comfort level  Outcome: Progressing   Denies pain this evening. Problem: Safety - Adult  Goal: Free from fall injury  Outcome: Progressing  Calls out appropriately. Bed locked in lowest position. Bed alarm on. Call light/belongings within reach.

## 2022-12-27 NOTE — PROGRESS NOTES
Department of Physical Medicine & Rehabilitation  Progress Note    Patient Identification:  rBielle Guerra  [id number]  : 1945  Admit date: 2022    Chief Complaint: Acute subdural hematoma    Subjective:   No acute events overnight. On q4 neuro checks. Seen by NS this AM. Pt is tolerating PO, no complaints of emesis, denied significant pain. Vitals stable and afebrile    Waiting for precert      ROS: No f/c, n/v, cp     Objective:  Patient Vitals for the past 24 hrs:   BP Temp Temp src Pulse Resp SpO2 Weight   22 0815 (!) 154/77 -- -- 73 -- -- --   22 0639 (!) 141/86 97.4 °F (36.3 °C) Oral 60 16 96 % --   22 0600 -- -- -- -- -- -- 199 lb 1.2 oz (90.3 kg)   22 0300 129/79 97.3 °F (36.3 °C) Axillary 60 16 96 % --   22 2300 (!) 147/75 98.2 °F (36.8 °C) Oral 60 16 96 % --   22 1730 (!) 152/86 97.4 °F (36.3 °C) Oral 64 16 98 % --   22 1430 (!) 155/83 97.4 °F (36.3 °C) -- 60 16 100 % --   22 1100 114/70 97.3 °F (36.3 °C) Oral 60 16 98 % --     Const: Alert. No distress, pleasant. HEENT: Normocephalic, atraumatic. Normal sclera/conjunctiva. MMM. CV: Regular rate and rhythm. Resp: No respiratory distress. Lungs CTAB. Abd: Soft, nontender, nondistended, NABS+   Ext: No edema. Neuro: Alert, oriented, appropriately interactive. Psych: Cooperative, appropriate mood and affect    Laboratory data: Available via EMR.    Last 24 hour lab  Recent Results (from the past 24 hour(s))   POCT Glucose    Collection Time: 22 11:25 AM   Result Value Ref Range    POC Glucose 229 (H) 70 - 99 mg/dl    Performed on ACCU-CHEK    POCT Glucose    Collection Time: 22  4:58 PM   Result Value Ref Range    POC Glucose 171 (H) 70 - 99 mg/dl    Performed on ACCU-CHEK    POCT Glucose    Collection Time: 22  8:28 PM   Result Value Ref Range    POC Glucose 281 (H) 70 - 99 mg/dl    Performed on ACCU-CHEK    POCT Glucose    Collection Time: 22  6:48 AM   Result Value Ref Range    POC Glucose 161 (H) 70 - 99 mg/dl    Performed on ACCU-CHEK        Therapy progress:  PT  Position Activity Restriction  Other position/activity restrictions: up as tolerated  Objective     Sit to Stand: Contact guard assistance  Stand to Sit: Contact guard assistance  Bed to Chair: Contact guard assistance (with RW)  Device: Rolling Walker  Assistance: Contact guard assistance  Distance: 10'+15'+50'  OT  PT Equipment Recommendations  Other: defer to next level of care  Toilet - Technique: Ambulating (with RW)  Equipment Used: Standard toilet (with L)  Toilet Transfers Comments: increased assistance required for lifting from low surface compared to other transfers, v/c for safe hand placement  Assessment        SLP          Body mass index is 35.26 kg/m². Assessment and Plan:  \"-Acute on chronic subdural hematoma s/p Left frontal trephine craniotomy for evacuation 12/21  -Essential HTN-controlled-on coreg  -CAD s/p CABG 2005,EF 55-60%-antiplatelets held,on coreg  -Hx Complete heart block s/p PPM  -Paroxsymal a fib-stable-eliquis held  -Obesity BMI 35  -Hypothyroidism-on  synthroid  -DM type 2-gliplizide,trulicity on hold since admission     Plan  Continue postop care per neurosurgery   Holding all antipaletelets and anticoagulants   C/w Keppra and decadron neurosurgery   C/w neurochecks  Continue basal bolus insulin\"     Impairments- Decreased functional mobility, Decreased ADLs     Recommendations:  Approved. Admit to ARU today. Rehab Progress: Improving  Anticipated Dispo: home  Services/DME: TBD  ELOS:     Juany Severino MD  PGY3 IM Resident     This pt has been staffed and discussed with attending Dr. Ramirez Ragland. This patient has been seen, examined, and discussed with the resident. I was part of the key critical services provided to the patient. I agree with the residents documentation. This note may have been altered to reflect my own examination findings, impression, and recommendations. Jocelyne Hilliard D.O. M.P.H  PM&R  12/27/2022  12:33 PM

## 2022-12-27 NOTE — PLAN OF CARE
Problem: Pain  Goal: Verbalizes/displays adequate comfort level or baseline comfort level  Outcome: Completed  Note: No pain for 2 days , up in chair for meals , shower today , incisional care done , gait steady

## 2022-12-27 NOTE — PROGRESS NOTES
NEUROSURGERY POST-OP PROGRESS NOTE    Patient Name: Anayeli Malave YOB: 1945   Sex: Female Age: 78 yrs     Medical Record Number: [id number] Acct Number: [de-identified]   Room Number: 1610/7917-59 Hospital Day: Hospital Day: 8     Interval History:  Post-operative Day# 5 s/p Procedure(s) (LRB):  LEFT TREPHINE CRANIOTOMY FOR SUBDURAL HEMATOMA EVACUATION (Left)    Subjective: Patient resting in bed, getting ready to work with PT. She is eager to get out of bed. No complaints of pain. Objective:    VITAL SIGNS   BP (!) 154/77   Pulse 73   Temp 97.4 °F (36.3 °C) (Oral)   Resp 16   Ht 5' 3\" (1.6 m)   Wt 199 lb 1.2 oz (90.3 kg)   SpO2 96%   BMI 35.26 kg/m²    Height Height: 5' 3\" (160 cm)   Weight Weight: 199 lb 1.2 oz (90.3 kg)        Allergies Allergies   Allergen Reactions    Niacin And Related Anaphylaxis    Ranexa [Ranolazine] Hallucinations    Adhesive Tape      Causes rash and blisters. Can use paper tape or opsite without any problems. Other      QVacin IV antibiotic had after foot surgery placed on this for infection. Developed high fever, chills and uncontrollable shaking. Statins Depletion Therapy      Deep muscle pain    Vancomycin      Does not recall reaction       NPO Status ADULT DIET; Regular; 4 carb choices (60 gm/meal); Low Fat/Low Chol/High Fiber/MARIPOSA   Isolation No active isolations     LABS   Basic Metabolic Profile No results for input(s): NA, POTASSIUM, CL, CO2, BUN, CREATININE, GLUCOSE, CA, ALB, PHOS, MG in the last 72 hours. Complete Blood Count No results for input(s): WBC, RBC, HEMOGLOBIN in the last 72 hours. Invalid input(s): HEMATOCRIT     Coagulation Studies No results for input(s): PTT, INR in the last 72 hours.     Invalid input(s): PLATELETS, PROA, PT, PTTA       MEDICATIONS   Inpatient Medications     [] dexamethasone, 4 mg, IntraVENous, BID **FOLLOWED BY** dexamethasone, 2 mg, IntraVENous, BID **FOLLOWED BY** [START ON 2022] dexamethasone, 1 mg, IntraVENous, BID    carvedilol, 6.25 mg, Oral, BID WC    furosemide, 40 mg, Oral, Daily    levETIRAcetam, 1,000 mg, IntraVENous, Q12H    insulin glargine, 8 Units, SubCUTAneous, Nightly    sodium chloride flush, 5-40 mL, IntraVENous, 2 times per day    heparin (porcine), 5,000 Units, SubCUTAneous, q8h    sodium chloride flush, 5-40 mL, IntraVENous, 2 times per day    isosorbide mononitrate, 120 mg, Oral, Daily    gabapentin, 300 mg, Oral, TID    pantoprazole, 40 mg, Oral, Daily    trospium, 20 mg, Oral, Nightly    levothyroxine, 50 mcg, Oral, Daily    insulin lispro, 0-8 Units, SubCUTAneous, TID WC    insulin lispro, 0-4 Units, SubCUTAneous, Nightly   Infusions    sodium chloride 10 mL/hr at 22 1009    sodium chloride 50 mL/hr at 22 1001    dextrose        Antibiotics   Recent Abx Admin        No antibiotic orders with administrations found. Neurologic Exam:  Mental status: awake and alert and oriented x4    Cranial Nerves:  II: Visual acuity not tested, visual fields full, denies new visual changes / diplopia  III, IV, VI: PERRL, 3 mm bilaterally, EOMI, no nystagmus noted  V: Facial sensation intact bilaterally to touch  VII: Face symmetric  VIII: Hearing intact bilaterally to spoken voice  IX: Palate movement equal bilaterally  XI: Shoulder shrug equal bilaterally  XII: Tongue midline      Musculoskeletal:   Gait: Not tested   Tone: normal  Sensory: intact to all extremities  Motor strength:    Right  Left    Right  Left    Deltoid  5 5  Hip Flex  5 5   Biceps  5 5  Knee Extensors  5 5   Triceps  5 5  Knee Flexors  5 5   Wrist Ext  5 5  Ankle Dorsiflex. 5 5   Wrist Flex  5 5  Ankle Plantarflex.   5 5   Handgrip  5 5  Ext Xavier Longus  5 5   Thumb Ext  5 5         Incision: staples c/d/i    Respiratory:  Unlabored respiratory pattern    Abdomen:   Soft, ND   Last BM 12/26    Cardiovascular:  Warm, well perfused    Assessment   Patient is a 77 yo POD 5 F s/p Procedure(s) (LRB):  LEFT TREPHINE CRANIOTOMY FOR SUBDURAL HEMATOMA EVACUATION (Left) per Dr. Meza Pitch:  Neurologic exam frequency:q 4  Mobility: PT/OT   Steroids: taper, SSI  Seizure Prophylaxis: keppra BID x 14 days   DVT Prophylaxis: SCDs and heparin sq  GI Prophylaxis: protonix  Bowel Regimen: senna and glycolax  Pain control: tylenol, fran   Incisional Care: Open to air. Wash incision daily with warm soapy water or shower daily, and pat dry with clean dry towel. Paint with CHG swab. Dispo: ok to discharge from NSGY standpoint, awaiting precert for ARU     Patient was seen and examined with Dr. Sheri Solis who agrees with above assessment and plan. Electronically signed by:  DARIA Ordonez NP, 12/27/2022 9:34 AM

## 2022-12-27 NOTE — PROGRESS NOTES
Speech Language Pathology  Facility/Department: [facility] 5T ORTHO/NEURO  Speech/Cognition Daily Treatment Note/  Dysphagia Discharge     NAME: Shaniqua Hendricks  : 1945  MRN: [id number]    Patient Diagnosis(es): has Hyperlipidemia; Hypertension; CAD (coronary artery disease); Diabetes mellitus ([address] Hypothyroid; Allergic rhinitis; Sleep apnea; Metabolic encephalopathy; Hepatic steatosis; Cholelithiasis; Acute renal failure (ARF) ([address] Small bowel obstruction ([address] Ischemic chest pain ([address] Unstable angina ([address] Vomiting; Morbid obesity with BMI of 40.0-44.9, [facility]); NSTEMI (non-ST elevated myocardial infarction) ([address] Angina pectoris, unspecified ([address] LBBB (left bundle branch block); Atrial fibrillation with rapid ventricular response ([address] History of coronary artery bypass graft; Acute respiratory failure with hypoxia ([address] Acute hypoxemic respiratory failure ([address] Angina, class IV ([address] History of tobacco abuse; Diabetic foot infection ([address] Acute hematogenous osteomyelitis of left foot ([address] Atrial arrhythmia; Osteomyelitis of right foot ([address] Nausea; Intermittent complete heart block ([address] Syncope and collapse; CKD (chronic kidney disease) stage 3, GFR 30-59 ml/min ([facility]); S/P placement of cardiac pacemaker; Acute subdural hematoma; and Abnormal ECG on their problem list.  Onset Date: 2022    Chart reviewed. Pain: none indicated by any means    Initial evals (2022)  Speech/Language Assessment:  Diagnosis: Patient presents with moderate expressive/receptive aphasia, with the following impairements: 60% accuracy yes/no questions, 80% accuracy confrontation naming, 80% accuracy automatics, 60% accuracy repetition, 100% accuracy object recognition, 40% accuracy instruction following (single step > two step), 60% accuracy reading instruction, impaired verbal fluency, and impaired orthography. Did not assess cognition 2/2 language deficits.  Recommend ongoing speech therapy to further assess/address. Dysphagia Assessment  Mild oral dysphagia 2/2 to reduced dentition. With patient seated upright on room air, assessed tolerance thins via straw and soft solid; pt with positive oral acceptance, slowed but adequate mastication, positive swallow movement, good oral clearance, no overt s/s aspiration. Per pt preference, recommend Dysphagia Soft & Bite-Sized Solids / Thin Liquids. Medical diagnosis: Subdural hematoma [S06. 5XAA]  Acute subdural hematoma [S06. 5XAA]  Treatment diagnosis: dysphagia    Speech/Language Treatment:  Pt seen to address the following goals:  Short Term Goals  Time Frame for Short Term Goals: 1-2 wks or LOS  Goal 1: Patient will answer yes/no questions with 70% accuracy. 12/22: Improved to 90% accuracy. Goal met, d/c goal  Goal 2: Patient will complete confrontation naming task with 100% accuracy. 12/22: 100% accuracy, no cues. Goal met, d/c goal  Goal 3: Patient will follow single step directions with 100% accuracy. 12/22: 100% accuracy for both spoken and written single and two step directions. Goal met, d/c goal  Goal 4: Patient will participate in further assessment of cognitive-linguistics as appropriate. 12/22: Language skills much improved this date vs initial assessment prior to crani. Only intermittent receptive language issues noted, however appears primarily d/t pt's hearing. Reading and writing skills improved as well. Pt reports feeling back to baseline. Did not target cognition, may benefit from further assessment. Cont goal  12/23: Completed [facility] Mental Status examination; patient scored 23/30, indicative of mild neurocognitive disorder. Specific areas of difficulty included: math calculations, problem solving, and alternating attention. Cont goal    New goals:  Goal 5: Patient will complete basic level math calculations with 90% accuracy given min cues.   12/27: Pt completed basic math problems related to medications with ~70% accuracy. Min cues to ID errors and self correct. Cont. Goal 6: Patient will complete tasks involving problem solving with 80% accuracy given min cues. 12/27: Pt answered problem solving tasks related to safety/awareness with 90% accuracy. No cues required. Cont. Dysphagia Treatment  Goal 1: Patient will tolerate least restrictive diet with adequate mastication and without overt signs of aspiration or associated decline in respiratory status. 12/22: Per chart pt currently on regular diet. Pt denies difficulty; discussed previous recommendation for soft bite-sized in alignment with her prior preference. This date, patient stated wish to remain on regular texture diet. Declined solid PO trials, however accepted thins via straw; positive swallow movement, good oral clearance, no overt signs of aspiration or penetration. Cont goal  Goal 2: Patient/caregiver will demonstrate understanding of swallowing concerns/recommendations. 12/20: Educated pt to purpose of visit, s/s of aspiration, concern if aspiration occurs, rationale for diet recommendation/strategies to reduce risk for aspiration and instruction to notify staff if any signs emerge. Pt stated comprehension, however suspect needs reinforcement. Cont goal  12/22: Reviewed diet consistencies, and safety strategies/positioning, aspiration s/s. Pt stated good comprehension. Goal met  12/27: Pt trialed Regular Solids and thin liquids. Pt demonstrated appropriate mastication, despite no bottom dentition, and complete oral clearance with independent use of liquid wash. Recommend continue current diet level with use of strategies. Goal met. DC.    Education: see goal above    Plan:  Diet Recommendations: Regular / Thin Liquids  Continue speech/language therapy to address above goals, 3-5 x/week x LOS  Discharge from dysphagia therapy. DC recommendations: pt would benefit from ongoing SLP services.     D/W nursing: Yuki Pollack   Needs met prior to leaving room, call button in reach.   Treatment time: 35 minutes    Electronically signed by:  Ana Mcgraw M.A., [address] [place] [address]  Speech-Language Pathologist  Pg #: [phone]      If patient is discharged prior to next treatment, this note will serve as the discharge summary

## 2022-12-28 LAB
ANION GAP SERPL CALCULATED.3IONS-SCNC: 10 MMOL/L (ref 3–16)
ATYPICAL LYMPHOCYTE RELATIVE PERCENT: 1 % (ref 0–6)
BANDED NEUTROPHILS RELATIVE PERCENT: 1 % (ref 0–7)
BASOPHILS ABSOLUTE: 0 K/UL (ref 0–0.2)
BASOPHILS RELATIVE PERCENT: 0 %
BUN BLDV-MCNC: 33 MG/DL (ref 7–20)
CALCIUM SERPL-MCNC: 9.1 MG/DL (ref 8.3–10.6)
CHLORIDE BLD-SCNC: 100 MMOL/L (ref 99–110)
CO2: 30 MMOL/L (ref 21–32)
CREAT SERPL-MCNC: 1.1 MG/DL (ref 0.6–1.2)
EOSINOPHILS ABSOLUTE: 0 K/UL (ref 0–0.6)
EOSINOPHILS RELATIVE PERCENT: 0 %
GFR SERPL CREATININE-BSD FRML MDRD: 52 ML/MIN/{1.73_M2}
GLUCOSE BLD-MCNC: 172 MG/DL (ref 70–99)
GLUCOSE BLD-MCNC: 185 MG/DL (ref 70–99)
GLUCOSE BLD-MCNC: 209 MG/DL (ref 70–99)
GLUCOSE BLD-MCNC: 221 MG/DL (ref 70–99)
HCT VFR BLD CALC: 46.3 % (ref 36–48)
HEMOGLOBIN: 15.6 G/DL (ref 12–16)
LYMPHOCYTES ABSOLUTE: 1.1 K/UL (ref 1–5.1)
LYMPHOCYTES RELATIVE PERCENT: 9 %
MCH RBC QN AUTO: 30.1 PG (ref 26–34)
MCHC RBC AUTO-ENTMCNC: 33.8 G/DL (ref 31–36)
MCV RBC AUTO: 88.9 FL (ref 80–100)
MONOCYTES ABSOLUTE: 0.3 K/UL (ref 0–1.3)
MONOCYTES RELATIVE PERCENT: 3 %
NEUTROPHILS ABSOLUTE: 9.2 K/UL (ref 1.7–7.7)
NEUTROPHILS RELATIVE PERCENT: 86 %
PDW BLD-RTO: 14.9 % (ref 12.4–15.4)
PERFORMED ON: ABNORMAL
PLATELET # BLD: 169 K/UL (ref 135–450)
PMV BLD AUTO: 9 FL (ref 5–10.5)
POTASSIUM REFLEX MAGNESIUM: 4.2 MMOL/L (ref 3.5–5.1)
RBC # BLD: 5.2 M/UL (ref 4–5.2)
RBC # BLD: NORMAL 10*6/UL
SODIUM BLD-SCNC: 140 MMOL/L (ref 136–145)
WBC # BLD: 10.6 K/UL (ref 4–11)

## 2022-12-28 PROCEDURE — 2580000003 HC RX 258: Performed by: PHYSICAL MEDICINE & REHABILITATION

## 2022-12-28 PROCEDURE — 80048 BASIC METABOLIC PNL TOTAL CA: CPT

## 2022-12-28 PROCEDURE — 97535 SELF CARE MNGMENT TRAINING: CPT

## 2022-12-28 PROCEDURE — 97530 THERAPEUTIC ACTIVITIES: CPT

## 2022-12-28 PROCEDURE — 97166 OT EVAL MOD COMPLEX 45 MIN: CPT

## 2022-12-28 PROCEDURE — 36415 COLL VENOUS BLD VENIPUNCTURE: CPT

## 2022-12-28 PROCEDURE — 92523 SPEECH SOUND LANG COMPREHEN: CPT

## 2022-12-28 PROCEDURE — 1280000000 HC REHAB R&B

## 2022-12-28 PROCEDURE — 97129 THER IVNTJ 1ST 15 MIN: CPT

## 2022-12-28 PROCEDURE — 97116 GAIT TRAINING THERAPY: CPT

## 2022-12-28 PROCEDURE — 85025 COMPLETE CBC W/AUTO DIFF WBC: CPT

## 2022-12-28 PROCEDURE — 6370000000 HC RX 637 (ALT 250 FOR IP): Performed by: PHYSICAL MEDICINE & REHABILITATION

## 2022-12-28 PROCEDURE — 97162 PT EVAL MOD COMPLEX 30 MIN: CPT

## 2022-12-28 PROCEDURE — 99222 1ST HOSP IP/OBS MODERATE 55: CPT | Performed by: PHYSICAL MEDICINE & REHABILITATION

## 2022-12-28 PROCEDURE — 6360000002 HC RX W HCPCS: Performed by: PHYSICAL MEDICINE & REHABILITATION

## 2022-12-28 RX ADMIN — LEVETIRACETAM 1000 MG: 500 TABLET, FILM COATED ORAL at 10:30

## 2022-12-28 RX ADMIN — DEXAMETHASONE SODIUM PHOSPHATE 1 MG: 4 INJECTION, SOLUTION INTRAMUSCULAR; INTRAVENOUS at 10:32

## 2022-12-28 RX ADMIN — SODIUM CHLORIDE, PRESERVATIVE FREE 10 ML: 5 INJECTION INTRAVENOUS at 10:30

## 2022-12-28 RX ADMIN — METHOCARBAMOL TABLETS 750 MG: 750 TABLET, COATED ORAL at 13:50

## 2022-12-28 RX ADMIN — LEVETIRACETAM 1000 MG: 500 TABLET, FILM COATED ORAL at 20:00

## 2022-12-28 RX ADMIN — TROSPIUM CHLORIDE 20 MG: 20 TABLET, FILM COATED ORAL at 20:00

## 2022-12-28 RX ADMIN — CARVEDILOL 6.25 MG: 6.25 TABLET, FILM COATED ORAL at 17:32

## 2022-12-28 RX ADMIN — GABAPENTIN 300 MG: 300 CAPSULE ORAL at 20:01

## 2022-12-28 RX ADMIN — METHOCARBAMOL TABLETS 750 MG: 750 TABLET, COATED ORAL at 10:30

## 2022-12-28 RX ADMIN — GABAPENTIN 300 MG: 300 CAPSULE ORAL at 10:30

## 2022-12-28 RX ADMIN — METHOCARBAMOL TABLETS 750 MG: 750 TABLET, COATED ORAL at 20:01

## 2022-12-28 RX ADMIN — INSULIN GLARGINE 8 UNITS: 100 INJECTION, SOLUTION SUBCUTANEOUS at 20:02

## 2022-12-28 RX ADMIN — CARVEDILOL 6.25 MG: 6.25 TABLET, FILM COATED ORAL at 10:29

## 2022-12-28 RX ADMIN — SODIUM CHLORIDE, PRESERVATIVE FREE 10 ML: 5 INJECTION INTRAVENOUS at 20:00

## 2022-12-28 RX ADMIN — GABAPENTIN 300 MG: 300 CAPSULE ORAL at 13:50

## 2022-12-28 RX ADMIN — ENOXAPARIN SODIUM 40 MG: 100 INJECTION SUBCUTANEOUS at 10:32

## 2022-12-28 RX ADMIN — METHOCARBAMOL TABLETS 750 MG: 750 TABLET, COATED ORAL at 17:30

## 2022-12-28 RX ADMIN — ISOSORBIDE MONONITRATE 120 MG: 30 TABLET, EXTENDED RELEASE ORAL at 10:29

## 2022-12-28 RX ADMIN — LEVOTHYROXINE SODIUM 50 MCG: 50 TABLET ORAL at 05:51

## 2022-12-28 RX ADMIN — FUROSEMIDE 40 MG: 40 TABLET ORAL at 10:30

## 2022-12-28 RX ADMIN — PANTOPRAZOLE SODIUM 40 MG: 40 TABLET, DELAYED RELEASE ORAL at 10:30

## 2022-12-28 RX ADMIN — DEXAMETHASONE SODIUM PHOSPHATE 1 MG: 4 INJECTION, SOLUTION INTRAMUSCULAR; INTRAVENOUS at 20:01

## 2022-12-28 ASSESSMENT — PAIN SCALES - GENERAL
PAINLEVEL_OUTOF10: 0

## 2022-12-28 NOTE — PROGRESS NOTES
Comprehensive Nutrition Assessment    RECOMMENDATIONS:  PO Diet: Regular; 4 carb choices; Low Fat/Low Chol/High Fiber/MARIPOSA  ONS: n/a  Nutrition Education: No recommendation at this time       NUTRITION ASSESSMENT:   Nutritional summary & status: Consult for ONS, new pt to ARU. Pt is on a Regular;4 carb choices; Low Fat/Low Chol/High Fiber/MARIPOSA diet. Pt reports fair appetite, however, improving.  lbs. Noted wt of 199 lbs(12/20) indicating loss of 3.5 % in past week, significant. Pt states that her UBW is 200 lbs or above and pleased that she has lost weight. Some wt loss may be secondary to fluid;on lasix. Declined offer of ONS. Meal intake 51-75% and % which should be adequate to meet estimated nutrition needs.  Will continue to monitor adequacy of po intake throughout stay and re-approach regarding supplement if indicated   Admission/PMH: Admit; Acute on Chronic subdural hematoma   PMHx; Afib, CAD, CHF, cholelithiasis, T2DM, Gastroperesis, HLD    MALNUTRITION ASSESSMENT  Context of Malnutrition: Acute Illness   Malnutrition Status: Mild malnutrition  Findings of the 6 clinical characteristics of malnutrition (Minimum of 2 out of 6 clinical characteristics is required to make the diagnosis of moderate or severe Protein Calorie Malnutrition based on AND/ASPEN Guidelines):  Energy Intake:  Mild decrease in energy intake (Comment)  Weight Loss:  Greater than 2% over 1 week     Body Fat Loss:  No significant body fat loss     Muscle Mass Loss:  No significant muscle mass loss    Fluid Accumulation:  No significant fluid accumulation     Strength:  Not Performed    NUTRITION DIAGNOSIS   Increased nutrient needs related to increase demand for energy/nutrients as evidenced by other (comment) (extended hospital stay(rehab))    Nutrition Monitoring and Evaluation:   Food/Nutrient Intake Outcomes:  Food and Nutrient Intake  Physical Signs/Symptoms Outcomes:  Biochemical Data, Weight, Skin     OBJECTIVE DATA: Significant to nutrition assessment  Nutrition Related Findings: LBM12/27. RLE non-pitting. Gluc 172. Lytes wnl. Wounds: Surgical Incision  Nutrition Goals: PO intake 75% or greater, by next RD assessment     CURRENT NUTRITION THERAPIES  ADULT DIET; Regular; 4 carb choices (60 gm/meal); Low Fat/Low Chol/High Fiber/MARIPOSA  PO Intake: %, 51-75%   PO Supplement Intake:None Ordered  Additional Sources of Calories/IVF:n/a     ANTHROPOMETRICS  Current Height: 5' 3\" (160 cm)  Current Weight: 192 lb 7.4 oz (87.3 kg)    Admission weight: 193 lb 12.6 oz (87.9 kg)  Ideal Body Weight (IBW): 115 lbs  (52 kg)    Usual Bodyweight     BMI: 34.2    COMPARATIVE STANDARDS  Energy (kcal):  3463-3565 (15-18 kcal/CBW/87.3 kg)     Protein (g):  70-87 (.8-1.0 gm/CBW/87.3 kg)       Fluid (mL/day):  5523-2382 (1ml/kcal) or per provider    The patient will be monitored per nutrition standards of care. Consult dietitian if additional nutrition interventions are needed prior to RD reassessment.      Dmitry Foss, [address]:  [phone]  Office:  [phone]

## 2022-12-28 NOTE — PATIENT CARE CONFERENCE
Inpatient Rehabilitation  Weekly Team Conference Note  The [address]  [phone]  Patient Name: Nader Gonzalez        MRN: [id number]    : 1945  (78 y.o.)  Gender: female   Referring Practitioner: DO Alicia  Diagnosis: SDH  The team conference for this patient was held on 2022 at 10:30am by:  Martha Maddox.  DO Alicia    Current/Goal QM SCORES  QM Current/Goal Score   Eating CARE Score: 6 / Discharge Goal: Independent   Oral Hygiene CARE Score: 4 / Discharge Goal: Independent   Shower/Bathing CARE Score: 4 / Discharge Goal: Independent   UB Dressing CARE Score: 5 / Discharge Goal: Independent   LB Dressing CARE Score: 3 / Discharge Goal: Independent   Putting on/off Footwear CARE Score: 4 / Discharge Goal: Independent   Toileting Hygiene CARE Score: 4 / Discharge Goal: Independent   Bladder Continence      Bowel Continence      Toilet Transfers CARE Score: 4 / Discharge Goal: Independent   Shower/Bathe Self  CARE Score: 4 / Discharge Goal: Independent   Rolling Left and Right CARE Score: 4 / Discharge Goal: Independent   Sit to Lying CARE Score: 4 / Discharge Goal: Independent   Lying to Sitting on Bedside CARE Score: 4 / Discharge Goal: Independent   Sit to Stand CARE Score: 5 / Discharge Goal: Independent   Chair/Bed to Chair Transfer CARE Score: 4 / Discharge Goal: Independent   Car Transfers CARE Score: 3 / Discharge Goal: Independent   Walk 10 Feet CARE Score: 4 / Discharge Goal: Independent   Walk 50 Feet with Two Turns CARE Score: 4 / Discharge Goal: Independent   Walk 150 Feet CARE Score: 4 / Discharge Goal: Independent   Walk 10 Feet on Uneven Surfaces CARE Score: 4 / Discharge Goal: Independent   1 Step (Curb) CARE Score: 3 / Discharge Goal: Supervision or touching assistance   4 Steps CARE Score: 88 / Discharge Goal: Supervision or touching assistance   12 Steps CARE Score: 88 / Discharge Goal: Not Applicable   Picking up Object from Floor CARE Score: 80 / Discharge Goal: Independent   Wheel 50 Feet with 2 Turns   /     Type         [] Manual        [] Motorized        [] N/A   Wheel 150 Feet   /     Type         [] Manual        [] Motorized        [] N/A     NURSING:  A&Ox: Level of Consciousness: Alert (0)  Orientation Level: Oriented X4  Alex Fall Risk Score: Alex Total Score: 60  Admission BIMS: 15   [] Unable to complete BIMS on Admission, Reasoning:   Wounds/Incisions/Ulcers: incision with staples, left scalp  Medication Review: with patient  Pain: na  Consultations: Neurosurgery  Imaging:    No orders to display     Active Comorbid Conditions: CHF, A-Fib, CAD, diabetes, ESRD  Systems Review:   Renal: X, Dialysis:  Type: na, Frequency: na  Neurological: X - Weakness, numbness, tingling,   Musculoskeletal: X - Weakness  Respiratory: WNL  Cardiac/Circulatory/Peripheral Vascular: X- Pacemaker  Abnormal/Relevant Test Results:   Abnormal/Relevant Lab Values:   CBC:   Recent Labs     12/28/22  0716 12/30/22 0641   WBC 10.6 9.7   HGB 15.6 14.0   HCT 46.3 42.2   MCV 88.9 89.0    134*     BMP:   Recent Labs     12/28/22  0716 12/30/22  0641    134*   K 4.2 3.8    97*   CO2 30 25   BUN 33* 32*   CREATININE 1.1 1.0     PT/INR: No results for input(s): PROTIME, INR in the last 72 hours. APTT: No results for input(s): APTT in the last 72 hours.   Liver Profile:  Lab Results   Component Value Date/Time    AST 17 12/19/2022 07:00 PM    ALT 13 12/19/2022 07:00 PM    BILITOT 0.8 12/19/2022 07:00 PM    ALKPHOS 125 12/19/2022 07:00 PM     Lab Results   Component Value Date/Time    CHOL 227 04/16/2015 07:20 AM    HDL 34 10/18/2017 10:01 AM    TRIG 179 04/16/2015 07:20 AM     Recent Labs     12/28/22  0716 12/30/22  0641   WBC 10.6 9.7   HGB 15.6 14.0   HCT 46.3 42.2    134*   MCV 88.9 89.0     Recent Labs     12/28/22  0716 12/30/22  0641    134*   K 4.2 3.8    97*   CO2 30 25   BUN 33* 32*   CREATININE 1.1 1.0 No results for input(s): AST, ALT, ALB, BILIDIR, BILITOT, ALKPHOS in the last 72 hours. No results for input(s): MG in the last 72 hours. Recent Labs     12/28/22  0716 12/30/22  0641   WBC 10.6 9.7   HGB 15.6 14.0   HCT 46.3 42.2    134*     Recent Labs     12/28/22  0716 12/30/22  0641    134*   K 4.2 3.8    97*   CO2 30 25   BUN 33* 32*   CREATININE 1.1 1.0   CALCIUM 9.1 8.3     No results for input(s): AST, ALT, BILIDIR, BILITOT, ALKPHOS in the last 72 hours. No results for input(s): INR in the last 72 hours. No results for input(s): Patrisha Meigs in the last 72 hours.     PHYSICAL THERAPY:  Bed Mobility:      Transfers:  Sit to Stand: Contact guard assistance (at recliner/EOB/wc with RW)  Stand to Sit: Contact guard assistance    Ambulation  Surface: Level tile, Uneven, Ramp  Device: Rolling Walker  Assistance: Contact guard assistance  Quality of Gait: forward flexed posture, decreased B step height/length, no LOB  Gait Deviations: Slow Taran (exception of increased taran when descending ramp)  Distance: 150', 30' (ascent/descent 15' ramp), 15'  Comments: VC for upright posture and increased step height/length and upright posture; VC for controlled pace when completing descent of ramp    Stairs  # Steps : 3  Stairs Height: 6\"  Rails: Bilateral  Assistance: Minimal assistance  Comment: pt completes with step to pattern with physical assist required for boost up on ascent and controlled eccentric lower on descent    OCCUPATIONAL THERAPY:  ADL  Grooming: Stand by assistance  Grooming Skilled Clinical Factors: oral hygiene in stance at sink; pt combed hair seated i'ly  UE Bathing: Stand by assistance  UE Bathing Skilled Clinical Factors: seated on TTB, pt reports sitting to bathe at baseline  LE Bathing: Contact guard assistance, Increased time to complete, Verbal cueing  LE Bathing Skilled Clinical Factors: seated on TTB, figure 4 and trunk flexion to wash lower limbs and feet, in stance to wash/dry buttocks with unilateral UE support on GB and CGA  UE Dressing: Setup  UE Dressing Skilled Clinical Factors: to doff/don sweatshirt  LE Dressing: Minimal assistance  LE Dressing Skilled Clinical Factors: pt doffed briefs and pants seated on commode via trunk flexion with + time and SBA, pt req min A to thread RLE into brief after + time and effort, CGA to thread pants and to manage over hips in stance  Toileting: Contact guard assistance  Toileting Skilled Clinical Factors: pt continent of bladder, perihygiene seated, LB clothing mgmt off hips in stance with CGA    Toilet Transfers: Toilet Transfers  Toilet - Technique: Ambulating  Equipment Used: Standard toilet  Toilet Transfer: Contact guard assistance  Toilet Transfers Comments: use of GB on L    Tub/ShowerTransfers:     Shower Transfers  Shower - Transfer From: Sim Alcocer - Transfer Type: To and From  Shower - Transfer To: Transfer tub bench  Shower - Technique: Ambulating  Shower Transfers: Contact Guard  Shower Transfers Comments: use of GB for descent; CGA to stand from TTB using GB    SPEECH THERAPY:  Assessment: Speech Therapy Diagnosis  Cognitive Diagnosis: Mild  Pt presents with mild cognitive impairments in the areas of executive function, thought organization, memory and processing. During IP admission, pt evaluated to have moderate expressive/receptive aphasia, both of which have resolved completely. Pt with appropriate and accurate verbal expressive and receptive language skills to communicate all wants/needs w/o difficulty. Pt expressed wanting to target areas of higher level cognitive tasks related to finances/medication management to ensure adequate independence at discharge. Pt would benefit from ongoing SLP services during this stay to progress towards pt's goals and return home safely.     NUTRITION:    Current Weight: 190 lb 4.1 oz (86.3 kg) BMI (Calculated): 33.8  Current diet order: Current diet and supplement order: ADULT DIET; Regular; 4 carb choices (60 gm/meal); Low Fat/Low Chol/High Fiber/MARIPOSA         Feeding: Able to feed self      NSG Recorded PO: PO Meals Eaten (%): 76 - 100%    Malnutrition Status Malnutrition Status: Mild malnutrition    CASE MANAGEMENT:  Psychosocial/Behavioral Issues: none  Assessment:  Pt lives at home alone but will have a friend who can stay with her at [place]. Pt wants to use [facility] at [place]. Pt is already active with [facility] services. Patient/Family Education provided by team:  Role of PT/OT, HEP, energy conservation    Patient Goals for Rehab stay:  1. To go home     Rehab Team Goals for patient for the Upcoming Week:  1. Increase independence with ADLs  2. Increase independence with transfers/ ambulation     Barriers to Progress/Attainment of Goals & Efforts/Interventions to remove Barriers:  1. Decreased dynamic balance-continue OT/PT  2. Decreased endurance- continue PT/OT    Discharge Plan:  Estimated Length of Stay: 11 days  Destination: home health  Services at Discharge: [address] [place] [address], Occupational Therapy, and Speech Therapy 2x week  Equipment at Discharge: N/A  Community Resources:   Factors facilitating achievement of predicted outcomes: Friend support, Motivated, Cooperative, Pleasant, Sense of humor, and Good insight into deficits  Barriers to the achievement of predicted outcomes: Cognitive deficit, Limited safety awareness, Limited insight into deficits, Communication deficit, Decreased endurance, Decreased sensation, Lower extremity weakness, Long standing deficits, and Incontinence of bladder    Special Needs in the Upcoming Week:   [x] Family/Caregiver Education  [] Home visit  []Therapeutic Pass [] Consults:_______   [] Family/Caregiver Training  [] Stroke Risk Factor Education     [] Other;_______     TEAM SUMMARY: Will continue with current poc & goals until anticipated d/c date of 1/4/2022.     MD:   Stroke Risk Factors:   [] N/A for this patient [x] HTN  [x]  Diabetes  [x] Hyperlipidemia  [x]Obesity BMI >25  [x] Atrial Fibrillation [] Smoker (current)  [] Smoker (quit in last 12 months)  [] Sleep Apnea [] Other:     Risk for Readmission: High: 20%  Critical Items: If High Risk, consider the following recommendations: Follow up with PCP within one week of discharge.     Justification for Continued Stay:   Criteria for continued IRF stay:  Based on my medical assessment of the patient and review of information from the interdisciplinary team, as part of this weekly team conference, the patient continues to meet the following criteria for IRF level of care:  [x] The patient requires 24-hour rehabilitation nursing care   [x] The patient requires an intensive rehabilitation therapy program  [x] The patient requires active and ongoing intervention of multiple therapy disciplines  [x] The patient requires continued physician supervision by a rehabilitation physician  [x] The patient requires an intensive and coordinated interdisciplinary team approach to the delivery of rehabilitative care    Medical Necessity-continued close physician medical management is required for:   [] Cardiac/Circulatory dysfunction  [] Respiratory/Pulmonary dysfunction  [] Integumentary complications  [] Peripheral Vascular dysfunction  [] Musculoskeletal dysfunction  [x] Neurological dysfunction d/t:  [x] CVA  [] SCI  [] TBI  [] Other: __________  [] Renal dysfunction  [] Hematologic dysfunction    [] Endocrine disorders  [] GI disorders     [] Genito-Urinary dysfunction    Assessment/Plan:  [x] The patient is making good progression towards their LTG's, is actively participating in, and has a reasonable expectation to continue to benefit from the intensive rehabilitation program.  [] The estimated discharge date has been changed from initial team conference due to:   [] The estimated discharge destination has been changed from initial team conference due to:     Rehab Team Members in attendance for Team Conference:  ARU Supervisor/PPS Coordinator:  Carito Dotson, PT, DPT    Social Work:  Lucy [place]    Nursing: Vamshi Montoya, RN  Tammie Medina, RN    Therapy:  Riana Chong, PT  Silva Gipson, PT, DPT  Juan Estevez PT, DPT     Caroline West, OTR/L  Susan Powers, OTR/L    Roselyn Spears MA-WILFREDO, SLP    Nutrition:  Bassem Esposito RD:    [place]: [phone]  Office:  [phone]      I approve the established interdisciplinary plan of care as documented within the medical record of Tania Pretty.     CALIXTO BlancoO. M.P.H  PM&R  12/30/2022  11:40 AM

## 2022-12-28 NOTE — PROGRESS NOTES
[facility] Admission Assessment    Ethnicity  \"Are you of , /a, or Indian origin? \"  Check all that apply:  [x] A. No, not of , /a, or [place] Origin  [] B.  Yes, [place], [place] American, Chicano/a  [] C.  Yes, [address] [place] [address]  [] D.  Yes, [place]  [] E.  Yes, another , , or Indian origin  [] X. Patient unable to respond  [] Y. Patient declines to respond    Race  \"What is your race? \"  Check all that apply:  [x] A. White  [] B. Black or   [] C. American [place] [facility] or [place] Native  [] D.  [place] [facility]  [] E. [place]  [] F. [place]  [] G. [place]  [] Emmalene Rummer  [] I. [place]  [] J.  Other   [] K.   [] L. [place] or [place]  [] M. Lebanese  [] N. Other [place]  [] X. Patient unable to respond  [] Y. Patient declines to respond  [] Z. None of the above    Language  A. \"What is your preferred language? \"   English    B. \"Do you need or want an  to communicate with a doctor or health care staff? \"  Check only one:  [x] 0. No  [] 1. Yes  [] 9. Unable to determine    Transportation  \"Has lack of transportation kept you from medical appointments, meetings, work, or from getting things needed for daily living? \"Check all that apply:  [] A.  Yes, it has kept me from medical appointments or from getting my medications  [] B.  Yes, it has kept me from non-medical meetings, appointments, work, or from getting things that I need  [x] C.  No  [] X. Patient unable to respond  [] Y. Patient declines to respond    Hearing  Ability to hear (with hearing aid or hearing appliances if normally used)  []  0. Adequate - no difficulty in normal conversation, social interaction, listening to TV  [x]  1. Minimal difficulty - difficulty in some environments (e.g. when person speaks softly or setting is noisy)  []  2. Moderate difficulty - speaker has to increase volume and speak distinctly   []  3.   Highly impaired - absence of useful hearing    Vision  Ability to see in adequate light (with glasses or other visual appliances)  []  0. Adequate - sees fine detail, such as regular print in newspapers/books  [x]  1. Impaired - sees large print, but not regular print in newspapers/books  []  2. Moderately impaired - limited vision; not able to see newspaper headlines but can identify objects  []  3. Highly impaired - object identification in question, but eyes appear to follow objects  []  4. Severely impaired - no vision or sees only light, colors, or shapes; eyes do not appear to follow objects    Health Literacy  \"How often do you need to have someone help you when you read instructions, pamphlets, or other written material from your doctor or pharmacy? \"  [x]  0. Never  []  1. Rarely  []  2. Sometimes  []  3. Often  []  4. Always  []  8. Patient unable to respond    BIMS - **Must be completed in the flowsheet at admission prior to proceeding with Delirium Assessment**  [x] BIMS completed in flowsheet at admission    Signs and Symptoms of Delirium  A. Acute Onset Mental Status Change - Is there evidence of an acute change in mental status from the patient's baseline? [x] 0. No  [] 1. Yes    B. Inattention - Did the patient have difficulty focusing attention, for example being easily distractible or having difficulty keeping track of what was being said? [x]  0. Behavior not present  []  1. Behavior continuously present, does not fluctuate  []  2. Behavior present, fluctuates (comes and goes, changes in severity)    C. Disorganized thinking - Was the patient's thinking disorganized or incoherent (rambling or irrelevant conversation, unclear or illogical flow of ideas, or unpredictable switching from subject to subject)? [x]  0. Behavior not present  []  1. Behavior continuously present, does not fluctuate  []  2. Behavior present, fluctuates (comes and goes, changes in severity)    D.   Altered level of consciousness - Did the patient have altered level of consciousness as indicated by any of the following criteria? Vigilant - startled easily to any sound or touch  Lethargic - repeatedly dozed off while being asked questions, but responded to voice or touch  Stuporous - very difficulty to arouse and keep aroused for the interview  Comatose - could not be aroused  [x]  0. Behavior not present  []  1. Behavior continuously present, does not fluctuate  []  2. Behavior present, fluctuates (comes and goes, changes in severity)    Mood    \"Over the last 2 weeks, have you been bothered by any of the following problems?\" 1. Symptom Presence    0 = No  1 = Yes  9 = No Response 2. Symptom Frequency    0 = Never or 1 day  1 = 2-6 days (several days)  2 = 7-11 days (half or more of the days)  3 = 12-14 days (nearly every day)  **Leave blank if 'No Reponse'**      Enter scores in boxes    Column 1 Column 2   Little interest or pleasure in doing things   0    Feeling down, depressed, or hopeless   0    **If either A or B in column 2 is coded 2 or 3, CONTINUE asking the questions below. If not, END the interview. **     Trouble falling or staying asleep, or sleeping too much       Feeling tired or having little energy       Poor appetite or overeating       Feeling bad about yourself - or that you are a failure or have let yourself or your family down       Trouble concentrating on things, such as reading the newspaper or watching television       Moving or speaking so slowly that other people could have noticed. Or the opposite- being so fidgety or restless that you have been moving around a lot more than usual.       Thoughts that you would be better off dead, or of hurting yourself in some way. Total Severity: Add scores for all frequency responses in column 2 (possible score 0-27, or enter 99 if unable to complete (if symptom frequency (column 2) is blank for 3 or more items).         Social Isolation  \"How often do you feel lonely or isolated from those around you? \"  [x] 0. Never  [] 1. Rarely  [] 2. Sometimes  [] 3. Often  [] 4. Always  [] 7. Patient declines to respond  [] 8. Patient unable to respond    Pain Effect on Sleep  \"Over the past 5 days, how much of the time has pain made it hard for you to sleep at night? \"  []  0. Does not apply - I have not had any pain or hurting in the past 5 days  []  1. Rarely or not at all  [x]  2. Occasionally  []  3. Frequently  []  4. Almost constantly  []  8. Unable to answer    **If the patient answers \"0. Does not apply\" to this question, skip the next two \"Pain Effect. Amado Gutter Amado Gutter \" questions**    Pain Interference with Therapy Activities  \"Over the past 5 days, how often have you limited your participation in rehabilitation therapy sessions due to pain? \"  [x]  0. Does not apply - I have not received rehabilitation therapy in the past 5 days  []  1. Rarely or not at all  []  2. Occasionally  []  3. Frequently  []  4. Almost constantly  []  8. Unable to answer    Pain Interference with Day-to-Day Activities: \"Over the past 5 days, how often have you limited your day-to-day activities (excluding rehabilitation therapy session)? \"  []  1. Rarely or not at all  [x]  2. Occasionally  []  3. Frequently  []  4. Almost constantly  []  8. Unable to answer    Nutritional Approaches  Check all of the following nutritional approaches that apply on admission:  []  A. Parenteral/IV feeding (including IV fluids if needed for hydration, but not as part of dialysis/chemo)  []  B. Feeding tube (e.g., nasogastric or abdominal (PEG))  []  C. Mechanically altered diet - requires change in texture of food or liquids (e.g., pureed food, thickened liquids)  [x]  D. Therapeutic diet (e.g., low salt, diabetic, low cholesterol)  []  Z.   None of the above    High Risk Drug Classes:  Use and Indication    Is taking: Check if the pt is taking any medications by pharmacological classification, not how it is used, in the following classes  Indication noted: If column 1 is checked, check if there is an indication noted for all meds in the drug class Is taking  (check all that apply) Indication noted (check all that apply)   Antipsychotic [] []   Anticoagulant [x] [x]   Antibiotic [] []   Opioid [x] [x]   Antiplatelet [] []   Hypoglycemic (including insulin) [x] [x]   None of the above []     Special Treatments, Procedures, and Programs    Check all of the following treatments, procedures, and programs that apply on admission. On admission (check all that apply)   Cancer Treatments   A1. Chemotherapy []           A2. IV []           A3. Oral []           A10. Other []   B1. Radiation []   Respiratory Therapies   C1. Oxygen Therapy [x]           C2. Continuous (continuously for at least 14 hours per day) []           C3. Intermittent [x]           C4. High-concentration []   D1. Suctioning (Does not include oral suctioning) []           D2. Scheduled []           D3. As needed []   E1. Tracheostomy Care []   F1. Invasive Mechanical Ventilator (ventilator or respirator) []   G1. Non-invasive Mechanical Ventilator []           G2. BiPAP []           G3. CPAP []   Other   H1. IV Medications (Do not include sub Q pumps, flushes, Dextrose 50% or lactated ringers) []           H2. Vasoactive medications [x]           H3. Antibiotics []           H4. Anticoagulation []           H10. Other []   I1. Transfusions []   J1. Dialysis []           J2. Hemodialysis []           J3. Peritoneal dialysis []   O1. IV access (including a catheter in a vein) []           O2. Peripheral [x]           O3. Midline []           O4. Central (PICC, tunneled, port) []      None of the above (select if no Cancer, Respiratory, or Other boxes are checked) []     The above items have been reviewed and updated as necessary, and are accurate for the admission assessment period.     Assessing RN:    Reviewing RN:

## 2022-12-28 NOTE — PROGRESS NOTES
Pt in bed, alert and oriented. VSS. No pain reported. All safety measures are in place. Call light within reach. Will continue to monitor.     Vitals:    12/27/22 1949   BP: (!) 147/73   Pulse: 63   Resp: 16   Temp: 98.1 °F (36.7 °C)   SpO2: 96%

## 2022-12-28 NOTE — PROGRESS NOTES
Physical Therapy  Facility/Department: [facility] UNIT  Rehabilitation Physical Therapy Initial Assessment    NAME: Octavio Mcdowell  : 1945 (89 y.o.)  MRN: [id number]  CODE STATUS: Full Code    Date of Service: 22      Past Medical History:   Diagnosis Date    A-fib [facility])     Allergic rhinitis     Arthritis     CAD (coronary artery disease)     Cardiac pacemaker     CHF (congestive heart failure) ([facility]     Cholelithiasis 2015    Diabetes mellitus ([facility]     Accuchecks daily once a day at home in A.[place]. Encounter for imaging to screen for metal prior to MRI 2022    MRI Conditional Medtronic Dolores XT DR model#W1DR01  Leads: RA [id number] implanted 22. Normal lmode. 1.5T or 3.0T. Pt must be A/OX4 per Med[employer] guidelines. Med[employer]onic rep and RN must be present. Follow all other Med[employer] guidelines. Pt currently follows     ESRD (end stage renal disease) ([facility]     Gastroparesis     Hepatic steatosis 2015    Hyperlipidemia     Hypertension     Peripheral neuropathy     Sleep apnea     Has used the CPAP x 25 yrs now per pt. Syncope     Thyroid disease     Vitamin B12 deficiency      Past Surgical History:   Procedure Laterality Date    CARDIAC CATHETERIZATION  2017    SVG to OM2 100% ostium    CHOLECYSTECTOMY      COLECTOMY  1998    diverticulitis     COLONOSCOPY      CORONARY ANGIOPLASTY      CORONARY ARTERY BYPASS GRAFT  2005    x 3    CRANIOTOMY Left 2022    LEFT TREPHINE CRANIOTOMY FOR SUBDURAL HEMATOMA EVACUATION performed by Evangelist Thorne.  Bianca Altamirano MD at [address] [place] [address] CATH LAB PROCEDURE      ENDOSCOPY, COLON, DIAGNOSTIC      EYE SURGERY      Bilateral cataracts removed with lens implants    FOOT SURGERY      Multiple foot surgeries bilateral    HERNIA REPAIR      OTHER SURGICAL HISTORY  2012    achilles tendon lengthening,arthroplasty,matatarsalhead resection    OTHER SURGICAL HISTORY Right 3/20/13    FUSION OF RIGHT GREAT TOE JOINT WITH WIRE FIXATION, DEBRIEDMENT OF WOUND RIGHT GREAT TOE    TOE AMPUTATION Left 3/29/2021    PARTIAL LEFT FOOT AMPUTATION performed by Aure Streeter DPM at [address]      Laparoscopic    UPPER GASTROINTESTINAL ENDOSCOPY         Chart Reviewed: Yes  Patient assessed for rehabilitation services?: Yes  Additional Pertinent Hx: 79 yo female present to ED 12/19 p/w aphasia. CT head (+) Acute on chronic subdural hematoma overlying the left frontal and parietal lobes. Pt had a code stroke called 12/20 and underwent a procedure: 1. Left frontal trephine craniotomy for evacuation of acute/chronic subdural hemorrhage on 12/21. PMHx: afib, CAD, CHF, DM, HTN  Family / Caregiver Present: No  Referring Practitioner: DO Alicia  Diagnosis: SDH    Restrictions:  Position Activity Restriction  Other position/activity restrictions: up as tolerated     SUBJECTIVE  Subjective: Pt seated in recline rupon approach and agreeable to PT. Denies pain. Prior Level of Function:  Social/Functional History  Lives With: Alone  Type of Home: House  Home Layout: One level  Home Access: Ramped entrance  Bathroom Shower/Tub: Walk-in shower  Bathroom Toilet: Handicap height  Bathroom Equipment: Built-in shower seat, Grab bars in shower, Hand-held shower, Grab bars around toilet  Home Equipment: Cane, Electric scooter, Waynetta Bears, [place], Waynetta Bears, 4 wheeled (2 rollators (1 for car, 1 for [employer]))  ADL Assistance: Independent  Homemaking Assistance: Independent  Homemaking Responsibilities: Yes  Ambulation Assistance: Independent (Uses a electric scooter in the house and rollator in the community.  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  She typically leaves the home every few days; neighbor and nephew have been taking care of house while she's been gone, reports friend friend could come and stay with her (sometimes gets confused becaus hx of stroke but moves well and drives)      OBJECTIVE  Vision  Vision: Impaired  Vision Exceptions: Wears glasses for reading    Hearing  Hearing: Within functional limits  Hearing Exceptions: Hard of hearing/hearing concerns         ROM  AROM RLE (degrees)  RLE AROM: WFL  AROM LLE (degrees)  LLE AROM : WFL    Strength  Strength RLE  Strength RLE: WFL  Comment: 4/5 hip flexionPF/DF, 4+/5 knee extension  Strength LLE  Strength LLE: WFL  Comment: 4/5 hip flexionPF/DF, 4+/5 knee extension    Quality of Movement  Coordination  Heel to Shin: Normal    Sensation  Overall Sensation Status: Impaired (pt unable to feel light touch from mid calf down B LE during sensory assessment)    Functional Mobility  Bed mobility  Rolling to Left: Stand by assistance  Rolling to Right: Stand by assistance  Supine to Sit: Stand by assistance  Sit to Supine: Stand by assistance  Bed Mobility Comments: HOB, without HR  Transfers  Sit to Stand: Contact guard assistance (at recliner/EOB/wc with RW)  Stand to Sit: Contact guard assistance  Car Transfer: Minimal Assistance (wc<>car without device, utlizes SL step over method with posterior LOB present while stepping L foot into car with physical assist required to correct)  Comment: VC for hand placement  Balance  Sitting - Static: Good;- (supv seated EOB)  Sitting - Dynamic: Fair;+ (SBA seated EOB)  Standing - Static: Fair (SBA with B UE support on RW)  Standing - Dynamic: Fair;- (CGA for amb with RW)  Comments: pt does not pick obj off of ground due to safety concerns    Environmental Mobility  Ambulation  Surface: Level tile;Uneven;Ramp  Device: Rolling Walker  Assistance: Contact guard assistance  Quality of Gait: forward flexed posture, decreased B step height/length, no LOB  Gait Deviations: Slow Taran (exception of increased taran when descending ramp)  Distance: 150', 30' (ascent/descent 15' ramp), 15'  Comments: VC for upright posture and increased step height/length and upright posture; VC for controlled pace when completing descent of ramp  Stairs/Curb  Stairs?: Yes  Stairs  # Steps : 3  Stairs Height: 6\"  Rails: Bilateral  Assistance: Minimal assistance  Comment: pt completes with step to pattern with physical assist required for boost up on ascent and controlled eccentric lower on descent         ASSESSMENT  Activity Tolerance  Activity Tolerance: Patient tolerated evaluation without incident;Patient limited by endurance    Assessment  Assessment: Pt presents to [facility] with decreased functional mobility, endurance, strength, and balance. Pt currently requires CGA for transfers and amb with RW. At baseline is supv with rollator in community and indp with tranfers and mobility with electric scooter around home. Pt reports she uses electric scooter around home due to fear of falling due to nueropathy. Pt would benefit from continued skilled PT inorder to maximize functional mobility and independence for safer d/c home. Treatment Diagnosis: decreased functional mobility 2/2 to [facility]  Therapy Prognosis: Good  Decision Making: Medium Complexity  Discharge Recommendations: Patient would benefit from continued therapy after discharge;Continue to assess pending progress  PT Equipment Recommendations  Equipment Needed: No    CLINICAL IMPRESSION   Pt presents to [facility] with decreased functional mobility, endurance, strength, and balance. Pt currently requires CGA for transfers and amb with RW. At baseline is supv with rollator in community and indp with tranfers and mobility with electric scooter around home.  Pt reports she uses electric scooter around home due to fear of falling due to nueropathy. Pt would benefit from continued skilled PT inorder to maximize functional mobility and independence for safer d/c home. GOALS  Patient Goals   Patient Goals : none stated  Short Term Goals  Time Frame for Short Term Goals: 10 days  Short Term Goal 1: Pt will complete bed mobility independently  Short Term Goal 2: Pt will compelte sit<>stand and pivot transfers with LRAD and mod I  Short Term Goal 3: Pt will complete 150' ambualtion with mod I and LRAD  Short Term Goal 4: Pt will complete ascent/descent of 10' ramp with mod I and LRAD    PLAN OF CARE  Frequency: 1-2 treatment sessions per day, 5-7 days per week  Physcial Therapy Plan  Days Per Week: 5 Days  Hours Per Day: 1 hour  Current Treatment Recommendations: Strengthening;Balance training;Gait training;Stair training;Functional mobility training;Transfer training; Endurance training;Home exercise program;Therapeutic activities; Neuromuscular re-education  Safety Devices  Type of Devices: Call light within reach; Chair alarm in place; Left in chair;Gait belt    EDUCATION  Education  Education Given To: Patient  Education Provided: Role of Therapy;Plan of Care;Safety; Mobility Training;Transfer Training;Equipment  Education Method: Demonstration;Verbal  Education Outcome: Verbalized understanding;Demonstrated understanding;Continued education needed      Therapy Time   Individual Concurrent Group Co-treatment   Time In 0830         Time Out 0930         Minutes 60           Timed Code Treatment Minutes: [address] [facility], [address] [place] [address]

## 2022-12-28 NOTE — PROGRESS NOTES
Speech Language Pathology  Facility/Department: [facility] ACUTE REHAB UNIT  Initial Speech/Language/Cognitive Assessment/treatment    NAME: Phillip Kruse  : 1945   MRN: [id number]  ADMISSION DATE: 2022  ADMITTING DIAGNOSIS: has Hyperlipidemia; Hypertension; CAD (coronary artery disease); Diabetes mellitus ([address] Hypothyroid; Allergic rhinitis; Sleep apnea; Metabolic encephalopathy; Hepatic steatosis; Cholelithiasis; Acute renal failure (ARF) ([address] Small bowel obstruction ([address] Ischemic chest pain ([address] Unstable angina ([address] Vomiting; Morbid obesity with BMI of 40.0-44.9, [facility]); NSTEMI (non-ST elevated myocardial infarction) ([address] Angina pectoris, unspecified ([address] LBBB (left bundle branch block); Atrial fibrillation with rapid ventricular response ([address] History of coronary artery bypass graft; Acute respiratory failure with hypoxia ([address] Acute hypoxemic respiratory failure ([address] Angina, class IV ([address] History of tobacco abuse; Diabetic foot infection ([address] Acute hematogenous osteomyelitis of left foot ([address] Atrial arrhythmia; Osteomyelitis of right foot ([address] Nausea; Intermittent complete heart block ([address] Syncope and collapse; CKD (chronic kidney disease) stage 3, GFR 30-59 ml/min ([facility]); S/P placement of cardiac pacemaker; Acute subdural hematoma; Abnormal ECG; and SDH (subdural hematoma) on their problem list.  DATE ONSET: 22    Date of Eval: 2022   Evaluating Therapist: MARTHA Thornton    RECENT RESULTS  CT OF HEAD/MRI:   Impression       1. Stable size of subacute left cerebral convexity subdural hematoma (2 cm thick) with stable mass effect and mild left to right midline shift measuring 4 mm   2. Mild chronic small vessel ischemia and mild generalized atrophy   3.  No evidence for intracranial mass or vascular lesion                     Primary Complaint: cognitive skills    Pain:  Pain Assessment  Pain Assessment: None - Denies Pain  Pain Level: 0    Vision/ Hearing  Vision  Vision: Impaired  Vision Exceptions: Wears glasses for reading  Hearing  Hearing: Within functional limits  Hearing Exceptions: Hard of hearing/hearing concerns    Assessment:  Cognitive Diagnosis: Mild   Diagnosis: Pt presents with mild cognitive impairments in the areas of executive function, thought organization, memory and processing. During IP admission, pt evaluated to have moderate expressive/receptive aphasia, both of which have resolved completely. Pt with appropriate and accurate verbal expressive and receptive language skills to communicate all wants/needs w/o difficulty. Pt expressed wanting to target areas of higher level cognitive tasks related to finances/medication management to ensure adequate independence at discharge. Pt would benefit from ongoing SLP services during this stay to progress towards pt's goals and return home safely. Recommendations:  Recommendations  Requires SLP Intervention: Yes  Patient Education: Educated pt to purpose of visit, role of SLP, recommendations. Patient Education Response: Verbalizes understanding;Demonstrated understanding  Total Treatment Time: 60  Duration of Treatment: LOS       Plan:   Speech Therapy Prognosis  Prognosis: Good  Prognosis Considerations: Medical Diagnosis; Age;Family/Community Support  Individuals consulted  Consulted and agree with results and recommendations: Patient  Safety Devices  Safety Devices in place: Yes  Type of devices: All fall risk precautions in place; Chair alarm in place; Left in chair;Call light within reach    Goals:  Short Term Goals  Goal 1: Pt will complete higher level executive function tasks with 100% accuracy. Goal 2: Pt will complete functional working/short term memory tasks with 100% accuracy and use of compensatory strategies as needed. Goal 3: Pt will participate in ongoing cognitive-linguistic assessment.    Patient/family involved in developing goals and treatment plan: discussed with pt    Subjective:   Previous level of function and limitations: as noted below. 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  She typically leaves the home every few days; neighbor and nephew have been taking care of house while she's been gone, reports friend friend could come and stay with her (sometimes gets confused because hx of stroke but moves well and drives-the friend)  Vision  Vision: Impaired  Vision Exceptions: Wears glasses for reading  Hearing  Hearing: Within functional limits  Hearing Exceptions: Hard of hearing/hearing concerns           Objective:    Motor Speech  Apraxic Characteristics: None  Dysarthric Characteristics: None  Intelligibility: No impairment  Overall Impairment Severity: None    Auditory Comprehension  Comprehension: Within Functional Limits  Basic Questions: WFL    Expression  Primary Mode of Expression: Verbal    Verbal Expression  Verbal Expression: Within functional limits  Initiation: WFL  Repetition: WFL  Automatic Speech: WFL  Confrontation: WFL  Convergent: WFL  Divergent: WFL  Conversation: WFL    Written Expression  Dominant Hand: Right  Written Expression: Within Functional Limits    Cognition:      Orientation  Overall Orientation Status: Within Normal Limits  Attention  Attention: Exceptions to [facility]  Alternating Attention: Mild  Selective Attention: Mild  Memory  Memory: Exceptions to [facility]  Short-term Memory: Mild  Working Memory: Mild  Problem Solving  Problem Solving: Within Functional Limits  Numeric Reasoning  Numeric Reasoning: Exceptions to [facility]   Calculations: Mild  Money Management: Mild  Safety/Judgment  Safety/Judgment: Within Functional Limits    Additional Assessments:  Pt was administered the Cognitive Linguistic Quick Test Genevieve Parra) with the following results:    Attention- [facility]  Memory- [facility]  Executive Functions- WFL  Language- WFL  Visuospatial Skills- WFL  Composite Severity Rating- WFL  Clock Drawing Severity Rating- WFL    Patient was administered portions of the DEWAYNE (Assessment of Language-Related Functional Activities) with the following results:  Counting Money: 80%  Solving Daily Math Problems: 100%  Understanding Medication Labels: 90%      Prognosis:  Speech Therapy Prognosis  Prognosis: Good  Prognosis Considerations: Medical Diagnosis; Age;Family/Community Support  Individuals consulted  Consulted and agree with results and recommendations: Patient    Education:  Patient Education: Educated pt to purpose of visit, role of SLP, recommendations. Patient Education Response: Verbalizes understanding;Demonstrated understanding  Safety Devices in place: Yes  Type of devices: All fall risk precautions in place; Chair alarm in place; Left in chair;Call light within reach    Therapy Time:   Individual Concurrent Group Co-treatment   Time In 4517         Time Out 1345         Minutes 60            Total Treatment Time: 60  Coded treatment time: 15 minutes cognitive    Electronically signed by:  Collin Ospina M.A., [address] [place] [address]  Speech-Language Pathologist

## 2022-12-28 NOTE — PROGRESS NOTES
NURSING ASSESSMENT: ARU ADMISSION  The [address] Dx/Hx: SDH (subdural hematoma) [S06. Brooke Sullivan   [id number]  [facility]  Date of Admit: 2022  Room #: 3109/3109-01    Subjective:   Patient admitted to room 3109 @ [id number] from  [address] via wheelchair. Alert and oriented x4. Oriented to room and call light system. Oriented to rehab routine and therapy schedules. Informed about care conferences and ordering of meals. Drug / Medication Review:   Medications were reviewed by RN at time of admission  [x]  No potential or actual clinically significant medication issues were noted.      []   Yes, a clinically significant medication issue was identified                 []  Adverse Drug Event:                  []  Allergy:                  []  Side Effect:                  []  Ineffective Therapy:                  []  Drug Interaction:                 []  Duplicated Therapy:                 []  Untreated Indication:                  []  Non-adherence:                 []  Other:                  Nursing/Pharmacy contacted the physician:     Date:              Time:                  Actions recommended by physician were completed:   Date :            Time:    4 Eyes Skin Assessment   The patient is being assessed for: Admission     I agree that 2 RN's have performed a thorough Head to Toe Skin Assessment on the patient. ALL assessment sites listed below have been assessed. 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 Abdominal pannus right side excoriation and erythema; bilateral breast skin fold erythema and excoriation. Areas assessed by both nurses:   [x]   Head, Face, and Ears   [x]   Shoulders, Back, and Chest, Abdomen  [x]   Arms, Elbows, and Hands   [x]   Coccyx, Sacrum, and Ischium  [x]   Legs, Feet, and Heel     Does the Patient have Skin Breakdown?   Natacha Lee Prevention initiated:  No  Wound Care Orders initiated: Not Applicable      Robel Borges consulted for Pressure Injury (Stage 3,4, Unstageable, DTI, NWPT, Complex wounds)and New or Established Ostomies:  Not Applicable    Primary Nurse eSignature: Constance Montiel RN  Co-signer eSignature: Sunny Artis

## 2022-12-28 NOTE — PLAN OF CARE
ARU PATIENT TREATMENT PLAN  [address], [address]  [facility]    : 1945  Acct #: [de-identified]  MRN: [id number]  PHYSICIAN:  Katya Vargas DO  Primary Problem    Patient Active Problem List   Diagnosis    Hyperlipidemia    Hypertension    CAD (coronary artery disease)    Diabetes mellitus ([facility]    Hypothyroid    Allergic rhinitis    Sleep apnea    Metabolic encephalopathy    Hepatic steatosis    Cholelithiasis    Acute renal failure (ARF) (HCC)    Small bowel obstruction (HCC)    Ischemic chest pain (HCC)    Unstable angina (HCC)    Vomiting    Morbid obesity with BMI of 40.0-44.9, adult ([facility])    NSTEMI (non-ST elevated myocardial infarction) ([facility]    Angina pectoris, unspecified ([facility])    LBBB (left bundle branch block)    Atrial fibrillation with rapid ventricular response ([facility]    History of coronary artery bypass graft    Acute respiratory failure with hypoxia (HCC)    Acute hypoxemic respiratory failure ([facility])    Angina, class IV ([facility]    History of tobacco abuse    Diabetic foot infection ([facility]    Acute hematogenous osteomyelitis of left foot (HCC)    Atrial arrhythmia    Osteomyelitis of right foot (HCC)    Nausea    Intermittent complete heart block (HCC)    Syncope and collapse    CKD (chronic kidney disease) stage 3, GFR 30-59 ml/min ([facility])    S/P placement of cardiac pacemaker    Acute subdural hematoma    Abnormal ECG    SDH (subdural hematoma)       Rehabilitation Diagnosis:  Stroke, 1.2, Right Body (L Brain)  ADMIT DATE:2022    Patient Goals: \"to go home\"  Admitting Impairments: Decreased functional mobility ; Decreased ADL status; Decreased endurance;Decreased balance;Decreased high-level IADLs;Decreased posture;Decreased strength; mild cognitive impairments   Activity Restrictions: None  Participation Limitations: None   CARE PLAN   NURSING:  Pal Foreman while on this unit will:  [x] Be continent of bowel and bladder     [x] Have an adequate number of bowel movements  [] Urinate with no urinary retention >300ml in bladder  [] Complete bladder protocol with boo removal  [] Maintain O2 SATs at ___%  [x] Have pain managed while on ARU       [] Be pain free by discharge   [x] Have no skin breakdown while on ARU  [] Have improved skin integrity via wound measurements  [x] Have no signs/symptoms of infection at the wound site  [x] Be free from injury during hospitalization   [] Complete education with patient/family with understanding demonstrated for:  [] Adjustment   [] Other:     Nursing Interventions will include:  [x] bowel/bladder training   [x] education for medical assistive devices   [x] medication education   [] O2 saturation management   [x] energy conservation   [x] stress management techniques   [x] fall prevention   [x] alarms protocol   [x] seating and positioning   [x] skin/wound care   [x] pressure relief instruction   [] dressing changes     [x] infection protection   [x] DVT prophylaxis  [x] assistance with in room safety with transfers to bed, toilet, wheelchair, shower   [x] bathroom activities and hygiene  [] Other:    Patient/Caregiver Education for:  [x] Disease/sustained injury/management     [x] Medication Use  [x] Surgical intervention  [x] Safety  [x] Body mechanics and or joint protection  [x] Health maintenance     [] Other:     PHYSICAL THERAPY:  Goals:                  Short Term Goals  Time Frame for Short Term Goals: 10 days  Short Term Goal 1: Pt will complete bed mobility independently  Short Term Goal 2: Pt will compelte sit<>stand and pivot transfers with LRAD and mod I  Short Term Goal 3: Pt will complete 150' ambualtion with mod I and LRAD  Short Term Goal 4: Pt will complete ascent/descent of 10' ramp with mod I and LRAD               These goals were reviewed with this patient at the time of assessment and Andres Ruiz is in agreement.      Plan of Care: Pt to be seen 5 out of 7 days per week per ARU protocol (60 minutes with PT)                  Current Treatment Recommendations: Strengthening, Balance training, Gait training, Stair training, Functional mobility training, Transfer training, Endurance training, Home exercise program, Therapeutic activities, Neuromuscular re-education    OCCUPATIONAL THERAPY:  Goals:             Short Term Goals  Time Frame for Short Term Goals: 10 days-all ongoing  Short Term Goal 1: Pt will complete bathing with mod I  Short Term Goal 2: Pt will complete toileting and toilet transfer with mod I  Short Term Goal 3: Pt will complete IADL task with mod I  Short Term Goal 4: Pt will complete LB dressing with mod I :    :  These goals were reviewed with this patient at the time of assessment and Ryder Coleman is in agreement    Plan of Care:  Pt to be seen 5 out of 7 days per week per ARU protocol (60 minutes with OT)       SPEECH THERAPY: Goals will be left blank if speech is not following this patient. Goals:                           Short Term Goals  Goal 1: Pt will complete higher level executive function tasks with 100% accuracy. Goal 2: Pt will complete functional working/short term memory tasks with 100% accuracy and use of compensatory strategies as needed. Goal 3: Pt will participate in ongoing cognitive-linguistic assessment.                    Plan of Care:  Pt to be seen 5 out of 7 days per week per ARU protocol (60 minutes with SLP)    Therapy Treatments will include:  [x]  therapeutic exercises    [x]  gait training     [x]  neuromuscular re-ed                            [x]  transfer training             [] community reintegration    [x] bed mobility                          []  w/c mobility and training  [x]  self care    [x]home mgmt    [x]  cognitive training            [x]  energy conservation        []  dysphagia tx    []  speech/language/communication therapy   []  group therapy    [x]  patient/family education    [] Other:    CASE MANAGEMENT:  Goals: Assist patient/family with discharge planning, patient/family counseling, and coordination with insurance during ARU stay.     Admission Period/Goal QM SCORES  QM Admit/Goal Score   Eating CARE Score: 6 / Discharge Goal: Independent   Oral Hygiene CARE Score: 4 / Discharge Goal: Independent   Shower/Bathing CARE Score: 4 / Discharge Goal: Independent   UB Dressing CARE Score: 5 / Discharge Goal: Independent   LB Dressing CARE Score: 3 / Discharge Goal: Independent   Putting on/off Footwear CARE Score: 4 / Discharge Goal: Independent   Toileting Hygiene CARE Score: 4 / Discharge Goal: Independent   Bladder Continence      Bowel Continence      Toilet Transfers CARE Score: 4 / Discharge Goal: Independent   Shower/Bathe Self  CARE Score: 4 / Discharge Goal: Independent   Rolling Left and Right CARE Score: 4 / Discharge Goal: Independent   Sit to Lying CARE Score: 4 / Discharge Goal: Independent   Lying to Sitting on Bedside CARE Score: 4 / Discharge Goal: Independent   Sit to Stand CARE Score: 4 / Discharge Goal: Independent   Chair/Bed to Chair Transfer CARE Score: 4 / Discharge Goal: Independent   Car Transfers CARE Score: 3 / Discharge Goal: Independent   Walk 10 Feet CARE Score: 4 / Discharge Goal: Independent   Walk 50 Feet with Two Turns CARE Score: 4 / Discharge Goal: Independent   Walk 150 Feet CARE Score: 4 / Discharge Goal: Independent   Walk 10 Feet on Uneven Surfaces CARE Score: 4 / Discharge Goal: Independent   1 Step (Curb) CARE Score: 3 / Discharge Goal: Supervision or touching assistance   4 Steps CARE Score: 88 / Discharge Goal: Supervision or touching assistance   12 Steps CARE Score: 88 / Discharge Goal: Not Applicable   Picking up Object from Floor CARE Score: 88 / Discharge Goal: Independent   Wheel 50 Feet with 2 Turns   /     Type         [] Manual        [] Motorized        [] N/A   Wheel 150 Feet   /     Type         [] Manual        [] Motorized        [] N/A        Sav Walsh Zoraida Goldstein will be seen a minimum of 3 hours of therapy per day, a minimum of 5 out of 7 days per week (please see above for specific treatment plan per PT/OT/SLP). [] In this rare instance due to the nature of this patient's medical involvement, this patient will be seen 15 hours per week (900 minutes within a 7day period). In addition, dietician/nutritionist may monitor calorie count as well as intake and collaboratively work with SLP on dietary upgrades. Neuropsychology/Psychology may evaluate and provide necessary support. Medical issues being managed closely and that require 24hour availability of a physician:  [] Swallowing Precautions  [x] Bowel/Bladder Fx  [] Weight bearing precautions  [x] Wound Care    [x] Pain Mgmt   [x] Infection Protection  [x] DVT Prophylaxis   [x] Fall Precautions  [x] Fluid/Electrolyte/Nutrition Balance  [] Voice Protection   [] Respiratory  [] Other:    Medical Prognosis: [] Good  [x] Fair    [] Guarded   Total expected IRF days 10  Anticipated discharge destination:   [] Home Independently   [x] Home Modified Independent  [] Home with supervision    []SNF     [] Other                                           Physician anticipated functional outcomes:Pt will progress to a level of MOD I for all functional mobility and ADLs to allow for a safe return home. IPOC brief synthesis: 12/20 Jacy Mayer is 78 y.o. female with history of HTN, DM II, CAD and hypothyroid  She now presents to the ER with complaint of difficulty to get the words out.   It started yesterday and continues without improvement  Therefore she decided to come to the ED  She denies similar symptoms in the past  She denies any focal neurological deficits such as slurred speech, facial droop or weakness in any of the extremities  She just has difficulty finding and getting words out although she is able to speak  CT of the head shows acute on chronic subdural hematoma with midline shift  Therefore she was transferred from [facility] ED for further evaluation    This initial ARU patient treatment plan of care, together with the [facility] & the Education plan, form the foundation for the patient's plan of care. Weekly patient care conferences are held to evaluate progress towards the initial treatment plan & goals.     I have reviewed this initial plan of care and agree with its contents:    Title   Name    Date    Time    Physician:   Carney Eisenmenger, D.O. M.P.H  PM&R  12/30/2022  11:40 AM    Case Mgmt: DANYEL Elizabeth 12/30/22 0902    OT: Tabitha Carreon, OTR/L 12/28/22 @ 1153    PT: Bran Eden PT, DPT [id number] 12/28/22 @ 1628    RN: Taylor Sauer RN 12/28/22 1:26 AM    ST: Zana Malik CCC-SLP 12/28/22 @ 1554    ARU Supervisor: Jesus Neri, PT, DPT 12/29/2022 @

## 2022-12-28 NOTE — PROGRESS NOTES
Occupational Therapy  Facility/Department: [facility] ACUTE REHAB UNIT  Occupational Therapy Initial Assessment/Treatment    Name: Vinicius Dumont  : 1945  MRN: [id number]  Date of Service: 2022    Discharge Recommendations:  Continue to assess pending progress, 24 hour supervision or assist, Home with Home health OT  OT Equipment Recommendations  Equipment Needed: No       Patient Diagnosis(es): There were no encounter diagnoses. Past Medical History:  has a past medical history of A-fib ([facility] [address] Allergic rhinitis, Arthritis, CAD (coronary artery disease), Cardiac pacemaker, CHF (congestive heart failure) ([address] Cholelithiasis, Diabetes mellitus ([facility] [address] Encounter for imaging to screen for metal prior to MRI, ESRD (end stage renal disease) ([facility] [address] Gastroparesis, Hepatic steatosis, Hyperlipidemia, Hypertension, Peripheral neuropathy, Sleep apnea, Syncope, Thyroid disease, and Vitamin B12 deficiency. Past Surgical History:  has a past surgical history that includes Coronary artery bypass graft (); Foot surgery; colectomy (); Colonoscopy; Upper gastrointestinal endoscopy; Tonsillectomy and adenoidectomy; Tubal ligation; other surgical history (2012); other surgical history (Right, 3/20/13); Coronary angioplasty; Cholecystectomy; Cardiac catheterization (2017); Diagnostic Cardiac Cath Lab Procedure; Toe amputation (Left, 3/29/2021); eye surgery; Endoscopy, colon, diagnostic; hernia repair; and craniotomy (Left, 2022). Treatment Diagnosis: impaired ADLs and functional transfers 2/2 crani      Assessment   Performance deficits / Impairments: Decreased functional mobility ; Decreased ADL status; Decreased endurance;Decreased balance;Decreased high-level IADLs;Decreased posture;Decreased strength  Assessment: Pt lives alone at baseline and is typically independent with ADLs, uses power scooter vs rollator for mobility. Pt now s/p crani for evacuation of SDH.  Pt currently demonstrates impaired dynamic balance, strength, activity tolerance and slight aphasia and cognitive impairments. Pt requires increased assistance with ADLs, functional mobility and functional transfers as a result. Pt would benefit from ongoing skilled OT to maximize her safety and independence as pt lives alone. Treatment Diagnosis: impaired ADLs and functional transfers 2/2 crani  Prognosis: Good  Decision Making: Medium Complexity  REQUIRES OT FOLLOW-UP: Yes  Activity Tolerance  Activity Tolerance: Patient Tolerated treatment well  Activity Tolerance Comments: pt demo QUICK with activity but tolerated all tasks well        Plan   Occupational Therapy Plan  Times Per Week: 60 minutes/day x 5 days/week  Current Treatment Recommendations: Strengthening, Functional mobility training, Balance training, Endurance training, Neuromuscular re-education, Safety education & training, Patient/Caregiver education & training, Equipment evaluation, education, & procurement, Self-Care / ADL, Home management training     Restrictions  Position Activity Restriction  Other position/activity restrictions: up as tolerated    Subjective   General  Chart Reviewed: Yes  Patient assessed for rehabilitation services?: Yes  Additional Pertinent Hx: 79 yo female present to ED 12/19 p/w aphasia. CT head (+) Acute on chronic subdural hematoma overlying the left frontal and parietal lobes. 12/21 s/p LEFT TREPHINE CRANIOTOMY FOR SUBDURAL HEMATOMA EVACUATION PMHx: afib, CAD, CHF, DM, HTN  Family / Caregiver Present: No  Referring Practitioner: Dr Mello Meléndez  Diagnosis: SDH  Subjective  Subjective: Pt sitting in bedside recliner upon arrival, pleasant and agreeable to OT evaluation.      Social/Functional History  Social/Functional History  Lives With: Alone  Type of Home: House  Home Layout: One level  Home Access: Ramped entrance  Bathroom Shower/Tub: Walk-in shower (GB from shower and cabinet for leverage)  Bathroom Toilet: Handicap height (BSC next to bed for night time toileting needs)  Bathroom Equipment: Built-in shower seat, Grab bars in shower, Hand-held shower  Bathroom Accessibility: Accessible  Home Equipment: Cane, Electric scooter, Walker, [place], Walker, 4 wheeled  Has the patient had two or more falls in the past year or any fall with injury in the past year?: No  Receives Help From: Family, Friend(s)  ADL Assistance: Independent  Homemaking Assistance: Independent  Homemaking Responsibilities: Yes  Meal Prep Responsibility: Primary  Laundry Responsibility: Primary  Cleaning Responsibility: Primary  Bill Paying/Finance Responsibility: Primary  Shopping Responsibility: No (receives assistance from friends/family)  Health Care Management: Primary  Ambulation Assistance: Independent (Uses a electric scooter in the house and rollator in the community. 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 She typically leaves the home every few days; neighbor and nephew have been taking care of house while she's been gone, reports friend friend could come and stay with her (sometimes gets confused because hx of stroke but moves well and drives-the friend)       Objective   Heart Rate: 59  Heart Rate Source: Monitor  BP: (!) 157/70  BP Location: Right upper arm  BP Method: Automatic  Patient Position: Sitting;Up in chair  MAP (Calculated): 99  Resp: 16  SpO2: 97 %  O2 Device: None (Room air)          Observation/Palpation  Observation: staples to scalp  Safety Devices  Type of Devices: Call light within reach; Chair alarm in place; Left in chair  Gait  Overall Level of Assistance: Contact-guard assistance (to/from bathroom using RW)  Toilet Transfers  Toilet - Technique: Ambulating  Equipment Used: Standard toilet  Toilet Transfer: Contact guard assistance  Toilet Transfers Comments: use of GB on L  Shower Transfers  Shower - Transfer From: Basil Solders - Transfer Type: To and From  Shower - Transfer To: Transfer tub bench  Shower - Technique: Ambulating  Shower Transfers: Contact Guard  Shower Transfers Comments: use of GB for descent; CGA to stand from TTB using GB  AROM: Within functional limits  Strength: Within functional limits  Coordination: Within functional limits  Tone: Normal  Sensation: Impaired (neuropathy to BUE and BLE)      ADL  Grooming: Stand by assistance  Grooming Skilled Clinical Factors: oral hygiene in stance at sink; pt combed hair seated i'ly  UE Bathing: Stand by assistance  UE Bathing Skilled Clinical Factors: seated on TTB, pt reports sitting to bathe at baseline  LE Bathing: Contact guard assistance; Increased time to complete;Verbal cueing  LE Bathing Skilled Clinical Factors: seated on TTB, figure 4 and trunk flexion to wash lower limbs and feet, in stance to wash/dry buttocks with unilateral UE support on GB and CGA  UE Dressing: Setup  UE Dressing Skilled Clinical Factors: to doff/don sweatshirt  LE Dressing: Minimal assistance  LE Dressing Skilled Clinical Factors: pt doffed briefs and pants seated on commode via trunk flexion with + time and SBA, pt req min A to thread RLE into brief after + time and effort, CGA to thread pants and to manage over hips in stance  Toileting: Contact guard assistance  Toileting Skilled Clinical Factors: pt continent of bladder, perihygiene seated, LB clothing mgmt off hips in stance with CGA               Transfers  Sit to stand: Contact guard assistance (from recliner to RW)  Stand to sit: Contact guard assistance  Vision  Vision: Impaired  Vision Exceptions: Wears glasses for reading  Hearing  Hearing: Within functional limits  Hearing Exceptions: Hard of hearing/hearing concerns  Cognition  Overall Cognitive Status: WFL  Orientation  Overall Orientation Status: Within Functional Limits  Orientation Level: Oriented X4                  Education Given To: Patient  Education Provided: Role of Therapy;Plan of Care;ADL Adaptive Strategies;Precautions;Transfer Training  Education Method: Verbal  Barriers to Learning: None  Education Outcome: Verbalized understanding;Continued education needed                             Goals  Short Term Goals  Time Frame for Short Term Goals: 10 days  Short Term Goal 1: Pt will complete bathing with mod I  Short Term Goal 2: Pt will complete toileting and toilet transfer with mod I  Short Term Goal 3: Pt will complete IADL task with mod I  Short Term Goal 4: Pt will complete LB dressing with mod I  Patient Goals   Patient goals : \"to go home\"       Therapy Time   Individual Concurrent Group Co-treatment   Time In 1100         Time Out 1200         Minutes 60         Timed Code Treatment Minutes: [address] [facility], OT

## 2022-12-28 NOTE — PROGRESS NOTES
Department of Physical Medicine & Rehabilitation  Progress Note    Patient Identification:  Arturo Castro  [id number]  : 1945  Admit date: 2022    Chief Complaint: SDH (subdural hematoma)    Subjective:   No acute events over night. Patient slept well at night. She was seen this morning. She is feeling great and said that she had a physical therapy session this morning. Her BP was 157/70 but the nurse just gave her BP medications. ROS: No f/c, n/v, cp     Objective:  Patient Vitals for the past 24 hrs:   BP Temp Temp src Pulse Resp SpO2 Height Weight   22 1018 (!) 157/70 97.7 °F (36.5 °C) Oral 59 16 97 % -- --   22 0545 -- -- -- -- -- -- -- 192 lb 7.4 oz (87.3 kg)   22 1949 (!) 147/73 98.1 °F (36.7 °C) Oral 63 16 96 % -- --   22 1715 (!) 149/72 98 °F (36.7 °C) Oral 60 16 99 % 5' 3\" (1.6 m) 193 lb 12.6 oz (87.9 kg)       Const: Alert. No distress, pleasant. HEENT: Normocephalic, atraumatic. Normal sclera/conjunctiva. MMM. CV: Regular rate and rhythm. Resp: No respiratory distress. Lungs CTAB. Abd: Soft, nontender, nondistended, NABS+   Ext: + LE edema  Neuro: Alert, oriented, appropriately interactive. Psych: Cooperative, appropriate mood and affect    Laboratory data: Available via EMR.    Last 24 hour lab  Recent Results (from the past 24 hour(s))   POCT Glucose    Collection Time: 22 11:39 AM   Result Value Ref Range    POC Glucose 188 (H) 70 - 99 mg/dl    Performed on ACCU-CHEK    COVID-19, Rapid    Collection Time: 22  3:07 PM    Specimen: Nasopharyngeal Swab   Result Value Ref Range    SARS-CoV-2, NAAT Not Detected Not Detected   POCT Glucose    Collection Time: 22  4:38 PM   Result Value Ref Range    POC Glucose 196 (H) 70 - 99 mg/dl    Performed on ACCU-CHEK    POCT Glucose    Collection Time: 22 10:30 PM   Result Value Ref Range    POC Glucose 172 (H) 70 - 99 mg/dl    Performed on ACCU-CHEK    Basic Metabolic Panel w/ Reflex to MG    Collection Time: 12/28/22  7:16 AM   Result Value Ref Range    Sodium 140 136 - 145 mmol/L    Potassium reflex Magnesium 4.2 3.5 - 5.1 mmol/L    Chloride 100 99 - 110 mmol/L    CO2 30 21 - 32 mmol/L    Anion Gap 10 3 - 16    Glucose 209 (H) 70 - 99 mg/dL    BUN 33 (H) 7 - 20 mg/dL    Creatinine 1.1 0.6 - 1.2 mg/dL    Est, Glom Filt Rate 52 (A) >60    Calcium 9.1 8.3 - 10.6 mg/dL   CBC auto differential    Collection Time: 12/28/22  7:16 AM   Result Value Ref Range    WBC 10.6 4.0 - 11.0 K/uL    RBC 5.20 4.00 - 5.20 M/uL    Hemoglobin 15.6 12.0 - 16.0 g/dL    Hematocrit 46.3 36.0 - 48.0 %    MCV 88.9 80.0 - 100.0 fL    MCH 30.1 26.0 - 34.0 pg    MCHC 33.8 31.0 - 36.0 g/dL    RDW 14.9 12.4 - 15.4 %    Platelets 333 456 - 472 K/uL    MPV 9.0 5.0 - 10.5 fL    Neutrophils % 86.0 %    Lymphocytes % 9.0 %    Monocytes % 3.0 %    Eosinophils % 0.0 %    Basophils % 0.0 %    Neutrophils Absolute 9.2 (H) 1.7 - 7.7 K/uL    Lymphocytes Absolute 1.1 1.0 - 5.1 K/uL    Monocytes Absolute 0.3 0.0 - 1.3 K/uL    Eosinophils Absolute 0.0 0.0 - 0.6 K/uL    Basophils Absolute 0.0 0.0 - 0.2 K/uL    Bands Relative 1 0 - 7 %    Atypical Lymphocytes Relative 1 0 - 6 %    RBC Morphology Normal    POCT Glucose    Collection Time: 12/28/22  7:55 AM   Result Value Ref Range    POC Glucose 172 (H) 70 - 99 mg/dl    Performed on ACCU-CHEK        Therapy progress:  PT  Position Activity Restriction  Other position/activity restrictions: up as tolerated  Objective           OT        Assessment        SLP          Body mass index is 34.09 kg/m².     Assessment and Plan:  -Acute on chronic subdural hematoma s/p Left frontal trephine craniotomy for evacuation 12/21  -Essential HTN-controlled-on coreg  -CAD s/p CABG 2005,EF 55-60%-antiplatelets held,on coreg  -Hx Complete heart block s/p PPM  -Paroxsymal a fib-stable-eliquis held  -Obesity BMI 35  -Hypothyroidism-on  synthroid  -DM type 2-gliplizide,trulicity on hold since admission Plan  Continue postop care per neurosurgery   Holding all antipaletelets and anticoagulants   C/w Keppra and decadron neurosurgery   C/w neurochecks  Continue basal bolus insulin\"     Impairments- Decreased functional mobility, Decreased ADLs     Recommendations:  Continue therapy    Rehab Progress: Improving  Anticipated Dispo: home  Services/DME: TBD  ELOS: 5-7 days    Dana Cardona MD, PGY3

## 2022-12-28 NOTE — H&P
Department of Physical Medicine & Rehabilitation  History & Physical      Patient Identification:  Lester Arteaga  : 1945  Admit date: 2022   Attending provider: Nely Short DO        Primary care provider: DARIA Simms CNP     Chief Complaint: SDH    History of Present Illness/Hospital Course:   Mary Carmen Wallace is 78 y.o. female with history of HTN, DM II, CAD and hypothyroid  She now presents to the ER with complaint of difficulty to get the words out. It started yesterday and continues without improvement  Therefore she decided to come to the ED  She denies similar symptoms in the past  She denies any focal neurological deficits such as slurred speech, facial droop or weakness in any of the extremities  She just has difficulty finding and getting words out although she is able to speak  CT of the head shows acute on chronic subdural hematoma with midline shift  Therefore she was transferred from [facility] ED for further evaluation    Prior Level of Function:  Independent for mobility, ADLs, and IADLs    Current Level of Function:  Mod assist     Pertinent Social History:  Lives at home with limited help     Past Medical History:   Diagnosis Date    A-fib [facility])     Allergic rhinitis     Arthritis     CAD (coronary artery disease)     Cardiac pacemaker     CHF (congestive heart failure) ([facility]     Cholelithiasis 2015    Diabetes mellitus ([facility]     Accuchecks daily once a day at home in A.M. Encounter for imaging to screen for metal prior to MRI 2022    MRI Conditional Medtronic [place] XT DR model#W1DR01  Leads: RA [id number] RV [id number] implanted 22. Normal lmode. 1.5T or 3.0T. Pt must be A/OX4 per [employer] guidelines. [employer] rep and RN must be present. Follow all other [employer] guidelines.  Pt currently follows     ESRD (end stage renal disease) ([facility]     Gastroparesis     Hepatic steatosis 2015    Hyperlipidemia     Hypertension Peripheral neuropathy     Sleep apnea     Has used the CPAP x 25 yrs now per pt. Syncope     Thyroid disease     Vitamin B12 deficiency      Fall in past year: yes    Past Surgical History:   Procedure Laterality Date    CARDIAC CATHETERIZATION  2017    SVG to OM2 100% ostium    CHOLECYSTECTOMY      COLECTOMY  1998    diverticulitis     COLONOSCOPY      CORONARY ANGIOPLASTY      CORONARY ARTERY BYPASS GRAFT  2005    x 3    CRANIOTOMY Left 2022    LEFT TREPHINE CRANIOTOMY FOR SUBDURAL HEMATOMA EVACUATION performed by Lashell Palumbo. Orville Ontiveros MD at [facility] CATH LAB PROCEDURE      ENDOSCOPY, COLON, DIAGNOSTIC      EYE SURGERY      Bilateral cataracts removed with lens implants    FOOT SURGERY      Multiple foot surgeries bilateral    HERNIA REPAIR      OTHER SURGICAL HISTORY  2012    achilles tendon lengthening,arthroplasty,matatarsalhead resection    OTHER SURGICAL HISTORY Right 3/20/13    FUSION OF RIGHT GREAT TOE JOINT WITH WIRE FIXATION, DEBRIEDMENT OF WOUND RIGHT GREAT TOE    TOE AMPUTATION Left 3/29/2021    PARTIAL LEFT FOOT AMPUTATION performed by General Corwin DPM at [address]      Laparoscopic    UPPER GASTROINTESTINAL ENDOSCOPY       Major Surgery in past 100 days: yes    Family History   Problem Relation Age of Onset    Stroke Mother     Coronary Art Dis Father     Coronary Art Dis Sister     Coronary Art Dis Brother        Social History     Socioeconomic History    Marital status:     Tobacco Use    Smoking status: Former     Packs/day: 1.00     Years: 30.00     Pack years: 30.00     Types: Cigarettes     Quit date: 1998     Years since quittin.5    Smokeless tobacco: Never   Vaping Use    Vaping Use: Never used   Substance and Sexual Activity    Alcohol use: No    Drug use: No    Sexual activity: Not Currently       Allergies   Allergen Reactions    Niacin And Related Anaphylaxis    Ranexa [Ranolazine] Hallucinations    Adhesive Tape      Causes rash and blisters. Can use paper tape or opsite without any problems. Other      QVacin IV antibiotic had after foot surgery placed on this for infection. Developed high fever, chills and uncontrollable shaking.     Statins Depletion Therapy      Deep muscle pain    Vancomycin      Does not recall reaction          Current Facility-Administered Medications   Medication Dose Route Frequency Provider Last Rate Last Admin    acetaminophen (TYLENOL) tablet 650 mg  650 mg Oral Q4H PRN Eliud Delgadois, DO        enoxaparin (LOVENOX) injection 40 mg  40 mg SubCUTAneous Daily Eliud Vargas, DO        bisacodyl (DULCOLAX) EC tablet 5 mg  5 mg Oral Daily Eliud Vargas, DO        magnesium hydroxide (MILK OF MAGNESIA) 400 MG/5ML suspension 30 mL  30 mL Oral Daily PRN Eliud Vargas, DO        polyethylene glycol (GLYCOLAX) packet 17 g  17 g Oral Daily PRN Eliud Vargas, DO        naloxone (NARCAN) injection 0.2 mg  0.2 mg IntraVENous PRN Eliud Vargas, DO        oxyCODONE (ROXICODONE) immediate release tablet 5 mg  5 mg Oral Q4H PRN Eliud Vargas, DO        insulin lispro (1 Unit Dial) (HUMALOG/ADMELOG) pen 0-4 Units  0-4 Units SubCUTAneous Nightly Eliud Vargas, DO        insulin lispro (1 Unit Dial) (HUMALOG/ADMELOG) pen 0-8 Units  0-8 Units SubCUTAneous TID  Eliud Vargas, DO        sodium chloride flush 0.9 % injection 5-40 mL  5-40 mL IntraVENous 2 times per day Eliud Vargas, DO   10 mL at 12/27/22 2231    sodium chloride flush 0.9 % injection 5-40 mL  5-40 mL IntraVENous PRN Eliud Vargas, DO        carvedilol (COREG) tablet 6.25 mg  6.25 mg Oral BID  Eliud Vargas, DO        dexamethasone (DECADRON) injection 1 mg  1 mg IntraVENous BID Eliud Vargas, DO        diphenhydrAMINE (BENADRYL) tablet 25 mg  25 mg Oral Nightly PRN Eliud Vargas, DO        furosemide (LASIX) tablet 40 mg  40 mg Oral Daily Eliud Vargas, DO        gabapentin (NEURONTIN) capsule 300 mg  300 mg Oral TID Janit Hook Heis, DO   300 mg at 12/27/22 2231    hydrALAZINE (APRESOLINE) injection 5 mg  5 mg IntraVENous Q4H PRN Eliud L Heis, DO        insulin glargine (LANTUS;BASAGLAR) injection pen 8 Units  8 Units SubCUTAneous Nightly Janit Hook Heis, DO   8 Units at 12/27/22 2232    isosorbide mononitrate (IMDUR) extended release tablet 120 mg  120 mg Oral Daily Eliud L Heis, DO        levETIRAcetam (KEPPRA) tablet 1,000 mg  1,000 mg Oral BID Eliud L Heis, DO   1,000 mg at 12/27/22 2231    levothyroxine (SYNTHROID) tablet 50 mcg  50 mcg Oral Daily Eliud L Heis, DO   50 mcg at 12/28/22 0551    methocarbamol (ROBAXIN) tablet 750 mg  750 mg Oral 4x Daily Eliud L Heis, DO   750 mg at 12/27/22 2231    nitroGLYCERIN (NITROSTAT) SL tablet 0.4 mg  0.4 mg SubLINGual Q5 Min PRN Eliud L Heis, DO        pantoprazole (PROTONIX) tablet 40 mg  40 mg Oral Daily Eliud L Heis, DO        prochlorperazine (COMPAZINE) injection 10 mg  10 mg IntraVENous Q6H PRN Eliud L Heis, DO        Or    prochlorperazine (COMPAZINE) tablet 10 mg  10 mg Oral Q6H PRN Eliud L Heis, DO        trospium (SANCTURA) tablet 20 mg  20 mg Oral Nightly Eliud L Heis, DO   20 mg at 12/27/22 2231    glucose chewable tablet 16 g  4 tablet Oral PRN Eliud L Heis, DO        dextrose bolus 10% 125 mL  125 mL IntraVENous PRN Eliud L Heis, DO        Or    dextrose bolus 10% 250 mL  250 mL IntraVENous PRN Eliud L Heis, DO        glucagon injection 1 mg  1 mg SubCUTAneous PRN Eliud L Heis, DO        dextrose 10 % infusion   IntraVENous Continuous PRN Eliud RICHMOND Heis, DO             REVIEW OF SYSTEMS:   CONSTITUTIONAL: negative for fevers, chills, diaphoresis, appetite change, night sweats, unexpected weight change, fatigue  EYES: negative for blurred vision, eye discharge, visual disturbance and icterus  HEENT: negative for hearing loss, tinnitus, ear drainage, sinus pressure, nasal congestion, epistaxis and snoring  RESPIRATORY: Negative for hemoptysis, cough, sputum production  CARDIOVASCULAR: negative for chest pain, palpitations, exertional chest pressure/discomfort, syncope, edema  GASTROINTESTINAL: negative for nausea, vomiting, diarrhea, blood in stool, abdominal pain, constipation  GENITOURINARY: negative for frequency, dysuria, urinary incontinence, decreased urine volume, and hematuria  HEMATOLOGIC/LYMPHATIC: negative for easy bruising, bleeding and lymphadenopathy  ALLERGIC/IMMUNOLOGIC: negative for recurrent infections, angioedema, anaphylaxis and drug reactions  ENDOCRINE: negative for weight changes and diabetic symptoms including polyuria, polydipsia and polyphagia  MUSCULOSKELETAL: negative for pain, joint swelling, decreased range of motion  NEUROLOGICAL: negative for headaches, slurred speech, unilateral weakness  PSYCHIATRIC/BEHAVIORAL: negative for hallucinations, behavioral problems, confusion and agitation. All pertinent positives are noted in the HPI. Physical Examination:  Vitals: Patient Vitals for the past 24 hrs:   BP Temp Temp src Pulse Resp SpO2 Height Weight   12/28/22 0545 -- -- -- -- -- -- -- 192 lb 7.4 oz (87.3 kg)   12/27/22 1949 (!) 147/73 98.1 °F (36.7 °C) Oral 63 16 96 % -- --   12/27/22 1715 (!) 149/72 98 °F (36.7 °C) Oral 60 16 99 % 5' 3\" (1.6 m) 193 lb 12.6 oz (87.9 kg)       Const: Alert. WDWN. No distress  Eyes: Conjunctiva noninjected, no icterus noted; pupils equal, round, and reactive to light. HENT: Atraumatic, normocephalic; Oral mucosa moist  Neck: Trachea midline, neck supple. No thyromegaly noted. CV: Regular rate and rhythm, no murmur rub or gallop noted  Resp: Lungs clear to auscultation bilaterally, no rales wheezes or ronchi, no retractions. Respirations unlabored. GI: Soft, nontender, nondistended. Normal bowel sounds. No palpable masses. Skin: Normal temperature and turgor. No rashes or breakdown noted. Ext: No significant edema appreciated. No varicosities.   MSK: No joint tenderness, erythema, warmth noted. AROM intact. Neuro: Alert, oriented, appropriate. No cranial nerve deficits appreciated. Sensation intact to light touch. Motor examination reveals normal strength in all four limbs diffusely. No abnormalities with finger/nose or heel/shin noted. Reflexes normal and symmetric. Psych: Stable mood, normal judgement, normal affect     Lab Results   Component Value Date    WBC 10.6 12/28/2022    HGB 15.6 12/28/2022    HCT 46.3 12/28/2022    MCV 88.9 12/28/2022     12/28/2022     Lab Results   Component Value Date    INR 1.26 (H) 12/22/2022    INR 1.23 (H) 12/21/2022    INR 1.25 (H) 12/20/2022    PROTIME 15.8 (H) 12/22/2022    PROTIME 15.4 (H) 12/21/2022    PROTIME 15.6 (H) 12/20/2022     Lab Results   Component Value Date    CREATININE 1.1 12/28/2022    BUN 33 (H) 12/28/2022     12/28/2022    K 4.2 12/28/2022     12/28/2022    CO2 30 12/28/2022     Lab Results   Component Value Date    ALT 13 12/19/2022    AST 17 12/19/2022    ALKPHOS 125 12/19/2022    BILITOT 0.8 12/19/2022         No orders to display           The above laboratory data have been reviewed. The above imaging data have been reviewed. The above medical testing have been reviewed. Body mass index is 34.09 kg/m². POST ADMISSION PHYSICIAN EVALUATION  The patient has agreed to being admitted to our comprehensive inpatient rehabilitation facility and can tolerate the intensity of service consisting of at least:  --180 minutes of therapy a day, 5 out of 7 days a week. OR  --15 hours of intensive therapy within a 7 consecutive day period.      The patient/family has a good understanding of our discharge process and will benefit from an interdisciplinary inpatient rehabilitation program. The patient has potential to make improvement and is in need of at least two of the following multidisciplinary therapies including but not limited to physical, occupational, respiratory, and speech, nutritional services, wound care, and prosthetics and orthotics. Given the patients complex condition and risk of further medical complications, rehabilitation services cannot be safely provided at a lower level of care such as a skilled nursing facility. All of the goals listed below were reviewed with the patient and he/she is in agreement. I have compared the patients medical and functional status at the time of the preadmission screening and the same on this date, and there are no significant changes. By signing this document, I acknowledge that I have personally performed a full physical examination on this patient within 24 hours of admission to this inpatient rehabilitation facility and have determined the patient to be able to tolerate the above course of treatment at an intensive level for a reasonable period of time. I will be completing a detailed individualized  Plan of Care for this patient by day four of the patients stay based upon the Preadmission Screen, this Post-Admission Evaluation, and the therapy evaluations.      Barriers: Decreased functional mobility, medical comorbidities  Services Required: PT, OT, SLP  Goals: mod I  Prognosis: Good  Anticipated Dispo: home  ELOS: TBD    Rehabilitation Diagnosis:   Stroke, 1.2, Right Body (L Brain)      Assessment and Plan:  \"-Acute on chronic subdural hematoma s/p Left frontal trephine craniotomy for evacuation 12/21  - PT/OT/SLP  - keppra 1000mg BID  - decadron- 1mg BID- 2 doses   - hold eliquis     -Essential HTN-controlled-on coreg    -CAD s/p CABG 2005,EF 55-60%-antiplatelets held,on coreg    -Hx Complete heart block s/p PPM    -Paroxsymal a fib-stable-eliquis held    -Obesity BMI 35  - counseling     -Hypothyroidism-on  synthroid    -DM type 2-gliplizide,trulicity on hold since admission   - basal insulin on inpatient         Impairments:Decreased functional mobility, Decreased ADLs    Bladder - high risk retention - Monitor PVRs, SC prn >300cc    Bowel - high risk constipation - colace BID, PRN miralax and MoM. follow bowel movements. Enema or suppository if needed.      Safety - fall precautions    PPx  DVT: lovenox  GI: pantoprazole    FULL CODE    Kristin Ball D.O. M.P.H  PM&R  12/28/2022  10:15 AM

## 2022-12-29 LAB
GLUCOSE BLD-MCNC: 146 MG/DL (ref 70–99)
GLUCOSE BLD-MCNC: 168 MG/DL (ref 70–99)
GLUCOSE BLD-MCNC: 208 MG/DL (ref 70–99)
GLUCOSE BLD-MCNC: 259 MG/DL (ref 70–99)
PERFORMED ON: ABNORMAL

## 2022-12-29 PROCEDURE — 6370000000 HC RX 637 (ALT 250 FOR IP): Performed by: PHYSICAL MEDICINE & REHABILITATION

## 2022-12-29 PROCEDURE — 2580000003 HC RX 258: Performed by: PHYSICAL MEDICINE & REHABILITATION

## 2022-12-29 PROCEDURE — 97116 GAIT TRAINING THERAPY: CPT

## 2022-12-29 PROCEDURE — 6360000002 HC RX W HCPCS: Performed by: NURSE PRACTITIONER

## 2022-12-29 PROCEDURE — 97130 THER IVNTJ EA ADDL 15 MIN: CPT

## 2022-12-29 PROCEDURE — 6360000002 HC RX W HCPCS: Performed by: PHYSICAL MEDICINE & REHABILITATION

## 2022-12-29 PROCEDURE — 97129 THER IVNTJ 1ST 15 MIN: CPT

## 2022-12-29 PROCEDURE — 97530 THERAPEUTIC ACTIVITIES: CPT

## 2022-12-29 PROCEDURE — 99232 SBSQ HOSP IP/OBS MODERATE 35: CPT | Performed by: PHYSICAL MEDICINE & REHABILITATION

## 2022-12-29 PROCEDURE — 6370000000 HC RX 637 (ALT 250 FOR IP)

## 2022-12-29 PROCEDURE — 97535 SELF CARE MNGMENT TRAINING: CPT

## 2022-12-29 PROCEDURE — 1280000000 HC REHAB R&B

## 2022-12-29 RX ORDER — DEXAMETHASONE 4 MG/1
2 TABLET ORAL DAILY
Status: COMPLETED | OUTPATIENT
Start: 2022-12-29 | End: 2022-12-30

## 2022-12-29 RX ORDER — TROSPIUM CHLORIDE 20 MG/1
20 TABLET, FILM COATED ORAL DAILY
Status: DISCONTINUED | OUTPATIENT
Start: 2022-12-29 | End: 2022-12-31 | Stop reason: SDUPTHER

## 2022-12-29 RX ORDER — DEXAMETHASONE 0.5 MG/1
0.5 TABLET ORAL
Status: DISCONTINUED | OUTPATIENT
Start: 2022-12-29 | End: 2022-12-29

## 2022-12-29 RX ADMIN — METHOCARBAMOL TABLETS 750 MG: 750 TABLET, COATED ORAL at 19:05

## 2022-12-29 RX ADMIN — INSULIN LISPRO 2 UNITS: 100 INJECTION, SOLUTION INTRAVENOUS; SUBCUTANEOUS at 18:28

## 2022-12-29 RX ADMIN — CARVEDILOL 6.25 MG: 6.25 TABLET, FILM COATED ORAL at 08:27

## 2022-12-29 RX ADMIN — CARVEDILOL 6.25 MG: 6.25 TABLET, FILM COATED ORAL at 19:05

## 2022-12-29 RX ADMIN — LEVETIRACETAM 1000 MG: 500 TABLET, FILM COATED ORAL at 21:17

## 2022-12-29 RX ADMIN — DEXAMETHASONE 0.5 MG: 0.5 TABLET ORAL at 08:32

## 2022-12-29 RX ADMIN — TROSPIUM CHLORIDE 20 MG: 20 TABLET, FILM COATED ORAL at 08:27

## 2022-12-29 RX ADMIN — INSULIN GLARGINE 8 UNITS: 100 INJECTION, SOLUTION SUBCUTANEOUS at 21:20

## 2022-12-29 RX ADMIN — GABAPENTIN 300 MG: 300 CAPSULE ORAL at 21:17

## 2022-12-29 RX ADMIN — DEXAMETHASONE 0.5 MG: 0.5 TABLET ORAL at 12:06

## 2022-12-29 RX ADMIN — SODIUM CHLORIDE, PRESERVATIVE FREE 10 ML: 5 INJECTION INTRAVENOUS at 21:19

## 2022-12-29 RX ADMIN — LEVETIRACETAM 1000 MG: 500 TABLET, FILM COATED ORAL at 08:28

## 2022-12-29 RX ADMIN — PANTOPRAZOLE SODIUM 40 MG: 40 TABLET, DELAYED RELEASE ORAL at 08:28

## 2022-12-29 RX ADMIN — SODIUM CHLORIDE, PRESERVATIVE FREE 10 ML: 5 INJECTION INTRAVENOUS at 12:06

## 2022-12-29 RX ADMIN — GABAPENTIN 300 MG: 300 CAPSULE ORAL at 08:28

## 2022-12-29 RX ADMIN — DEXAMETHASONE 2 MG: 4 TABLET ORAL at 18:28

## 2022-12-29 RX ADMIN — GABAPENTIN 300 MG: 300 CAPSULE ORAL at 14:56

## 2022-12-29 RX ADMIN — LEVOTHYROXINE SODIUM 50 MCG: 50 TABLET ORAL at 06:38

## 2022-12-29 RX ADMIN — ACETAMINOPHEN 650 MG: 325 TABLET ORAL at 21:24

## 2022-12-29 RX ADMIN — METHOCARBAMOL TABLETS 750 MG: 750 TABLET, COATED ORAL at 12:06

## 2022-12-29 RX ADMIN — METHOCARBAMOL TABLETS 750 MG: 750 TABLET, COATED ORAL at 08:27

## 2022-12-29 RX ADMIN — ISOSORBIDE MONONITRATE 120 MG: 30 TABLET, EXTENDED RELEASE ORAL at 08:27

## 2022-12-29 RX ADMIN — ENOXAPARIN SODIUM 40 MG: 100 INJECTION SUBCUTANEOUS at 08:28

## 2022-12-29 RX ADMIN — FUROSEMIDE 40 MG: 40 TABLET ORAL at 08:27

## 2022-12-29 ASSESSMENT — PAIN DESCRIPTION - ORIENTATION: ORIENTATION: UPPER;LEFT

## 2022-12-29 ASSESSMENT — PAIN DESCRIPTION - LOCATION
LOCATION: HEAD
LOCATION: HEAD

## 2022-12-29 ASSESSMENT — PAIN SCALES - GENERAL
PAINLEVEL_OUTOF10: 3
PAINLEVEL_OUTOF10: 2
PAINLEVEL_OUTOF10: 0

## 2022-12-29 NOTE — PLAN OF CARE

## 2022-12-29 NOTE — PLAN OF CARE
Problem: Discharge Planning  Goal: Discharge to home or other facility with appropriate resources  12/29/2022 9316 by Socorro Singer RN  Outcome: Progressing     Problem: Skin/Tissue Integrity  Goal: Absence of new skin breakdown  Description: 1. Monitor for areas of redness and/or skin breakdown  2. Assess vascular access sites hourly  3. Every 4-6 hours minimum:  Change oxygen saturation probe site  4. Every 4-6 hours:  If on nasal continuous positive airway pressure, respiratory therapy assess nares and determine need for appliance change or resting period. 12/29/2022 2381 by Socorro Singer RN  Outcome: Progressing  Note: Redness to groin and abdominal folds. Interdry placed. Problem: Safety - Adult  Goal: Free from fall injury  12/29/2022 0852 by Socorro Singer RN  Outcome: Progressing  Note: No falls since admission. Patient A/Ox4. Fall precautions in place. Call light in reach. Calls out for assistance. Chair alarm activated. Non skid footwear on. For patient safety, Visual Surveillance is in place, reviewed with patient/family, verbalized understanding.        Problem: ABCDS Injury Assessment  Goal: Absence of physical injury  12/29/2022 4312 by Socorro Singer RN  Outcome: Progressing     Problem: Nutrition Deficit:  Goal: Optimize nutritional status  12/29/2022 0852 by Socorro Singer RN  Outcome: Progressing

## 2022-12-29 NOTE — PROGRESS NOTES
Physical Therapy  Facility/Department: [facility] ACUTE REHAB UNIT  Rehabilitation Physical Therapy Treatment Note    NAME: Arturo Castro  : 1945 (78 y.o.)  MRN: [id number]  CODE STATUS: Full Code    Date of Service: 22    Restrictions:  Position Activity Restriction  Other position/activity restrictions: up as tolerated     SUBJECTIVE  Subjective  Subjective: Pt seated in recliner upon approavh and agreeable to PT  Pain: pt reports pain in forearms with extended distance amb- resolves with seated rest,      OBJECTIVE  Cognition  Overall Cognitive Status: WFL  Cognition Comment: some decreased memory noted  Orientation  Overall Orientation Status: Within Functional Limits    Functional Mobility  Sit to Supine  Assistance Level: Modified independent (completed with HOB raised, pt owns adjustable bed)  Scooting  Assistance Level: Modified independent  Transfers  Surface: To chair with arms;From chair with arms;Standard toilet  Sit to Stand  Assistance Level: Contact guard assist  Skilled Clinical Factors: VC for hand placement and scootingforward to edge of seat  Stand to Sit  Assistance Level: Contact guard assist  Skilled Clinical Factors: VC for hand placement      Environmental Mobility  Ambulation  Surface: Level surface; Uneven surface; Ramp  Distance: 15' + 150' + 120' with RW; 60' (with HR + cane to ascend descend 15' ramp and cane only last 30' )  Activity: Within Room; Within Unit  Activity Comments: to demonstrates forward flexed posture and decreased B step height length throughout ambulation; decreased stability, taran and step height noted with use of cane only compared to RW / HR + cane. HR + cane used to simulate pt enters/exits ramped enterance at home. Cane ambualtion observed due to pt reporting she uses cane to walk to scooter when entering home and getting to scooters at grocery store.   Assistance Level: Stand by assist (SBA with RW, CGA with HR+ cane on ramp and cane only on level surface)  Gait Deviations: Slow taran;Decreased step length bilateral;Decreased heel strike right;Decreased heel strike left  Skilled Clinical Factors: VC for upright posture and increased step height/length    PT Exercises  Dynamic Standing Balance Exercises: Pt compeltes reaching outside [place] and crossbody for beanbags with B UEs without UE support for 3 x ~ 1 min during bean bag toss activity with CGA-min. Increased assist required during instances of posterior lean. Pt limited by anxiety/fear of falling with encouragement required to complete balance challenges. ASSESSMENT/PROGRESS TOWARDS GOALS    Assessment  Assessment: Pt demonstrates multiple instances of posterior loss od balance requiring physical assist to correct while donning brief/pants and during dynamic balance exercises in gym. Pt limited by anxiety during balance activities. Requires SBA for amb with RW , with decreased stability/increased asssit (CGA) required with cane which pt reports she uses to get into house and grocery store. Educated on use of RW at [address] as opposed to cane. Pt would benefit from continued skilled PT in order to progress towards PLOF and independence.   Activity Tolerance: Patient tolerated treatment well  Discharge Recommendations: Continue to assess pending progress;Home with Home health PT;24 hour supervision or assist  PT Equipment Recommendations  Equipment Needed: No    Goals  Patient Goals   Patient Goals : none stated  Short Term Goals  Time Frame for Short Term Goals: 10 days  Short Term Goal 1: Pt will complete bed mobility independently  Short Term Goal 2: Pt will compelte sit<>stand and pivot transfers with LRAD and mod I  Short Term Goal 3: Pt will complete 150' ambualtion with mod I and LRAD  Short Term Goal 4: Pt will complete ascent/descent of 10' ramp with mod I and LRAD    PLAN OF CARE/SAFETY  Physcial Therapy Plan  Days Per Week: 5 Days  Hours Per Day: 1 hour  Current Treatment Recommendations: Strengthening;Balance training;Gait training;Stair training;Functional mobility training;Transfer training; Endurance training;Home exercise program;Therapeutic activities; Neuromuscular re-education  Safety Devices  Type of Devices: Call light within reach;Gait belt;Bed alarm in place; Left in bed    EDUCATION  Education  Education Given To: Patient  Education Provided: Plan of Care;Safety; Mobility Training;Transfer Training;Equipment  Education Provided Comments: discharge recommendations  Education Method: Demonstration;Verbal  Education Outcome: Verbalized understanding;Demonstrated understanding;Continued education needed        Therapy Time   Individual Concurrent Group Co-treatment   Time In 1300         Time Out 1400         Minutes 60           Timed Code Treatment Minutes: [facility], [facility]

## 2022-12-29 NOTE — PROGRESS NOTES
Department of Physical Medicine & Rehabilitation  Progress Note    Patient Identification:  Loan Medrano  [id number]  : 1945  Admit date: 2022    Chief Complaint: SDH (subdural hematoma)    Subjective:   No acute events overnight. Patient seen this am. Says she is getting \"stronger everyday. \" Wants her sanctura during day time to help with frequency. No other complaints. Seen in her room today. ROS: No f/c, n/v, cp     Objective:  Patient Vitals for the past 24 hrs:   BP Temp Temp src Pulse Resp SpO2 Height Weight   22 0630 -- -- -- -- -- -- -- 192 lb 14.4 oz (87.5 kg)   22 1945 131/80 97.4 °F (36.3 °C) Oral 61 16 96 % -- --   22 1730 118/67 -- -- 61 -- -- -- --   22 1501 -- -- -- -- -- -- 5' 3\" (1.6 m) --   22 1018 (!) 157/70 97.7 °F (36.5 °C) Oral 59 16 97 % -- --     Const: Alert. No distress, pleasant. HEENT: Normocephalic, atraumatic. Normal sclera/conjunctiva. MMM. CV: Regular rate and rhythm. Resp: No respiratory distress. Lungs CTAB. Abd: Soft, nontender, nondistended, NABS+   Ext: No edema. Neuro: Alert, oriented, appropriately interactive. Psych: Cooperative, appropriate mood and affect    Laboratory data: Available via EMR.    Last 24 hour lab  Recent Results (from the past 24 hour(s))   POCT Glucose    Collection Time: 22  5:03 PM   Result Value Ref Range    POC Glucose 185 (H) 70 - 99 mg/dl    Performed on ACCU-CHEK    POCT Glucose    Collection Time: 22  7:45 PM   Result Value Ref Range    POC Glucose 221 (H) 70 - 99 mg/dl    Performed on ACCU-CHEK    POCT Glucose    Collection Time: 22  7:46 AM   Result Value Ref Range    POC Glucose 146 (H) 70 - 99 mg/dl    Performed on ACCU-CHEK        Therapy progress:  PT  Position Activity Restriction  Other position/activity restrictions: up as tolerated  Objective     Sit to Stand: Contact guard assistance (at recliner/EOB/wc with RW)  Stand to Sit: Contact guard assistance  Device: [address]: Contact guard assistance  Distance: 150', 30' (ascent/descent 15' ramp), 15'  OT  PT Equipment Recommendations  Equipment Needed: No  Toilet - Technique: Ambulating  Equipment Used: Standard toilet  Toilet Transfers Comments: use of GB on L  Assessment        SLP          Body mass index is 34.17 kg/m². Assessment and Plan:    \"-Acute on chronic subdural hematoma s/p Left frontal trephine craniotomy for evacuation 12/21  - PT/OT/SLP  - keppra 1000mg BID  - decadron- 1mg BID- 2 doses   - hold eliquis      -Essential HTN-controlled-on coreg     -CAD s/p CABG 2005,EF 55-60%-antiplatelets held,on coreg     -Hx Complete heart block s/p PPM     -Paroxsymal a fib-stable-eliquis held     -Obesity BMI 35  - counseling      -Hypothyroidism-on  synthroid     -DM type 2-gliplizide,trulicity on hold since admission   - basal insulin on inpatient            Impairments:Decreased functional mobility, Decreased ADLs     Bladder - high risk retention - Monitor PVRs, SC prn >300cc     Bowel - high risk constipation - colace BID, PRN miralax and MoM. follow bowel movements. Enema or suppository if needed. Safety - fall precautions     PPx  DVT: lovenox  GI: pantoprazole     FULL CODE\"    Rehab Progress: Improving  Anticipated Dispo: home  Services/DME: TBD  ELOS:     Amado Macedo MD  PGY1 - IM    This patient has been staffed and discussed with attending Dr. Roberto Gleason. This patient has been seen, examined, and discussed with the resident. I was part of the key critical services provided to the patient. I agree with the residents documentation. This note may have been altered to reflect my own examination findings, impression, and recommendations.        Justin Anthony D.O. M.P.H  PM&R  12/29/2022  8:01 AM

## 2022-12-29 NOTE — PROGRESS NOTES
Decadron order on patients [facility] reviewed with Dr. Yue Voss. Advised to put in Decadron 0.5 mg tab PO in the morning, at noon, in the evening and before bedtime x10 days.

## 2022-12-29 NOTE — PROGRESS NOTES
Speech Language Pathology  ACUTE REHAB UNIT  SPEECH/LANGUAGE PATHOLOGY      [x] Daily  [] Weekly Care Conference Note  [] Discharge    Patient:Lachelle Sheldon      :1945  MRN:[id number]  Rehab Dx/Hx: SDH (subdural hematoma) [S06.5XAA]    Precautions: [] Aspiration  [x] Fall risk  [] Sternal  [] Seizure [] Hip  [] Weight Bearing [] Other     ST Dx: [] Aphasia  [] Dysarthria  [] Apraxia   [] Oropharyngeal dysphagia [x] Cognitive Impairment  [] Other:   Date of Admit: 2022  Room #: 3109/3109-01  Date: 2022          Current Diet Order:ADULT DIET; Regular; 4 carb choices (60 gm/meal); Low Fat/Low Chol/High Fiber/MARIPOSA   Lives With: Alone  Homemaking Responsibilities: Yes     Occupation: Retired  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[employer],  before that  Leisure & Hobbies: watch TV, read    Dentition: Some missing teeth  Vision  Vision: Impaired  Vision Exceptions: Wears glasses for reading  Hearing  Hearing: Within functional limits  Hearing Exceptions: Hard of hearing/hearing concerns  Barriers toward progress: None      Date: 2022      Tx session 1 Tx session 2   Total Timed Code Min 30 30   Total Treatment Minutes 30 30   Individual Treatment Minutes 30 30   Group Treatment Minutes 0 0   Co-Treat Minutes 0 0   Brief Exception: N/A N/A   Pain None expressed None indicated   Pain Intervention: [] RN notified  [] Repositioned  [] Intervention offered and patient declined  [x] N/A  [] Other: [] RN notified  [] Repositioned  [] Intervention offered and patient declined  [x] N/A  [] Other:   Subjective:     Pt seated upright in chair finishing AM meal, agreeable to SLP session at this time. Pt being assisted to restroom with PCA present. Agreeable to session upon return to chair.   Objective / Goals:     Goal 1: Pt will complete higher level executive function tasks with 100% accuracy    Targeted via further DEWAYNE completion listed below.    Completed pillbox error ID task  with 100% accuracy and no cues required. Adequate processing speed noted with good attention to detail. Completed medication organization with prescription labels. Answered questions related to labels with 100% accuracy. Organized meds into pillbox with 60% accuracy, increased to 100% with min cues. X1 error, with min cues to ID and self correct. Completed x2 deduction puzzles with 77% accuracy, increased to 100% with min cues. Benefited from re-reading instructions to determine correct placing of information. Goal 2: Pt will complete functional working/short term memory tasks with 100% accuracy and use of compensatory strategies as needed. Recall SLP evaluation from yesterday and areas to be targeted during therapy. Recalled activities completed with OT session this AM with adequate details provided. Goal 3: Pt will participate in ongoing cognitive-linguistic assessment. Patient was administered portions of the DEWAYNE (Assessment of Language-Related Functional Activities) with the following results:   Writing a Check and Balancing a Checkbook: 80% accuracy  Min cues to solve math problems to balance check book amount correctly. Using a Calendar: 100% accuracy  Pt expressed using calendar at home for keeping track of appointments, etc.  Reading Instructions: 100% accuracy   Not directly targeted this session. Other areas targeted:     Education:   Educated re: rationale for further assessments, goals being targeted Educated re: rationale for tasks   Safety Devices: [x] Call light within reach  [x] Chair alarm activated and connected to nurse call light system  [] Bed alarm activated   [] Other: [x] Call light within reach  [x] Chair alarm activated and connected to nurse call light system  [] Bed alarm activated  [] Other:    Assessment: Pt presents with mild, yet resolving, cognitive impairments in the areas of executive function, memory and attention.  Intermittent impulsivity of responses, however self corrects errors with min cues. Plan: Continue as per plan of care.       Interventions used this date:  [] Speech/Language Treatment  [] Instruction in HEP  [] Dysphagia Treatment [x] Cognitive Treatment   [] Other:    Discharge recommendations:  [] Home independently  [x] Home with assistance []  24 hour supervision  [] ECF [] Other  Continued Tx Upon Discharge: ? [] Yes    [x] No    [x] TBD based on progress while on ARU     [] Vital Stim indicated     [] Other:   Estimated discharge date: Not yet established    Electronically signed by:  Jasper Wright M.A., [address] [place] [address]  Speech-Language Pathologist

## 2022-12-29 NOTE — CARE COORDINATION
Case Management Assessment  Initial Evaluation    Date/Time of Evaluation: 12/29/2022 2:42 PM  Assessment Completed by: Marilee Cantrell RN    If patient is discharged prior to next notation, then this note serves as note for discharge by case management. Patient Name: Loan Medrano                   YOB: 1945  Diagnosis: SDH (subdural hematoma) [S06. 5XAA]                   Date / Time: 12/27/2022  5:56 PM    Patient Admission Status: REHAB IP   Readmission Risk (Low < 19, Mod (19-27), High > 27): Readmission Risk Score: 19.3    Current PCP: DARIA Tsang CNP  PCP verified by CM? Yes    Chart Reviewed: Yes      History Provided by: Patient  Patient Orientation: Alert and Oriented    Patient Cognition: Alert    Hospitalization in the last 30 days (Readmission): If yes, Readmission Assessment in CM Navigator will be completed. Advance Directives:      Code Status: Full Code   Patient's Primary Decision Maker is:      Primary Decision MakerCautumn Anson Booth Katie - [phone]    Secondary Decision Maker: Arabella Duque - Niece/Nephew - [phone]    Secondary Decision Maker: Lea Carter - [phone]    Discharge Planning:    Patient lives with: Alone Type of Home: House  Primary Care Giver: Self  Patient Support Systems include: Friends/Neighbors   Current Financial resources: None  Current community resources: None  Current services prior to admission: None            Current DME:  walker, cane, scooter, rollator            Type of Home Care services:  None    ADLS  Prior functional level: Independent in ADLs/IADLs  Current functional level:      PT AM-PAC:   /24  OT AM-PAC:   /24    Family can provide assistance at DC: No (relies on friends)  Would you like Case Management to discuss the discharge plan with any other family members/significant others, and if so, who?  No  Plans to Return to Present Housing: Yes  Other Identified Issues/Barriers to RETURNING to current housing: none  Potential Assistance needed at discharge: 1 Roxana Roach, Other (Comment) ([facility])            Potential DME:    Patient expects to discharge to: [employer] for transportation at discharge:  friend    Financial    Payor: Ted Anand / Plan: [place] / Product Type: *No Product type* /     Does insurance require precert for SNF: Yes    Potential assistance Purchasing Medications: No  Meds-to-Beds request:        [facility] [id number] [facility] [place], [address] [phone] Rajiv Grady [phone]  KEANU Lee [address]  Phone: [phone] Fax: [phone]      Notes:    Factors facilitating achievement of predicted outcomes: Friend support, Motivated, Cooperative, Pleasant, Good insight into deficits, Has needed Durable Medical Equipment at home, and Home is wheelchair accessible    Barriers to discharge: No family support and Decreased endurance    Additional Case Management Notes: Cm met with pt at bedside. Pt lives alone. Has a friend who will stay with pt at [place]. Pt is agreeable to home care. Pt has used [facility] in past. Pt also interested in home making services through [email or web address]. Pt gave Cm COA contact Christin Ch [phone] to contact for housekeeping services. Pt has not gotten around to calling her back. CM spoke with [facility] home care. They can accept pt. Cm will need to call them when pt is ready for dc. [phone]. CM faxed demographics and needed paperwork today. They are aware pt will probably not dc until after 1/1/23. CM called Clarice Kim [phone] ext [phone]. Christin Ch stated she has never received a referral and is not sure how pt got her name and phone #. They do not have any available housekeeping services in pt's area at current time. She recommended calling [facility] on [address] [phone] Carmelo Guillen.  They have a hospital to home program that can provide 20hrs of assistance when discharged home from the hospital.     The Plan for Transition of Care is related to the following treatment goals of SDH (subdural hematoma) [S06. 5XAA]    IF APPLICABLE: The Patient and/or patient representative Frieda Mock and her family were provided with a choice of provider and agrees with the discharge plan.  Freedom of choice list with basic dialogue that supports the patient's individualized plan of care/goals and shares the quality data associated with the providers was provided to:   pt  Patient Representative Name:       The Patient and/or Patient Representative Agree with the Discharge Plan?  yes    Paradise Lawrence RN  Case Management Department  Ph: [phone]

## 2022-12-29 NOTE — PROGRESS NOTES
Pt in chair, alert and oriented. VSS. No pain reported. All safety measures are in place. Call light within reach. Will continue to monitor.     Vitals:    12/28/22 1945   BP: 131/80   Pulse: 61   Resp: 16   Temp: 97.4 °F (36.3 °C)   SpO2: 96%

## 2022-12-30 LAB
ANION GAP SERPL CALCULATED.3IONS-SCNC: 12 MMOL/L (ref 3–16)
BASOPHILS ABSOLUTE: 0 K/UL (ref 0–0.2)
BASOPHILS RELATIVE PERCENT: 0 %
BUN BLDV-MCNC: 32 MG/DL (ref 7–20)
CALCIUM SERPL-MCNC: 8.3 MG/DL (ref 8.3–10.6)
CHLORIDE BLD-SCNC: 97 MMOL/L (ref 99–110)
CO2: 25 MMOL/L (ref 21–32)
CREAT SERPL-MCNC: 1 MG/DL (ref 0.6–1.2)
EOSINOPHILS ABSOLUTE: 0.1 K/UL (ref 0–0.6)
EOSINOPHILS RELATIVE PERCENT: 1 %
GFR SERPL CREATININE-BSD FRML MDRD: 59 ML/MIN/{1.73_M2}
GLUCOSE BLD-MCNC: 173 MG/DL (ref 70–99)
GLUCOSE BLD-MCNC: 174 MG/DL (ref 70–99)
GLUCOSE BLD-MCNC: 197 MG/DL (ref 70–99)
GLUCOSE BLD-MCNC: 216 MG/DL (ref 70–99)
GLUCOSE BLD-MCNC: 264 MG/DL (ref 70–99)
HCT VFR BLD CALC: 42.2 % (ref 36–48)
HEMOGLOBIN: 14 G/DL (ref 12–16)
LYMPHOCYTES ABSOLUTE: 1.6 K/UL (ref 1–5.1)
LYMPHOCYTES RELATIVE PERCENT: 17 %
MCH RBC QN AUTO: 29.6 PG (ref 26–34)
MCHC RBC AUTO-ENTMCNC: 33.3 G/DL (ref 31–36)
MCV RBC AUTO: 89 FL (ref 80–100)
MONOCYTES ABSOLUTE: 0.6 K/UL (ref 0–1.3)
MONOCYTES RELATIVE PERCENT: 6 %
NEUTROPHILS ABSOLUTE: 7.4 K/UL (ref 1.7–7.7)
NEUTROPHILS RELATIVE PERCENT: 76 %
PDW BLD-RTO: 15.1 % (ref 12.4–15.4)
PERFORMED ON: ABNORMAL
PLATELET # BLD: 134 K/UL (ref 135–450)
PMV BLD AUTO: 8.9 FL (ref 5–10.5)
POTASSIUM REFLEX MAGNESIUM: 3.8 MMOL/L (ref 3.5–5.1)
RBC # BLD: 4.74 M/UL (ref 4–5.2)
RBC # BLD: NORMAL 10*6/UL
SODIUM BLD-SCNC: 134 MMOL/L (ref 136–145)
WBC # BLD: 9.7 K/UL (ref 4–11)

## 2022-12-30 PROCEDURE — 6360000002 HC RX W HCPCS: Performed by: PHYSICAL MEDICINE & REHABILITATION

## 2022-12-30 PROCEDURE — 97530 THERAPEUTIC ACTIVITIES: CPT

## 2022-12-30 PROCEDURE — 85025 COMPLETE CBC W/AUTO DIFF WBC: CPT

## 2022-12-30 PROCEDURE — 97129 THER IVNTJ 1ST 15 MIN: CPT

## 2022-12-30 PROCEDURE — 2580000003 HC RX 258: Performed by: PHYSICAL MEDICINE & REHABILITATION

## 2022-12-30 PROCEDURE — 6370000000 HC RX 637 (ALT 250 FOR IP)

## 2022-12-30 PROCEDURE — 97535 SELF CARE MNGMENT TRAINING: CPT

## 2022-12-30 PROCEDURE — 6370000000 HC RX 637 (ALT 250 FOR IP): Performed by: PHYSICAL MEDICINE & REHABILITATION

## 2022-12-30 PROCEDURE — 99233 SBSQ HOSP IP/OBS HIGH 50: CPT | Performed by: PHYSICAL MEDICINE & REHABILITATION

## 2022-12-30 PROCEDURE — 97130 THER IVNTJ EA ADDL 15 MIN: CPT

## 2022-12-30 PROCEDURE — 97116 GAIT TRAINING THERAPY: CPT

## 2022-12-30 PROCEDURE — 6360000002 HC RX W HCPCS: Performed by: NURSE PRACTITIONER

## 2022-12-30 PROCEDURE — 80048 BASIC METABOLIC PNL TOTAL CA: CPT

## 2022-12-30 PROCEDURE — 36415 COLL VENOUS BLD VENIPUNCTURE: CPT

## 2022-12-30 PROCEDURE — 1280000000 HC REHAB R&B

## 2022-12-30 RX ORDER — METHOCARBAMOL 750 MG/1
750 TABLET, FILM COATED ORAL 4 TIMES DAILY PRN
Status: DISCONTINUED | OUTPATIENT
Start: 2022-12-30 | End: 2023-01-01 | Stop reason: HOSPADM

## 2022-12-30 RX ADMIN — GABAPENTIN 300 MG: 300 CAPSULE ORAL at 21:54

## 2022-12-30 RX ADMIN — DEXAMETHASONE 2 MG: 4 TABLET ORAL at 07:51

## 2022-12-30 RX ADMIN — ISOSORBIDE MONONITRATE 120 MG: 30 TABLET, EXTENDED RELEASE ORAL at 07:51

## 2022-12-30 RX ADMIN — ENOXAPARIN SODIUM 40 MG: 100 INJECTION SUBCUTANEOUS at 07:52

## 2022-12-30 RX ADMIN — LEVETIRACETAM 1000 MG: 500 TABLET, FILM COATED ORAL at 07:51

## 2022-12-30 RX ADMIN — INSULIN LISPRO 4 UNITS: 100 INJECTION, SOLUTION INTRAVENOUS; SUBCUTANEOUS at 12:18

## 2022-12-30 RX ADMIN — TROSPIUM CHLORIDE 20 MG: 20 TABLET, FILM COATED ORAL at 07:51

## 2022-12-30 RX ADMIN — INSULIN GLARGINE 8 UNITS: 100 INJECTION, SOLUTION SUBCUTANEOUS at 21:55

## 2022-12-30 RX ADMIN — SODIUM CHLORIDE, PRESERVATIVE FREE 10 ML: 5 INJECTION INTRAVENOUS at 07:52

## 2022-12-30 RX ADMIN — FUROSEMIDE 40 MG: 40 TABLET ORAL at 07:56

## 2022-12-30 RX ADMIN — CARVEDILOL 6.25 MG: 6.25 TABLET, FILM COATED ORAL at 17:51

## 2022-12-30 RX ADMIN — LEVOTHYROXINE SODIUM 50 MCG: 50 TABLET ORAL at 06:38

## 2022-12-30 RX ADMIN — LEVETIRACETAM 1000 MG: 500 TABLET, FILM COATED ORAL at 21:54

## 2022-12-30 RX ADMIN — METHOCARBAMOL TABLETS 750 MG: 750 TABLET, COATED ORAL at 07:57

## 2022-12-30 RX ADMIN — GABAPENTIN 300 MG: 300 CAPSULE ORAL at 07:51

## 2022-12-30 RX ADMIN — GABAPENTIN 300 MG: 300 CAPSULE ORAL at 14:10

## 2022-12-30 RX ADMIN — PANTOPRAZOLE SODIUM 40 MG: 40 TABLET, DELAYED RELEASE ORAL at 07:51

## 2022-12-30 RX ADMIN — CARVEDILOL 6.25 MG: 6.25 TABLET, FILM COATED ORAL at 07:52

## 2022-12-30 ASSESSMENT — PAIN SCALES - GENERAL: PAINLEVEL_OUTOF10: 0

## 2022-12-30 NOTE — PROGRESS NOTES
Physical Therapy  Facility/Department: [facility] ACUTE REHAB UNIT  Rehabilitation Physical Therapy Treatment Note    NAME: Yazan Krause  : 1945 (78 y.o.)  MRN: [id number]  CODE STATUS: Full Code    Date of Service: 22    Restrictions:  Position Activity Restriction  Other position/activity restrictions: up as tolerated     SUBJECTIVE  Subjective  Subjective: Pt seated in recliner upon approach and agreeable to PT  Pain: pt reports pain in forearms with extended distance amb- resolves with seated rest,      OBJECTIVE  Cognition  Overall Cognitive Status: [facility]  Cognition Comment: some decreased short term memory noted  Orientation  Overall Orientation Status: Within Functional Limits    Functional Mobility  Transfers  Surface: To chair with arms;From chair with arms;Standard toilet  Sit to Stand  Assistance Level: Stand by assist  Skilled Clinical Factors: VC for hand placement and scooting forward to edge of seat  Stand to Sit  Assistance Level: Contact guard assist  Skilled Clinical Factors: VC for hand placement and increased eccentric control      Environmental Mobility  Ambulation  Surface: Level surface; Uneven surface; Ramp  Distance: 180', 40' (including navigation of tight spaces in dining room), 79' (ascent/descent of 35' ramp),  Activity: Within Room; Within Unit  Activity Comments: demonstrates forward flexed posture and decreased B step height length however mantains foot clearance throughout. Improved balance and controlled taran noted on ramp ambulation - recommendation provided to use RW for ramp instead of cane so that she can then use RW to get into house / onto scootoer from ramp instead of cane.   Assistance Level: Stand by assist (SBA on level surface and for ramp ambulaiton)  Gait Deviations: Slow taran;Decreased step length bilateral  Skilled Clinical Factors: VC for upright posture      PT Exercises  Dynamic Standing Balance Exercises: Pt completes x3 B shoulder flexion with physioball with CGA-min - limited in ROM by weight and fear of falling with several posterior LOB corrected with CGA-min. Pt completes x7 OH raise and B trunk rotation with lightweight rubber sensory ball with CGA throughout. ASSESSMENT/PROGRESS TOWARDS GOALS    Assessment  Assessment: Pt demonstrates improves endurance through increased distance ambualted on level and uneven surfaces. Improved baalnce demonstrated during balance activities with improved righting reactions noted with decreased assist rewuired to correct posterior losses of balance. Improved balance and controlled taran noted on ramp ambulation with RW- recommendation provided to use RW for ramp instead of cane on ramp so that she can then use RW to get into house / onto scootoer from ramp instead of cane as she normally does. Pt would benefit from continued skilled PT in order to progress towards prior level function and independence. Activity Tolerance: Patient tolerated treatment well  Discharge Recommendations: Continue to assess pending progress;Home with Home health PT;24 hour supervision or assist  PT Equipment Recommendations  Equipment Needed: No  Other: pt has all equipment needs met    Goals  Patient Goals   Patient Goals : none stated  Short Term Goals  Time Frame for Short Term Goals: 10 days- all ongoing  Short Term Goal 1: Pt will complete bed mobility independently  Short Term Goal 2: Pt will compelte sit<>stand and pivot transfers with LRAD and mod I  Short Term Goal 3: Pt will complete 150' ambualtion with mod I and LRAD  Short Term Goal 4: Pt will complete ascent/descent of 10' ramp with mod I and LRAD    PLAN OF CARE/SAFETY  Physcial Therapy Plan  Days Per Week: 5 Days  Hours Per Day: 1 hour  Current Treatment Recommendations: Strengthening;Balance training;Gait training;Stair training;Functional mobility training;Transfer training; Endurance training;Home exercise program;Therapeutic activities; Neuromuscular re-education  Safety Devices  Type of Devices: Call light within reach;Gait belt; Chair alarm in place; Left in chair    EDUCATION  Education  Education Given To: Patient  Education Provided: Plan of Care;Safety; Mobility Training;Transfer Training;Equipment  Education Provided Comments: discharge recommendations including use of RW upon discharge, benefits of balance exercises  Education Method: Demonstration;Verbal  Education Outcome: Verbalized understanding;Demonstrated understanding;Continued education needed        Therapy Time   Individual Concurrent Group Co-treatment   Time In 0915         Time Out 1015         Minutes 60           Timed Code Treatment Minutes: [address] [facility], [place], [facility]

## 2022-12-30 NOTE — PROGRESS NOTES
Pt in chair, alert and oriented. VSS. 3/10 pain reported, H/A, pain meds given. All safety measures are in place. Call light within reach. Will continue to monitor.     Vitals:    12/29/22 2107   BP: 121/65   Pulse: 67   Resp: 17   Temp: 98 °F (36.7 °C)   SpO2: 94%

## 2022-12-30 NOTE — PROGRESS NOTES
Occupational Therapy  Facility/Department: [place] [address] ACUTE REHAB UNIT  Rehabilitation Occupational Therapy Daily Treatment Note    Date: 22  Patient Name: Richard Gonzales       Room: [facility]5603-72  MRN: [id number]  Account: [de-identified]   : 1945  (78 y.o.) Gender: female                    Past Medical History:  has a past medical history of A-fib ([facility] [address] Allergic rhinitis, Arthritis, CAD (coronary artery disease), Cardiac pacemaker, CHF (congestive heart failure) ([facility] [address] Cholelithiasis, Diabetes mellitus ([facility] [address] Encounter for imaging to screen for metal prior to MRI, ESRD (end stage renal disease) ([facility] [address] Gastroparesis, Hepatic steatosis, Hyperlipidemia, Hypertension, Peripheral neuropathy, Sleep apnea, Syncope, Thyroid disease, and Vitamin B12 deficiency. Past Surgical History:   has a past surgical history that includes Coronary artery bypass graft (); Foot surgery; colectomy (); Colonoscopy; Upper gastrointestinal endoscopy; Tonsillectomy and adenoidectomy; Tubal ligation; other surgical history (2012); other surgical history (Right, 3/20/13); Coronary angioplasty; Cholecystectomy; Cardiac catheterization (2017); Diagnostic Cardiac Cath Lab Procedure; Toe amputation (Left, 3/29/2021); eye surgery; Endoscopy, colon, diagnostic; hernia repair; and craniotomy (Left, 2022). Restrictions  Other position/activity restrictions: up as tolerated    Subjective  Subjective: Pt sitting in bedside recliner upon arrival, pleasant and agreeable to OT session requesting to use toilet. \"I know why I've been grogg all day, it's the muscle relaxers. \"                Objective     Cognition  Overall Cognitive Status: WFL  Orientation  Overall Orientation Status: Within Functional Limits         ADL  Grooming/Oral Hygiene  Assistance Level: Stand by assist  Skilled Clinical Factors: hand hygiene in stance at sink  Toileting  Assistance Level: Stand by assist  Skilled Clinical Factors: pt continent of urine, perihygiene seated, LB clothing mgmt on/off hips in stance, unilateral UE support on RW/GB when retrieving clothing from below waist, no overt LOB  Toilet Transfers  Technique:  (ambulatory using RW)  Equipment: Standard toilet  Assistance Level: Stand by assist  Skilled Clinical Factors: use of GB on L    Instrumental ADL's  Instrumental ADLs: Yes  Meal Prep  Meal Prep Level: Walker  Meal Prep Level of Assistance: Stand by assistance  Meal Preparation: Pt reports that typically she sits in her electric scooter during meal prep but will stand to retrieve items from freezer and overhead cabinets when necessary as she cannot reach them from the scooter. She also states that she uses the countertop for support when reaching for items. In order to simulate home environment pt was instructed to position w/c as she would with her scooter and retrieve various items from fridge and overhead cabinets. Pt completed multiple STS from w/c to RW with SBA and retrieved/replaced items in fridge maintaining unilateral UE support on RW with CGA-SBA and no overt LOB, however pt did require VCs for positioning of RW to avoid reaching outside [place] as pt has baseline dynamic balance concerns. Pt then completed item retrieval from cabinet in stance maintaining unilateral UE support on countertop with SBA and no overt LOB. Pt demo no difficulty with task. Pt also reports that she keeps a reacher in her kitchen to retrieve items that are too high or when she drops items on the floor. Pt able to retrieve an item from overhead cabinet i'ly using reacher. Light Housekeeping  Light Housekeeping Level: Rita Rangel Housekeeping Level of Assistance: Minimal assistance  Light Housekeeping: Pt reports that she completes laundry partially in stance and partially seated in a chair but she does not take her electric scooter into the laundry room because it does not fit.  \"I don't use my cane or walker usually because I only have to take 2 steps and I'm there and I just hold on to things. \" Pt educated to utilize RW upon d/c for increased safety and support, pt verbalized understanding. Pt placed 12 items of dirty laundry into below waist level washing machine in stance maintaining unilateral UE support on RW mostly with CGA but demo 2 instances of anterior LOB when reaching outside [place] req min A to correct. Pt req VC for RW positioning to avoid reaching outside [place] and instructed that use of a reacher would be safer to prevent excessive bending. However pt's washing machine is a  and higher and will not require bending upon d/c. Pt able to operate novel washing machine settings with VCs. Pt tolerated ~4 minutes in stance for duration of task before requiring seated rest break. Pt demo slight QUICK but able to recover quickly. Functional Mobility  Device: Rolling walker  Activity: To/From bathroom; To/From therapy gym (within dining room)  Assistance Level: Stand by assist  Sit to Stand  Assistance Level: Stand by assist  Skilled Clinical Factors: from recliner and standard chair to RW  Stand to Sit  Assistance Level: Stand by assist     Assessment  Assessment  Assessment: Pt tolerated session fair this date completing IADL simulation tasks in stance and functional mobility to/from gym using RW. Pt completed item retrieval in kitchen to simulate meal prep with SBA but req min A for laundry task due to 2 anterior LOB when reaching outside [place]. Pt is functioning slightly below baseline but benefits from ongoing skilled OT and problem solving safer ways to complete functional tasks/IADLs to decrease fall risk. Cont per OT POC. Activity Tolerance: Patient tolerated treatment well;Patient limited by fatigue  Discharge Recommendations: Continue to assess pending progress;Home with Home health OT; Home with assist PRN  OT Equipment Recommendations  Equipment Needed: No  Safety Devices  Safety Devices in place: Yes  Type of devices: Chair alarm in place;Call light within reach; Left in chair;Nurse notified    Patient Education  Education  Education Given To: Patient  Education Provided: Role of Therapy;Plan of Care;Transfer Training;Mobility Training; Fall Prevention Strategies; Safety;ADL Function;IADL Function  Education Provided Comments: pt educated on safety with meal prep and laundry, use of RW vs furniture coasting as pt was completing prior  Education Method: Verbal  Barriers to Learning: None  Education Outcome: Verbalized understanding;Continued education needed    Plan  Occupational Therapy Plan  Times Per Week: 60 minutes/day x 5 days/week  Current Treatment Recommendations: Strengthening; Functional mobility training;Balance training; Endurance training;Neuromuscular re-education; Safety education & training;Patient/Caregiver education & training;Equipment evaluation, education, & procurement;Self-Care / ADL; Home management training    Goals  Patient Goals   Patient goals : \"to go home\"  Short Term Goals  Time Frame for Short Term Goals: 10 days-all ongoing  Short Term Goal 1: Pt will complete bathing with mod I  Short Term Goal 2: Pt will complete toileting and toilet transfer with mod I  Short Term Goal 3: Pt will complete IADL task with mod I  Short Term Goal 4: Pt will complete LB dressing with mod I    AM-PAC Score               Therapy Time   Individual Concurrent Group Co-treatment   Time In 1430         Time Out 1530         Minutes 60         Timed Code Treatment Minutes: 61 Minutes       Jairo Gilbert OT

## 2022-12-30 NOTE — PROGRESS NOTES
Occupational Therapy  Facility/Department: [facility] ACUTE REHAB UNIT  Rehabilitation Occupational Therapy Daily Treatment Note    Date: 22  Patient Name: Blanca Wayne       Room: [facility]/1928[facility]55  MRN: [id number]  Account: [de-identified]   : 1945  (78 y.o.) Gender: female                    Past Medical History:  has a past medical history of A-fib ([facility] [address] Allergic rhinitis, Arthritis, CAD (coronary artery disease), Cardiac pacemaker, CHF (congestive heart failure) ([facility] [address] Cholelithiasis, Diabetes mellitus ([facility] [address] Encounter for imaging to screen for metal prior to MRI, ESRD (end stage renal disease) ([facility] [address] Gastroparesis, Hepatic steatosis, Hyperlipidemia, Hypertension, Peripheral neuropathy, Sleep apnea, Syncope, Thyroid disease, and Vitamin B12 deficiency. Past Surgical History:   has a past surgical history that includes Coronary artery bypass graft (); Foot surgery; colectomy (); Colonoscopy; Upper gastrointestinal endoscopy; Tonsillectomy and adenoidectomy; Tubal ligation; other surgical history (2012); other surgical history (Right, 3/20/13); Coronary angioplasty; Cholecystectomy; Cardiac catheterization (2017); Diagnostic Cardiac Cath Lab Procedure; Toe amputation (Left, 3/29/2021); eye surgery; Endoscopy, colon, diagnostic; hernia repair; and craniotomy (Left, 2022). Restrictions  Other position/activity restrictions: up as tolerated    Subjective  Subjective: Pt sitting in bedside recliner upon arrival, pleasant and agreeable to OT session requesting to use toilet.                 Objective     Cognition  Overall Cognitive Status: WFL  Orientation  Overall Orientation Status: Within Functional Limits         ADL  Grooming/Oral Hygiene  Assistance Level: Stand by assist  Skilled Clinical Factors: oral hygiene in stance at sink  Toileting  Assistance Level: Contact guard assist  Skilled Clinical Factors: pt continent of urine, perihygiene seated, LB clothing mgmt on/off hips in stance, unilateral UE support on RW/GB when retrieving clothing from below waist, no overt LOB; pt completed x 2 bouts during session  Toilet Transfers  Equipment: Standard toilet  Assistance Level: Stand by assist  Skilled Clinical Factors: use of GB on L          Functional Mobility  Device: Rolling walker  Activity: To/From bathroom; To/From therapy gym  Assistance Level: Contact guard assist (progressing to SBA)  Sit to Stand  Assistance Level: Stand by assist  Skilled Clinical Factors: from recliner and standard chair to RW  Stand to Sit  Assistance Level: Stand by assist       OT Exercises  Resistive Exercises: In order to improve BUE strength and activity tolerance pt completed x10 arm circles using 2. 2# weighted ball while seated; pt unable to flex R shoulder beyond 90 due to previous sx  Dynamic Standing Balance Exercises: In order to improve self righting reactions as pt reports baseline challenges with balance, pt completed the following standing level exercises without UE support using 1# medicine ball: 1) x10 shoulder flexion with CGA-SBA and no overt LOB; 2) x6 trunk rotation, pt demo multiple posterior LOB req min A to correct, pt anxious and reporting she cannot finish the reps due to anxiety/fear of falling     Assessment  Assessment  Assessment: Pt tolerated session well completing funcitonal mobility to/from gym using RW without difficulty and toileting with CGA. Pt does verbalize baseline dynamic balance concerns and required min A due to multiple posterior losses of balance during unsupported standing with trunk rotation. Pt highly motivated and eager to return to PLOF. Cont per OT POC. Activity Tolerance: Patient tolerated treatment well  Discharge Recommendations: Continue to assess pending progress;Home with Home health OT; Home with assist PRN  Safety Devices  Safety Devices in place: Yes  Type of devices: Chair alarm in place;Call light within reach; Left in chair    Patient Education  Education  Education Given To: Patient  Education Provided: Role of Therapy;Plan of Care;Transfer Training;Mobility Training; Fall Prevention Strategies; Safety;ADL Function;IADL Function  Education Provided Comments: Pt educated on pulling up LB clothing when seated on toilet to avoid bending down to floor level when in stance for fall prevention; extensive time spent discussing how pt completes laundry and cooking at baseline, plan to simulate/practice next session  Education Method: Verbal  Barriers to Learning: None  Education Outcome: Verbalized understanding;Continued education needed    Plan  Occupational Therapy Plan  Times Per Week: 60 minutes/day x 5 days/week  Current Treatment Recommendations: Strengthening; Functional mobility training;Balance training; Endurance training;Neuromuscular re-education; Safety education & training;Patient/Caregiver education & training;Equipment evaluation, education, & procurement;Self-Care / ADL; Home management training    Goals  Patient Goals   Patient goals : \"to go home\"  Short Term Goals  Time Frame for Short Term Goals: 10 days-all ongoing  Short Term Goal 1: Pt will complete bathing with mod I  Short Term Goal 2: Pt will complete toileting and toilet transfer with mod I  Short Term Goal 3: Pt will complete IADL task with mod I  Short Term Goal 4: Pt will complete LB dressing with mod I    AM-PAC Score               Therapy Time   Individual Concurrent Group Co-treatment   Time In  0930         Time Out  1030         Minutes  60         Timed Code Treatment Minutes: 61 Minutes       Jairo Gilbert OT

## 2022-12-30 NOTE — PROGRESS NOTES
Department of Physical Medicine & Rehabilitation  Progress Note    Patient Identification:  Pal Foreman  [id number]  : 1945  Admit date: 2022    Chief Complaint: SDH (subdural hematoma)    Subjective:   No acute events overnight. Patient seen this am. Donna Montoya she is getting \"stronger everyday. \" Got her sanctura this AM. No new concerns. Seen in hallway today. She is slowly improving. Headache last night but has resolved. Team conference today. ROS: No f/c, n/v, cp     Objective:  Patient Vitals for the past 24 hrs:   BP Temp Temp src Pulse Resp SpO2 Weight   22 0745 127/79 97.9 °F (36.6 °C) Oral 75 17 95 % --   22 0645 -- -- -- -- -- -- 190 lb 4.1 oz (86.3 kg)   22 2107 121/65 98 °F (36.7 °C) Oral 67 17 94 % --   22 1905 (!) 154/84 -- -- 83 -- -- --       Const: Alert. No distress, pleasant. HEENT: Normocephalic, atraumatic. Normal sclera/conjunctiva. MMM. CV: Regular rate and rhythm. Resp: No respiratory distress. Lungs CTAB. Abd: Soft, nontender, nondistended, NABS+   Ext: No edema. Neuro: Alert, oriented, appropriately interactive. Psych: Cooperative, appropriate mood and affect    Laboratory data: Available via EMR.    Last 24 hour lab  Recent Results (from the past 24 hour(s))   POCT Glucose    Collection Time: 22 11:33 AM   Result Value Ref Range    POC Glucose 168 (H) 70 - 99 mg/dl    Performed on ACCU-CHEK    POCT Glucose    Collection Time: 22  4:30 PM   Result Value Ref Range    POC Glucose 208 (H) 70 - 99 mg/dl    Performed on ACCU-CHEK    POCT Glucose    Collection Time: 22  9:13 PM   Result Value Ref Range    POC Glucose 259 (H) 70 - 99 mg/dl    Performed on ACCU-CHEK    Basic Metabolic Panel w/ Reflex to MG    Collection Time: 22  6:41 AM   Result Value Ref Range    Sodium 134 (L) 136 - 145 mmol/L    Potassium reflex Magnesium 3.8 3.5 - 5.1 mmol/L    Chloride 97 (L) 99 - 110 mmol/L    CO2 25 21 - 32 mmol/L    Anion Gap 12 3 - 16    Glucose 197 (H) 70 - 99 mg/dL    BUN 32 (H) 7 - 20 mg/dL    Creatinine 1.0 0.6 - 1.2 mg/dL    Est, Glom Filt Rate 59 (A) >60    Calcium 8.3 8.3 - 10.6 mg/dL   CBC auto differential    Collection Time: 12/30/22  6:41 AM   Result Value Ref Range    WBC 9.7 4.0 - 11.0 K/uL    RBC 4.74 4.00 - 5.20 M/uL    Hemoglobin 14.0 12.0 - 16.0 g/dL    Hematocrit 42.2 36.0 - 48.0 %    MCV 89.0 80.0 - 100.0 fL    MCH 29.6 26.0 - 34.0 pg    MCHC 33.3 31.0 - 36.0 g/dL    RDW 15.1 12.4 - 15.4 %    Platelets 526 (L) 390 - 450 K/uL    MPV 8.9 5.0 - 10.5 fL    Neutrophils % 76.0 %    Lymphocytes % 17.0 %    Monocytes % 6.0 %    Eosinophils % 1.0 %    Basophils % 0.0 %    Neutrophils Absolute 7.4 1.7 - 7.7 K/uL    Lymphocytes Absolute 1.6 1.0 - 5.1 K/uL    Monocytes Absolute 0.6 0.0 - 1.3 K/uL    Eosinophils Absolute 0.1 0.0 - 0.6 K/uL    Basophils Absolute 0.0 0.0 - 0.2 K/uL    RBC Morphology Normal    POCT Glucose    Collection Time: 12/30/22  7:39 AM   Result Value Ref Range    POC Glucose 174 (H) 70 - 99 mg/dl    Performed on ACCU-CHEK        Therapy progress:  PT  Position Activity Restriction  Other position/activity restrictions: up as tolerated  Objective     Sit to Stand: Contact guard assistance (at recliner/EOB/wc with RW)  Stand to Sit: Contact guard assistance  Device: Rolling Walker  Assistance: Contact guard assistance  Distance: 150', 30' (ascent/descent 15' ramp), 15'  OT  PT Equipment Recommendations  Equipment Needed: No  Toilet - Technique: Ambulating  Equipment Used: Standard toilet  Toilet Transfers Comments: use of GB on L  Assessment        SLP          Body mass index is 33.7 kg/m².     Assessment and Plan:    \"-Acute on chronic subdural hematoma s/p Left frontal trephine craniotomy for evacuation 12/21  - PT/OT/SLP  - keppra 1000mg BID  - decadron- 1mg BID- 2 doses   - hold eliquis      -Essential HTN-controlled-on coreg     -CAD s/p CABG 2005,EF 55-60%-antiplatelets held,on coreg     -Hx Complete heart block s/p PPM     -Paroxsymal a fib-stable-eliquis held     -Obesity BMI 35  - counseling      -Hypothyroidism-on  synthroid     -DM type 2-gliplizide,trulicity on hold since admission   - basal insulin on inpatient            Impairments:Decreased functional mobility, Decreased ADLs     Bladder - high risk retention - Monitor PVRs, SC prn >300cc     Bowel - high risk constipation - colace BID, PRN miralax and MoM. follow bowel movements. Enema or suppository if needed. Safety - fall precautions     PPx  DVT: lovenox  GI: pantoprazole     FULL CODE\"    Rehab Progress: Improving  Anticipated Dispo: home  Services/DME: TBD  ELOS: TBD    Team conference was held today on the patient and discussed directly with the patient utilizing their entire treatment team. Please see separate team note for details. Total treatment time for today's care >35 min. >50% of time spent counseling with patient and coordinating care. Amado Knutson MD  PGY1 - IM    This patient has been staffed and discussed with attending Dr. Ken Campo. This patient has been seen, examined, and discussed with the resident. I was part of the key critical services provided to the patient. I agree with the residents documentation. This note may have been altered to reflect my own examination findings, impression, and recommendations.        Jarod Ortez D.O. M.P.H  PM&R  12/30/2022  10:10 AM

## 2022-12-30 NOTE — CARE COORDINATION
Team Conference held this morning. Team discussed DC date for 1/4/23. SW met with Pt at bedside and discussed DC date - Pt is in agreement and states that she will call her friend who will be staying with her and let her know about DC date. Emotional support provided. SW will continue to follow.     Nathan Munguia, [place]  Case Management  [phone]

## 2022-12-30 NOTE — PROGRESS NOTES
Speech Language Pathology  ACUTE REHAB UNIT  SPEECH/LANGUAGE PATHOLOGY      [x] Daily  [x] Weekly Care Conference Note  [] Discharge    Patient:Lachelle Hernandez      JQW:[birth year]  ZKV:[id number]  Rehab Dx/Hx: SDH (subdural hematoma) [S06. 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[employer],  before that  [address]: watch TV, read    Dentition: Some missing teeth  Vision  Vision: Impaired  Vision Exceptions: Wears glasses for reading  Hearing  Hearing: Within functional limits  Hearing Exceptions: Hard of hearing/hearing concerns  Barriers toward progress: None      Date: 12/30/2022       Tx session 1 Tx session 2 Weekly Summary   Total Timed Code Min 30 30    Total Treatment Minutes 30 30    Individual Treatment Minutes 30 30    Group Treatment Minutes 0 0    Co-Treat Minutes 0 0    Brief Exception: N/A N/A    Pain None expressed None indicated    Pain Intervention: [] RN notified  [] Repositioned  [] Intervention offered and patient declined  [x] N/A  [] Other: [] RN notified  [] Repositioned  [] Intervention offered and patient declined  [x] N/A  [] Other:    Subjective:     Pt seated upright in chair agreeable to SLP session at this time. Noted increased fatigue and reduced attention/processing. Discussed with RN following session. Pt seated upright in chair sleeping upon entry. Easily awoke and agreeable to 2nd session.     Objective / Goals:      Goal 1: Pt will complete higher level executive function tasks with 100% accuracy    Targeted via organizing register. Pt with increased difficulty completing task and required mod cues. Noted errors in writing/transferring information accurately. Perseverations of numbers and order inaccuracy. Pt unaware of errors at times and required mod cues to ID and attempt self corrections. Completed basic math problems with pictures with 60% accuracy, increased to 80% with min cues. Answered higher level temporal orientation math questions with 70% accuracy, increased to 90% with min cues. PROGRESSING; CONTINUE   Goal 2: Pt will complete functional working/short term memory tasks with 100% accuracy and use of compensatory strategies as needed. Not directly targeted this session. Recall information targeted this AM during session, as well as activities with PT. PROGRESSING; CONTINUE   Goal 3: Pt will participate in ongoing cognitive-linguistic assessment. As noted above, written perseverations present during task. SLP discussed noted impairments and pt with limited awareness until pointed out by SLP. Reduced attention negatively impacting accuracy this session as well. Not directly targeted this session. CONTINUE   New Goal: Pt will complete written tasks with no perseverations or errors. New goal. New goal. NEW GOAL   New Goal: Pt will improve error awareness and attempt self corrections across x3/3 occurences. New goal. New goal.  NEW GOAL   Other areas targeted:      Education:   Educated re: rationale for tasks, areas being targeted Educated re: tasks targeted, new goals added. Safety Devices: [x] Call light within reach  [x] Chair alarm activated and connected to nurse call light system  [] Bed alarm activated   [] Other: [x] Call light within reach  [x] Chair alarm activated and connected to nurse call light system  [] Bed alarm activated  [] Other:     Assessment: Pt presents with mild cognitive impairments in the areas of executive function, memory and attention. Cues for attempting self corrections and identifying perseverations in writing. Plan: Continue as per plan of care.        Interventions used this date:  [] Speech/Language Treatment  [] Instruction in HEP  [] Dysphagia Treatment [x] Cognitive Treatment   [] Other:    Discharge recommendations:  [] Home independently  [x] Home with assistance []  24 hour supervision  [] ECF [] Other  Continued Tx Upon Discharge: ? [] Yes    [] No    [x] TBD based on progress while on ARU     [] Vital Stim indicated     [] Other:   Estimated discharge date: January 4th, 2023      Electronically signed by:  All Monge M.A., [address] [facility]  Speech-Language Pathologist

## 2022-12-30 NOTE — PLAN OF CARE
Problem: Discharge Planning  Goal: Discharge to home or other facility with appropriate resources  12/29/2022 2116 by Abbie Mitchell RN  Outcome: Progressing     Problem: Skin/Tissue Integrity  Goal: Absence of new skin breakdown  Description: 1. Monitor for areas of redness and/or skin breakdown  2. Assess vascular access sites hourly  3. Every 4-6 hours minimum:  Change oxygen saturation probe site  4. Every 4-6 hours:  If on nasal continuous positive airway pressure, respiratory therapy assess nares and determine need for appliance change or resting period.   12/29/2022 2116 by Abbie Mitchell RN  Outcome: Progressing     Problem: Safety - Adult  Goal: Free from fall injury  12/29/2022 2116 by Abbie Mitchell RN  Outcome: Progressing     Problem: ABCDS Injury Assessment  Goal: Absence of physical injury  12/29/2022 2116 by Abbie Mitchell RN  Outcome: Progressing     Problem: Nutrition Deficit:  Goal: Optimize nutritional status  12/29/2022 2116 by Abbie Mitchell RN  Outcome: Progressing     Problem: Pain  Goal: Verbalizes/displays adequate comfort level or baseline comfort level  Outcome: Progressing

## 2022-12-30 NOTE — PROGRESS NOTES
Patient thinks robaxing 4x a day is too much and makes her loopy. Messaged Dr Santana Hammans and okayed to order it PRN.

## 2022-12-31 ENCOUNTER — APPOINTMENT (OUTPATIENT)
Dept: CT IMAGING | Age: 77
DRG: 056 | End: 2022-12-31
Attending: PHYSICAL MEDICINE & REHABILITATION
Payer: MEDICARE

## 2022-12-31 VITALS
BODY MASS INDEX: 33.79 KG/M2 | SYSTOLIC BLOOD PRESSURE: 153 MMHG | DIASTOLIC BLOOD PRESSURE: 60 MMHG | OXYGEN SATURATION: 95 % | RESPIRATION RATE: 16 BRPM | HEART RATE: 75 BPM | TEMPERATURE: 98.4 F | WEIGHT: 190.7 LBS | HEIGHT: 63 IN

## 2022-12-31 PROBLEM — G93.40 ACUTE ENCEPHALOPATHY: Status: ACTIVE | Noted: 2022-01-01

## 2022-12-31 PROBLEM — R29.898 RIGHT ARM WEAKNESS: Status: ACTIVE | Noted: 2022-01-01

## 2022-12-31 PROBLEM — Z99.2 ESRD (END STAGE RENAL DISEASE) ON DIALYSIS (HCC): Status: ACTIVE | Noted: 2022-01-01

## 2022-12-31 PROBLEM — N18.6 ESRD (END STAGE RENAL DISEASE) ON DIALYSIS (HCC): Status: ACTIVE | Noted: 2022-12-31

## 2022-12-31 LAB
BASE EXCESS ARTERIAL: 4.2 MMOL/L (ref -3–3)
CARBOXYHEMOGLOBIN ARTERIAL: 1.7 % (ref 0–1.5)
GLUCOSE BLD-MCNC: 168 MG/DL (ref 70–99)
GLUCOSE BLD-MCNC: 171 MG/DL (ref 70–99)
GLUCOSE BLD-MCNC: 202 MG/DL (ref 70–99)
GLUCOSE BLD-MCNC: 251 MG/DL (ref 70–99)
HCO3 ARTERIAL: 27 MMOL/L (ref 21–29)
HEMOGLOBIN, ART, EXTENDED: 14 G/DL
METHEMOGLOBIN ARTERIAL: 0.3 % (ref 0–1.4)
O2 SAT, ARTERIAL: 97 % (ref 93–100)
PCO2 ARTERIAL: 35.6 MMHG (ref 35–45)
PERFORMED ON: ABNORMAL
PH ARTERIAL: 7.49 (ref 7.35–7.45)
PO2 ARTERIAL: 70.9 MMHG (ref 75–108)
TCO2 ARTERIAL: 29 MMOL/L
TROPONIN: <0.01 NG/ML

## 2022-12-31 PROCEDURE — 36415 COLL VENOUS BLD VENIPUNCTURE: CPT

## 2022-12-31 PROCEDURE — 97530 THERAPEUTIC ACTIVITIES: CPT

## 2022-12-31 PROCEDURE — 99223 1ST HOSP IP/OBS HIGH 75: CPT | Performed by: NURSE PRACTITIONER

## 2022-12-31 PROCEDURE — 6370000000 HC RX 637 (ALT 250 FOR IP): Performed by: PHYSICAL MEDICINE & REHABILITATION

## 2022-12-31 PROCEDURE — 70450 CT HEAD/BRAIN W/O DYE: CPT

## 2022-12-31 PROCEDURE — 6360000002 HC RX W HCPCS: Performed by: PHYSICAL MEDICINE & REHABILITATION

## 2022-12-31 PROCEDURE — 82803 BLOOD GASES ANY COMBINATION: CPT

## 2022-12-31 PROCEDURE — 36600 WITHDRAWAL OF ARTERIAL BLOOD: CPT

## 2022-12-31 PROCEDURE — 83036 HEMOGLOBIN GLYCOSYLATED A1C: CPT

## 2022-12-31 PROCEDURE — 99232 SBSQ HOSP IP/OBS MODERATE 35: CPT | Performed by: PHYSICAL MEDICINE & REHABILITATION

## 2022-12-31 PROCEDURE — 97116 GAIT TRAINING THERAPY: CPT

## 2022-12-31 PROCEDURE — 97110 THERAPEUTIC EXERCISES: CPT

## 2022-12-31 PROCEDURE — 92507 TX SP LANG VOICE COMM INDIV: CPT

## 2022-12-31 PROCEDURE — 84484 ASSAY OF TROPONIN QUANT: CPT

## 2022-12-31 PROCEDURE — 6370000000 HC RX 637 (ALT 250 FOR IP)

## 2022-12-31 PROCEDURE — 94760 N-INVAS EAR/PLS OXIMETRY 1: CPT

## 2022-12-31 RX ORDER — INSULIN LISPRO 100 [IU]/ML
0-4 INJECTION, SOLUTION INTRAVENOUS; SUBCUTANEOUS NIGHTLY
Status: DISCONTINUED | OUTPATIENT
Start: 2022-12-31 | End: 2023-01-01 | Stop reason: HOSPADM

## 2022-12-31 RX ORDER — LABETALOL HYDROCHLORIDE 5 MG/ML
10 INJECTION, SOLUTION INTRAVENOUS EVERY 10 MIN PRN
Status: DISCONTINUED | OUTPATIENT
Start: 2022-12-31 | End: 2023-01-01 | Stop reason: HOSPADM

## 2022-12-31 RX ORDER — DEXTROSE MONOHYDRATE 100 MG/ML
INJECTION, SOLUTION INTRAVENOUS CONTINUOUS PRN
Status: DISCONTINUED | OUTPATIENT
Start: 2022-12-31 | End: 2023-01-01 | Stop reason: HOSPADM

## 2022-12-31 RX ORDER — LEVOTHYROXINE SODIUM 0.05 MG/1
50 TABLET ORAL DAILY
Status: DISCONTINUED | OUTPATIENT
Start: 2023-01-01 | End: 2022-12-31 | Stop reason: SDUPTHER

## 2022-12-31 RX ORDER — ROSUVASTATIN CALCIUM 20 MG/1
40 TABLET, COATED ORAL NIGHTLY
Status: DISCONTINUED | OUTPATIENT
Start: 2022-12-31 | End: 2023-01-01 | Stop reason: HOSPADM

## 2022-12-31 RX ORDER — INSULIN LISPRO 100 [IU]/ML
0-4 INJECTION, SOLUTION INTRAVENOUS; SUBCUTANEOUS
Status: DISCONTINUED | OUTPATIENT
Start: 2023-01-01 | End: 2023-01-01 | Stop reason: HOSPADM

## 2022-12-31 RX ORDER — ASPIRIN 81 MG/1
81 TABLET ORAL DAILY
Status: DISCONTINUED | OUTPATIENT
Start: 2023-01-01 | End: 2023-01-01 | Stop reason: HOSPADM

## 2022-12-31 RX ORDER — SODIUM CHLORIDE 9 MG/ML
INJECTION, SOLUTION INTRAVENOUS CONTINUOUS
Status: DISCONTINUED | OUTPATIENT
Start: 2022-12-31 | End: 2023-01-01 | Stop reason: HOSPADM

## 2022-12-31 RX ORDER — TROSPIUM CHLORIDE 20 MG/1
20 TABLET, FILM COATED ORAL
Status: DISCONTINUED | OUTPATIENT
Start: 2023-01-01 | End: 2023-01-01 | Stop reason: HOSPADM

## 2022-12-31 RX ORDER — PANTOPRAZOLE SODIUM 40 MG/1
40 TABLET, DELAYED RELEASE ORAL DAILY
Status: DISCONTINUED | OUTPATIENT
Start: 2023-01-01 | End: 2023-01-01 | Stop reason: HOSPADM

## 2022-12-31 RX ORDER — ASPIRIN 300 MG/1
300 SUPPOSITORY RECTAL DAILY
Status: DISCONTINUED | OUTPATIENT
Start: 2023-01-01 | End: 2023-01-01 | Stop reason: HOSPADM

## 2022-12-31 RX ORDER — ONDANSETRON 4 MG/1
4 TABLET, ORALLY DISINTEGRATING ORAL EVERY 8 HOURS PRN
Status: DISCONTINUED | OUTPATIENT
Start: 2022-12-31 | End: 2023-01-01 | Stop reason: HOSPADM

## 2022-12-31 RX ORDER — GLUCAGON 1 MG/ML
1 KIT INJECTION PRN
Status: DISCONTINUED | OUTPATIENT
Start: 2022-12-31 | End: 2023-01-01 | Stop reason: HOSPADM

## 2022-12-31 RX ORDER — POLYETHYLENE GLYCOL 3350 17 G/17G
17 POWDER, FOR SOLUTION ORAL DAILY PRN
Status: DISCONTINUED | OUTPATIENT
Start: 2022-12-31 | End: 2022-12-31 | Stop reason: SDUPTHER

## 2022-12-31 RX ORDER — ONDANSETRON 2 MG/ML
4 INJECTION INTRAMUSCULAR; INTRAVENOUS EVERY 6 HOURS PRN
Status: DISCONTINUED | OUTPATIENT
Start: 2022-12-31 | End: 2023-01-01 | Stop reason: HOSPADM

## 2022-12-31 RX ADMIN — LEVETIRACETAM 1000 MG: 500 TABLET, FILM COATED ORAL at 08:49

## 2022-12-31 RX ADMIN — GABAPENTIN 300 MG: 300 CAPSULE ORAL at 12:41

## 2022-12-31 RX ADMIN — LEVETIRACETAM 1000 MG: 500 TABLET, FILM COATED ORAL at 20:31

## 2022-12-31 RX ADMIN — INSULIN LISPRO 4 UNITS: 100 INJECTION, SOLUTION INTRAVENOUS; SUBCUTANEOUS at 17:42

## 2022-12-31 RX ADMIN — CARVEDILOL 6.25 MG: 6.25 TABLET, FILM COATED ORAL at 17:37

## 2022-12-31 RX ADMIN — TROSPIUM CHLORIDE 20 MG: 20 TABLET, FILM COATED ORAL at 08:49

## 2022-12-31 RX ADMIN — FUROSEMIDE 40 MG: 40 TABLET ORAL at 08:49

## 2022-12-31 RX ADMIN — PANTOPRAZOLE SODIUM 40 MG: 40 TABLET, DELAYED RELEASE ORAL at 08:50

## 2022-12-31 RX ADMIN — LEVOTHYROXINE SODIUM 50 MCG: 50 TABLET ORAL at 06:21

## 2022-12-31 RX ADMIN — GABAPENTIN 300 MG: 300 CAPSULE ORAL at 08:49

## 2022-12-31 RX ADMIN — CARVEDILOL 6.25 MG: 6.25 TABLET, FILM COATED ORAL at 08:49

## 2022-12-31 RX ADMIN — GABAPENTIN 300 MG: 300 CAPSULE ORAL at 20:31

## 2022-12-31 RX ADMIN — INSULIN GLARGINE 8 UNITS: 100 INJECTION, SOLUTION SUBCUTANEOUS at 22:42

## 2022-12-31 RX ADMIN — INSULIN LISPRO 2 UNITS: 100 INJECTION, SOLUTION INTRAVENOUS; SUBCUTANEOUS at 12:42

## 2022-12-31 RX ADMIN — ISOSORBIDE MONONITRATE 120 MG: 30 TABLET, EXTENDED RELEASE ORAL at 08:48

## 2022-12-31 RX ADMIN — ENOXAPARIN SODIUM 40 MG: 100 INJECTION SUBCUTANEOUS at 08:49

## 2022-12-31 ASSESSMENT — PAIN SCALES - GENERAL: PAINLEVEL_OUTOF10: 0

## 2022-12-31 NOTE — PROGRESS NOTES
Speech Language Pathology  ACUTE REHAB UNIT  SPEECH/LANGUAGE PATHOLOGY      [x] Daily  [] Weekly Care Conference Note  [] Discharge    Patient:Lachelle Choi      MGI:[id number]  HB  Rehab Dx/Hx: SDH (subdural hematoma) [S06. 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, read    Dentition: Some missing teeth  Vision  Vision: Impaired  Vision Exceptions: Wears glasses for reading  Hearing  Hearing: Within functional limits  Hearing Exceptions: Hard of hearing/hearing concerns  Barriers toward progress: None, communication deficit    Date: 2022      Tx session 1 Tx session 2   Total Timed Code Min 0 0   Total Treatment Minutes 30 30   Individual Treatment Minutes 30 30   Group Treatment Minutes 0 0   Co-Treat Minutes 0 0   Brief Exception: N/A N/A   Pain None indicated via any means None indicated via any means   Pain Intervention: [] RN notified  [] Repositioned  [] Intervention offered and patient declined  [x] N/A  [] Other: [] RN notified  [] Repositioned  [] Intervention offered and patient declined  [x] N/A  [] Other:   Subjective:     Per nursing, pt with new onset dysarthria and RUE weakness. CT scan completed and DO Ok'd pt for participation in therapy this date. During session, pt with dysarthria, AOS, and expressive>receptive aphasia. RN aware of changes. Pt became tearful in session and emotional support was provided.   Pt seated in chair upon entry. Pt with worse AOS and expressive>receptive aphasia. DO notified of progressively worsening changes. Pt became tearful at times suspect due to the changes she is experiencing. Objective / Goals:     Pt will complete higher level executive function tasks with 100% accuracy    Goal not targeted this session. Goal not targeted this session. Pt will complete functional working/short term memory tasks with 100% accuracy and use of compensatory strategies as needed. Goal not targeted this session. Goal not targeted this session. Pt will participate in ongoing cognitive-linguistic assessment. Goal not targeted this session. Goal not targeted this session. Pt will complete written tasks with no perseverations or errors. Goal not targeted this session. Goal not targeted this session. Pt will improve error awareness and attempt self corrections across x3/3 occurences. Goal not targeted this session. Goal not targeted this session. Other areas targeted: Dysarthria: pt presented with new onset dysarthria this AM. Educated pt to speech comprehensibility strategies and pt was unable to state strategies back to SLP. SLP provided strategies written down and pt unable to read strategies, suspect due to receptive aphasia. Pt was able to complete various speech tasks, including sustained phonation of a neutral vowel x3 for an average of 8 seconds; pt with moderately reduced vocal intensity. Completed DDKs: pt able to complete, but with reduced vocal intensity; moderately reduced coordination. Pt achieved 9 reps within 10 seconds. Auditory comprehension: single word to object in visual field of 2: pt completed with 33% accuracy independently increasing to 66% accuracy given mod cues. Task terminated due to frustration level. Personal to moderately complex Yes/No questions: pt completed with 80% accuracy. Pt with some perseverations this session.      Automatic speech: pt able to count 1-10 with 100% accuracy independently. Pt was able to verbalize ABCs with melodic intonation but became perseverative at the end. Automatic speech: attempted to name novel leisure item (cards). Pt was unable to name the specific card, but able to verbalize \"cards\". Confrontational naming: pt completed with 70% accuracy given mod phonemic and phrase closure cues. Pt with several perseverations throughout this task and was able to repeat items with ~50% accuracy. Communicating wants and needs: pt was able to provide consistent yes/no questions 2/3 opportunities to communicate wants and needs, pt became perseverative at other times. Attempted to introduce and utilize a low tech VF2 and [employer]. Pt was unable to gesture to the individual photos within the board suspect due to motor planning deficits. This is currently not an effective form of communication. Pt was able to gesture to turn off the lights during session. Education:   Pt educated to skills targeted and rationale. Also educated to aphasia, apraxia, and dysarthria. Pt educated to rationale for tx session. Also educated pt and RN to utilizing yes/no questions to facilitate communication, but that we must confirm the message being expressed by asking questions that yield the opposite response. Safety Devices: [x] Call light within reach  [x] Chair alarm activated and connected to nurse call light system  [] Bed alarm activated   [x] Other: reinforced use of call light [x] Call light within reach  [x] Chair alarm activated and connected to nurse call light system  [] Bed alarm activated  [x] Other: RN at side   Assessment: Pt presents with new onset dysarthria, apraxia of speech, expressive>receptive aphasia. Pt was more perseverative and was unable to point when attempting to utilize a low tech VF2 communication board.  Pt able to answer yes/no questions more accurately in 1st session compared to 2nd session, but is above the level of chance. Plan: Continue as per plan of care. New goals:  Pt will effectively communicate any wants/needs via any means. Pt will answer moderately complex yes/no questions with 80% accuracy. Pt will complete naming tasks with 80% accuracy given min cues.       Interventions used this date:  [x] Speech/Language Treatment  [] Instruction in HEP  [] Dysphagia Treatment [] Cognitive Treatment   [] Other:    Discharge recommendations:  [] Home independently  [x] Home with assistance []  24 hour supervision  [] ECF [] Other  Continued Tx Upon Discharge: ? [] Yes    [] No    [x] TBD based on progress while on ARU     [] Vital Stim indicated     [] Other:   Estimated discharge date: January 4th, 2023      Electronically signed by:  Lenore Avilez, [address] [facility], [address] [facility]  Speech-Language Pathologist

## 2022-12-31 NOTE — PROGRESS NOTES
Patient assessment completed. VSS and patient is afebrile. Denies any pain or discomfort at this time. Ambulated X 1 [facility] walker.

## 2022-12-31 NOTE — PROGRESS NOTES
Patient was awakened for medication. She was assisted to the bathroom. Right arm weakness noted. Patient reports that her weakness was not this bad. Her speech slurred at times. Vital signs stable. Accucheck blood sugar wnl. Dr Jos Luciano. Notified of change of condition. Order received for stat CT scan.

## 2022-12-31 NOTE — PLAN OF CARE
Problem: Discharge Planning  Goal: Discharge to home or other facility with appropriate resources  12/31/2022 1150 by Miguelina Dalton  Outcome: Progressing  Flowsheets (Taken 12/31/2022 0910)  Discharge to home or other facility with appropriate resources: Identify barriers to discharge with patient and caregiver     Problem: Skin/Tissue Integrity  Goal: Absence of new skin breakdown  Description: 1. Monitor for areas of redness and/or skin breakdown  2. Assess vascular access sites hourly  3. Every 4-6 hours minimum:  Change oxygen saturation probe site  4. Every 4-6 hours:  If on nasal continuous positive airway pressure, respiratory therapy assess nares and determine need for appliance change or resting period.   12/31/2022 1150 by Miguelina Dalton  Outcome: Progressing     Problem: Safety - Adult  Goal: Free from fall injury  12/31/2022 1150 by Miguelina Dalton  Outcome: Progressing     Problem: ABCDS Injury Assessment  Goal: Absence of physical injury  Outcome: Progressing     Problem: Nutrition Deficit:  Goal: Optimize nutritional status  Outcome: Progressing     Problem: Pain  Goal: Verbalizes/displays adequate comfort level or baseline comfort level  12/31/2022 1150 by Miguelina Dalton  Outcome: Progressing

## 2022-12-31 NOTE — PROGRESS NOTES
Physical Therapy  Facility/Department: [facility] ACUTE REHAB UNIT  Rehabilitation Physical Therapy Treatment Note    NAME: Lester Arteaga  : 1945 (78 y.o.)  MRN: [id number]  CODE STATUS: Full Code    Date of Service: 22     Restrictions:  Position Activity Restriction  Other position/activity restrictions: up as tolerated     SUBJECTIVE  Subjective  Subjective: Pt seated in recliner upon approach and agreeable to PT. Pt states she feels worse today and is noticing more RUE weakness and word finding difficulty. DO present at start of session and aware, OK to participate in therapy at this time. Pain: no c/o pain. OBJECTIVE  Cognition  Overall Cognitive Status: Exceptions  Arousal/Alertness: Delayed responses to stimuli  Following Commands: Follows one step commands with repetition; Follows one step commands with increased time  Attention Span: Difficulty attending to directions  Problem Solving: Assistance required to generate solutions  Insights: Decreased awareness of deficits  Initiation: Requires cues for some  Sequencing: Requires cues for some  Cognition Comment: Pt w/ stat CT this morning due to increased R sided weakness and dysarthria. CT revealed no new changes and pt was approved by Dr. Dell Shoemaker to continue therapy today. Pt however presented very impaired from previous information reviewed in notes and from report. Pt demonstrated min difficulty w/ receptive language and max difficulty w/ expression. Pt was having increased difficulty providing clear sentences and could only occasionally produce a comprehensible word or phrase. Pt also appeared to have significant perception/proprioception deficits related to RUE. Pt was also perseverative w/ her language at times. Orientation  Overall Orientation Status: Within Functional Limits    Functional Mobility  Standing Balance  Comments: Ambulatory toilet transfer completed this session with min A and RW. Pt manages pants down with min A for balance. While seated on toilet, some ideomotor apraxia noted with pt attempting to perform pericare with RUE. Able to complete pericare after continent of bladder with LUE with max cues, assist for pants mgmt up in standing. Transfers  Surface: To chair with arms;From chair with arms;Raised toilet Seat  Sit to Stand  Assistance Level: Contact guard assist;Stand by assist  Skilled Clinical Factors: VC for hand placement and scooting forward to edge of seat. To RW  Stand to Sit  Assistance Level: Contact guard assist  Skilled Clinical Factors: VC for hand placement and increased eccentric control      Environmental Mobility  Ambulation  Surface: Level surface  Distance: [id number], 180ft, 190ft  Activity: Within Room; Within Unit  Assistance Level: Minimal assistance; Moderate assistance  Gait Deviations: Slow taran;Decreased step length bilateral  Skilled Clinical Factors: VC for upright posture, heavy reliance on BUEs on walker - RUE demo'd poor  on walker this session and slipped off several times with pt lurching trunk fwd requiring mod A to correct 2x. PT added hemigrip to walker with improvement in grasp noted, continued to require min A for stability and keeping all 4 pts of walker in contact with floor. Several standing rest breaks req'd due to fatigue. PT Exercises  Exercise Treatment: Seated AAROM RUE bicep curls/tricep ext x10, RUE grasp and release of ball x10      ASSESSMENT/PROGRESS TOWARDS GOALS       Assessment  Assessment: pt demo's decline this session requiring min A for most ambulation with RW with difficulty grasping RW on RUE with 2 instances of R hand slipping off walker and pt with LOB fwd requiring mod a to correct. PT added hemigrip to R side of walker with improvement noted. PT also noted some ideomotor apraxia with toileting this session and req'd max cues and A to complete pericare and pants mgmt. Pt continues to require IPR To address deficits.   Activity Tolerance: Patient tolerated treatment well  Discharge Recommendations: Continue to assess pending progress;Home with Home health PT;24 hour supervision or assist  PT Equipment Recommendations  Equipment Needed: No  Other: pt has all equipment needs met    Goals  Patient Goals   Patient Goals : none stated  Short Term Goals  Time Frame for Short Term Goals: 10 days- all ongoing  Short Term Goal 1: Pt will complete bed mobility independently  Short Term Goal 2: Pt will compelte sit<>stand and pivot transfers with LRAD and mod I  Short Term Goal 3: Pt will complete 150' ambualtion with mod I and LRAD  Short Term Goal 4: Pt will complete ascent/descent of 10' ramp with mod I and LRAD    PLAN OF CARE/SAFETY  Physcial Therapy Plan  Days Per Week: 5 Days  Hours Per Day: 1 hour  Current Treatment Recommendations: Strengthening;Balance training;Gait training;Stair training;Functional mobility training;Transfer training; Endurance training;Home exercise program;Therapeutic activities; Neuromuscular re-education  Safety Devices  Type of Devices: Left in chair (left with OT Amauri Grumbles)    EDUCATION  Education  Education Given To: Patient  Education Provided: Plan of Care;Safety; Mobility Training;Transfer Training;Equipment  Education Provided Comments: discharge recommendations including use of RW upon discharge, benefits of balance exercises  Education Method: Demonstration;Verbal  Education Outcome: Verbalized understanding;Demonstrated understanding;Continued education needed        Therapy Time   Individual Concurrent Group Co-treatment   Time In 1015         Time Out 1115         Minutes 60            Timed Code Treatment Minutes:60    Total Treatment Minutes:60         Penelope Henry PT, 12/31/22 at 1:00 PM

## 2022-12-31 NOTE — PROGRESS NOTES
I reviewed the head CT 12/31/2022. This demonstrates interval improvement in mass effect and density of SDH since surgery,. No further surgical intervention required. Mat consider neurology consultation for seizure if she continues to experience neurologic changes.     Liliana Berrios MD, PhD  Amparo Job  Director, [address], [address] (c), [phone] (o)

## 2022-12-31 NOTE — CONSULTS
Neurology / Neurocritical Care Consult Note    Ara Vargas DO is requesting this consult. Reason for Consult: right arm weakness, concern for seizure   Admission Chief Complaint: right arm weakness    History of Present Illness     Savi Peterson is a 78 y.o. y/o female with PMH significant for left SDH s/p trephine evacuation. atrial fibrillation, CHF, DM, HTN, HLD, BERTRAND, neuropathy, syncope, and B12 deficiency who we were asked to see for worsening right sided weakness. Her initial symptoms prior to SDH evacuation were moderate expressive aphasia and right arm weakness. She had episode of being more lethargic and she was worked up for this and her imaging was stable. REVIEW OF SYSTEMS:   MELLISA, lethargy     Past Medical, Surgical, Family, and Social History   PAST MEDICAL HISTORY:  Past Medical History:   Diagnosis Date    A-fib [facility])     Allergic rhinitis     Arthritis     CAD (coronary artery disease)     Cardiac pacemaker     CHF (congestive heart failure) ([facility] [address]     Cholelithiasis 07/03/2015    Diabetes mellitus ([facility] [address]     Accuchecks daily once a day at home in A.M. Encounter for imaging to screen for metal prior to MRI 12/20/2022    MRI Conditional Medtronic [place] XT DR model#[id number]  Leads: RA [id number] RV [id number] implanted 2/17/22. Normal lmode. 1.5T or 3.0T. Pt must be A/OX4 per [employer] guidelines. [employer] rep and RN must be present. Follow all other [employer] guidelines. Pt currently follows     ESRD (end stage renal disease) ([facility] [address]     Gastroparesis     Hepatic steatosis 07/03/2015    Hyperlipidemia     Hypertension     Peripheral neuropathy     Sleep apnea     Has used the CPAP x 25 yrs now per pt.     Syncope     Thyroid disease     Vitamin B12 deficiency      SURGICAL HISTORY:  Past Surgical History:   Procedure Laterality Date    CARDIAC CATHETERIZATION  12/27/2017    SVG to OM2 100% ostium    CHOLECYSTECTOMY      COLECTOMY  1998    diverticulitis     COLONOSCOPY CORONARY ANGIOPLASTY      CORONARY ARTERY BYPASS GRAFT  2005    x 3    CRANIOTOMY Left 2022    LEFT TREPHINE CRANIOTOMY FOR SUBDURAL HEMATOMA EVACUATION performed by Adam Levy. Orville Ontiveros MD at [facility] CATH LAB PROCEDURE      ENDOSCOPY, COLON, DIAGNOSTIC      EYE SURGERY      Bilateral cataracts removed with lens implants    FOOT SURGERY      Multiple foot surgeries bilateral    HERNIA REPAIR      OTHER SURGICAL HISTORY  2012    achilles tendon lengthening,arthroplasty,matatarsalhead resection    OTHER SURGICAL HISTORY Right 3/20/13    FUSION OF RIGHT GREAT TOE JOINT WITH WIRE FIXATION, DEBRIEDMENT OF WOUND RIGHT GREAT TOE    TOE AMPUTATION Left 3/29/2021    PARTIAL LEFT FOOT AMPUTATION performed by Rubén Brito DPM at [address]      Laparoscopic    UPPER GASTROINTESTINAL ENDOSCOPY       FAMILY HISTORY & SOCIAL HISTORY:  Family history non-contributory  Family History   Problem Relation Age of Onset    Stroke Mother     Coronary Art Dis Father     Coronary Art Dis Sister     Coronary Art Dis Brother      Social History     Tobacco Use    Smoking status: Former     Packs/day: 1.00     Years: 30.00     Pack years: 30.00     Types: Cigarettes     Quit date: 1998     Years since quittin.6    Smokeless tobacco: Never   Vaping Use    Vaping Use: Never used   Substance Use Topics    Alcohol use: No    Drug use: No         Allergies & Outpatient Medications   ALLERGIES:  Allergies   Allergen Reactions    Niacin And Related Anaphylaxis    Ranexa [Ranolazine] Hallucinations    Adhesive Tape      Causes rash and blisters. Can use paper tape or opsite without any problems. Other      QVacin IV antibiotic had after foot surgery placed on this for infection. Developed high fever, chills and uncontrollable shaking.     Statins Depletion Therapy      Deep muscle pain    Vancomycin      Does not recall reaction      HOME MEDICATIONS:  Current Discharge Medication List        CONTINUE these medications which have NOT CHANGED    Details   carvedilol (COREG) 6.25 MG tablet Take 1 tablet by mouth 2 times daily (with meals)  Qty: 60 tablet, Refills: 3      furosemide (LASIX) 40 MG tablet Take 1 tablet by mouth daily  Qty: 60 tablet, Refills: 3      nitroGLYCERIN (NITROSTAT) 0.4 MG SL tablet up to max of 3 total doses. If no relief after 1 dose, call 911. Qty: 25 tablet, Refills: 1      isosorbide mononitrate (IMDUR) 120 MG extended release tablet TAKE ONE TABLET BY MOUTH DAILY  Qty: 90 tablet, Refills: 3      levothyroxine (SYNTHROID) 50 MCG tablet Take 1 tablet by mouth Daily  Qty: 30 tablet, Refills: 3      pantoprazole (PROTONIX) 40 MG tablet TAKE ONE TABLET BY MOUTH DAILY      tolterodine (DETROL LA) 2 MG ER capsule Take 4 mg by mouth daily       GLIPIZIDE Take 5 mg by mouth 2 times daily (with meals) Taking 10 mg AM & 5 mg in PM      gabapentin (NEURONTIN) 600 MG tablet Take 600 mg by mouth. May take up to 4x per day if needed for neuropathy pain. STOP taking these medications       levETIRAcetam (KEPPRA) 500 MG tablet Comments:   Reason for Stopping:         insulin glargine (LANTUS;BASAGLAR) 100 UNIT/ML injection pen Comments:   Reason for Stopping:         dexamethasone (DECADRON) 0.5 MG tablet Comments:   Reason for Stopping:         methocarbamol (ROBAXIN) 750 MG tablet Comments:   Reason for Stopping:                 Physical Exam   PHYSICAL EXAM:  Vitals:    12/30/22 1953 12/31/22 0630 12/31/22 0642 12/31/22 0830   BP: 122/75  131/79 114/75   Pulse: 60  62 60   Resp: 16   16   Temp: 98.2 °F (36.8 °C)   97.8 °F (36.6 °C)   TempSrc: Oral   Oral   SpO2: 97%  96% 96%   Weight:  190 lb 11.2 oz (86.5 kg)     Height:             General: lethargic , no distress, well-nourished  Neurologic  Mental status: asleep. Awakens easily. Follows commands.  Some expressive aphasia    Cranial nerves:   CN2: Visual fields full w/o extinction on confrontational testing   CN 3,4,6: Pupils equal and reactive to light, extraocular muscles intact  CN5: Facial sensation symmetric   CN7: Face symmetric bilaterally   CN8: Hearing symmetric to spoken voice  CN9: Palate elevated symmetrically  CN11: Traps full strength on shoulder shrug  CN12: Tongue midline with protrusion    Motor Exam:   R  L    Deltoid 4  5   Biceps 4 5   Triceps 4 5    2 5   Interossei 4 5      R  L    Hip flexion  5  5   Knee flexion  5 5   Knee extension  5 5   Ankle dorsiflexion  5 5   Ankle plantar flexion  5 5         OTHER SYSTEMS:  Cardiovascular: Warm, appears well perfused   Respiratory: Easy, non-labored respiratory pattern   Abdominal: Abdomen is without distention   Extremities: Upper and lower extremities are atraumatic in appearance without deformity. No swelling or erythema. Psychiatric: Cooperative with exam    Diagnostic Testing Results   IMAGES:  Images personally reviewed and agree w/ radiology interpretation. Head CT w/o Contrast:  Interval removal of the subdural drain since most recent study with residual mixed attenuation subdural collection measuring up to 13 mm without significant mass effect or midline shift. Global atrophy with stable chronic small vessel ischemic changes. LABS:  All results below personally reviewed. Pertinent positives & negatives are addressed in Impression & Recommendations below. LABS   Metabolic Panel Recent Labs     12/30/22  0641   *   K 3.8   CL 97*   CO2 25   BUN 32*   CREATININE 1.0   GLUCOSE 197*   CALCIUM 8.3      CBC / Coags Recent Labs     12/30/22  0641   WBC 9.7   RBC 4.74   HGB 14.0   HCT 42.2   *      Other No results for input(s): LABA1C, LDLCALC, TRIG, TSH, [id number], FOLATE, LABSALI, COVID19 in the last 72 hours. No results for input(s): PHENYTOIN, KEPPRA, LACOSA, LAMO, VALPROATE, LACTSEPSIS, LACTA in the last 72 hours.        CURRENT SCHEDULED MEDICATIONS   Inpatient Medications trospium, 20 mg, Oral, Daily    enoxaparin, 40 mg, SubCUTAneous, Daily    bisacodyl, 5 mg, Oral, Daily    insulin lispro, 0-4 Units, SubCUTAneous, Nightly    insulin lispro, 0-8 Units, SubCUTAneous, TID WC    carvedilol, 6.25 mg, Oral, BID WC    furosemide, 40 mg, Oral, Daily    gabapentin, 300 mg, Oral, TID    insulin glargine, 8 Units, SubCUTAneous, Nightly    isosorbide mononitrate, 120 mg, Oral, Daily    levETIRAcetam, 1,000 mg, Oral, BID    levothyroxine, 50 mcg, Oral, Daily    pantoprazole, 40 mg, Oral, Daily   Infusions    dextrose        Antibiotics   Recent Abx Admin        No antibiotic orders with administrations found. IMPRESSION & RECOMMENDATIONS     IMPRESSION: 78year old woman with SDH s/p trephine evacuation with Dr. Derrick Can who we were asked to see for worsened right sided weakness. She does have right arm weakness that is new from when we last saw her. Imaging reviewed by neurosurgery and mass effect less than after her initial evacuation. She may be having seizures or ischemic stroke could also cause these symptoms but I favor seizures.       RECOMMENDATIONS:    -Imaging review by neurosurgery and interval improvement in mass effect and density of SDH  -Check UA with reflex to micro  -Continue keppra 1000mg PO BID  -If she fails to improve or has waxing and waning mental status, may need to be admitted to hospital for [facility], [address] [place] [address]   Neurology & Neurocritical Care   12/31/2022 5:32 PM    Floor / PCU Patients:  Neurology Line: [phone]  [employer]: [facility] Neurology

## 2022-12-31 NOTE — PROGRESS NOTES
Occupational Therapy  Facility/Department: [facility] ACUTE REHAB UNIT  Rehabilitation Occupational Therapy Daily Treatment Note    Date: 22  Patient Name: Abraham Robert       Room: 5898/2720-36  MRN: [id number]  Account: [de-identified]   : 1945  (78 y.o.) Gender: female                    Past Medical History:  has a past medical history of A-fib ([address] Allergic rhinitis, Arthritis, CAD (coronary artery disease), Cardiac pacemaker, CHF (congestive heart failure) ([address] Cholelithiasis, Diabetes mellitus ([address] Encounter for imaging to screen for metal prior to MRI, ESRD (end stage renal disease) ([facility] [address] Gastroparesis, Hepatic steatosis, Hyperlipidemia, Hypertension, Peripheral neuropathy, Sleep apnea, Syncope, Thyroid disease, and Vitamin B12 deficiency. Past Surgical History:   has a past surgical history that includes Coronary artery bypass graft (); Foot surgery; colectomy (); Colonoscopy; Upper gastrointestinal endoscopy; Tonsillectomy and adenoidectomy; Tubal ligation; other surgical history (2012); other surgical history (Right, 3/20/13); Coronary angioplasty; Cholecystectomy; Cardiac catheterization (2017); Diagnostic Cardiac Cath Lab Procedure; Toe amputation (Left, 3/29/2021); eye surgery; Endoscopy, colon, diagnostic; hernia repair; and craniotomy (Left, 2022). Restrictions  Other position/activity restrictions: up as tolerated    Subjective    Subjective: Pt was seated in recliner chair upon arrival. Pt reported being tired from PT and events of this morning. Pt moderately-maximally dysarthric w/ difficulty communicating. Per Dr. Roberto Gleason, pt is OK to continue therapy since CT scan did not reveal any change however pt is presenting significantly more impaired. Pt emotional about new changes but agreeable to OT                Objective     Cognition  Overall Cognitive Status: Exceptions  Arousal/Alertness: Delayed responses to stimuli  Following Commands:  Follows one step commands with repetition; Follows one step commands with increased time  Attention Span: Difficulty attending to directions  Problem Solving: Assistance required to generate solutions  Insights: Decreased awareness of deficits  Initiation: Requires cues for some  Sequencing: Requires cues for some  Cognition Comment: Pt w/ stat CT this morning due to increased R sided weakness and dysarthria. CT revealed no new changes and pt was approved by Dr. Yue Voss to continue therapy today. Pt however presented very impaired from previous information reviewed in notes and from report. Pt demonstrated min difficulty w/ receptive language and max difficulty w/ expression. Pt was having increased difficulty providing clear sentences and could only occasionally produce a comprehensible word or phrase. Pt also appeared to have significant perception/proprioception deficits related to RUE. Pt was also perseverative w/ her language at times. Orientation  Overall Orientation Status: Within Functional Limits         ADL    Feeding  Assistance Level: Set-up; Verbal cues; Increased time to complete  Skilled Clinical Factors: Pt required assistance to open all lunch containers. Pt unaware that her R hand was reaching into her plate and required VCs and assistance to re-position RUE back into chair. Pt required assistance to cut up her chicken. Pt unable to functionally use RUE for meal.            Functional Mobility  Device: Rolling walker  Activity:  (from recliner > bed and bed > recliner)  Assistance Level: Minimal assistance  Skilled Clinical Factors: Pt ambulated a short distance from the chair to the bed in order to complete supine exercises in bed. Pt required min A and assistance to stabilize RUE onto RW as pt was unable to control RUE.  assist present however this still was not helping. Pt returned to recliner chair w/ RW and min A in order to sit up and eat her lunch.   Sit to Supine  Assistance Level: Minimal assistance  Skilled Clinical Factors: Min A needed to lift RLE into the bed  Supine to Sit  Assistance Level: Moderate assistance  Skilled Clinical Factors: Min A needed to advance RLE and assist w/ trunk. Pt had increased difficulty following directions to complete bed mobility  Sit to Stand  Assistance Level: Contact guard assist  Skilled Clinical Factors: from recliner > RW and EOB > RW. VCs needed for hand placement  Stand to Sit  Assistance Level: Contact guard assist  Skilled Clinical Factors: VCs to reach back for bed/chair       OT Exercises  A/AROM Exercises: Pt presented w/ more impaired R sided weakness today. Pt was lethargic and \"drained\" from events of this morning. Pt was agreeable to seated and supine bed level exercises this session to further assess and address changes w/ RUE since onset of weakness this AM. Pt demonstrated changes in tone as evident by pt being nearly completely flaccid and RUE \"dragging\" at her side to moments of increased flexor tone noted in elbow and shoulder. Tone changes appeared to be random throughout session. Pt performed ROM w/ ability to achieve ~80 degrees R shoulder flexion w/ apparent pain w/ movement. Pt seemed like she was trying to tell OT that she had pain here prior to this event however unable to obtain clear info as pt was unable to communicate successfully. Pt attempted to perform 10 reps supine shoulder flexion however pt had increased difficulty following commands to perform task. Pt instead was lifting her legs off of the bed and had difficulty comprehending directions. Pt performed 5 reps shoulder flexion but could not sustain as pt's RUE would then \"drop\" down by her side w/ seemingly impaired ability for pt to control. Pt was able to actively flex R elbow but could not initiate extension. Pt's R elbow would occasionally maintain flexed position that was only alleviated when OT performed PROM.  Pt attempted AAROM shoulder flexion w/ use of LUE guiding RUE however pt had difficulty following commands to initiate and required [facility]. Pt completed 2 reps before stopping task due to difficulty. Pt completed 5 reps of chest press w/ 1lb dowel silke w/ TCs needed to continue to grasp dowel silke w/ RUE. When OT performed  assessment pt was able to squeeze OT's hand however pt had difficulty following command to do so. Pt had poor awareness of RUE and difficulty initiating task w/ RUE. OT instructed pt to attempt to grasp a foam sponge to address functional reaching w/ RUE. Pt appeared very dysmetric w/ her movement and had poor ability to reach target w/ RUE. Pt w/ RUE weakness but also motor planning difficulty. OT instructed pt to release sponge from R hand to L hand upon retrieval which pt could not do successfully due to difficulty sequencing task. Assessment      Assessment  Assessment: Pt w/ significant change in status today w/ onset of RUE weakness and dysarthria. Pt was approved by medical team to participate in therapy today however pt was much more impaired in what she could do today. Pt demonstrated proprioception deficits w/ RUE and dysmetria w/ attempts at reaching and grasping. Pt unable to functionally use RUE today which is a big change since yesterday. Pt also much more dysarthric w/ max difficulty w/ expressive language and some difficulty w/ comprehension of information. Pt continues to benefit from OT to maximize functional independence. Cont OT per POC  Activity Tolerance: Treatment limited secondary to medical complications; Patient limited by fatigue  Discharge Recommendations: Home with Home health OT; Home with assist PRN;Continue to assess pending progress  OT Equipment Recommendations  Equipment Needed: No  Other: cont to assess  Safety Devices  Type of devices: Call light within reach; Left in chair;Chair alarm in place;Nurse notified    Patient Education  Education  Education Given To: Patient  Education Provided: Role of Therapy;Precautions; Safety;Cognition;Transfer Training;Home Exercise Program  Education Provided Comments: RUE exercises and awareness  Education Method: Verbal;Demonstration  Education Outcome: Verbalized understanding;Continued education needed    Plan  Occupational Therapy Plan  Times Per Week: 60 minutes/day x 5 days/week  Current Treatment Recommendations: Strengthening; Functional mobility training;Balance training; Endurance training;Neuromuscular re-education; Safety education & training;Patient/Caregiver education & training;Equipment evaluation, education, & procurement;Self-Care / ADL; Home management training    Goals  Patient Goals   Patient goals : \"to go home\"  Short Term Goals  Time Frame for Short Term Goals: 10 days-all ongoing  Short Term Goal 1: Pt will complete bathing with mod I  Short Term Goal 2: Pt will complete toileting and toilet transfer with mod I  Short Term Goal 3: Pt will complete IADL task with mod I  Short Term Goal 4: Pt will complete LB dressing with mod I    AM-PAC Score               Therapy Time   Individual Concurrent Group Co-treatment   Time In 1115         Time Out 1215         Minutes 60         Timed Code Treatment Minutes: 60 Minutes       Kayli Wall OT

## 2022-12-31 NOTE — PLAN OF CARE
Problem: Discharge Planning  Goal: Discharge to home or other facility with appropriate resources  Outcome: Progressing  Flowsheets (Taken 12/31/2022 0402)  Discharge to home or other facility with appropriate resources:   Identify barriers to discharge with patient and caregiver   Identify discharge learning needs (meds, wound care, etc)     Problem: Skin/Tissue Integrity  Goal: Absence of new skin breakdown  Outcome: Progressing  Note: Scalp incision well approximated. Staples in place. No drainage noted. Problem: Safety - Adult  Goal: Free from fall injury  Outcome: Progressing  Note: Fall precautions in place. Bed is in low and locked position. Bed alarm set. Call light and bedside table within reach.      Problem: Pain  Goal: Verbalizes/displays adequate comfort level or baseline comfort level  Outcome: Progressing  Flowsheets (Taken 12/31/2022 0402)  Verbalizes/displays adequate comfort level or baseline comfort level:   Encourage patient to monitor pain and request assistance   Assess pain using appropriate pain scale   Administer analgesics based on type and severity of pain and evaluate response   Implement non-pharmacological measures as appropriate and evaluate response

## 2022-12-31 NOTE — PROGRESS NOTES
Department of Physical Medicine & Rehabilitation  Progress Note    Patient Identification:  Alisa Noriega  [id number]  : 1945  Admit date: 2022    Chief Complaint: SDH (subdural hematoma)    Subjective:   More confusion and right arm weakness this morning. CT negative for new path this morning. She is not in any pain and able to walk down to the gym for therapy. Will continue to monitor her progress. No new bleed. ROS: No f/c, n/v, cp     Objective:  Patient Vitals for the past 24 hrs:   BP Temp Temp src Pulse Resp SpO2 Weight   22 0830 114/75 97.8 °F (36.6 °C) Oral 60 16 96 % --   22 0642 131/79 -- -- 62 -- 96 % --   22 0630 -- -- -- -- -- -- 190 lb 11.2 oz (86.5 kg)   22 1953 122/75 98.2 °F (36.8 °C) Oral 60 16 97 % --   22 1751 (!) 142/67 -- -- 60 -- -- --       Const: Alert. No distress, pleasant. HEENT: Normocephalic, atraumatic. Normal sclera/conjunctiva. MMM. CV: Regular rate and rhythm. Resp: No respiratory distress. Lungs CTAB. Abd: Soft, nontender, nondistended, NABS+   Ext: No edema. Neuro: Alert, oriented, appropriately interactive. Psych: Cooperative, appropriate mood and affect    Laboratory data: Available via EMR.    Last 24 hour lab  Recent Results (from the past 24 hour(s))   POCT Glucose    Collection Time: 22 11:55 AM   Result Value Ref Range    POC Glucose 264 (H) 70 - 99 mg/dl    Performed on ACCU-CHEK    POCT Glucose    Collection Time: 22  5:02 PM   Result Value Ref Range    POC Glucose 173 (H) 70 - 99 mg/dl    Performed on ACCU-CHEK    POCT Glucose    Collection Time: 22  9:53 PM   Result Value Ref Range    POC Glucose 216 (H) 70 - 99 mg/dl    Performed on ACCU-CHEK    POCT Glucose    Collection Time: 22  6:46 AM   Result Value Ref Range    POC Glucose 168 (H) 70 - 99 mg/dl    Performed on ACCU-CHEK        Therapy progress:  PT  Position Activity Restriction  Other position/activity restrictions: up as tolerated  Objective     Sit to Stand: Contact guard assistance (at recliner/EOB/wc with RW)  Stand to Sit: Contact guard assistance  Device: Rolling Walker  Assistance: Contact guard assistance  Distance: 150', 30' (ascent/descent 15' ramp), 15'  OT  PT Equipment Recommendations  Equipment Needed: No  Other: pt has all equipment needs met  Toilet - Technique: Ambulating  Equipment Used: Standard toilet  Toilet Transfers Comments: use of GB on L  Assessment        SLP          Body mass index is 33.78 kg/m². Assessment and Plan:    \"-Acute on chronic subdural hematoma s/p Left frontal trephine craniotomy for evacuation 12/21  - PT/OT/SLP  - keppra 1000mg BID  - decadron- 1mg BID- 2 doses   - hold eliquis      -Essential HTN-controlled-on coreg     -CAD s/p CABG 2005,EF 55-60%-antiplatelets held,on coreg     -Hx Complete heart block s/p PPM     -Paroxsymal a fib-stable-eliquis held     -Obesity BMI 35  - counseling      -Hypothyroidism-on  synthroid     -DM type 2-gliplizide,trulicity on hold since admission   - basal insulin on inpatient            Impairments:Decreased functional mobility, Decreased ADLs     Bladder - high risk retention - Monitor PVRs, SC prn >300cc     Bowel - high risk constipation - colace BID, PRN miralax and MoM. follow bowel movements. Enema or suppository if needed.       Safety - fall precautions     PPx  DVT: lovenox  GI: pantoprazole     FULL CODE\"    Rehab Progress: Improving  Anticipated Dispo: home  Services/DME:   ELOS: 1/4        Yumiko Anthony D.O. M.P.H  PM&R  12/31/2022  11:02 AM

## 2023-01-01 ENCOUNTER — HOSPITAL ENCOUNTER (INPATIENT)
Age: 78
LOS: 2 days | Discharge: NURSING, REHAB OR LONG-TERM CARE | DRG: 100 | End: 2023-01-03
Attending: INTERNAL MEDICINE | Admitting: INTERNAL MEDICINE
Payer: MEDICARE

## 2023-01-01 ENCOUNTER — APPOINTMENT (OUTPATIENT)
Dept: CT IMAGING | Age: 78
DRG: 100 | End: 2023-01-01
Attending: INTERNAL MEDICINE
Payer: MEDICARE

## 2023-01-01 ENCOUNTER — CLINICAL DOCUMENTATION ONLY (OUTPATIENT)
Facility: CLINIC | Age: 78
End: 2023-01-01

## 2023-01-01 DIAGNOSIS — G40.901 STATUS EPILEPTICUS (HCC): Primary | ICD-10-CM

## 2023-01-01 PROBLEM — G81.91 RIGHT HEMIPLEGIA (HCC): Status: ACTIVE | Noted: 2022-01-01

## 2023-01-01 PROBLEM — R47.01 GLOBAL APHASIA: Status: ACTIVE | Noted: 2023-01-01

## 2023-01-01 LAB
ALBUMIN SERPL-MCNC: 3 G/DL (ref 3.4–5)
ANION GAP SERPL CALCULATED.3IONS-SCNC: 17 MMOL/L (ref 3–16)
BACTERIA: ABNORMAL /HPF
BANDED NEUTROPHILS RELATIVE PERCENT: 4 % (ref 0–7)
BASOPHILS ABSOLUTE: 0 K/UL (ref 0–0.2)
BASOPHILS RELATIVE PERCENT: 0 %
BILIRUBIN URINE: NEGATIVE
BLOOD, URINE: NEGATIVE
BUN BLDV-MCNC: 29 MG/DL (ref 7–20)
CALCIUM SERPL-MCNC: 8.6 MG/DL (ref 8.3–10.6)
CHLORIDE BLD-SCNC: 96 MMOL/L (ref 99–110)
CLARITY: CLEAR
CO2: 16 MMOL/L (ref 21–32)
COLOR: YELLOW
CREAT SERPL-MCNC: 1.1 MG/DL (ref 0.6–1.2)
CRYSTALS, UA: ABNORMAL /HPF
EOSINOPHILS ABSOLUTE: 0.2 K/UL (ref 0–0.6)
EOSINOPHILS RELATIVE PERCENT: 2 %
EPITHELIAL CELLS, UA: ABNORMAL /HPF (ref 0–5)
GFR SERPL CREATININE-BSD FRML MDRD: 52 ML/MIN/{1.73_M2}
GLUCOSE BLD-MCNC: 195 MG/DL (ref 70–99)
GLUCOSE BLD-MCNC: 198 MG/DL (ref 70–99)
GLUCOSE BLD-MCNC: 208 MG/DL (ref 70–99)
GLUCOSE BLD-MCNC: 208 MG/DL (ref 70–99)
GLUCOSE BLD-MCNC: 223 MG/DL (ref 70–99)
GLUCOSE BLD-MCNC: 272 MG/DL (ref 70–99)
GLUCOSE URINE: NEGATIVE MG/DL
HCT VFR BLD CALC: 46.6 % (ref 36–48)
HEMOGLOBIN: 15.7 G/DL (ref 12–16)
INR BLD: 1.15 (ref 0.87–1.14)
KETONES, URINE: ABNORMAL MG/DL
LACTIC ACID: 1.7 MMOL/L (ref 0.4–2)
LEUKOCYTE ESTERASE, URINE: ABNORMAL
LYMPHOCYTES ABSOLUTE: 1.1 K/UL (ref 1–5.1)
LYMPHOCYTES RELATIVE PERCENT: 10 %
MCH RBC QN AUTO: 29.9 PG (ref 26–34)
MCHC RBC AUTO-ENTMCNC: 33.7 G/DL (ref 31–36)
MCV RBC AUTO: 88.6 FL (ref 80–100)
MICROSCOPIC EXAMINATION: YES
MONOCYTES ABSOLUTE: 1 K/UL (ref 0–1.3)
MONOCYTES RELATIVE PERCENT: 9 %
MYELOCYTE PERCENT: 1 %
NEUTROPHILS ABSOLUTE: 8.8 K/UL (ref 1.7–7.7)
NEUTROPHILS RELATIVE PERCENT: 74 %
NITRITE, URINE: POSITIVE
PDW BLD-RTO: 15.3 % (ref 12.4–15.4)
PERFORMED ON: ABNORMAL
PH UA: 8.5 (ref 5–8)
PHOSPHORUS: 3 MG/DL (ref 2.5–4.9)
PLATELET # BLD: 108 K/UL (ref 135–450)
PLATELET SLIDE REVIEW: ABNORMAL
PMV BLD AUTO: 9.1 FL (ref 5–10.5)
POTASSIUM SERPL-SCNC: 4.3 MMOL/L (ref 3.5–5.1)
PRO-BNP: 637 PG/ML (ref 0–449)
PROCALCITONIN: 0.04 NG/ML (ref 0–0.15)
PROLACTIN: 7.7 NG/ML
PROTEIN UA: 30 MG/DL
PROTHROMBIN TIME: 14.6 SEC (ref 11.7–14.5)
RBC # BLD: 5.26 M/UL (ref 4–5.2)
RBC # BLD: NORMAL 10*6/UL
RBC UA: ABNORMAL /HPF (ref 0–4)
RENAL EPITHELIAL, UA: ABNORMAL /HPF (ref 0–1)
SLIDE REVIEW: ABNORMAL
SODIUM BLD-SCNC: 129 MMOL/L (ref 136–145)
SPECIFIC GRAVITY UA: 1.01 (ref 1–1.03)
TOTAL CK: 24 U/L (ref 26–192)
URINE REFLEX TO CULTURE: YES
URINE TYPE: ABNORMAL
UROBILINOGEN, URINE: 1 E.U./DL
WBC # BLD: 11.1 K/UL (ref 4–11)
WBC UA: ABNORMAL /HPF (ref 0–5)

## 2023-01-01 PROCEDURE — 2580000003 HC RX 258: Performed by: NURSE PRACTITIONER

## 2023-01-01 PROCEDURE — 95813 EEG EXTND MNTR 61-119 MIN: CPT

## 2023-01-01 PROCEDURE — 84146 ASSAY OF PROLACTIN: CPT

## 2023-01-01 PROCEDURE — 6360000002 HC RX W HCPCS: Performed by: NURSE PRACTITIONER

## 2023-01-01 PROCEDURE — 99239 HOSP IP/OBS DSCHRG MGMT >30: CPT | Performed by: PHYSICAL MEDICINE & REHABILITATION

## 2023-01-01 PROCEDURE — 83036 HEMOGLOBIN GLYCOSYLATED A1C: CPT

## 2023-01-01 PROCEDURE — 36415 COLL VENOUS BLD VENIPUNCTURE: CPT

## 2023-01-01 PROCEDURE — 6360000002 HC RX W HCPCS: Performed by: STUDENT IN AN ORGANIZED HEALTH CARE EDUCATION/TRAINING PROGRAM

## 2023-01-01 PROCEDURE — 2580000003 HC RX 258: Performed by: STUDENT IN AN ORGANIZED HEALTH CARE EDUCATION/TRAINING PROGRAM

## 2023-01-01 PROCEDURE — 87086 URINE CULTURE/COLONY COUNT: CPT

## 2023-01-01 PROCEDURE — 80069 RENAL FUNCTION PANEL: CPT

## 2023-01-01 PROCEDURE — 99223 1ST HOSP IP/OBS HIGH 75: CPT | Performed by: PSYCHIATRY & NEUROLOGY

## 2023-01-01 PROCEDURE — 6360000004 HC RX CONTRAST MEDICATION: Performed by: PSYCHIATRY & NEUROLOGY

## 2023-01-01 PROCEDURE — C9254 INJECTION, LACOSAMIDE: HCPCS | Performed by: NURSE PRACTITIONER

## 2023-01-01 PROCEDURE — 2580000003 HC RX 258: Performed by: HOSPITALIST

## 2023-01-01 PROCEDURE — 81001 URINALYSIS AUTO W/SCOPE: CPT

## 2023-01-01 PROCEDURE — 83605 ASSAY OF LACTIC ACID: CPT

## 2023-01-01 PROCEDURE — 87186 SC STD MICRODIL/AGAR DIL: CPT

## 2023-01-01 PROCEDURE — 6370000000 HC RX 637 (ALT 250 FOR IP): Performed by: HOSPITALIST

## 2023-01-01 PROCEDURE — 82550 ASSAY OF CK (CPK): CPT

## 2023-01-01 PROCEDURE — 70450 CT HEAD/BRAIN W/O DYE: CPT

## 2023-01-01 PROCEDURE — 87040 BLOOD CULTURE FOR BACTERIA: CPT

## 2023-01-01 PROCEDURE — 84145 PROCALCITONIN (PCT): CPT

## 2023-01-01 PROCEDURE — 70498 CT ANGIOGRAPHY NECK: CPT

## 2023-01-01 PROCEDURE — 85610 PROTHROMBIN TIME: CPT

## 2023-01-01 PROCEDURE — 83880 ASSAY OF NATRIURETIC PEPTIDE: CPT

## 2023-01-01 PROCEDURE — 87077 CULTURE AEROBIC IDENTIFY: CPT

## 2023-01-01 PROCEDURE — 85025 COMPLETE CBC W/AUTO DIFF WBC: CPT

## 2023-01-01 PROCEDURE — 1200000000 HC SEMI PRIVATE

## 2023-01-01 RX ORDER — SODIUM CHLORIDE 9 MG/ML
INJECTION, SOLUTION INTRAVENOUS CONTINUOUS
Status: DISCONTINUED | OUTPATIENT
Start: 2023-01-01 | End: 2023-01-03

## 2023-01-01 RX ORDER — LORAZEPAM 2 MG/ML
2 INJECTION INTRAMUSCULAR ONCE
Status: COMPLETED | OUTPATIENT
Start: 2023-01-02 | End: 2023-01-01

## 2023-01-01 RX ORDER — LABETALOL HYDROCHLORIDE 5 MG/ML
10 INJECTION, SOLUTION INTRAVENOUS EVERY 10 MIN PRN
Status: DISCONTINUED | OUTPATIENT
Start: 2023-01-01 | End: 2023-01-03 | Stop reason: HOSPADM

## 2023-01-01 RX ORDER — ROSUVASTATIN CALCIUM 20 MG/1
40 TABLET, COATED ORAL NIGHTLY
Status: DISCONTINUED | OUTPATIENT
Start: 2023-01-01 | End: 2023-01-03 | Stop reason: HOSPADM

## 2023-01-01 RX ORDER — ASPIRIN 300 MG/1
300 SUPPOSITORY RECTAL DAILY
Status: DISCONTINUED | OUTPATIENT
Start: 2023-01-01 | End: 2023-01-03 | Stop reason: HOSPADM

## 2023-01-01 RX ORDER — INSULIN LISPRO 100 [IU]/ML
0-4 INJECTION, SOLUTION INTRAVENOUS; SUBCUTANEOUS EVERY 4 HOURS
Status: DISCONTINUED | OUTPATIENT
Start: 2023-01-01 | End: 2023-01-03

## 2023-01-01 RX ORDER — GLUCAGON 1 MG/ML
1 KIT INJECTION PRN
Status: DISCONTINUED | OUTPATIENT
Start: 2023-01-01 | End: 2023-01-03 | Stop reason: HOSPADM

## 2023-01-01 RX ORDER — LACOSAMIDE 10 MG/ML
100 INJECTION, SOLUTION INTRAVENOUS 2 TIMES DAILY
Status: DISCONTINUED | OUTPATIENT
Start: 2023-01-01 | End: 2023-01-01

## 2023-01-01 RX ORDER — DEXTROSE MONOHYDRATE 100 MG/ML
INJECTION, SOLUTION INTRAVENOUS CONTINUOUS PRN
Status: DISCONTINUED | OUTPATIENT
Start: 2023-01-01 | End: 2023-01-03 | Stop reason: HOSPADM

## 2023-01-01 RX ORDER — LORAZEPAM 2 MG/ML
0.25 INJECTION INTRAMUSCULAR 2 TIMES DAILY
Status: DISCONTINUED | OUTPATIENT
Start: 2023-01-01 | End: 2023-01-02

## 2023-01-01 RX ORDER — ONDANSETRON 4 MG/1
4 TABLET, ORALLY DISINTEGRATING ORAL EVERY 8 HOURS PRN
Status: DISCONTINUED | OUTPATIENT
Start: 2023-01-01 | End: 2023-01-03 | Stop reason: HOSPADM

## 2023-01-01 RX ORDER — POLYETHYLENE GLYCOL 3350 17 G/17G
17 POWDER, FOR SOLUTION ORAL DAILY PRN
Status: DISCONTINUED | OUTPATIENT
Start: 2023-01-01 | End: 2023-01-03 | Stop reason: HOSPADM

## 2023-01-01 RX ORDER — ONDANSETRON 2 MG/ML
4 INJECTION INTRAMUSCULAR; INTRAVENOUS EVERY 6 HOURS PRN
Status: DISCONTINUED | OUTPATIENT
Start: 2023-01-01 | End: 2023-01-03 | Stop reason: HOSPADM

## 2023-01-01 RX ORDER — ASPIRIN 81 MG/1
81 TABLET ORAL DAILY
Status: DISCONTINUED | OUTPATIENT
Start: 2023-01-01 | End: 2023-01-03 | Stop reason: HOSPADM

## 2023-01-01 RX ADMIN — LACOSAMIDE 100 MG: 10 INJECTION, SOLUTION INTRAVENOUS at 21:30

## 2023-01-01 RX ADMIN — SODIUM CHLORIDE: 9 INJECTION, SOLUTION INTRAVENOUS at 22:40

## 2023-01-01 RX ADMIN — INSULIN LISPRO 1 UNITS: 100 INJECTION, SOLUTION INTRAVENOUS; SUBCUTANEOUS at 12:55

## 2023-01-01 RX ADMIN — LORAZEPAM 2 MG: 2 INJECTION INTRAMUSCULAR; INTRAVENOUS at 23:38

## 2023-01-01 RX ADMIN — LORAZEPAM 0.25 MG: 2 INJECTION INTRAMUSCULAR; INTRAVENOUS at 19:32

## 2023-01-01 RX ADMIN — LEVETIRACETAM 1000 MG: 100 INJECTION INTRAVENOUS at 04:31

## 2023-01-01 RX ADMIN — INSULIN LISPRO 2 UNITS: 100 INJECTION, SOLUTION INTRAVENOUS; SUBCUTANEOUS at 16:34

## 2023-01-01 RX ADMIN — INSULIN LISPRO 1 UNITS: 100 INJECTION, SOLUTION INTRAVENOUS; SUBCUTANEOUS at 21:30

## 2023-01-01 RX ADMIN — IOPAMIDOL 75 ML: 755 INJECTION, SOLUTION INTRAVENOUS at 02:15

## 2023-01-01 RX ADMIN — SODIUM CHLORIDE: 9 INJECTION, SOLUTION INTRAVENOUS at 10:13

## 2023-01-01 RX ADMIN — LEVETIRACETAM 1000 MG: 100 INJECTION INTRAVENOUS at 16:34

## 2023-01-01 ASSESSMENT — PAIN SCALES - GENERAL
PAINLEVEL_OUTOF10: 0
PAINLEVEL_OUTOF10: 0

## 2023-01-01 NOTE — PROGRESS NOTES
Speech Language Pathology  HOLD    Referral rec'd, chart reviewed. Spoke with RN who reported pt is not appropriate for assessment at this time. RN will page when pt  more alert and better able to participate.      Guillaume Lozano, [address] [facility] [address]  Speech-Language Pathologist  Pager [phone]

## 2023-01-01 NOTE — PROGRESS NOTES
Patient transferred to bathroom with with much difficulty. Stedy used to transfer back to bed. Patient only able to answer yes or no questions. Daughter requesting transfer to another unit.

## 2023-01-01 NOTE — PROGRESS NOTES
Pt not R arm, minimally moving BLE by wiggling toes, not following commands, answering only some questions with \"yes\" and \"no\", and lethargic. Code stroke called. Medical residents at bedside to evaluate.

## 2023-01-01 NOTE — PROGRESS NOTES
Assist with heavy transfer from toilet to wheelchair. Failed transfer from wheelchair to bed; difficult transfer from wheelchair to Shana Balding and [place] to bed with [address]. This RN assisted with transfer due to assigned RN in midst of end of shift report.

## 2023-01-01 NOTE — PROGRESS NOTES
Patient with continued lethargy, unable to follow commands. UA sent this shift. EEG hooked up. Awaiting MRI. Opens eyes to verbal stimuli. Call light in reach, bed alarm on.

## 2023-01-01 NOTE — H&P
Hospital Medicine History & Physical      PCP: DARIA Hardy CNP    Date of Admission: 12/27/2022    Date of Service: Pt seen/examined on 12/31/2022 and Admitted to Inpatient with expected LOS greater than two midnights due to medical therapy. Chief Complaint: Altered mental status      History Of Present Illness:    78 y.o. female smoker with significant past medical history of diabetes, CAD, ischemic cardiomyopathy, status post recent trephine evacuation of subdural hematoma, who was transferred from acute rehab unit  with altered mental status. Patient has some residual right upper extremity weakness and expressive aphasia from the subdural hematoma however this afternoon around 1700 hrs she was noted to be more confused unable to speak. CT head was done which showed no acute changes. However patient continued to deteriorate with flaccid paralysis of the right upper extremity and persistent lethargy and inability to follow commands that we were asked to evaluate for transfer to the inpatient side of the hospital.    Her latest vitals at the acute rehab unit temperature 98.4, blood pressure 149/82, respirations 16, pulse 81, satting 95% on room air. Past Medical History:          Diagnosis Date    A-fib [facility])     Allergic rhinitis     Arthritis     CAD (coronary artery disease)     Cardiac pacemaker     CHF (congestive heart failure) ([facility]     Cholelithiasis 07/03/2015    Diabetes mellitus ([facility] [address]     Accuchecks daily once a day at home in A.M. Encounter for imaging to screen for metal prior to MRI 12/20/2022    MRI Conditional Medtronic [place] XT DR model#W1DR01  Leads: RA [id number] RV [id number] implanted 2/17/22. Normal lmode. 1.5T or 3.0T. Pt must be A/OX4 per Medtronic guidelines. Medtronic rep and RN must be present. Follow all other Medtronic guidelines.  Pt currently follows     ESRD (end stage renal disease) ([facility] [address]     Gastroparesis     Hepatic steatosis 07/03/2015 Hyperlipidemia     Hypertension     Peripheral neuropathy     SDH (subdural hematoma)     Sleep apnea     Has used the CPAP x 25 yrs now per pt. Syncope     Thyroid disease     Vitamin B12 deficiency        Past Surgical History:          Procedure Laterality Date    CARDIAC CATHETERIZATION  12/27/2017    SVG to OM2 100% ostium    CHOLECYSTECTOMY      COLECTOMY  1998    diverticulitis     COLONOSCOPY      CORONARY ANGIOPLASTY      CORONARY ARTERY BYPASS GRAFT  2005    x 3    CRANIOTOMY Left 12/21/2022    LEFT TREPHINE CRANIOTOMY FOR SUBDURAL HEMATOMA EVACUATION performed by Yesi Loving. Orville Ontiveros MD at [address] [facility] CATH LAB PROCEDURE      ENDOSCOPY, COLON, DIAGNOSTIC      EYE SURGERY      Bilateral cataracts removed with lens implants    FOOT SURGERY      Multiple foot surgeries bilateral    HERNIA REPAIR      OTHER SURGICAL HISTORY  4/17/2012    achilles tendon lengthening,arthroplasty,matatarsalhead resection    OTHER SURGICAL HISTORY Right 3/20/13    FUSION OF RIGHT GREAT TOE JOINT WITH WIRE FIXATION, DEBRIEDMENT OF WOUND RIGHT GREAT TOE    TOE AMPUTATION Left 3/29/2021    PARTIAL LEFT FOOT AMPUTATION performed by Isaias Delcid DPM at [address]      Laparoscopic    UPPER GASTROINTESTINAL ENDOSCOPY         Medications Prior to Admission:      Prior to Admission medications    Medication Sig Start Date End Date Taking? Authorizing Provider   carvedilol (COREG) 6.25 MG tablet Take 1 tablet by mouth 2 times daily (with meals) 12/27/22   Jatin Rogers MD   furosemide (LASIX) 40 MG tablet Take 1 tablet by mouth daily 12/28/22   Jatin Rogers MD   nitroGLYCERIN (NITROSTAT) 0.4 MG SL tablet up to max of 3 total doses.  If no relief after 1 dose, call 911. 11/8/22   Ana León MD   isosorbide mononitrate (IMDUR) 120 MG extended release tablet TAKE ONE TABLET BY MOUTH DAILY 8/29/22   Ines Corral MD   levothyroxine (SYNTHROID) 50 MCG tablet Take 1 tablet by mouth Daily 2/18/22   Antoni Richard MD   pantoprazole (PROTONIX) 40 MG tablet TAKE ONE TABLET BY MOUTH DAILY 9/3/19   Historical Provider, MD   tolterodine (DETROL LA) 2 MG ER capsule Take 4 mg by mouth daily     Historical Provider, MD   GLIPIZIDE Take 5 mg by mouth 2 times daily (with meals) Taking 10 mg AM & 5 mg in PM    Historical Provider, MD   gabapentin (NEURONTIN) 600 MG tablet Take 600 mg by mouth. May take up to 4x per day if needed for neuropathy pain. Historical Provider, MD       Allergies:  Niacin and related, Ranexa [ranolazine], Adhesive tape, Other, Statins depletion therapy, and Vancomycin    Social History:      The patient currently lives at home. TOBACCO:   reports that she quit smoking about 24 years ago. She has a 30.00 pack-year smoking history. She has never used smokeless tobacco.  ETOH:   reports no history of alcohol use. E-cigarette/Vaping       Questions Responses    E-cigarette/Vaping Use Never User    Start Date     Passive Exposure     Quit Date     Counseling Given     Comments               Family History:       Reviewed and negative in regards to presenting illness/complaint. Problem Relation Age of Onset    Stroke Mother     Coronary Art Dis Father     Coronary Art Dis Sister     Coronary Art Dis Brother        REVIEW OF SYSTEMS COMPLETED:       Unable to obtain due to patient's altered mental status. PHYSICAL EXAM PERFORMED:    BP (!) 153/60   Pulse 74   Temp 98.4 °F (36.9 °C) (Oral)   Resp 16   Ht 5' 3\" (1.6 m)   Wt 190 lb 11.2 oz (86.5 kg)   SpO2 95%   BMI 33.78 kg/m²     General appearance: Well-developed/obese, somnolent in no apparent distress. HEENT:  Normal cephalic, atraumatic without obvious deformity. Pupils equal, round, and reactive to light. Extra ocular muscles intact. Conjunctivae/corneas clear. Right upper lid weakness. Neck: Supple, with full range of motion. No jugular venous distention. No bruit.   Trachea midline. Respiratory:  Normal respiratory effort. Clear to auscultation, bilaterally without Rales/Wheezes/Rhonchi. Cardiovascular:  Regular rate and rhythm with normal S1/S2 without murmurs, rubs or gallops. Abdomen: Soft, obese, non-tender, non-distended with normal bowel sounds. Musculoskeletal:  No clubbing, cyanosis or edema bilaterally. Full range of motion without deformity. Skin: Skin color, texture, turgor normal.  No rashes or lesions. Neurologic: Opens eyes to voice, follows simple commands intermittently,  right upper extremity is flaccid, nonverbal, deep tendon reflexes are within normal limits, negative Babinski's, sensory, rest of motor function and cerebellar function could not be assessed  Psychiatric: Could not assess   Capillary Refill: Brisk,3 seconds, normal  Peripheral Pulses: +2 palpable, equal bilaterally       Labs:     Recent Labs     12/30/22  0641   WBC 9.7   HGB 14.0   HCT 42.2   *     Recent Labs     12/30/22  0641   *   K 3.8   CL 97*   CO2 25   BUN 32*   CREATININE 1.0   CALCIUM 8.3     No results for input(s): AST, ALT, BILIDIR, BILITOT, ALKPHOS in the last 72 hours. No results for input(s): INR in the last 72 hours. No results for input(s): Rocio Kras in the last 72 hours.     Urinalysis:      Lab Results   Component Value Date/Time    NITRU Negative 11/28/2022 01:40 PM    WBCUA 0-2 05/25/2022 03:40 PM    BACTERIA Rare 05/25/2022 03:40 PM    RBCUA 0-2 05/25/2022 03:40 PM    BLOODU Negative 11/28/2022 01:40 PM    SPECGRAV <=1.005 11/28/2022 01:40 PM    GLUCOSEU Negative 11/28/2022 01:40 PM       Radiology:     CXR: I have reviewed the CXR with the following interpretation: None recently  EKG:  I have reviewed the EKG with the following interpretation: None recently    CT HEAD WO CONTRAST   Final Result      Interval removal of the subdural drain since most recent study with residual mixed attenuation subdural collection measuring up to 13 mm without significant mass effect or midline shift. Global atrophy with stable chronic small vessel ischemic changes. MRI brain without contrast    (Results Pending)       Consults:    IP CONSULT TO CASE MANAGEMENT  IP CONSULT TO DIETITIAN  IP CONSULT TO NEUROSURGERY  IP CONSULT TO NEUROLOGY  IP CONSULT TO HOSPITALIST  IP CONSULT TO NEUROLOGY    ASSESSMENT:    Active Hospital Problems    Diagnosis Date Noted    Right arm weakness [R29.898] 12/31/2022     Priority: High    Acute encephalopathy [G93.40] 12/31/2022     Priority: High    ESRD (end stage renal disease) on dialysis ([facility] [address] [N18.6, Z99.2] 12/31/2022     Priority: Medium    SDH (subdural hematoma) [S06. 5XAA] 12/27/2022     Priority: Medium    Atrial fibrillation ([facility] [address] [I48.91]      Priority: Medium    Diabetes mellitus ([facility] [address] [E11.9]      Priority: Medium    CAD (coronary artery disease) [I25.10]      Priority: Medium    Hypothyroid [E03.9]      Priority: Low    Sleep apnea [G47.30]      Priority: Low    Hyperlipidemia [E78.5]      Priority: Low    Hypertension [I10]      Priority: Low         PLAN:  -Transferred to [address] [place] [address] on telemetry  -MRI of the brain and CTA of the head and neck  -Check CBC, CMP, troponin, BNP, ABG, UA, EKG, chest x-ray  -NPO until awake enough to do a bedside swallow evaluation  -Permissive hypertension  -Switch Keppra to IV  -Neurology consult  -Maintenance IV fluids  -Moderate sliding scale insulin before every meal and at bedtime as needed  -Hold p.o. meds for now  -Nephrology consult for hemodialysis    DVT Prophylaxis: Mechanical  Diet: Diet NPO  Code Status: DNR-CCA    PT/OT Eval Status: On hold    Dispo -to be determined       Aaron Sorto MD    Thank you DARIA Hardy - CALEB for the opportunity to be involved in this patient's care. If you have any questions or concerns please feel free to contact me at [phone].

## 2023-01-01 NOTE — CONSULTS
Neurology / [facility] Amabile Consult Note    Kendall Mayer MD is requesting this consult. Reason for Consult: right arm weakness  Admission Chief Complaint: right arm weakness    History of Present Illness     Loan Medrano is a 78 y.o. y/o female with PMH significant for DM, CAD ischemic cardiomyopathy, status post recent trephine evacuation of subdural hematoma who was recently transferred to the acute rehab unit. Today patient begin having some right upper extremity weakness and expressive aphasia, throughout the day she was noted to get worse and more confused. Her speech went from normal to having expressive aphasia to not being able to talk and only moan to painful stimuli. CT was done and showed no acute changes. Around 7pm today she was on the toilet and she became lethargic and had right upper extremity flaccid paralysis. She was not able to follow commands. She was transferred from [facility] to [address] [place] [address]. When she arrived on 5T \"code stroke\" was called and she was taken the CT scan to obtain CT head wo contrast and CTA. REVIEW OF SYSTEMS: unable to obtain due to patient condition      Past Medical, Surgical, Family, and Social History   PAST MEDICAL HISTORY:  Past Medical History:   Diagnosis Date    A-fib [facility])     Allergic rhinitis     Arthritis     CAD (coronary artery disease)     Cardiac pacemaker     CHF (congestive heart failure) ([facility] [address]     Cholelithiasis 07/03/2015    Diabetes mellitus ([facility] [address]     Accuchecks daily once a day at home in A.M. Encounter for imaging to screen for metal prior to MRI 12/20/2022    MRI Conditional Medtronic [place] XT DR model#W1DR01  Leads: RA [id number] RV [id number] implanted 2/17/22. Normal lmode. 1.5T or 3.0T. Pt must be A/OX4 per Medtronic guidelines. Medtronic rep and RN must be present. Follow all other Medtronic guidelines.  Pt currently follows     ESRD (end stage renal disease) ([facility] [address]     Gastroparesis     Hepatic steatosis 07/03/2015 Hyperlipidemia     Hypertension     Peripheral neuropathy     SDH (subdural hematoma)     Sleep apnea     Has used the CPAP x 25 yrs now per pt. Syncope     Thyroid disease     Vitamin B12 deficiency      SURGICAL HISTORY:  Past Surgical History:   Procedure Laterality Date    CARDIAC CATHETERIZATION  2017    SVG to OM2 100% ostium    CHOLECYSTECTOMY      COLECTOMY  1998    diverticulitis     COLONOSCOPY      CORONARY ANGIOPLASTY      CORONARY ARTERY BYPASS GRAFT  2005    x 3    CRANIOTOMY Left 2022    LEFT TREPHINE CRANIOTOMY FOR SUBDURAL HEMATOMA EVACUATION performed by Yesi Loving.  Orville Ontiveros MD at [address] [facility] CATH LAB PROCEDURE      ENDOSCOPY, COLON, DIAGNOSTIC      EYE SURGERY      Bilateral cataracts removed with lens implants    FOOT SURGERY      Multiple foot surgeries bilateral    HERNIA REPAIR      OTHER SURGICAL HISTORY  2012    achilles tendon lengthening,arthroplasty,matatarsalhead resection    OTHER SURGICAL HISTORY Right 3/20/13    FUSION OF RIGHT GREAT TOE JOINT WITH WIRE FIXATION, DEBRIEDMENT OF WOUND RIGHT GREAT TOE    TOE AMPUTATION Left 3/29/2021    PARTIAL LEFT FOOT AMPUTATION performed by Isaias Delcid DPM at [address]      Laparoscopic    UPPER GASTROINTESTINAL ENDOSCOPY       FAMILY HISTORY & SOCIAL HISTORY:  Family history non-contributory  Family History   Problem Relation Age of Onset    Stroke Mother     Coronary Art Dis Father     Coronary Art Dis Sister     Coronary Art Dis Brother      Social History     Tobacco Use    Smoking status: Former     Packs/day: 1.00     Years: 30.00     Pack years: 30.00     Types: Cigarettes     Quit date: 1998     Years since quittin.6    Smokeless tobacco: Never   Vaping Use    Vaping Use: Never used   Substance Use Topics    Alcohol use: No    Drug use: No         Allergies & Outpatient Medications   ALLERGIES:  Allergies   Allergen Reactions    Niacin And Related Anaphylaxis    Ranexa [Ranolazine] Hallucinations    Adhesive Tape      Causes rash and blisters. Can use paper tape or opsite without any problems. Other      QVacin IV antibiotic had after foot surgery placed on this for infection. Developed high fever, chills and uncontrollable shaking. Statins Depletion Therapy      Deep muscle pain    Vancomycin      Does not recall reaction      HOME MEDICATIONS:  Current Discharge Medication List        CONTINUE these medications which have NOT CHANGED    Details   carvedilol (COREG) 6.25 MG tablet Take 1 tablet by mouth 2 times daily (with meals)  Qty: 60 tablet, Refills: 3      furosemide (LASIX) 40 MG tablet Take 1 tablet by mouth daily  Qty: 60 tablet, Refills: 3      nitroGLYCERIN (NITROSTAT) 0.4 MG SL tablet up to max of 3 total doses. If no relief after 1 dose, call 911. Qty: 25 tablet, Refills: 1      isosorbide mononitrate (IMDUR) 120 MG extended release tablet TAKE ONE TABLET BY MOUTH DAILY  Qty: 90 tablet, Refills: 3      levothyroxine (SYNTHROID) 50 MCG tablet Take 1 tablet by mouth Daily  Qty: 30 tablet, Refills: 3      pantoprazole (PROTONIX) 40 MG tablet TAKE ONE TABLET BY MOUTH DAILY      tolterodine (DETROL LA) 2 MG ER capsule Take 4 mg by mouth daily       GLIPIZIDE Take 5 mg by mouth 2 times daily (with meals) Taking 10 mg AM & 5 mg in PM      gabapentin (NEURONTIN) 600 MG tablet Take 600 mg by mouth. May take up to 4x per day if needed for neuropathy pain. Physical Exam   PHYSICAL EXAM:  Vitals:    01/01/23 0052 01/01/23 0132 01/01/23 0230   BP: (!) 147/64 127/83 (!) 148/76   Pulse: 73 74 78   Resp: 18 18 18   Temp: (!) 100.7 °F (38.2 °C) (!) 100.7 °F (38.2 °C) 99.1 °F (37.3 °C)   TempSrc: Axillary Axillary Oral   SpO2: 95% 95% 97%         Mental status: patient with eyes  but does not follow commands. Responds to pain by moaning. She has little movement in her lower extremities. She has right UE weakness.  Her extremity weakness appears to be similar to her exam prior to transfer to ARU. Her exam is concerns for decrease in mental status today. Cranial nerves: patient does not follow commands, difficult to assess    CN 3,4,6: Pupils equal and reactive to light, extraocular muscles intact  CN7: Face symmetric      Motor Exam: patient is not following commands  RUE: weak with limited movement   LUE: with movement against gravity  She has limited movement of bilateral lower extremities. Sensory, coordination and gait: not able to be tested. OTHER SYSTEMS:  Cardiovascular: Warm, appears well perfused   Respiratory: Easy, non-labored respiratory pattern   Abdominal: Abdomen is without distention   Extremities: Upper and lower extremities with multiple bruises    Diagnostic Testing Results   IMAGES:  Images personally reviewed and agree w/ radiology interpretation. Head CT w/o Contrast: 1/1/23 01:51    FINDINGS:       The diagnostic quality of the examination is adequate. Brain parenchyma: Scattered foci of nonspecific white matter low-attenuation. Ventricles and extraaxial spaces: Stable size and configuration of the ventricles. There is a left convexity subdural collection with some isodense and hypodense components. This measures up to 14 mm in thickness on coronal images which is not    significant changed (previously 13 mm in thickness. No new hyperdense components to suggest acute rebleed. Orbits, paranasal sinuses, mastoids: Prior cataract surgery. Clear paranasal sinuses. Clear mastoid air cells. Extracranial soft tissues: Normal.       Calvarium and skull base: Status post left frontal craniotomy. No acute calvarial fracture. Other: Atherosclerotic vascular calcifications. Impression       1. Stable mixed isodense and hypodense left convexity subdural hematoma. No new intracranial hemorrhage identified. 2.  Mild chronic small vessel ischemic disease.            Head CT w/o Contrast:12/31/22 07:53  FINDINGS:       Interval removal of the subdural drain since most recent prior examination. Mixed attenuation subdural hematoma again decreased in size compared to more distant priors now measuring up to 13 mm in maximal thickness, previously measuring over 20 mm in    maximal thickness. Subdural collection now predominantly low-density with a few small areas of hyperdensity. Tiny amount of residual pneumocephalus. No new intracranial hemorrhage is identified. No significant mass effect or midline shift. Periventricular and subcortical white matter low attenuation stable, the sequela of chronic small vessel disease. Global atrophy. Ventricles and cisterns appropriate in size and position. Visualized paranasal sinuses and mastoid air cells clear. No    unexpected calvarial abnormalities. Soft tissue emphysema over the left frontal scalp without suspicious fluid collection. Impression       Interval removal of the subdural drain since most recent study with residual mixed attenuation subdural collection measuring up to 13 mm without significant mass effect or midline shift. Global atrophy with stable chronic small vessel ischemic changes. CTA of Head / Neck w/ Contrast: 1/1/23 01:51  FINDINGS:     Right internal carotid artery:  Intracranial segment is patent    with no significant stenosis. No aneurysm. Right anterior cerebral artery:  No occlusion or significant    stenosis. No aneurysm. Right middle cerebral artery:  No occlusion or significant    stenosis. No aneurysm. Right posterior cerebral artery:  No occlusion or significant    stenosis. No aneurysm. Right vertebral artery:  Unremarkable. Left internal carotid artery:  Intracranial segment is patent    with no significant stenosis. No aneurysm. Left anterior cerebral artery:  No occlusion or significant    stenosis. No aneurysm.      Left middle cerebral artery:  No occlusion or significant    stenosis. No aneurysm. Left posterior cerebral artery:  No occlusion or significant    stenosis. No aneurysm. Left vertebral artery:  Unremarkable. Basilar artery:  No occlusion or significant stenosis. No    aneurysm. EXAM:     CT Angiography Neck With Intravenous Contrast       CLINICAL HISTORY:     r/o stroke. TECHNIQUE:     Axial computed tomographic angiography images of the neck with    intravenous contrast.  CTDI is 31.24 mGy and DLP is 1091.65 mGy-cm. All CT scans at this facility use dose modulation, iterative    reconstruction, and/or weight based dosing when appropriate to    reduce radiation dose to as low as reasonably achievable. MIP reconstructed images were created and reviewed. COMPARISON:     No relevant prior studies available. FINDINGS:        VASCULATURE:     Right common carotid artery:  No significant stenosis. No    dissection. Right internal carotid artery: Mild stenosis. No dissection. Right vertebral artery:  No significant stenosis. No dissection. Left common carotid artery:  No significant stenosis. No    dissection. Left internal carotid artery: Moderate stenosis. No dissection. Left vertebral artery:  No significant stenosis. No dissection. NECK:     Bones/joints:  No acute fracture. No dislocation. CAROTID STENOSIS REFERENCE USING NASCET CRITERIA:     % ICA stenosis = (1 - narrowest ICA diameter/diameter of distal    cervical ICA) x 100. Mild - <50% stenosis. Moderate - 50-69% stenosis. Severe - 70-94% stenosis. Near occlusion - 95-99% stenosis. Occluded - 100% stenosis. LABS:  All results below personally reviewed. Pertinent positives & negatives are addressed in Impression & Recommendations below.      LABS   Metabolic Panel Recent Labs     12/30/22  0641   *   K 3.8   CL 97*   CO2 25   BUN 32*   CREATININE 1.0   GLUCOSE 197* CALCIUM 8.3      CBC / Coags Recent Labs     12/30/22  0641   WBC 9.7   RBC 4.74   HGB 14.0   HCT 42.2   *      Other No results for input(s): LABA1C, LDLCALC, TRIG, TSH, [id number], FOLATE, LABSALI, COVID19 in the last 72 hours. No results for input(s): PHENYTOIN, KEPPRA, LACOSA, LAMO, VALPROATE, LACTSEPSIS, LACTA in the last 72 hours. CURRENT SCHEDULED MEDICATIONS   Inpatient Medications     levETIRAcetam, 1,000 mg, IntraVENous, Q12H    aspirin, 81 mg, Oral, Daily **OR** aspirin, 300 mg, Rectal, Daily    rosuvastatin, 40 mg, Oral, Nightly   Infusions      Antibiotics   Recent Abx Admin        No antibiotic orders with administrations found. IMPRESSION & RECOMMENDATIONS     IMPRESSION:  Lloa Nassar is a 78 y.o. y/o female with PMH significant for DM, CAD ischemic cardiomyopathy, status post recent trephine evacuation of subdural hematoma who was recently transferred to the acute rehab unit. Today patient begin having some right upper extremity weakness and expressive aphasia, throughout the day she was noted to get worse and more confused as the day went on. She could be having seizure or this could be an infectious or metabolic encephalopathy.      RECOMMENDATIONS:    Neuro check Q4  Stat head CT wo contrast and CTA  [facility] stroke team called  Keppra continued but switch to IV  MRI of brain w/wo can be considered for Monday   BP goal < 160  Agree with CBC, CMP, BNP, UA / culture, troponin, EKG and chest xray  Will discuss with AM neurology team can possible consider EEG   Continue  on keppra for now    Demian Kumar, APRN - [id number]   Neurology & Neurocritical Care   1/1/2023 3:07 AM      ICU Patients:   [address] [phone]  [facility]: [facility] Neurocritical Care    Floor / PCU Patients:  Neurology Line: [phone]  [facility]: [facility] Neurology    Time independently spent by Nurse Practitioner reviewing chart and prior testing, obtaining history from patient, examining patient, ordering appropriate medications / diagnostics, reviewing results of diagnostics, counseling and educating patient / family, communicating plan with other providers and documenting clinical information in the EMR was approximately 50 minutes.

## 2023-01-01 NOTE — PROGRESS NOTES
Daughter at bedside demanding that patient ,be transferred to higher level of care, Call place to  [facility] , new order to consult Hospital ist this nurse perfect served  , awaiting response.

## 2023-01-01 NOTE — PROGRESS NOTES
Hospitalist Progress Note      Name:  Richard Gonzales /Age/Sex: 1945  (78 y.o. female)   MRN & CSN:  [id number] & [id number] Admission Date/Time: 2023 12:55 AM   Location:  5501/5501-01 PCP: [place] Day: 1    Assessment and Plan:     78 y.o. female smoker with significant past medical history of diabetes, CAD, ischemic cardiomyopathy, status post recent trephine evacuation of subdural hematoma, who was transferred from acute rehab unit  with altered mental status. Patient has some residual right upper extremity weakness and expressive aphasia from the subdural hematoma however this afternoon around 1700 hrs she was noted to be more confused unable to speak. CT head was done which showed no acute changes. However patient continued to deteriorate with flaccid paralysis of the right upper extremity and persistent lethargy and inability to follow commands that we were asked to evaluate for transfer to the inpatient side of the hospital.     Her latest vitals at the acute rehab unit temperature 98.4, blood pressure 149/82, respirations 16, pulse 81, satting 95% on room air. 1- AMS with R arm weakness :  Metabolic encephalopathy ? CT head show   Stable mixed isodense and hypodense left convexity subdural hematoma. No new intracranial hemorrhage identified. 2.  Mild chronic small vessel ischemic disease. CTA head and neck show Left worse than right bilateral ICA stenosis from atherosclerosis  Neurology on board  Plan for cEEG  May need LP if continue spiking fever ?   MRI of brain pending   Neuro check q4h  Swallow eval  On iv keppra   BP goal < 160  Check CBC, CMP, troponin, BNP, ABG, UA, EKG, chest x-ray  Iv fluid   Sepsis work up: BC , UA , and serum procal  Hold on iv AB unless spiked more fever      Hx of AFB : rate controlled  Consider resume on BB after 24-48 hours   Telemetry    Moderate sliding scale insulin before every meal and at bedtime as needed    ESRD on HD : nephrology consult     Diet Diet NPO   DVT Prophylaxis [] Lovenox, []  Heparin, [] SCDs, [] Ambulation   GI Prophylaxis [] PPI,  [] H2 Blocker,  [] Carafate,  [] Diet/Tube Feeds   Code Status DNR-CCA   Disposition Patient requires continued admission due to    MDM [] Low, [] Moderate,[]  High  Patient's risk as above due to      History of Present Illness: The patient was seen and examined at bedside  The patient is alert but aphasic   Spiked fever last night   No neck stiffness   Sepsis work up      Objective:   No intake or output data in the 24 hours ending 01/01/23 0912   Vitals:   Vitals:    01/01/23 0638   BP: (!) 141/84   Pulse: 86   Resp: 18   Temp: 97.9 °F (36.6 °C)   SpO2: 96%     Physical Exam:   GEN Awake.  Aphasic  , not in respiratory distress, not in pain  HEENT: PEERLA, , supple neck,   Chest: air entry equal bilaterally, no wheezing or crepitation  Heart: S1 and S2 heard, no murmur, no gallop or rub, regular rate  Abdomen: soft, ND , Nt, +BS  Extremities: no cyanosis, tenderness or erythema, peripheral pulses audible  Neurology: alert, aphasic , unable to access accurately     Medications:   Medications:    aspirin  81 mg Oral Daily    Or    aspirin  300 mg Rectal Daily    rosuvastatin  40 mg Oral Nightly    levETIRAcetam  1,000 mg IntraVENous Q12H      Infusions:   PRN Meds: ondansetron, 4 mg, Q8H PRN   Or  ondansetron, 4 mg, Q6H PRN  polyethylene glycol, 17 g, Daily PRN  labetalol, 10 mg, Q10 Min PRN          Electronically signed by Danice Simmonds, MD on 1/1/2023 at 9:12 AM

## 2023-01-01 NOTE — PROCEDURES
CONTINUOUS VIDEO EEG MONITORING    DATE OF SERVICE: 1/1/2023 13:50 TO 1/2/2022    NAME: Richard Gonzales   YOB: 1945  SEX: female  MEDICAL RECORD [id number]    EEG-CCTV study:   The patient is a 78 y. o.y old female monitored due to concern for seizures. EEG-VIDEO MONITORING METHODOLOGY:   Time-locked EEG-video monitoring was performed     Analyses of the monitoring data were performed using the following techniques:   1. Review of the relevant EEG-video data. 2. Review of events detected by the computer system in detail. 3. Review of clinical seizures, with both detailed review of EEG and video and playback using multiple montages. A variety of referential and bipolar montages were used. CLINICAL AND EEG ANALYSIS:  Video EEG recording was reviewed in real time by a technologist, and then reviewed at least twice a day. Results of the monitoring were relayed to the treating team as needed throughout the study, via verbal or written documentation on the patient chart. 1/1/2023-1/2/2023    Review 21:45    Seizures -   17:00 , 18:48, 18:59, 19:00, 19:08, 20:39 Brief burst of spike and wave, left frontal with associated right facial twitching - less than two minutes  Push buttons - none  Background - Continuous, moderate amplitude mixed frequencies with persistent asymmetry with near continuous focal slowing noted over the left hemisphere, maximal anteriorly as well as some suppression of fast frequencies. Some posterior frequencies of up to 7 Hz seen in the right occipital region only. CLINICAL INTERPRETATION: This is an abnormal video EEG study  1. Generalized background abnormalities indicative of an underlying moderate, diffuse encephalopathy nonspecific etiology  2. Persistent asymmetry with near continuous focal slowing noted over the left hemisphere with some suppression of fast frequencies.   This type of asymmetry is most commonly seen in the setting of an underlying structural lesion with associated focal, neuronal dysfunction. Clinical correlation is advised  3. Brief, electrographic and clinical seizure,s left frontal in onset with associated right facial twitching as described above.

## 2023-01-01 NOTE — PROGRESS NOTES
Physical Therapy/Occupational Therapy  No treatment  Chart reviewed for PT/OT evaluation. Patient with orders for continuous EEG. Spoke with RN who reports patient is not following commands and not appropriate for evaluations at this time. Will continue to follow and evaluate as able.     Edmund SOLANO [address]

## 2023-01-01 NOTE — PROGRESS NOTES
Hospitalist in to see patient , ok to send to 5 tower. Daughter made aware , report called to nurse, pt going to room 5501. Left without incidence

## 2023-01-01 NOTE — PLAN OF CARE
Problem: Discharge Planning  Goal: Discharge to home or other facility with appropriate resources  Outcome: Progressing  Plan of care reviewed with pt's daughter. All questions answered at this time. Problem: Skin/Tissue Integrity  Goal: Absence of new skin breakdown  Outcome: Progressing  Turn and reposition q 2 hrs.

## 2023-01-01 NOTE — PROGRESS NOTES
CONTINUOUS EEG    Name:  Leslie Carter Record Number:  [id number]  Age: 78 y.o. Gender: female  : 1945  Today's Date:  2023  Room:  [facility]5501-01  Vital Signs   BP (!) 149/84   Pulse 83   Temp 98.4 °F (36.9 °C) (Axillary)   Resp 18   Wt 189 lb 13.1 oz (86.1 kg)   SpO2 96%   BMI 33.62 kg/m²           Continuous EEG Testing Start Time:  13:50. Comments: Marcia Romano contacted 14:10. Impedence on all leads are within normal limits. Today is the initial set up day for cvEEG. Patient head is NOT wrapped. Plan of Care: Begin monitoring.     Electronically signed by Annita Partida on 2023 at 2:19 PM

## 2023-01-01 NOTE — PROGRESS NOTES
Pt returned from CT in stable condition. Moving all 4 extremities in bed. Still not following commands and remains lethargic. Does moan when repositioned. Opens eyes to verbal stimuli. Will continue to monitor and assess. Call light is in reach. Bed alarm is engaged.

## 2023-01-01 NOTE — SIGNIFICANT EVENT
Code Stroke called around 1:30 AM, for not following commands, RUE weakness, and minimal movement in BL LE. Neurology present at bedside during evaluation. Pt admitted to hospital from ARU for worsening AMS. On exam pt still has expressive aphasia present, RUE weakness, and is able to move her BL LE. Due to previous SDH and evacuation, a CT Head was ordered. The  Stroke Team was called, they recommended also ordering a CTA head and neck. Writer followed up with  Stroke Team, they looked at the scans, and said that etiology of the pt's symptoms not likely a stroke. Additional labs were ordered, and pt's home Keppra was started.     Julius Holden, , PGY-2

## 2023-01-02 ENCOUNTER — APPOINTMENT (OUTPATIENT)
Dept: MRI IMAGING | Age: 78
DRG: 100 | End: 2023-01-02
Attending: INTERNAL MEDICINE
Payer: MEDICARE

## 2023-01-02 ENCOUNTER — APPOINTMENT (OUTPATIENT)
Dept: CT IMAGING | Age: 78
DRG: 100 | End: 2023-01-02
Attending: INTERNAL MEDICINE
Payer: MEDICARE

## 2023-01-02 PROBLEM — G40.901 STATUS EPILEPTICUS (HCC): Status: ACTIVE | Noted: 2023-01-01

## 2023-01-02 LAB
A/G RATIO: 1 (ref 1.1–2.2)
ALBUMIN SERPL-MCNC: 3 G/DL (ref 3.4–5)
ALP BLD-CCNC: 93 U/L (ref 40–129)
ALT SERPL-CCNC: 29 U/L (ref 10–40)
ANION GAP SERPL CALCULATED.3IONS-SCNC: 14 MMOL/L (ref 3–16)
AST SERPL-CCNC: 20 U/L (ref 15–37)
BANDED NEUTROPHILS RELATIVE PERCENT: 1 % (ref 0–7)
BASOPHILS ABSOLUTE: 0 K/UL (ref 0–0.2)
BASOPHILS RELATIVE PERCENT: 0 %
BILIRUB SERPL-MCNC: 1.3 MG/DL (ref 0–1)
BUN BLDV-MCNC: 19 MG/DL (ref 7–20)
CALCIUM SERPL-MCNC: 8.6 MG/DL (ref 8.3–10.6)
CHLORIDE BLD-SCNC: 98 MMOL/L (ref 99–110)
CO2: 24 MMOL/L (ref 21–32)
CREAT SERPL-MCNC: 0.8 MG/DL (ref 0.6–1.2)
EOSINOPHILS ABSOLUTE: 0 K/UL (ref 0–0.6)
EOSINOPHILS RELATIVE PERCENT: 0 %
ESTIMATED AVERAGE GLUCOSE: 137 MG/DL
ESTIMATED AVERAGE GLUCOSE: 137 MG/DL
GFR SERPL CREATININE-BSD FRML MDRD: >60 ML/MIN/{1.73_M2}
GLUCOSE BLD-MCNC: 202 MG/DL (ref 70–99)
GLUCOSE BLD-MCNC: 204 MG/DL (ref 70–99)
GLUCOSE BLD-MCNC: 212 MG/DL (ref 70–99)
GLUCOSE BLD-MCNC: 214 MG/DL (ref 70–99)
GLUCOSE BLD-MCNC: 225 MG/DL (ref 70–99)
GLUCOSE BLD-MCNC: 243 MG/DL (ref 70–99)
GLUCOSE BLD-MCNC: 284 MG/DL (ref 70–99)
HBA1C MFR BLD: 6.4 %
HBA1C MFR BLD: 6.4 %
HCT VFR BLD CALC: 45.2 % (ref 36–48)
HEMOGLOBIN: 14.9 G/DL (ref 12–16)
LYMPHOCYTES ABSOLUTE: 0.8 K/UL (ref 1–5.1)
LYMPHOCYTES RELATIVE PERCENT: 6 %
MCH RBC QN AUTO: 29.4 PG (ref 26–34)
MCHC RBC AUTO-ENTMCNC: 33 G/DL (ref 31–36)
MCV RBC AUTO: 88.9 FL (ref 80–100)
MONOCYTES ABSOLUTE: 1.5 K/UL (ref 0–1.3)
MONOCYTES RELATIVE PERCENT: 12 %
NEUTROPHILS ABSOLUTE: 10.5 K/UL (ref 1.7–7.7)
NEUTROPHILS RELATIVE PERCENT: 81 %
PDW BLD-RTO: 15.1 % (ref 12.4–15.4)
PERFORMED ON: ABNORMAL
PHENYTOIN DOSE AMOUNT: ABNORMAL
PHENYTOIN DOSE AMOUNT: NORMAL
PHENYTOIN LEVEL: 9.9 UG/ML (ref 10–20)
PLATELET # BLD: 129 K/UL (ref 135–450)
PMV BLD AUTO: 9.7 FL (ref 5–10.5)
POTASSIUM REFLEX MAGNESIUM: 4.3 MMOL/L (ref 3.5–5.1)
RBC # BLD: 5.09 M/UL (ref 4–5.2)
RBC # BLD: NORMAL 10*6/UL
SODIUM BLD-SCNC: 136 MMOL/L (ref 136–145)
TOTAL PROTEIN: 6.1 G/DL (ref 6.4–8.2)
WBC # BLD: 12.8 K/UL (ref 4–11)

## 2023-01-02 PROCEDURE — 2580000003 HC RX 258: Performed by: STUDENT IN AN ORGANIZED HEALTH CARE EDUCATION/TRAINING PROGRAM

## 2023-01-02 PROCEDURE — 94761 N-INVAS EAR/PLS OXIMETRY MLT: CPT

## 2023-01-02 PROCEDURE — C9254 INJECTION, LACOSAMIDE: HCPCS | Performed by: NURSE PRACTITIONER

## 2023-01-02 PROCEDURE — 99291 CRITICAL CARE FIRST HOUR: CPT | Performed by: NURSE PRACTITIONER

## 2023-01-02 PROCEDURE — 2580000003 HC RX 258: Performed by: NURSE PRACTITIONER

## 2023-01-02 PROCEDURE — 80185 ASSAY OF PHENYTOIN TOTAL: CPT

## 2023-01-02 PROCEDURE — 83036 HEMOGLOBIN GLYCOSYLATED A1C: CPT

## 2023-01-02 PROCEDURE — 99292 CRITICAL CARE ADDL 30 MIN: CPT | Performed by: NURSE PRACTITIONER

## 2023-01-02 PROCEDURE — 95813 EEG EXTND MNTR 61-119 MIN: CPT

## 2023-01-02 PROCEDURE — 6360000002 HC RX W HCPCS: Performed by: NURSE PRACTITIONER

## 2023-01-02 PROCEDURE — 85025 COMPLETE CBC W/AUTO DIFF WBC: CPT

## 2023-01-02 PROCEDURE — 2700000000 HC OXYGEN THERAPY PER DAY

## 2023-01-02 PROCEDURE — 1200000000 HC SEMI PRIVATE

## 2023-01-02 PROCEDURE — 31720 CLEARANCE OF AIRWAYS: CPT

## 2023-01-02 PROCEDURE — 80053 COMPREHEN METABOLIC PANEL: CPT

## 2023-01-02 PROCEDURE — 70450 CT HEAD/BRAIN W/O DYE: CPT

## 2023-01-02 PROCEDURE — 6360000002 HC RX W HCPCS: Performed by: STUDENT IN AN ORGANIZED HEALTH CARE EDUCATION/TRAINING PROGRAM

## 2023-01-02 PROCEDURE — 36415 COLL VENOUS BLD VENIPUNCTURE: CPT

## 2023-01-02 RX ORDER — LORAZEPAM 2 MG/ML
1 INJECTION INTRAMUSCULAR EVERY 6 HOURS PRN
Status: DISCONTINUED | OUTPATIENT
Start: 2023-01-02 | End: 2023-01-02

## 2023-01-02 RX ORDER — LORAZEPAM 2 MG/ML
1 INJECTION INTRAMUSCULAR EVERY 4 HOURS PRN
Status: DISCONTINUED | OUTPATIENT
Start: 2023-01-02 | End: 2023-01-03 | Stop reason: HOSPADM

## 2023-01-02 RX ORDER — POLYETHYLENE GLYCOL 3350 17 G/17G
17 POWDER, FOR SOLUTION ORAL DAILY
Status: DISCONTINUED | OUTPATIENT
Start: 2023-01-02 | End: 2023-01-03 | Stop reason: HOSPADM

## 2023-01-02 RX ORDER — LORAZEPAM 2 MG/ML
1 INJECTION INTRAMUSCULAR ONCE
Status: COMPLETED | OUTPATIENT
Start: 2023-01-02 | End: 2023-01-02

## 2023-01-02 RX ORDER — FOSPHENYTOIN SODIUM 50 MG/ML
100 INJECTION, SOLUTION INTRAMUSCULAR; INTRAVENOUS EVERY 8 HOURS
Status: DISCONTINUED | OUTPATIENT
Start: 2023-01-02 | End: 2023-01-03 | Stop reason: HOSPADM

## 2023-01-02 RX ORDER — LACOSAMIDE 10 MG/ML
200 INJECTION, SOLUTION INTRAVENOUS 2 TIMES DAILY
Status: DISCONTINUED | OUTPATIENT
Start: 2023-01-02 | End: 2023-01-02

## 2023-01-02 RX ORDER — DOCUSATE SODIUM 100 MG/1
100 CAPSULE, LIQUID FILLED ORAL DAILY
Status: DISCONTINUED | OUTPATIENT
Start: 2023-01-02 | End: 2023-01-03 | Stop reason: HOSPADM

## 2023-01-02 RX ADMIN — INSULIN LISPRO 1 UNITS: 100 INJECTION, SOLUTION INTRAVENOUS; SUBCUTANEOUS at 21:17

## 2023-01-02 RX ADMIN — INSULIN LISPRO 1 UNITS: 100 INJECTION, SOLUTION INTRAVENOUS; SUBCUTANEOUS at 12:34

## 2023-01-02 RX ADMIN — LORAZEPAM 1 MG: 2 INJECTION INTRAMUSCULAR; INTRAVENOUS at 14:12

## 2023-01-02 RX ADMIN — FOSPHENYTOIN SODIUM 1290 MG PE: 50 INJECTION, SOLUTION INTRAMUSCULAR; INTRAVENOUS at 12:26

## 2023-01-02 RX ADMIN — CEFTRIAXONE 1000 MG: 1 INJECTION, POWDER, FOR SOLUTION INTRAMUSCULAR; INTRAVENOUS at 10:37

## 2023-01-02 RX ADMIN — INSULIN LISPRO 1 UNITS: 100 INJECTION, SOLUTION INTRAVENOUS; SUBCUTANEOUS at 05:13

## 2023-01-02 RX ADMIN — DEXTROSE MONOHYDRATE 860 MG PE: 50 INJECTION, SOLUTION INTRAVENOUS at 17:35

## 2023-01-02 RX ADMIN — LACOSAMIDE 200 MG: 10 INJECTION, SOLUTION INTRAVENOUS at 21:35

## 2023-01-02 RX ADMIN — LORAZEPAM 1 MG: 2 INJECTION INTRAMUSCULAR; INTRAVENOUS at 11:27

## 2023-01-02 RX ADMIN — LEVETIRACETAM 2000 MG: 100 INJECTION INTRAVENOUS at 09:38

## 2023-01-02 RX ADMIN — INSULIN LISPRO 1 UNITS: 100 INJECTION, SOLUTION INTRAVENOUS; SUBCUTANEOUS at 09:43

## 2023-01-02 RX ADMIN — LEVETIRACETAM 2000 MG: 100 INJECTION INTRAVENOUS at 21:13

## 2023-01-02 RX ADMIN — SODIUM CHLORIDE: 9 INJECTION, SOLUTION INTRAVENOUS at 17:34

## 2023-01-02 RX ADMIN — LORAZEPAM 0.25 MG: 2 INJECTION INTRAMUSCULAR; INTRAVENOUS at 08:36

## 2023-01-02 RX ADMIN — FOSPHENYTOIN SODIUM 100 MG PE: 50 INJECTION, SOLUTION INTRAMUSCULAR; INTRAVENOUS at 19:52

## 2023-01-02 RX ADMIN — LORAZEPAM 1 MG: 2 INJECTION INTRAMUSCULAR; INTRAVENOUS at 15:32

## 2023-01-02 RX ADMIN — LEVETIRACETAM 1000 MG: 100 INJECTION INTRAVENOUS at 05:11

## 2023-01-02 RX ADMIN — INSULIN LISPRO 1 UNITS: 100 INJECTION, SOLUTION INTRAVENOUS; SUBCUTANEOUS at 01:36

## 2023-01-02 RX ADMIN — INSULIN LISPRO 2 UNITS: 100 INJECTION, SOLUTION INTRAVENOUS; SUBCUTANEOUS at 17:36

## 2023-01-02 RX ADMIN — LACOSAMIDE 200 MG: 10 INJECTION, SOLUTION INTRAVENOUS at 10:05

## 2023-01-02 NOTE — PROGRESS NOTES
Called by Rosalina, informed that a seizure was noted at 1700 which lasted 37 seconds. Neurocritical care NP messaged, see orders.

## 2023-01-02 NOTE — PLAN OF CARE
Patient was started on continuous EEG today. Since 17:00 she has had multiple seizures. Start Ativan 0.25 mg for 2 times for 4 dose  Add Vimpat 100 mg BID  If seizures continue give 2 mg of Ativan  Call neurology / neuro-critical will any questions or concerns. Marisol HUFF-[facility]  Neuro-critical care     CONTINUOUS VIDEO EEG MONITORING     DATE OF SERVICE: 1/1/2023 13:50 TO 1/2/2022     NAME: Maynor Waite   YOB: 1945  SEX: female  MEDICAL RECORD [id number]     EEG-CCTV study:   The patient is a 78 y. o.y old female monitored due to concern for seizures. EEG-VIDEO MONITORING METHODOLOGY:   Time-locked EEG-video monitoring was performed      Analyses of the monitoring data were performed using the following techniques:   1. Review of the relevant EEG-video data. 2. Review of events detected by the computer system in detail. 3. Review of clinical seizures, with both detailed review of EEG and video and playback using multiple montages. A variety of referential and bipolar montages were used. CLINICAL AND EEG ANALYSIS:  Video EEG recording was reviewed in real time by a technologist, and then reviewed at least twice a day. Results of the monitoring were relayed to the treating team as needed throughout the study, via verbal or written documentation on the patient chart. 1/1/2023-1/2/2023     Review 21:45     Seizures -   17:00 , 18:48, 18:59, 19:00, 19:08, 20:39 Brief burst of spike and wave, left frontal with associated right facial twitching - less than two minutes  Push buttons - none  Background - Continuous, moderate amplitude mixed frequencies with persistent asymmetry with near continuous focal slowing noted over the left hemisphere, maximal anteriorly as well as some suppression of fast frequencies. Some posterior frequencies of up to 7 Hz seen in the right occipital region only. CLINICAL INTERPRETATION: This is an abnormal video EEG study  1. Generalized background abnormalities indicative of an underlying moderate, diffuse encephalopathy nonspecific etiology  2. Persistent asymmetry with near continuous focal slowing noted over the left hemisphere with some suppression of fast frequencies. This type of asymmetry is most commonly seen in the setting of an underlying structural lesion with associated focal, neuronal dysfunction. Clinical correlation is advised  3. Brief, electrographic and clinical seizure,s left frontal in onset with associated right facial twitching as described above.

## 2023-01-02 NOTE — PROGRESS NOTES
Hospitalist Progress Note      Name:  Octavio Mcdowell /Age/Sex: 1945  (78 y.o. female)   MRN & CSN:  [id number] & [id number] Admission Date/Time: 2023 12:55 AM   Location:  5501/5501-01 PCP: [place] Day: 2    Assessment and Plan: This is a 78 y.o. female smoker with history of diabetes, CAD, ischemic cardiomyopathy, s/p recent trephine evacuation of subdural hematoma, who was transferred from acute rehab unit  with altered mental status. Patient has some residual right upper extremity weakness and expressive aphasia from the subdural hematoma however this afternoon around 1700 hrs she was noted to be more confused unable to speak. CT head was done which showed no acute changes. However patient continued to deteriorate with flaccid paralysis of the right upper extremity and persistent lethargy and inability to follow commands that we were asked to evaluate for transfer to the inpatient side of the hospital.     Her latest vitals at the acute rehab unit temperature 98.4, blood pressure 149/82, respirations 16, pulse 81, satting 95% on room air. Status Epilepticus   - patient had many seizures on 23.    - Neurology increased vimpat to 200 mg BID (-) and keppra to 2000 mg BID (-). -- also administered loading dose of fosphenytoin, then fosphenytoin 100 mg Q8H (23-). - Family decided to continue treatment without intubation.  - Continue cEEG. Aphasia   Rt Hemiplegia  Recent SDH  - s/p evacuation of left cerebral convexity SDH in 2022.  - CT Head wo contrast  showed status post evacuation of left cerebral convexity subdural hematoma without evidence of complication.   Very mild residual chronic left cerebral convexity subdural hematoma with nearly resolved 1 mm rightward midline shift.  - CT Head wo contrast 23 showed no significant change in mixed attenuation subdural collection over the left cerebral convexity compared with recent studies. No new hemorrhage. Stable chronic small vessel ischemic changes. - CTA head and neck 1/1/23 showed no significant stenosis. - MRI brain pending. Patient has pacemaker which needs to be reviewed prior to MRI. Atrial Fibrillation  CAD s/p CABG, previous NSTEMI  Intermittent CHB, Syncope  Pacemaker in situ  - rate controlled  - EKG 12/22 showed A-sensed, V-paced, HR 94.  - Home on carvedilol 6.25 mg BID, crestor 40 mg HS, imdur 120 mg daily, lasix 40 mg daily. - Continue home crestor.  - Restart carvedilol, imdur as tolerated. Hyponatremia  - Na 129. In the setting of NPO status.  - Na improved to normal after receiving NaCl infusion. ? ESRD on HD   - Listed on her past medical history. - Her labs appear normal.    Proteus Mirabilis UTI  - UA positive for nitrite, small leuk est, WBC 10-20. UC grew Proteus mirabilis, pansensitive. - Continue IV ceftriaxone (1/2/23-). DM  - A1c  6.4 on 1/1/2023.  - Home on glipizide 5 mg BID. - Start lantus 8 units daily, low SSI. History of Rt Foot OM  History of Lt Foot OM    Hypertension  - Home on carvedilol 6.25 mg BID, imdur 120 mg daily, lasix 40 mg daily. Dyslipidemia  - no recent lipid panel.  - Continue home crestor 40 mg HS.  - Lipid panel. Hypothyroidism  - TSH 4.30 on 10/2020.  - Continue home levothyroxine 30 mcg daily. BERTRAND    History of SBO    GERD  - Continue protonix for GERD and GI ppx. Diet Diet NPO   DVT Prophylaxis [] Lovenox, []  Heparin, [] SCDs, [] Ambulation   GI Prophylaxis [] PPI,  [] H2 Blocker,  [] Carafate,  [] Diet/Tube Feeds   Code Status DNR-CCA   Disposition Patient requires continued admission due to    MDM [] Low, [] Moderate,[]  High  Patient's risk as above due to      History of Present Illness:     Patient had many seizures this morning and throughout day for which she was administered IV ativan. Neurology increased vimpat to 200 mg BID and keppra to 2000 mg BID.   She also received loading dose of fosphenytoin. Family decided to continue treatment without intubation. CT Head wo contrast showed no significant change in the mixed attenuation subdural collection of the left cerebral convexity. No new hemorrhage. Objective:      Intake/Output Summary (Last 24 hours) at 1/2/2023 1144  Last data filed at 1/2/2023 0300  Gross per 24 hour   Intake 0 ml   Output 985 ml   Net -985 ml        Vitals:   Vitals:    01/02/23 1143   BP: (!) 148/53   Pulse: 71   Resp: 28   Temp: 99.9 °F (37.7 °C)   SpO2: 95%     Physical Exam:       GEN sleeping, in no distress  HEENT mucosa moist, no stridor  NECK supple, trachea midline  RESP on 2LPM NC, anteriorly clear to auscultation  CARDS RRR, S1, S2, no murmurs, bilateral pitting edema 1+, radial pulse 2+, DP pulse  ABD +BS, soft nontender  MSK no cyanosis, no clubbing  SKIN warm, dry  NEURO sleeping, no facial asymmetry, no dysarthria, moving spontaneously  PSYCH unable to assess      Medications:   Medications:    levETIRAcetam  1,500 mg IntraVENous Q12H    lacosamide (VIMPAT) IVPB  200 mg IntraVENous BID    cefTRIAXone (ROCEPHIN) IV  1,000 mg IntraVENous Q24H    fosphenytoin (CEREBYX) loading dose IVPB  15 mg PE/kg IntraVENous Once    Followed by    fosphenytoin  100 mg PE IntraVENous Q8H    polyethylene glycol  17 g Oral Daily    docusate sodium  100 mg Oral Daily    [Held by provider] aspirin  81 mg Oral Daily    Or    [Held by provider] aspirin  300 mg Rectal Daily    rosuvastatin  40 mg Oral Nightly    insulin lispro  0-4 Units SubCUTAneous Q4H      Infusions:    sodium chloride 50 mL/hr at 01/02/23 0858    dextrose       PRN Meds: ondansetron, 4 mg, Q8H PRN   Or  ondansetron, 4 mg, Q6H PRN  polyethylene glycol, 17 g, Daily PRN  labetalol, 10 mg, Q10 Min PRN  glucose, 4 tablet, PRN  dextrose bolus, 125 mL, PRN   Or  dextrose bolus, 250 mL, PRN  glucagon (rDNA), 1 mg, PRN  dextrose, , Continuous PRN        Electronically signed by Jennifer Marquez MD on 1/2/2023 at 11:44 AM

## 2023-01-02 NOTE — PROGRESS NOTES
[facility] [place] called to say that patient had several seizures over the course of an hour, less than a minute a piece. Patient's BP slightly elevated and respirations at 24 but SpO2 at 96%. Neuro-critical notified again, 2 mg of IV ativan ordered and given at [phone], patient tolerated and vitals stable, no further seizure activity at this time.

## 2023-01-02 NOTE — PROGRESS NOTES
Occupational/Physical Therapy  Consults received. Chart reviewed. Evaluations attempted on multiple days, but pt now with seizure activity and likely t/f to ICU. Will sign off at this time. Please re-consult when pt is medically stable and appropriate.  Susu Rivas, OTR/L #[id number]   Zehra Guido, DPT  [id number]

## 2023-01-02 NOTE — PROGRESS NOTES
Patient opens eyes to pain only, is non-verbal and does not follow commands. VSS with exception to tachypnea. Patient moves left side on own but right leg is flaccid and right arm she is only able to wiggle fingers. Respirations are labored, lungs are diminished, and patient is having diaphragmatic breathing but SpO2 is 96% on room air. Patient is voiding adequately. Turned and repositioned frequently. Mouth care performed. Fall precautions in place.

## 2023-01-02 NOTE — SIGNIFICANT EVENT
Patient's family was met at bedside. The patient has been having status epilepticus which is new for her. She was admitted for SDH and has developed a new right sided weakness with her code stroke being called. CT head was unremarkable and MRI was ordered but is unable to be obtained due to having AICD there is unknown compatibility. Family is understanding of her prognosis and given her significant history of her other comorbidities that it is in the patient's best interest to not seek out aggressive care. The family had made ICU team aware that the patient did not want to be intubated or unable to care for herself. Patient has been struggling this last few months and has said that she had felt like she wanted to give up. Given this new diagnosis of seizures we will continue to manage without intubation and try to obtain MRI to give family answers of what the new right-sided weakness is from. Patient does not want to be intubated and the family agrees to not intubate at this time. All questions were answered and all family members including the POA were part of this discussion and understood.     CODE STATUS change limited x4

## 2023-01-02 NOTE — PROGRESS NOTES
Speech Language Pathology  Attempt    Chart reviewed, d/w Tariq Funez who states pt not appropriate for evaluation at this time. Per chart notes pt had seizures lat annette. Will follow up with evaluation as pt appropriate      Juan Barcenas M.S./CCC-SLP #[id number]  Pg.  # [phone]

## 2023-01-02 NOTE — PROGRESS NOTES
Called by [facility] and notified that the patient had 5 seizures in the last hour. Vimpat currently infusing, see KATHRIN Viera, neurology NP notified.

## 2023-01-02 NOTE — PROGRESS NOTES
NEUROLOGY / NEUROCRITICAL CARE PROGRESS NOTE       Patient Name: Richard Gonzales YOB: 1945   Sex: Female Age: 78 yrs     CC / Reason for Consult: Seizures    Changes over last 24 hours: Several seizures last evening. Given 2mg ativan around 11pm and no further seizures till this morning when patient has had several facial twitching / R arm twitching events - confirming with corticare. ROS: MELLISA d/t mental status   HISTORY   Interval Hx:   Patient is a 77 y/o F s/p L crani for SDH evacuation on 12/21, in rehab doing well when patient started to have seizures and decline in mental status / R sided weakness. Transferred to  for likely status epilepticus         [facility] Past Medical History:   Diagnosis Date    A-fib [facility])     Allergic rhinitis     Arthritis     CAD (coronary artery disease)     Cardiac pacemaker     CHF (congestive heart failure) ([address]     Cholelithiasis 07/03/2015    Diabetes mellitus ([address]     Accuchecks daily once a day at home in A.M. Encounter for imaging to screen for metal prior to MRI 12/20/2022    MRI Conditional Medtronic Mila [address]ce XT DR model#W1DR01  Leads: RA [id number] RV [id number] implanted 2/17/22. Normal lmode. 1.5T or 3.0T. Pt must be A/OX4 per [employer] guidelines. Med[employer] rep and RN must be present. Follow all other [employer] guidelines. Pt currently follows     ESRD (end stage renal disease) ([address]     Gastroparesis     Hepatic steatosis 07/03/2015    Hyperlipidemia     Hypertension     Peripheral neuropathy     SDH (subdural hematoma)     Sleep apnea     Has used the CPAP x 25 yrs now per pt. Syncope     Thyroid disease     Vitamin B12 deficiency       Allergies Allergies   Allergen Reactions    Niacin And Related Anaphylaxis    Ranexa [Ranolazine] Hallucinations    Adhesive Tape      Causes rash and blisters. Can use paper tape or opsite without any problems.      Other      QVacin IV antibiotic had after foot surgery placed on this for infection. Developed high fever, chills and uncontrollable shaking. Statins Depletion Therapy      Deep muscle pain    Vancomycin      Does not recall reaction       Diet Diet NPO   Isolation No active isolations     LABS   Metabolic Panel Recent Labs     01/01/23  0248 01/02/23  0615   * 136   K 4.3 4.3   CL 96* 98*   CO2 16* 24   BUN 29* 19   CREATININE 1.1 0.8   GLUCOSE 198* 202*   CALCIUM 8.6 8.6   LABALBU 3.0* 3.0*   PHOS 3.0  --    ALKPHOS  --  93   ALT  --  29   AST  --  20      CBC / Coags Recent Labs     01/01/23  0302 01/02/23  0615   WBC 11.1* 12.8*   RBC 5.26* 5.09   HGB 15.7 14.9   HCT 46.6 45.2   * 129*   INR 1.15*  --       Other Lab Results   Component Value Date/Time    LABA1C 6.4 01/01/2023 02:48 AM    LDLCALC 108 10/18/2017 10:01 AM    TRIG 179 04/16/2015 07:20 AM    FHGCXXNA18 288 08/16/2018 11:35 AM    FOLATE 8.61 08/16/2018 11:35 AM    COVID19 Not Detected 12/27/2022 03:07 PM     Lab Results   Component Value Date/Time    LACTA 1.7 01/01/2023 02:47 AM          CURRENT SCHEDULED MEDICATIONS   Inpatient Medications     levETIRAcetam, 2,000 mg, IntraVENous, Once **FOLLOWED BY** levETIRAcetam, 1,500 mg, IntraVENous, Q12H    lacosamide (VIMPAT) IVPB, 200 mg, IntraVENous, BID    cefTRIAXone (ROCEPHIN) IV, 1,000 mg, IntraVENous, Q24H    [Held by provider] aspirin, 81 mg, Oral, Daily **OR** [Held by provider] aspirin, 300 mg, Rectal, Daily    rosuvastatin, 40 mg, Oral, Nightly    insulin lispro, 0-4 Units, SubCUTAneous, Q4H   Infusions    sodium chloride 50 mL/hr at 01/02/23 0858    dextrose        Antibiotics   Recent Abx Admin        No antibiotic orders with administrations found.                       DIAGNOSTICS   IMAGES:  No new images to review     PHYSICAL EXAMINATION     PHYSICAL EXAM:  Vitals:    01/02/23 0254 01/02/23 0654 01/02/23 0713 01/02/23 0723   BP: (!) 168/94 (!) 161/78     Pulse:   87    Resp: 20 20 24   Temp: 99 °F (37.2 °C) 98.2 °F (36.8 °C)     TempSrc: Oral Oral SpO2: 96% 97%     Weight:             Patient is resting in bed  Labored breathing pattern  No eye opening  No attempts at verbalization   No following commands  Pupils reactive - R pupil slightly irregular shaped   Face appears symmetric at rest  R hemiplegia   L side will move spontaneously but not to command     OTHER SYSTEMS:  Cardiovascular: Warm, appears well perfused  Respiratory: Easy, non-labored respiratory pattern   Abdominal: Abdomen is without distention   Extremities: Upper and lower extremities are atraumatic in appearance without deformity. ASSESSMENT & RECOMMENDATIONS   Patient is a 77 y/o F s/p L crani for SDH evacuation on 12/21, now presents from rehab unit with status epilepticus     Status Epilepticus   - cvEEG   - Keppra 2gm x 1 now, then 1.5gm BID   - Vimpate 200mg BID   - Ativan given overnight and this morning - hold further doses d/t mental status. Nursing will call for any convulsive activity. - Patient has labored respirations and difficulty w/ controlling secretions. Will need to move to higher level of care for monitoring. May need intubation if family agreeable and would like to continue aggressive treatment of seizures. cSDH   - s/p evacuation on 12/21   - hold antiplt / full dose anticoagulant currently. - f/u imaging stable     New R sided weakness   - Possibly related to seizures, however patient does have hx of afib and has been off AC, will need to r/o stroke. - MRI brain w/o once arranged - likely tomorrow. UA concerning for UTI   - start Rocephin 1gm Q24H   - f/u culture pending    - medicine team primary and will monitor     Will follow closely. Please call with questions  Discussed plan at length w/ daughter in room in this morning.      DARIA Barron - CNP   Neurology & Neurocritical Care   1/2/2023 9:02 AM    ICU Patients:   Neurocritical Care Line: [phone]  [facility]: [facility] Neurocritical Care    Floor / PCU Patients:  Neurology Line: [phone]  [facility]: [facility] Neurology    Critical Care:  Due to the immediate potential for life-threatening deterioration due to neurological failure, I spent 55 minutes providing critical care. This time excludes time spent performing procedures but includes time spent on direct patient care, history retrieval, review of the chart, and discussions with patient, family, and consultant(s).

## 2023-01-02 NOTE — PLAN OF CARE
Notified that pt is having seizures frequently. It is known that she is not willing to be intubated. She is maxed out on PHT, LEV, and LCM. Has received numerous doses of LZP. There are few drugs that can be added effectively other than versed. Discussed with attending neurologist  - recommended trying Lilia Corazon, though patient cannot take anything PO (and apparently this may alter drug levels of LEV per pharmacy). OXC, lamictal, and topiramate are also considerations but cannot be given as she is NPO. Will notify oncoming shift of this issue as we will need to seriously re-consider the goals of care if she does not wish to be intubated or sedated with versed (which is next-line therapy in patients who fail three non-sedating AEDs). D/w Dr. Cora Shell.   Vic Faust NP  Neurology

## 2023-01-02 NOTE — PLAN OF CARE
Problem: Pain  Goal: Verbalizes/displays adequate comfort level or baseline comfort level  Outcome: Progressing   Patient is non-verbal but not showing signs of pain at this time. Problem: Skin/Tissue Integrity  Goal: Absence of new skin breakdown  Description: 1. Monitor for areas of redness and/or skin breakdown  2. Assess vascular access sites hourly  3. Every 4-6 hours minimum:  Change oxygen saturation probe site  4. Every 4-6 hours:  If on nasal continuous positive airway pressure, respiratory therapy assess nares and determine need for appliance change or resting period. Outcome: Progressing   Patient turned and repositioned every two hours. Barrier cream used on bottom. Sacral heart on buttocks. Heels elevated off of bed. Specialty mattress in use. Skin thoroughly assessed.

## 2023-01-02 NOTE — PLAN OF CARE
Neurology update     Patient has had many seizures throughout the day, [place] Min / keppra increased, several doses of ativan given and patient was loaded on Phenytoin. Have discussed at length with family multiple times. Next steps for aggressive treatment would be intubation and versed gtt. Patient has expressed previously that she would not want to be on ventilator. Family is struggling with decisions, it would be uncertain given her co-morbidities if patient would be able to be weaned from vent, given duration of her status epilepticus would anticipate seizures to be difficult to control. Currently they would like to proceed w/o intubation and with continued treatment (as able) with the understanding that she is currently on vimpat, keppra, and forphenytoin with intermittent doses of ativan being given. This has been inadequate to fully control seizures, family understand the risk of further damage if seizures are not controlled. They also understand the risks with intubation and versed gtt. And they understand the risk of continuing on this way w/o secure airway and the risk for aspiration and added strain to heart as patient has had mostly labored respiratory pattern throughout the day. Head CT completed and does not show signs of new ischemia. Head CT is stable. Plan:   Continue vimpat 200 bid, keppra 1.5mg BID, and fosphenytoin 100mg Q8H  Check phenytoin level after load  PRN ativan for convulsive activity  Palliative care consult to discuss further goals of care as family is struggling w/ decision. They understand this needs to be made soon if they would like to be more aggressive. Patient reloaded w/ fospheny d/t level of only 9.9 after initial load.    Will recheck in AM    DARIA Montalvo CNP   Neurology & Neurocritical Care   1/2/2023 4:02 PM    ICU Patients:   [address] [phone]  [facility]: [facility] Neurocritical Care    Floor / PCU Patients:  Neurology Line: [phone]  [facility]: [facility] Neurology    Additional critical care time spent on this patient was 60 minutes.

## 2023-01-02 NOTE — PROGRESS NOTES
Patient with seizure activity at 1410 - PRN ativan 1mg given. Patient with continued seizure activity at this time. Discussed with CHRISTIANA Cooley, who has ordered to give additional 1mg ativan at this time. Will closely monitor.

## 2023-01-02 NOTE — DISCHARGE INSTRUCTIONS
Extra Heart Failure sites:   https://Tech21.SafeNet/ --- this is American Heart Association interactive Healthier Living with Heart Failure guidebook. Please copy and paste link into search bar. Use your mouse to scroll through the pages. Lots and lots of info / tips    HF [employer] evette  --- free smart phone evette available for [employer] and [employer]. Use your phone to track sodium / fluid intake,  symptoms, weight, etc.    [employer]. etechies.in - website-- [employer] is a dialysis company. All dialysis patients follow a renal diet which IS low sodium!! This website offers free seasonal cookbooks.   Each quarter, they will release 25-30 new recipes with a breakdown of calories, sodium, glucose, etc    www.Shoplogix.SafeNet/recipes -- more free recipes

## 2023-01-02 NOTE — PROGRESS NOTES
Transport arrived to take patient to MRI. Just before transferring patient to [facility], MRI dept called stating the scan would need to be delayed due to need for pace maker representative adjustment of patient's implanted device prior to MRI. While hooking patient back up to EEG right facial twitching noted which lasted 20-30 seconds. Erick Vazquez, neuro NP notified and en rt. Will closely monitor.

## 2023-01-02 NOTE — DISCHARGE SUMMARY
Physical Medicine & Rehabilitation  Discharge Summary     Patient Identification:  Phillip Kruse  : 1945  Admit date: 2022  Discharge date: 2023   Attending provider: Shant Olmedo DO    Primary care provider: Felix Anguiano, APRN - CNP     Discharge Diagnoses:   Patient Active Problem List   Diagnosis    Hyperlipidemia    Hypertension    CAD (coronary artery disease)    Diabetes mellitus ([facility]    Hypothyroid    Allergic rhinitis    Sleep apnea    Metabolic encephalopathy    Hepatic steatosis    Cholelithiasis    Acute renal failure (ARF) (HCC)    Small bowel obstruction (HCC)    Ischemic chest pain (HCC)    Unstable angina (HCC)    Vomiting    Morbid obesity with BMI of 40.0-44.9, adult (HCC)    NSTEMI (non-ST elevated myocardial infarction) ([facility]    Angina pectoris, unspecified (HCC)    LBBB (left bundle branch block)    Atrial fibrillation with rapid ventricular response ([facility]    History of coronary artery bypass graft    Acute respiratory failure with hypoxia (HCC)    Acute hypoxemic respiratory failure (HCC)    Angina, class IV ([facility]    History of tobacco abuse    Diabetic foot infection ([address]    Acute hematogenous osteomyelitis of left foot (HCC)    Atrial fibrillation (HCC)    Osteomyelitis of right foot (HCC)    Nausea    Intermittent complete heart block (HCC)    Syncope and collapse    CKD (chronic kidney disease) stage 3, GFR 30-59 ml/min (HCC)    S/P placement of cardiac pacemaker    Acute subdural hematoma    Abnormal ECG    SDH (subdural hematoma)    Right hemiplegia (HCC)    Acute encephalopathy    ESRD (end stage renal disease) on dialysis ([address]    Global aphasia       History of Present Illness/Acute Hospital Course:  78 y.o. female smoker with significant past medical history of diabetes, CAD, ischemic cardiomyopathy, status post recent trephine evacuation of subdural hematoma, who was transferred from acute rehab unit  with altered mental status.   Patient has some residual right upper extremity weakness and expressive aphasia from the subdural hematoma however this afternoon around 1700 hrs she was noted to be more confused unable to speak. CT head was done which showed no acute changes. However patient continued to deteriorate with flaccid paralysis of the right upper extremity and persistent lethargy and inability to follow commands that we were asked to evaluate for transfer to the inpatient side of the hospital.    Inpatient Rehabilitation Course:   Yazan Krause is a 78 y.o. female admitted to inpatient rehabilitation on 12/27/2022 with Acute encephalopathy  On the day of 12/31 she had a change in mental status. Emergent CT performed and reviewed by neurosurgery. No intervention required at that time. Her mental status continued to be in a declined state. A decision was made to transfer her to a higher level of care for evaluation as she would not be able to perform 3 hours of therapy on the ARU. Medical Management:  \"-Acute on chronic subdural hematoma s/p Left frontal trephine craniotomy for evacuation 12/21  - PT/OT/SLP  - keppra 1000mg BID  - decadron- 1mg BID- 2 doses   - hold eliquis      -Essential HTN-controlled-on coreg     -CAD s/p CABG 2005,EF 55-60%-antiplatelets held,on coreg     -Hx Complete heart block s/p PPM     -Paroxsymal a fib-stable-eliquis held     -Obesity BMI 35  - counseling      -Hypothyroidism-on  synthroid     -DM type 2-gliplizide,trulicity on hold since admission   - basal insulin on inpatient     Discharge Exam:  Constitutional: Alert, confused  Head: Normocephalic, atruamatic, MMM  Eyes: Conjunctiva noninjected, no icterus, no drainage  Pulm: CTA bilat. Respirations non-labored. CV: No murmurs noted. RRR. Abd: Soft, nontender.  NABS+  Ext: No edema, no varicosities  Neuro: confused   MSK: No joint abnormalities noted       Discharge Functional Status:    Physical therapy:  Bed Mobility:    Transfers: Sit to Stand: Contact guard assistance (at recliner/EOB/wc with RW)  Stand to Sit: Contact guard assistance, Ambulation  Surface: Level tile, Uneven, Ramp  Device: Rolling Walker  Assistance: Contact guard assistance  Quality of Gait: forward flexed posture, decreased B step height/length, no LOB  Gait Deviations: Slow Taran (exception of increased taran when descending ramp)  Distance: 150', 30' (ascent/descent 15' ramp), 15'  Comments: VC for upright posture and increased step height/length and upright posture; VC for controlled pace when completing descent of ramp, Stairs  # Steps : 3  Stairs Height: 6\"  Rails: Bilateral  Assistance: Minimal assistance  Comment: pt completes with step to pattern with physical assist required for boost up on ascent and controlled eccentric lower on descent  Mobility:  , PT Equipment Recommendations  Equipment Needed: No  Other: pt has all equipment needs met, Assessment: Pt presents to [facility] s/p SDH with decreased functional mobility, endurance, strength, and balance. Pt currently requires CGA for transfers and amb with RW. At baseline is supv with rollator in community and indp with tranfers and mobility with electric scooter around home. Pt reports she uses electric scooter around home due to fear of falling due to nueropathy. Pt would benefit from continued skilled PT inorder to maximize functional mobility and independence for safer d/c home. Occupational therapy:  ,  , Assessment: Pt lives alone at baseline and is typically independent with ADLs, uses power scooter vs rollator for mobility. Pt now s/p crani for evacuation of SDH. Pt currently demonstrates impaired dynamic balance, strength, activity tolerance and slight aphasia and cognitive impairments. Pt requires increased assistance with ADLs, functional mobility and functional transfers as a result. Pt would benefit from ongoing skilled OT to maximize her safety and independence as pt lives alone.     Speech therapy: Significant Diagnostics:   Lab Results   Component Value Date    CREATININE 1.1 01/01/2023    BUN 29 (H) 01/01/2023     (L) 01/01/2023    K 4.3 01/01/2023    CL 96 (L) 01/01/2023    CO2 16 (L) 01/01/2023       Lab Results   Component Value Date    WBC 11.1 (H) 01/01/2023    HGB 15.7 01/01/2023    HCT 46.6 01/01/2023    MCV 88.6 01/01/2023     (L) 01/01/2023       Disposition:  Acute care        Discharge Condition: Stable    Follow-up:  See after visit summary from hospitalization    Discharge Medications:     Medication List        ASK your doctor about these medications      carvedilol 6.25 MG tablet  Commonly known as: COREG  Take 1 tablet by mouth 2 times daily (with meals)     furosemide 40 MG tablet  Commonly known as: LASIX  Take 1 tablet by mouth daily     gabapentin 600 MG tablet  Commonly known as: NEURONTIN     GLIPIZIDE     isosorbide mononitrate 120 MG extended release tablet  Commonly known as: IMDUR  TAKE ONE TABLET BY MOUTH DAILY     levothyroxine 50 MCG tablet  Commonly known as: SYNTHROID  Take 1 tablet by mouth Daily     nitroGLYCERIN 0.4 MG SL tablet  Commonly known as: NITROSTAT  up to max of 3 total doses. If no relief after 1 dose, call 911. pantoprazole 40 MG tablet  Commonly known as: PROTONIX     tolterodine 2 MG extended release capsule  Commonly known as: DETROL CELESTINE CRUZ spent over 35 minutes on this discharge encounter between counseling, coordination of care, and medication reconciliation. To comply with [facility] bylaw R.II.4.1:   Discharge order placed in advance to facilitate patients discharge needs.       Eliud Vargas DO

## 2023-01-03 VITALS
SYSTOLIC BLOOD PRESSURE: 147 MMHG | DIASTOLIC BLOOD PRESSURE: 69 MMHG | TEMPERATURE: 98.9 F | OXYGEN SATURATION: 100 % | HEART RATE: 64 BPM | RESPIRATION RATE: 12 BRPM | WEIGHT: 195.77 LBS | BODY MASS INDEX: 34.68 KG/M2

## 2023-01-03 PROBLEM — Z51.5 ENCOUNTER FOR PALLIATIVE CARE: Status: ACTIVE | Noted: 2023-01-03

## 2023-01-03 PROBLEM — Z71.89 ENCOUNTER FOR HOSPICE CARE DISCUSSION: Status: ACTIVE | Noted: 2023-01-01

## 2023-01-03 LAB
ESTIMATED AVERAGE GLUCOSE: 137 MG/DL
GLUCOSE BLD-MCNC: 169 MG/DL (ref 70–99)
GLUCOSE BLD-MCNC: 185 MG/DL (ref 70–99)
GLUCOSE BLD-MCNC: 192 MG/DL (ref 70–99)
GLUCOSE BLD-MCNC: 207 MG/DL (ref 70–99)
GLUCOSE BLD-MCNC: 211 MG/DL (ref 70–99)
HBA1C MFR BLD: 6.4 %
ORGANISM: ABNORMAL
PERFORMED ON: ABNORMAL
PHENYTOIN DOSE AMOUNT: NORMAL
PHENYTOIN LEVEL: 15.2 UG/ML (ref 10–20)
URINE CULTURE, ROUTINE: ABNORMAL

## 2023-01-03 PROCEDURE — 6360000002 HC RX W HCPCS: Performed by: NURSE PRACTITIONER

## 2023-01-03 PROCEDURE — 6370000000 HC RX 637 (ALT 250 FOR IP): Performed by: INTERNAL MEDICINE

## 2023-01-03 PROCEDURE — C9254 INJECTION, LACOSAMIDE: HCPCS | Performed by: NURSE PRACTITIONER

## 2023-01-03 PROCEDURE — 80185 ASSAY OF PHENYTOIN TOTAL: CPT

## 2023-01-03 PROCEDURE — 6360000002 HC RX W HCPCS

## 2023-01-03 PROCEDURE — 99221 1ST HOSP IP/OBS SF/LOW 40: CPT | Performed by: NURSE PRACTITIONER

## 2023-01-03 PROCEDURE — 36415 COLL VENOUS BLD VENIPUNCTURE: CPT

## 2023-01-03 PROCEDURE — 99233 SBSQ HOSP IP/OBS HIGH 50: CPT

## 2023-01-03 PROCEDURE — C9113 INJ PANTOPRAZOLE SODIUM, VIA: HCPCS | Performed by: INTERNAL MEDICINE

## 2023-01-03 PROCEDURE — 95813 EEG EXTND MNTR 61-119 MIN: CPT

## 2023-01-03 PROCEDURE — 6360000002 HC RX W HCPCS: Performed by: INTERNAL MEDICINE

## 2023-01-03 PROCEDURE — 2580000003 HC RX 258: Performed by: NURSE PRACTITIONER

## 2023-01-03 RX ORDER — INSULIN LISPRO 100 [IU]/ML
0-4 INJECTION, SOLUTION INTRAVENOUS; SUBCUTANEOUS
Status: DISCONTINUED | OUTPATIENT
Start: 2023-01-03 | End: 2023-01-03 | Stop reason: HOSPADM

## 2023-01-03 RX ORDER — LEVETIRACETAM 10 MG/ML
2000 INJECTION INTRAVASCULAR 2 TIMES DAILY
Qty: 2000 ML | Refills: 1 | Status: SHIPPED | OUTPATIENT
Start: 2023-01-03 | End: 2023-02-02

## 2023-01-03 RX ORDER — INSULIN LISPRO 100 [IU]/ML
0-4 INJECTION, SOLUTION INTRAVENOUS; SUBCUTANEOUS NIGHTLY
Status: DISCONTINUED | OUTPATIENT
Start: 2023-01-03 | End: 2023-01-03 | Stop reason: HOSPADM

## 2023-01-03 RX ORDER — LORAZEPAM 2 MG/ML
2 INJECTION INTRAMUSCULAR ONCE
Status: COMPLETED | OUTPATIENT
Start: 2023-01-03 | End: 2023-01-03

## 2023-01-03 RX ORDER — PANTOPRAZOLE SODIUM 40 MG/10ML
40 INJECTION, POWDER, LYOPHILIZED, FOR SOLUTION INTRAVENOUS DAILY
Qty: 30 EACH | Refills: 0 | Status: SHIPPED | OUTPATIENT
Start: 2023-01-04

## 2023-01-03 RX ORDER — FOSPHENYTOIN SODIUM 50 MG/ML
100 INJECTION, SOLUTION INTRAMUSCULAR; INTRAVENOUS EVERY 8 HOURS
Qty: 180 ML | Refills: 1 | Status: SHIPPED | OUTPATIENT
Start: 2023-01-03

## 2023-01-03 RX ORDER — PANTOPRAZOLE SODIUM 40 MG/10ML
40 INJECTION, POWDER, LYOPHILIZED, FOR SOLUTION INTRAVENOUS DAILY
Status: DISCONTINUED | OUTPATIENT
Start: 2023-01-03 | End: 2023-01-03 | Stop reason: HOSPADM

## 2023-01-03 RX ORDER — LACOSAMIDE 10 MG/ML
200 INJECTION, SOLUTION INTRAVENOUS 2 TIMES DAILY
Qty: 600 ML | Refills: 1 | Status: SHIPPED | OUTPATIENT
Start: 2023-01-03 | End: 2023-02-02

## 2023-01-03 RX ADMIN — INSULIN GLARGINE 8 UNITS: 100 INJECTION, SOLUTION SUBCUTANEOUS at 08:31

## 2023-01-03 RX ADMIN — LORAZEPAM 1 MG: 2 INJECTION INTRAMUSCULAR at 06:59

## 2023-01-03 RX ADMIN — LEVETIRACETAM 2000 MG: 100 INJECTION INTRAVENOUS at 20:57

## 2023-01-03 RX ADMIN — FOSPHENYTOIN SODIUM 100 MG PE: 50 INJECTION, SOLUTION INTRAMUSCULAR; INTRAVENOUS at 12:23

## 2023-01-03 RX ADMIN — INSULIN LISPRO 1 UNITS: 100 INJECTION, SOLUTION INTRAVENOUS; SUBCUTANEOUS at 12:18

## 2023-01-03 RX ADMIN — LEVETIRACETAM 2000 MG: 100 INJECTION INTRAVENOUS at 08:35

## 2023-01-03 RX ADMIN — FOSPHENYTOIN SODIUM 100 MG PE: 50 INJECTION, SOLUTION INTRAMUSCULAR; INTRAVENOUS at 03:58

## 2023-01-03 RX ADMIN — LACOSAMIDE 200 MG: 10 INJECTION, SOLUTION INTRAVENOUS at 09:20

## 2023-01-03 RX ADMIN — FOSPHENYTOIN SODIUM 100 MG PE: 50 INJECTION, SOLUTION INTRAMUSCULAR; INTRAVENOUS at 18:41

## 2023-01-03 RX ADMIN — INSULIN LISPRO 1 UNITS: 100 INJECTION, SOLUTION INTRAVENOUS; SUBCUTANEOUS at 00:42

## 2023-01-03 RX ADMIN — LORAZEPAM 2 MG: 2 INJECTION INTRAMUSCULAR at 12:38

## 2023-01-03 RX ADMIN — PANTOPRAZOLE SODIUM 40 MG: 40 INJECTION, POWDER, LYOPHILIZED, FOR SOLUTION INTRAVENOUS at 08:41

## 2023-01-03 RX ADMIN — CEFTRIAXONE 1000 MG: 1 INJECTION, POWDER, FOR SOLUTION INTRAMUSCULAR; INTRAVENOUS at 10:18

## 2023-01-03 NOTE — PROGRESS NOTES
NEUROLOGY / NEUROCRITICAL CARE PROGRESS NOTE       Patient Name: Maynor Waite YOB: 1945   Sex: Female Age: 78 yrs     CC / Reason for Consult: Seizures    Changes over last 24 hours: Patient continues to have frequent seizures with R facial twitching. cvEEG reports patient is averaging approximately 5-8 seizures per hour. Goals of care and treatment options discussed with patient's medical POA. ROS: MELLISA d/t mental status   HISTORY   Interval Hx:   Patient is a 79 y/o F s/p L crani for SDH evacuation on 12/21, in rehab doing well when patient started to have seizures and decline in mental status / R sided weakness. Transferred to  for likely status epilepticus         [facility] Past Medical History:   Diagnosis Date    A-fib [facility])     Allergic rhinitis     Arthritis     CAD (coronary artery disease)     Cardiac pacemaker     CHF (congestive heart failure) ([facility] [address]     Cholelithiasis 07/03/2015    Diabetes mellitus ([facility] [address]     Accuchecks daily once a day at home in A.M. Encounter for imaging to screen for metal prior to MRI 12/20/2022    MRI Conditional Medtronic Dolores XT DR model#W1DR01  Leads: RA [id number] RV [id number] implanted 2/17/22. Normal lmode. 1.5T or 3.0T. Pt must be A/OX4 per [employer] guidelines. [employer] rep and RN must be present. Follow all other [employer] guidelines. Pt currently follows     ESRD (end stage renal disease) ([facility] [address]     Gastroparesis     Hepatic steatosis 07/03/2015    Hyperlipidemia     Hypertension     Peripheral neuropathy     SDH (subdural hematoma)     Sleep apnea     Has used the CPAP x 25 yrs now per pt. Syncope     Thyroid disease     Vitamin B12 deficiency       Allergies Allergies   Allergen Reactions    Niacin And Related Anaphylaxis    Ranexa [Ranolazine] Hallucinations    Adhesive Tape      Causes rash and blisters. Can use paper tape or opsite without any problems.      Other      QVacin IV antibiotic had after foot surgery placed on this for infection. Developed high fever, chills and uncontrollable shaking.     Statins Depletion Therapy      Deep muscle pain    Vancomycin      Does not recall reaction       Diet Diet NPO   Isolation No active isolations     LABS   Metabolic Panel Recent Labs     01/01/23  0248 01/02/23  0615   * 136   K 4.3 4.3   CL 96* 98*   CO2 16* 24   BUN 29* 19   CREATININE 1.1 0.8   GLUCOSE 198* 202*   CALCIUM 8.6 8.6   LABALBU 3.0* 3.0*   PHOS 3.0  --    ALKPHOS  --  93   ALT  --  29   AST  --  20        CBC / Coags Recent Labs     01/01/23  0302 01/02/23  0615   WBC 11.1* 12.8*   RBC 5.26* 5.09   HGB 15.7 14.9   HCT 46.6 45.2   * 129*   INR 1.15*  --         Other Lab Results   Component Value Date/Time    LABA1C 6.4 01/01/2023 02:48 AM    LDLCALC 108 10/18/2017 10:01 AM    TRIG 179 04/16/2015 07:20 AM    [id number] 288 08/16/2018 11:35 AM    FOLATE 8.61 08/16/2018 11:35 AM    COVID19 Not Detected 12/27/2022 03:07 PM     Lab Results   Component Value Date/Time    PHENYTOIN 9.9 01/02/2023 03:42 PM    LACTA 1.7 01/01/2023 02:47 AM          CURRENT SCHEDULED MEDICATIONS   Inpatient Medications     pantoprazole, 40 mg, IntraVENous, Daily    insulin lispro, 0-4 Units, SubCUTAneous, TID WC    insulin lispro, 0-4 Units, SubCUTAneous, Nightly    insulin glargine, 8 Units, SubCUTAneous, Daily    lacosamide (VIMPAT) IVPB, 200 mg, IntraVENous, BID    cefTRIAXone (ROCEPHIN) IV, 1,000 mg, IntraVENous, Q24H    [COMPLETED] fosphenytoin (CEREBYX) loading dose IVPB, 15 mg PE/kg, IntraVENous, Once **FOLLOWED BY** fosphenytoin, 100 mg PE, IntraVENous, Q8H    polyethylene glycol, 17 g, Oral, Daily    docusate sodium, 100 mg, Oral, Daily    [COMPLETED] levETIRAcetam, 2,000 mg, IntraVENous, Once **FOLLOWED BY** levETIRAcetam, 2,000 mg, IntraVENous, Q12H    [Held by provider] aspirin, 81 mg, Oral, Daily **OR** [Held by provider] aspirin, 300 mg, Rectal, Daily    rosuvastatin, 40 mg, Oral, Nightly   Infusions    sodium chloride 50 mL/hr at 01/02/23 1734    dextrose        Antibiotics   Recent Abx Admin                     cefTRIAXone (ROCEPHIN) 1,000 mg in dextrose 5 % 50 mL IVPB mini-bag (mg) 1,000 mg New Bag 01/02/23 1037                      DIAGNOSTICS   IMAGES:  No new images to review     PHYSICAL EXAMINATION     PHYSICAL EXAM:  Vitals:    01/03/23 0402 01/03/23 0412 01/03/23 0600 01/03/23 0700   BP: (!) 124/91 119/79 (!) 121/44    Pulse:  62 60    Resp:    21   Temp:    98.1 °F (36.7 °C)   TempSrc:    Axillary   SpO2:       Weight:   195 lb 12.3 oz (88.8 kg)          Patient is resting in bed  Labored breathing pattern  No eye opening  No attempts at verbalization   No following commands  Pupils reactive - R pupil slightly irregular shaped   Face appears symmetric at rest  Withdraws to pain in all 4 extremities, L>R, with some spontaneous, nonpurposeful movement noted in BLE. Patient does not follow commands. OTHER SYSTEMS:  Cardiovascular: Warm, appears well perfused  Respiratory: Easy, non-labored respiratory pattern   Abdominal: Abdomen is without distention   Extremities: Upper and lower extremities are atraumatic in appearance without deformity. ASSESSMENT & RECOMMENDATIONS   Patient is a 79 y/o F s/p L crani for SDH evacuation on 12/21, now presents from rehab unit with status epilepticus     Status Epilepticus   - cvEEG   - Keppra 2gm BID   - Vimpat 200mg BID   - Fosphenytoin 100mg q8h   - Patient has labored respirations and difficulty w/ controlling secretions. Family continues to express desire to avoid intubation. Poor prognosis w/out intubation and aggressive treatment of status epilepticus discussed with patient's POA. - Palliative care consulted  - one time dose 2mg ativan for seizures       cSDH   - s/p evacuation on 12/21   - hold antiplt / full dose anticoagulant currently.     - f/u imaging stable     New R sided weakness   - Possibly related to seizures, however patient does have hx of afib and has been off AC, will need to r/o stroke. - MRI brain w/o once arranged - likely tomorrow. UA concerning for UTI   - start Rocephin 1gm Q24H   - f/u culture pending    - medicine team primary and will monitor     Will follow closely. Please call with questions  Discussed plan at length w/ daughter in room in this morning.      DARIA Winter - CNP   Neurology & Neurocritical Care   1/3/2023 8:31 AM    Floor / PCU Patients:  Neurology Line: [phone]  PerfectServe: [facility]

## 2023-01-03 NOTE — PLAN OF CARE
Problem: Safety - Adult  Goal: Free from fall injury  1/3/2023 0130 by Lupe Kaur RN  Outcome: Progressing  1/3/2023 0130 by Lupe Kaur RN  Outcome: Progressing     Problem: Skin/Tissue Integrity  Goal: Absence of new skin breakdown  Description: 1. Monitor for areas of redness and/or skin breakdown  2. Assess vascular access sites hourly  3. Every 4-6 hours minimum:  Change oxygen saturation probe site  4. Every 4-6 hours:  If on nasal continuous positive airway pressure, respiratory therapy assess nares and determine need for appliance change or resting period.   Outcome: Progressing

## 2023-01-03 NOTE — PROGRESS NOTES
Hospice [facility]:  Met with the pt's daughter Constance Salguero and son Bety Muro to review hospice services, level of care. They would like hospice services for their mother. Pt will go to the [facility] this evening between 930-10 pm with [address] family aware. CM and RN updated on plan; RN messaging MD for discharge order. Please leave IV in place at discharge for use at the Inpatient center.      Thank you,  Neema Molina RN [address] [facility] [place] [phone]

## 2023-01-03 NOTE — PROGRESS NOTES
VSS on 2L via NC. Pt not opening eyes or responding, withdrawing from pain, PERRLA. GCS=6. No acute events this shift. NIH=23. Pt continues seizing with visible R sided face twitch and bodily jerks, on Continuous EEG, scheduled seizure medications administered. Frequent repositioning with specialty mattress and pillow support provided for pressure relief and comfort. Frequent oral suctioning provided for comfort. Redness in abd pannus and groin, silver impreg textile applied in pannus. Redness to sacrum, sacral mepilex applied. Staples left side of head C/D/I, no drainage noted. All fall precautions in place.

## 2023-01-03 NOTE — PROGRESS NOTES
This RN Ariela Funk MD @ 4032  about obtaining d/c order for Pt to go to hospice per Palliative and hospice request.   MD said \"great will later\". No d/c order put in, this RN messaged MD at [phone] asking again for d/c order per hospice request. It is now [phone] and still no d/c order.

## 2023-01-03 NOTE — PROCEDURES
Continuous EEG monitoring record    Patient name: Jimmy Loredo    START: 01/02/2023 @ 08:00am   END: 01/03/2023 @ 17:21pm       Electroencephalographer: Raul Krishnamurthy MD PhD      CLINICAL DETAILS:  This EEG was performed on this 78 y. o.yo female admitted for Altered mental Status and concer for subclinical seizures      TECHNICAL DETAILS:  Continuous video-EEG monitoring was performed with 27 surface scalp electrodes placed according to the International 10-20 electrode placement system, using a 32-channel Se[employer]v-Fermen[employer] headbox. All EEG and video information was acquired digitally, including the use of automated spike and seizure detection software to detect epileptiform activity. An event button was also available to be depressed during clinical events. 01/03/2023 00:00am to 17:21pm    SEIZURES:  Frequent seizures after 0347am (no seizures between 22:00pm and 0347am), Seizures were left frontal in nature with associated right facial twitching. None were subclinical    Seizures occurred at [phone], 0351, 0400, 0402, 0439, 0503, 0508, 0509, 0510, 0516, 0617, [id number], [id number], [id number], [id number], [id number], [id number], [id number], [id number], [id number], [address] [facility] [address], [id number], [id number], [id number], [id number], [id number]. After this, seizures averaged approximately 2-4 per hour until study was Dc'd  INTERICTAL:  Frequent left frontal sharps. PUSHBUTTONS:  None  BACKGROUND:  Abundant mixed theta and delta frequencies  EKG:  Regular    01/02/2023 08:00am to 23:59pm   SEIZURES:  Frequent seizures throughout the day, averaging approximately 5-8 seizures per hour. Team was regularly notified. Seizures were left frontal in nature with associated right facial twitching. None were subclinical  INTERICTAL:  Frequent left frontal sharps. PUSHBUTTONS:  None  BACKGROUND:  Abundant mixed theta and delta frequencies  EKG:  regular    CLINICAL INTERPRETATION:  This is an abnormal EEG for age and state due to the presence of frequent focal seizures as described above.   The background is mild to moderate  generalized slowing that is nonspecific for etiology. .  Clinical correlation is advised.         Raul Krishnamurthy MD PhD

## 2023-01-03 NOTE — PLAN OF CARE
Family has elected for comfort care. Plan for d/c to inpatient hospice this evening. cvEEG discontinued. Neurology will sign off.        DARIA Dumas CNP   Neurology & Neurocritical Care   1/3/2023 5:43 PM    Floor / PCU Patients:  Neurology Line: [phone]  PerfectServe: [facility]

## 2023-01-03 NOTE — CARE COORDINATION
CM made a referral to [facility] per family request, faxed to [phone], spoke with liaison Nataly Dykes. YOUNG will follow with updates.      Sivan Steele RN, BSN,   4th Floor Progressive Care Unit  [phone]

## 2023-01-03 NOTE — PROGRESS NOTES
Speech Language Pathology    Received evaluation order, reviewed chart. Spoke with RN, who reports pt is not sufficiently alert, and is unable to participate. Will hold, and follow-up at later time/date as appropriate. Priyanka, [employer], [address] [facility], [id number]  Pg.  # [id number]

## 2023-01-03 NOTE — PROGRESS NOTES
Pt withdrawing from pain. Not opening eyes. Pupils reacting equally and briskly. NIH 23. Pt receiving multiple anti seizure medications per MAR. Suctioning pt as needed. Fall precautions in place.

## 2023-01-03 NOTE — CARE COORDINATION
Case Management Assessment            Discharge Note                    Date / Time of Note: 1/3/2023 4:33 PM                  Discharge Note Completed by: Maria R Solorzano RN    Patient Name: Ryder Cee   YOB: 1945  Diagnosis: Right arm weakness [R29.898]   Date / Time: 1/1/2023 12:55 AM    Current PCP: Nicolás Hoffmann, DARIA - CNP  Clinic patient: No    Hospitalization in the last 30 days: Yes    Advance Directives:  Code Status: [facility] DNR form completed and on chart: Yes    Financial:  Payor: Greg Adam / Plan: [place] / Product Type: *No Product type* /      Pharmacy:    [facility] [id number] [address] [place] [address]  Phone: [phone] Fax: [phone]      Assistance purchasing medications?:    Assistance provided by Case Management: None at this time    Does patient want to participate in local refill/ meds to beds program?:      Meds To Beds General Rules:  1. Can ONLY be done Monday- Friday between 8:30am-5pm  2. Prescription(s) must be in pharmacy by 3pm to be filled same day  3. Copy of patient's insurance/ prescription drug card and patient face sheet must be sent along with the prescription(s)  4. Cost of Rx cannot be added to hospital bill. If financial assistance is needed, please contact unit  or ;  or  CANNOT provide pharmacy voucher for patients co-pays  5.  Patients can then  the prescription on their way out of the hospital at discharge, or pharmacy can deliver to the bedside if staff is available. (payment due at time of pick-up or delivery - cash, check, or card accepted)     Able to afford home medications/ co-pay costs: Yes    ADLS:  Current PT AM-PAC Score:   /24  Current OT AM-PAC Score:   /24      DISCHARGE Disposition: Inpatient Hospice Unit: [facility] at discharge: Not Applicable  TRINITY Completed: Not Indicated    Notification completed in HENS/PAS?:  Not Applicable    IMM Completed:   Not Indicated    Transportation:  Transportation PLAN for discharge: EMS transportation   Mode of Transport: Ambulance stretcher - BLS  Reason for medical transport: Bed confined: Meets the following criteria 1) unable to get out of bed without assistance or ambulate, 2) unable to safely sit up in a wheelchair, 3) unable to maintain erect seating position in a chair for time needed for transport  Name of [address] [place] [address] [address]: [employer]: [phone]  Time of Transport: 9:30 pm     Transport form completed: Yes    Home Care:  [address] ordered at discharge: Not [address]: Not Applicable  Orders faxed: No    Durable Medical Equipment:  DME Provider: n/a  Equipment obtained during hospitalization: [address] and Respiratory Equipment:  Oxygen needed at discharge?: Not [address]: Not Applicable  Portable tank available for discharge?: Not Indicated    Dialysis:  Dialysis patient: No    Dialysis Center:  Not Applicable    Hospice Services:  Location: Inpatient Unit  Agency: [facility]  Phone: [phone]    Consents signed: Yes    Referrals made at [facility] for outpatient continued care:  Not Applicable    Additional CM Notes:   Patient is discharging to [facility] inpatient unit at [facility]. Family has signed consents. Patient will transport at 9:30 pm via [address] The Plan for Transition of Care is related to the following treatment goals of Right arm weakness [R29.898]    The Patient and/or patient representative Emmy Cook and her family were provided with a choice of provider and agrees with the discharge plan Yes    Freedom of choice list was provided with basic dialogue that supports the patient's individualized plan of care/goals and shares the quality data associated with the providers. Yes    Care Transitions patient: No    Joelle , RN  [facility]  Case Management Department  Ph: [phone]  Fax: [phone]

## 2023-01-03 NOTE — CONSULTS
[facility]  Palliative Medicine Consultation Note      Date Of Admission:1/1/2023  Date of consult: 01/03/23  Seen by [facility] in the past:  No    Recommendations:        Met with pt's daughter and son, and granddaughter on the phone. Introduced palliative care and answered family's questions about comfort care and hospice. They requested a referral to [address] [place] [address] and hope pt can be transferred to the [facility] inpatient unit. D/w HOC MENDOZA Stratton and YOUNG Garsia. 1. Goals of Care/Advanced Care planning/Code status: changed to [facility] per family's wishes for comfort. Family hopes to transfer pt to hospice inpatient unit. 2. Pain: no signs of distress. 3. SOB: minimal congestion noted, and breathing comfortably on 2 L oxygen  4. Seizures: pt p/w altered mental status at ARU and was started on cvEEG which revealed status epilepticus. Pt is on Keppra, vimpat, ativan. 5. Disposition: d/c to [facility] [place] inpatient unit this evening if a bed is available. Reason for Consult:         [x]  Goals of Care  [x]  Code Status Discussion/Advanced Care Planning   [x]  Psychosocial/Family Support  []  Symptom Management  []  Other (Specify)    Requesting Physician: Dr. Lorenza HUFF    CHIEF COMPLAINT:  Altered mental status    History Obtained From:  family member - daughter, electronic medical record    History of Present Illness:         Yazan Krause is a 78 y.o. female with PMH of diabetes, CAD, ischemic cardiomyopathy, status post recent trephine evacuation of subdural hematoma who presented with altered mental status at ARU. She had increased confusion, inability to speak or follow commands, and flaccid paralysis on RUE. Initial and subsequent head CT scans did not show any suggestion of acute infarct and known SDH was unchanged. Neurology suspected possible seizure and ordered cvEEG and brain MRI.  EEG revealed status epilepticus and she has been given keppra, vimpat, and ativan. Her family decided to manage her seizures without intubating her. Subjective:         Past Medical History:        Diagnosis Date    A-fib [facility])     Allergic rhinitis     Arthritis     CAD (coronary artery disease)     Cardiac pacemaker     CHF (congestive heart failure) ([facility]     Cholelithiasis 07/03/2015    Diabetes mellitus ([facility]     Accuchecks daily once a day at home in A.M. Encounter for imaging to screen for metal prior to MRI 12/20/2022    MRI Conditional Medtronic Dolores XT DR model#[id number]  Leads: RA [id number] RV [id number] implanted 2/17/22. Normal lmode. 1.5T or 3.0T. Pt must be A/OX4 per Medtronic guidelines. Medtronic rep and RN must be present. Follow all other Medtronic guidelines. Pt currently follows     ESRD (end stage renal disease) ([facility]     Gastroparesis     Hepatic steatosis 07/03/2015    Hyperlipidemia     Hypertension     Peripheral neuropathy     SDH (subdural hematoma)     Sleep apnea     Has used the CPAP x 25 yrs now per pt. Syncope     Thyroid disease     Vitamin B12 deficiency        Past Surgical History:        Procedure Laterality Date    CARDIAC CATHETERIZATION  12/27/2017    SVG to OM2 100% ostium    CHOLECYSTECTOMY      COLECTOMY  1998    diverticulitis     COLONOSCOPY      CORONARY ANGIOPLASTY      CORONARY ARTERY BYPASS GRAFT  2005    x 3    CRANIOTOMY Left 12/21/2022    LEFT TREPHINE CRANIOTOMY FOR SUBDURAL HEMATOMA EVACUATION performed by Modesta Sanz.  Orville Ontiveros MD at [address] [place] [address] CATH LAB PROCEDURE      ENDOSCOPY, COLON, DIAGNOSTIC      EYE SURGERY      Bilateral cataracts removed with lens implants    FOOT SURGERY      Multiple foot surgeries bilateral    HERNIA REPAIR      OTHER SURGICAL HISTORY  4/17/2012    achilles tendon lengthening,arthroplasty,matatarsalhead resection    OTHER SURGICAL HISTORY Right 3/20/13    FUSION OF RIGHT GREAT TOE JOINT WITH WIRE FIXATION, DEBRIEDMENT OF WOUND RIGHT GREAT TOE    TOE AMPUTATION Left 3/29/2021    PARTIAL LEFT FOOT AMPUTATION performed by Sharda Yates DPM at [address]      Laparoscopic    UPPER GASTROINTESTINAL ENDOSCOPY         Current Medications:    Medications Prior to Admission: carvedilol (COREG) 6.25 MG tablet, Take 1 tablet by mouth 2 times daily (with meals)  furosemide (LASIX) 40 MG tablet, Take 1 tablet by mouth daily  nitroGLYCERIN (NITROSTAT) 0.4 MG SL tablet, up to max of 3 total doses. If no relief after 1 dose, call 911. isosorbide mononitrate (IMDUR) 120 MG extended release tablet, TAKE ONE TABLET BY MOUTH DAILY  levothyroxine (SYNTHROID) 50 MCG tablet, Take 1 tablet by mouth Daily  pantoprazole (PROTONIX) 40 MG tablet, TAKE ONE TABLET BY MOUTH DAILY  tolterodine (DETROL LA) 2 MG ER capsule, Take 4 mg by mouth daily   GLIPIZIDE, Take 5 mg by mouth 2 times daily (with meals) Taking 10 mg AM & 5 mg in PM  gabapentin (NEURONTIN) 600 MG tablet, Take 600 mg by mouth. May take up to 4x per day if needed for neuropathy pain. Allergies:  Niacin and related, Ranexa [ranolazine], Adhesive tape, Other, Statins depletion therapy, and Vancomycin    Social History:    TOBACCO: reports that she quit smoking about 24 years ago. She has a 30.00 pack-year smoking history. She has never used smokeless tobacco.  ETOH:   reports no history of alcohol use. Patient currently lives alone    Review of Systems -   Review of Systems: unable to obtain due to mental status    Objective:        Physical Exam  Constitutional:       General: She is not in acute distress. HENT:      Head: Normocephalic and atraumatic. Cardiovascular:      Rate and Rhythm: Normal rate and regular rhythm. Pulmonary:      Effort: Pulmonary effort is normal.      Breath sounds: Normal breath sounds. Abdominal:      General: Bowel sounds are normal.      Palpations: Abdomen is soft. Musculoskeletal:      Right lower leg: Edema present.       Left lower leg: Edema present. Skin:     General: Skin is warm and dry. Neurological:      Comments: Unresponsive to stimuli        Palliative Performance Scale:  [] 60% Ambulation reduced; Significant disease; Can't do hobbies/housework; intake normal or reduced; occasional assist; LOC full/confusion  [] 50% Mainly sit/lie; Extensive disease; Can't do any work; Considerable assist; intake normal  Or reduced; LOC full/confusion  [] 40% Mainly in bed; Extensive disease; Mainly assist; intake normal or reduced; occasional assist; LOC full/confusion  [] 30% Bed Bound; Extensive disease; Total care; intake reduced; LOC full/confusion  [] 20% Bed Bound; Extensive disease; Total care; intake minimal; Drowsy/coma  [x] 10% Bed Bound; Extensive disease; Total care; Mouth care only; Drowsy/coma  [] 0% Death      Vitals:    /60   Pulse 60   Temp 98.9 °F (37.2 °C) (Axillary)   Resp 17   Wt 195 lb 12.3 oz (88.8 kg)   SpO2 100%   BMI 34.68 kg/m²     Labs:    BMP:   Recent Labs     01/01/23  0248 01/02/23  0615   * 136   K 4.3 4.3   CL 96* 98*   CO2 16* 24   BUN 29* 19   CREATININE 1.1 0.8   GLUCOSE 198* 202*     CBC:   Recent Labs     01/01/23  0302 01/02/23  0615   WBC 11.1* 12.8*   HGB 15.7 14.9   HCT 46.6 45.2   * 129*       LFT's:   Recent Labs     01/02/23  0615   AST 20   ALT 29   BILITOT 1.3*   ALKPHOS 93     Troponin:   Recent Labs     12/31/22  2323   TROPONINI <0.01     BNP: No results for input(s): BNP in the last 72 hours. ABGs:   Recent Labs     12/31/22  2345   PHART 7.494*   YOX3URY 35.6   PO2ART 70.9*     INR:   Recent Labs     01/01/23  0302   INR 1.15*       U/A:  Recent Labs     01/01/23  1421   COLORU Yellow   PHUR 8.5*   WBCUA 10-20*   RBCUA 0-2   BACTERIA 3+*   CLARITYU Clear   SPECGRAV 1.010   LEUKOCYTESUR SMALL*   UROBILINOGEN 1.0   BILIRUBINUR Negative   BLOODU Negative   GLUCOSEU Negative       CT HEAD WO CONTRAST   Final Result   1.   No significant change in the mixed attenuation subdural collection of the left cerebral convexity. 2.  No new hemorrhage. CT HEAD WO CONTRAST   Final Result      No significant change in mixed attenuation subdural collection over the left cerebral convexity compared with recent studies. No new hemorrhage. Stable chronic small vessel ischemic changes. CTA HEAD NECK W CONTRAST   Final Result     No significant stenosis. _______________________________________________       IMPRESSION:             Left worse than right bilateral ICA stenosis from atherosclerosis. CT HEAD WO CONTRAST   Final Result      1. Stable mixed isodense and hypodense left convexity subdural hematoma. No new intracranial hemorrhage identified. 2.  Mild chronic small vessel ischemic disease. MRI BRAIN WO CONTRAST    (Results Pending)         Conclusion/Time spent:         Recommendations see above    Pt/family [id number]  Time spent with patient and/or family: 15 min  Time reviewing records: 10 min   Time communicating with staff: 10 min     A total of 35 minutes spent with the patient and family on unit greater than 50% in counseling regarding palliative care and in goals of care for the patient. Thank you to Dr. Angie HUFF for this consultation. We will continue to follow Ms. Sheldon's care as needed.     Lucio Lo NP  [address]

## 2023-01-03 NOTE — PLAN OF CARE
Problem: Discharge Planning  Goal: Discharge to home or other facility with appropriate resources  Outcome: Progressing   CM and palliative to meet with family to discuss appropriate resources moving forward. Problem: Safety - Adult  Goal: Free from fall injury  1/3/2023 0742 by Vance Siddiqi RN  Outcome: Progressing   All fall precautions in place. Bed locked and in lowest position with alarm on. Overbed table and personal belonings within reach. Call light within reach and patient instructed to use call light for assistance. Non-skid socks on.

## 2023-01-03 NOTE — PROGRESS NOTES
CONTINUOUS EEG    Name:  Leslie Carter Record Number:  [id number]  Age: 78 y.o. Gender: female  : 1945  Today's Date:  1/3/2023  Room:  [address] [place] [address]  Vital Signs   /66   Pulse 60   Temp 98.5 °F (36.9 °C) (Oral)   Resp 19   Wt 195 lb 12.3 oz (88.8 kg)   SpO2 99%   BMI 34.68 kg/m²           Continuous EEG Testing End Time:   390      Comments:  Per [facility]patient test is completed.  [facility] contacted [phone] via team viewer      Plan of care:Leads removed      Electronically signed by Abdon Dinero on 1/3/2023 at 5:37 PM

## 2023-01-04 NOTE — PROGRESS NOTES
[place] transport arrived for pt, pt safely transferred to stretcher x4 person support, O2 switched to portable tank on stretcher. PIV to LUE intact per Hospice discretion, IV to saline well prior to transfer. VSS on 3L O2 prior to transfer.

## 2023-01-05 LAB
BLOOD CULTURE, ROUTINE: NORMAL
CULTURE, BLOOD 2: NORMAL

## 2023-01-06 NOTE — PROGRESS NOTES
Physician Progress Note      PATIENT:               Jluis Serna  CSN #:                  [id number]  :                       1945  ADMIT DATE:       2023 12:55 AM  DISCH DATE:        1/3/2023 9:46 PM  RESPONDING  PROVIDER #:        Jennifer Marquez MD          QUERY TEXT:    Patient admitted with Status epilepticus. Noted documentation of \" ESRD on HD   \" on MD progress note 23 . In order to support the diagnosis of ESRD,   please include additional clinical indicators in your documentation. Or   please document if the diagnosis of ESRD has been ruled out after further   study. The medical record reflects the following:  Risk Factors: HTN, DM  Clinical Indicators: serial creatinine- 22=1.0 mg/dl, 23=1.1 mg/dl,   23=0.8mg/dl; per I/O flowsheet, patient has  adequate urine output via external urinary catheter= for 1/3 @06:13= 100cc,   1/3 @ 06:07=759jk, 1/3@ 10:41=866ku  Treatment: CBC, CMP, Neurology consult, CT of head  Options provided:  -- ESRD was ruled out  -- ESRD present as evidenced by, Please document evidence. -- Other - I will add my own diagnosis  -- Disagree - Not applicable / Not valid  -- Disagree - Clinically unable to determine / Unknown  -- Refer to Clinical Documentation Reviewer    PROVIDER RESPONSE TEXT:    ? ESRD, this is diagnosis list on her medical history. Her laboratory studies do not suggest and end stage condition. I changed how I represented the diagnosis. Query created by: Aroldo Carver on 1/3/2023 12:44 PM      QUERY TEXT:    Pt admitted with status epilepticus. Pt noted to have serum sodium=129   mmol/L. If possible, please document in the progress notes and discharge   summary if you are evaluating and / or treating any of the following:       The medical record reflects the following:  Risk Factors: NPO status since   Clinical Indicators: serum Na 23= 129mmol/L  Treatment: CBC, CMP/BMP monitoring, continuous IVF 75cc/hour of 0.9NS  Options provided:  -- Hyponatremia  -- Pseudohyponatremia  -- Other - I will add my own diagnosis  -- Disagree - Not applicable / Not valid  -- Disagree - Clinically unable to determine / Unknown  -- Refer to Clinical Documentation Reviewer    PROVIDER RESPONSE TEXT:    Hyponatremia likely related to dehydration from NPO status.     Query created by: Julian Gonzalez on 1/3/2023 12:48 PM      Electronically signed by:  Olvin Meek MD 1/6/2023 9:03 AM

## 2023-01-07 NOTE — DISCHARGE SUMMARY
Hospital Medicine Discharge Summary    Patient ID: Vinicius Dumont      Patient's PCP: DARIA Hernandez - CNP    Admit Date: 1/1/2023     Discharge Date: 1/3/2023      Admitting Physician: Sadiq Johnson MD     Discharge Physician: Allyson Woodruff MD     Discharge Diagnoses:    Status Epilepticus   Aphasia   Rt Hemiplegia  Recent SDH  Atrial Fibrillation  CAD s/p CABG, previous NSTEMI  Intermittent CHB, Syncope  Medtronic Dual Chamber Pacemaker in Situ  Hyponatremia  Proteus Mirabilis UTI  Diabetes  Hypertension  Dyslipidemia  Hypothyroidism  BERTRAND  GERD      The patient was seen and examined on day of discharge and this discharge summary is in conjunction with any daily progress note from day of discharge. Hospital Course: This is a 78 y.o. female, current smoker with history of diabetes, HTN, dyslipidemia, CAD, ischemic CM, chronic HFpEF, atrial fibrillation, paroxysmal high-grade AV block with prolonged ventricular asystole and syncope s/p dual chamber Medtronic PPM, syncope, recent SDH s/p trephine evacuation, B12 deficiency, neuropathy, who was transferred from acute rehabilitation unit with altered mental status. Patient's symptoms prior to SDH evacuation were expressive aphasia and right upper extremity weakness. This afternoon 12/31 around 1700 she was noted to be more confused/lethargic and unable to speak. CT head showed no acute changes. However, patient continued to deteriorate to flaccid paralysis of the right upper extremity and demonstrated persistent lethargy and inability to follow commands/     Her vitals at the acute rehabilitation unit showed temp of 98.4, HR 81, /82, RR 16 with oxygen saturation of 95% on room air. Status Epilepticus   - patient had many seizures throughout day on 1/2/23.    - Neurology increased vimpat to 200 mg BID (1/2-) and keppra to 2000 mg BID (1/2-).     -- also administered loading dose of fosphenytoin, then fosphenytoin 100 mg Q8H (1/2/23-). -- Ativan was also administered. - Family decided to continue treatment without intubation on 1/2/23. On 1/3/23 family decided to transition patient to comfort care. Aphasia   Rt Hemiplegia  Recent SDH  - s/p evacuation of left cerebral convexity SDH in December 2022.  - CT Head wo contrast 12/21 showed status post evacuation of left cerebral convexity subdural hematoma without evidence of complication. Very mild residual chronic left cerebral convexity subdural hematoma with nearly resolved 1 mm rightward midline shift.  - CT Head wo contrast 1/1/23 showed no significant change in mixed attenuation subdural collection over the left cerebral convexity compared with recent studies. No new hemorrhage. Stable chronic small vessel ischemic changes. - CTA head and neck 1/1/23 showed no significant stenosis. - Did not perform MRI of brain since patient has pacemaker which required review prior to MRI. - Started crestor 40 mg HS. Atrial Fibrillation  CAD s/p CABG, previous NSTEMI  Intermittent CHB, Syncope  Medtronic Dual Chamber Pacemaker in Situ  - Heart rate controlled. - EKG 12/22 showed A-sensed, V-paced, HR 94.  - Home on carvedilol 6.25 mg BID, crestor 40 mg HS, imdur 120 mg daily, lasix 40 mg daily.  - Patient unable to take PO due to altered mental status in the setting of status epilepticus. Hyponatremia  - Na 129. In the setting of NPO status.  - Na improved to normal after receiving NaCl infusion. ? ESRD on HD Listed on her past medical history. - Her labs appear overall normal.     Proteus Mirabilis UTI  - UA positive for nitrite, small leuk est, WBC 10-20. UC grew Proteus mirabilis, pansensitive. - Received IV ceftriaxone (1/2-1/3/23). DM  - A1c  6.4 on 1/1/2023.  - Home on glipizide 5 mg BID. - Started lantus 8 units daily, low SSI.      History of Rt Foot OM  History of Lt Foot OM     Hypertension  - Home on carvedilol 6.25 mg BID, imdur 120 mg daily, lasix 40 mg daily. Patient's NPO.  - Used IV labetolol for elevated SBP. Dyslipidemia  - no recent lipid panel.  - Continue home crestor 40 mg HS. Hypothyroidism  - TSH 4.30 on 10/2020.  - Continue home levothyroxine 30 mcg daily. BERTRAND     History of SBO     GERD  - Continue protonix for GERD and GI ppx. Physical Exam Performed:     BP (!) 147/69   Pulse 64   Temp 98.9 °F (37.2 °C) (Axillary)   Resp 12   Wt 195 lb 12.3 oz (88.8 kg)   SpO2 100%   BMI 34.68 kg/m²       GEN sleeping, in no distress  HEENT mucosa moist, no stridor  NECK supple, trachea midline  RESP on 2LPM NC, anteriorly clear to auscultation  CARDS RRR, S1, S2, no murmurs, bilateral pitting edema 1+, radial pulse 2+, DP pulse  ABD +BS, soft nontender  MSK no cyanosis, no clubbing  SKIN warm, dry  NEURO sleeping, no facial asymmetry, no dysarthria, moving spontaneously  PSYCH unable to assess      Labs: For convenience and continuity at follow-up the following most recent labs are provided:      CBC:    Lab Results   Component Value Date/Time    WBC 12.8 01/02/2023 06:15 AM    HGB 14.9 01/02/2023 06:15 AM    HCT 45.2 01/02/2023 06:15 AM     01/02/2023 06:15 AM       Renal:    Lab Results   Component Value Date/Time     01/02/2023 06:15 AM    K 4.3 01/02/2023 06:15 AM    CL 98 01/02/2023 06:15 AM    CO2 24 01/02/2023 06:15 AM    BUN 19 01/02/2023 06:15 AM    CREATININE 0.8 01/02/2023 06:15 AM    CALCIUM 8.6 01/02/2023 06:15 AM    PHOS 3.0 01/01/2023 02:48 AM         Significant Diagnostic Studies    Radiology:   CT HEAD WO CONTRAST   Final Result   1. No significant change in the mixed attenuation subdural collection of the left cerebral convexity. 2.  No new hemorrhage. CT HEAD WO CONTRAST   Final Result      No significant change in mixed attenuation subdural collection over the left cerebral convexity compared with recent studies. No new hemorrhage. Stable chronic small vessel ischemic changes.       CTA HEAD NECK W CONTRAST   Final Result     No significant stenosis. _______________________________________________       IMPRESSION:             Left worse than right bilateral ICA stenosis from atherosclerosis. CT HEAD WO CONTRAST   Final Result      1. Stable mixed isodense and hypodense left convexity subdural hematoma. No new intracranial hemorrhage identified. 2.  Mild chronic small vessel ischemic disease. Consults:     IP CONSULT TO CRITICAL CARE  IP CONSULT TO PALLIATIVE CARE  IP CONSULT TO PALLIATIVE CARE  IP CONSULT TO HOSPICE    Disposition:   [facility] Inpatient     Condition at Discharge:  critical    Discharge Instructions/Follow-up:    Hospice team    Code Status:  DNR-CC    Activity: activity as tolerated    Diet: patient altered, not likely to take oral.      Discharge Medications:     Discharge Medication List as of 1/3/2023  6:33 PM             Details   insulin glargine (LANTUS;BASAGLAR) 100 UNIT/ML injection pen Inject 8 Units into the skin daily, Disp-5 Adjustable Dose Pre-filled Pen Syringe, R-3Normal      fosphenytoin (CEREBYX) 100 MG PE/2ML injection Infuse 2 mLs intravenously in the morning and 2 mLs at noon and 2 mLs in the evening., Disp-180 mL, R-1Normal      pantoprazole (PROTONIX) 40 MG injection Infuse 40 mg intravenously daily, Disp-30 each, R-0Normal      lacosamide (VIMPAT) 200 MG/20ML SOLN Infuse 20 mLs intravenously 2 times daily for 30 days. Max Daily Amount: 400 mg, Disp-600 mL, R-1Normal      levETIRAcetam in NaCl (KEPPRA) 1000 MG/100ML SOLN solution Infuse 200 mLs intravenously 2 times daily, Disp-2000 mL, R-1Normal             Time Spent on discharge is lesser than 30 minutes in the examination, evaluation, counseling and review of medications and discharge plan. Signed:    Juanpablo Eduardo MD   1/6/2023      Thank you Jocelyn Hess APRN - CNP for the opportunity to be involved in this patient's care.  If you have any questions or concerns please feel free to contact me at [phone].

## 2023-01-10 ENCOUNTER — TELEPHONE (OUTPATIENT)
Dept: CARDIOLOGY CLINIC | Age: 78
End: 2023-01-10

## 2023-09-13 RX ORDER — CARVEDILOL 3.12 MG/1
TABLET ORAL
Qty: 60 TABLET | Refills: 4 | OUTPATIENT
Start: 2023-09-13

## (undated) DEVICE — DRAIN SURG FLAT W7MMXL20CM FULL PERF

## (undated) DEVICE — SPONGE GZ W4XL8IN COT WVN 12 PLY

## (undated) DEVICE — 3M™ STERI-STRIP™ REINFORCED ADHESIVE SKIN CLOSURES, R1548, 1 IN X 5 IN (25 MM X 125 MM), 4 STRIPS/ENVELOPE: Brand: 3M™ STERI-STRIP™

## (undated) DEVICE — SUTURE ETHLN SZ 3-0 L30IN NONABSORBABLE BLK FSL L30MM 3/8 1671H

## (undated) DEVICE — CRANI: Brand: MEDLINE INDUSTRIES, INC.

## (undated) DEVICE — ELECTRODE PT RET AD L9FT HI MOIST COND ADH HYDRGEL CORDED

## (undated) DEVICE — NEEDLE HYPO 25GA L1.5IN BLU POLYPR HUB S STL REG BVL STR

## (undated) DEVICE — SYRINGE MED 3ML CLR PLAS STD N CTRL LUERLOCK TIP DISP

## (undated) DEVICE — BANDAGE COMPR W4INXL12FT E DISP ESMARCH EVEN

## (undated) DEVICE — BAG DRNGE 9OZ L9.5IN BILE W/ ADPT TWO ADJ RUB BELT DISP FOR

## (undated) DEVICE — SOLUTION IV IRRIG 500ML 0.9% SODIUM CHL 2F7123

## (undated) DEVICE — SUTURE NRLN SZ 4-0 L18IN NONABSORBABLE BLK L13MM TF 1/2 CIR C584D

## (undated) DEVICE — GLOVE ORANGE PI 7 1/2   MSG9075

## (undated) DEVICE — PRESSURE MONITORING SET: Brand: TRUWAVE, VAMP PLUS

## (undated) DEVICE — DRESSING,GAUZE,XEROFORM,CURAD,1"X8",ST: Brand: CURAD

## (undated) DEVICE — HANDPIECE SET WITH HIGH FLOW TIP AND SUCTION TUBE: Brand: INTERPULSE

## (undated) DEVICE — BLADE CLIPPER SURG SENSICLIP

## (undated) DEVICE — BANDAGE,GAUZE,BULKEE II,4.5"X4.1YD,STRL: Brand: MEDLINE

## (undated) DEVICE — ADHESIVE SKIN CLSR 0.7ML TOP DERMBND ADV

## (undated) DEVICE — PRECISION SPECIMEN CONTAINER 4 OZ (118 ML) • POSITIVE SEAL INDICATOR OR PACKAGED: Brand: PRECISION

## (undated) DEVICE — SYRINGE MED 10ML TRNSLUC BRL PLUNG BLK MRK POLYPR CTRL

## (undated) DEVICE — CATHETER 46419 EDM LUMBAR 80CM W/TIP

## (undated) DEVICE — PACK ORTH LO EXT VI

## (undated) DEVICE — GLOVE SURG SZ 65 L12IN FNGR THK79MIL GRN LTX FREE

## (undated) DEVICE — TIP SUCT DIA12FR W STYL CTRL VENT DISPOSABLE FRAZ

## (undated) DEVICE — 3M™ COBAN™ NL STERILE NON-LATEX SELF-ADHERENT WRAP, 2084S, 4 IN X 5 YD (10 CM X 4,5 M), 18 ROLLS/CASE: Brand: 3M™ COBAN™

## (undated) DEVICE — GOWN SIRUS NONREIN XL W/TWL: Brand: MEDLINE INDUSTRIES, INC.

## (undated) DEVICE — Device

## (undated) DEVICE — DRESSING GERM DIA1IN CNTR H DIA7MM BLU CHG ANTIMIC PROTCT

## (undated) DEVICE — GAUZE,SPONGE,4"X4",8PLY,STRL,LF,10/TRAY: Brand: MEDLINE

## (undated) DEVICE — PRESSURE TUBING: Brand: TRUWAVE

## (undated) DEVICE — SHOE, POST-OPERATIVE: Brand: DEROYAL

## (undated) DEVICE — GLOVE SURG SZ 6 L12IN FNGR THK75MIL WHT LTX POLYMER BEAD

## (undated) DEVICE — INTENDED FOR TISSUE SEPARATION, AND OTHER PROCEDURES THAT REQUIRE A SHARP SURGICAL BLADE TO PUNCTURE OR CUT.: Brand: BARD-PARKER ® STAINLESS STEEL BLADES

## (undated) DEVICE — APPLICATOR PREP 26ML 0.7% IOD POVACRYLEX 74% ISO ALC ST

## (undated) DEVICE — SUTURE MCRYL SZ 4-0 L27IN ABSRB UD L19MM PS-2 1/2 CIR PRIM Y426H

## (undated) DEVICE — X-RAY CASSETTE COVER: Brand: CONVERTORS

## (undated) DEVICE — SOLUTION IV 1000ML 0.9% SOD CHL

## (undated) DEVICE — COTTON UNDERCAST PADDING,CRIMPED FINISH: Brand: WEBRIL

## (undated) DEVICE — GLOVE SURG SZ 75 L12IN FNGR THK94MIL TRNSLUC YEL LTX

## (undated) DEVICE — MAJOR SET UP PK

## (undated) DEVICE — TOWEL,STOP FLAG GOLD N-W: Brand: MEDLINE

## (undated) DEVICE — SYRINGE MED 10ML LUERLOCK TIP W/O SFTY DISP

## (undated) DEVICE — GLOVE SURG SZ 65 CRM LTX FREE POLYISOPRENE POLYMER BEAD ANTI

## (undated) DEVICE — KIT CATHETER 20GA L12CM ART CUST

## (undated) DEVICE — SUTURE VCRL SZ 2-0 L18IN ABSRB VLT L26MM SH 1/2 CIR J775D

## (undated) DEVICE — PAD,ABDOMINAL,8"X10",ST,LF: Brand: MEDLINE

## (undated) DEVICE — COVER LT HNDL CAM BLU DISP W/ SURG CTRL

## (undated) DEVICE — TOOL 9BA50 LEGEND 9CM 5MM BA: Brand: MIDAS REX

## (undated) DEVICE — TOOL F2/8TA23 LEGEND 8CM 2.3MM TAPER: Brand: MIDAS REX™

## (undated) DEVICE — PIN ADLT MAYFIELD RIGID MOLD FINGER

## (undated) DEVICE — UNDERGLOVE SURG SZ 8 BLU LTX FREE SYN POLYISOPRENE POLYMER